# Patient Record
Sex: FEMALE | Race: WHITE | Employment: OTHER | URBAN - METROPOLITAN AREA
[De-identification: names, ages, dates, MRNs, and addresses within clinical notes are randomized per-mention and may not be internally consistent; named-entity substitution may affect disease eponyms.]

---

## 2018-08-06 ENCOUNTER — OFFICE VISIT (OUTPATIENT)
Dept: CARDIOLOGY CLINIC | Age: 83
End: 2018-08-06
Payer: MEDICARE

## 2018-08-06 VITALS
DIASTOLIC BLOOD PRESSURE: 80 MMHG | BODY MASS INDEX: 24.75 KG/M2 | WEIGHT: 145 LBS | HEIGHT: 64 IN | RESPIRATION RATE: 14 BRPM | HEART RATE: 75 BPM | SYSTOLIC BLOOD PRESSURE: 144 MMHG

## 2018-08-06 DIAGNOSIS — I21.4 NSTEMI (NON-ST ELEVATED MYOCARDIAL INFARCTION) (HCC): Primary | ICD-10-CM

## 2018-08-06 DIAGNOSIS — I34.0 NON-RHEUMATIC MITRAL REGURGITATION: ICD-10-CM

## 2018-08-06 PROCEDURE — 99214 OFFICE O/P EST MOD 30 MIN: CPT | Performed by: INTERNAL MEDICINE

## 2018-08-06 PROCEDURE — 93000 ELECTROCARDIOGRAM COMPLETE: CPT | Performed by: INTERNAL MEDICINE

## 2018-08-06 RX ORDER — METOPROLOL SUCCINATE 25 MG/1
12.5 TABLET, EXTENDED RELEASE ORAL DAILY
Qty: 90 TABLET | Refills: 3
Start: 2018-08-06 | End: 2018-08-17

## 2018-08-06 RX ORDER — BIOTIN 1 MG
TABLET ORAL
COMMUNITY
End: 2019-05-15

## 2018-08-06 RX ORDER — AMOXICILLIN 500 MG
CAPSULE ORAL
COMMUNITY
End: 2019-05-15

## 2018-08-06 NOTE — PROGRESS NOTES
Estela Malcolm  1934  Date of Service: 8/6/2018    Patient Active Problem List    Diagnosis Date Noted    GI bleed 06/03/2016     Priority: High    NSTEMI (non-ST elevated myocardial infarction) ([facility] 06/03/2016     Priority: High    GIST (gastrointestinal stromal tumor), malignant ([facility] 05/25/2016     Priority: High    Moderate protein-calorie malnutrition ([facility] 06/06/2016     Priority: Medium    EKG abnormalities 06/03/2016     Priority: Medium    Chest pain 06/03/2016     Priority: Medium    Acute blood loss anemia 06/03/2016     Priority: Medium    Acidosis 06/04/2016    Protein calorie malnutrition ([facility] 06/04/2016    Diverticulitis of large intestine 05/24/2016    Hypertension     PUD (peptic ulcer disease)        Social History     Social History    Marital status:      Spouse name: N/A    Number of children: N/A    Years of education: N/A     Social History Main Topics    Smoking status: Never Smoker    Smokeless tobacco: Never Used    Alcohol use Yes      Comment: glass of wine with dinner    Drug use: No    Sexual activity: No     Other Topics Concern    None     Social History Narrative    None       Current Outpatient Prescriptions   Medication Sig Dispense Refill    Calcium Polycarbophil (FIBER-CAPS PO) Take by mouth      Biotin 1000 MCG TABS Take by mouth      Red Yeast Rice Extract (RED YEAST RICE PO) Take by mouth      Probiotic Product (PROBIOTIC DAILY PO) Take by mouth      Omega-3 Fatty Acids (FISH OIL) 1200 MG CAPS Take by mouth      metoprolol succinate (TOPROL XL) 25 MG extended release tablet Take 0.5 tablets by mouth daily 90 tablet 3    aspirin 81 MG EC tablet Take 81 mg by mouth as needed       diazepam (VALIUM) 5 MG tablet Take 5 mg by mouth nightly as needed for Sleep      Turmeric 450 MG CAPS Take by mouth daily Instructed to stop until after surgery       No current facility-administered medications for this visit.          Allergies Allergen Reactions    Carafate [Sucralfate]      Unable to explain    Cetirizine & Related      Unable to explain    Pravachol [Pravastatin Sodium]      Unable to explain    Protonix [Pantoprazole Sodium]      Unable to explain    Statins Other (See Comments)     intolerable       Chief Complaint:  Coreen Franco is here today for follow up and management/recomendations for CAD     History of Present Illness: Coreen Franco states that She does house work, goes up the stairs, & goes shopping. She denies any chest discomfort, dyspnea on exertionWith any of these activities. She denies orthopnea/PND, or lower extremity edema. She denies any palpitations or presyncopal symptoms. REVIEW OF SYSTEMS:  As above. Patient does not complain of any fever, chills, nausea, vomiting or diarrhea. No focal, motor or neurological deficits. No changes in his/her vision, hearing, bowel or bladder habits. She is not known to have a history of thyroid problems. No recent nose bleeds. PHYSICAL EXAM:  Vitals:    08/06/18 1009 08/06/18 1022   BP: 130/78 (!) 144/80   Pulse: 75    Resp: 14    Weight: 145 lb (65.8 kg)    Height: 5' 4\" (1.626 m)        GENERAL:  She is alert and oriented x 3, communicates well, in no distress. NECK:  No masses, trachea is mid position. Supple, full ROM, no JVD or bruits. No palpable thyromegaly or lymphadenopathy. HEART:  Regular rate and rhythm. Normal S1 and S2. There is an S4 gallop and a I/VI systolic murmur. LUNGS:  Clear to auscultation bilaterally. No use of accessory muscles. symmetrical excursion. ABDOMEN:  Soft, non-tender. Normal bowel sounds. EXTREMITIES:  Full ROM x 4. No bilateral lower extremity edema. Good distal pulses. EYES:  Extraocular muscles intact. PERRL. Normal lids & conjunctiva. ENT:  Nares are clear & not bleeding. Moist mucosa. Normal lips formation. No external masses   NEURO: no tremors, full ROM x 4, EOMI. SKIN:  Warm, dry and intact.

## 2018-08-15 ENCOUNTER — HOSPITAL ENCOUNTER (OUTPATIENT)
Dept: CARDIOLOGY | Age: 83
Discharge: HOME | End: 2018-08-15
Payer: MEDICARE

## 2018-08-15 DIAGNOSIS — I34.0 NON-RHEUMATIC MITRAL REGURGITATION: ICD-10-CM

## 2018-08-15 LAB
LV EF: 65 %
LVEF MODALITY: NORMAL

## 2018-08-15 PROCEDURE — 93306 TTE W/DOPPLER COMPLETE: CPT | Performed by: PSYCHIATRY & NEUROLOGY

## 2018-08-16 ENCOUNTER — TELEPHONE (OUTPATIENT)
Dept: CARDIOLOGY CLINIC | Age: 83
End: 2018-08-16

## 2018-08-16 NOTE — TELEPHONE ENCOUNTER
----- Message from Kaitlynn Chen DO sent at 8/16/2018  3:42 PM EDT -----  Let her know that the heart function is good. The moderate mitral regurgitation is unchanged.   ----- Message -----  From: Chuy Gracia Incoming Cardiology Results From [facility]  Sent: 8/15/2018   5:03 PM  To: Kaitlynn Chen DO

## 2018-08-20 ENCOUNTER — HOSPITAL ENCOUNTER (OUTPATIENT)
Age: 83
Discharge: HOME | End: 2018-08-22

## 2018-08-20 PROCEDURE — 88342 IMHCHEM/IMCYTCHM 1ST ANTB: CPT

## 2018-08-20 PROCEDURE — 88305 TISSUE EXAM BY PATHOLOGIST: CPT

## 2019-03-18 ENCOUNTER — TELEPHONE (OUTPATIENT)
Dept: ADMINISTRATIVE | Age: 84
End: 2019-03-18

## 2019-03-21 ENCOUNTER — OFFICE VISIT (OUTPATIENT)
Dept: CARDIOLOGY CLINIC | Age: 84
End: 2019-03-21
Payer: MEDICARE

## 2019-03-21 VITALS
RESPIRATION RATE: 18 BRPM | BODY MASS INDEX: 22.98 KG/M2 | HEIGHT: 66 IN | WEIGHT: 143 LBS | SYSTOLIC BLOOD PRESSURE: 146 MMHG | DIASTOLIC BLOOD PRESSURE: 86 MMHG | HEART RATE: 60 BPM

## 2019-03-21 DIAGNOSIS — R06.09 DOE (DYSPNEA ON EXERTION): ICD-10-CM

## 2019-03-21 DIAGNOSIS — I10 ESSENTIAL HYPERTENSION: ICD-10-CM

## 2019-03-21 DIAGNOSIS — I25.9 CHEST PAIN DUE TO MYOCARDIAL ISCHEMIA, UNSPECIFIED ISCHEMIC CHEST PAIN TYPE: ICD-10-CM

## 2019-03-21 DIAGNOSIS — I21.4 NSTEMI (NON-ST ELEVATED MYOCARDIAL INFARCTION) (HCC): Primary | ICD-10-CM

## 2019-03-21 PROCEDURE — 93000 ELECTROCARDIOGRAM COMPLETE: CPT | Performed by: INTERNAL MEDICINE

## 2019-03-21 PROCEDURE — 99214 OFFICE O/P EST MOD 30 MIN: CPT | Performed by: INTERNAL MEDICINE

## 2019-03-21 RX ORDER — LISINOPRIL 2.5 MG/1
2.5 TABLET ORAL DAILY
Qty: 90 TABLET | Refills: 3 | Status: SHIPPED | OUTPATIENT
Start: 2019-03-21 | End: 2019-04-01 | Stop reason: SINTOL

## 2019-03-21 RX ORDER — LORATADINE 10 MG/1
10 TABLET ORAL DAILY PRN
COMMUNITY

## 2019-03-22 ENCOUNTER — HOSPITAL ENCOUNTER (OUTPATIENT)
Dept: CARDIOLOGY | Age: 84
Discharge: HOME | End: 2019-03-22
Payer: MEDICARE

## 2019-03-22 VITALS
BODY MASS INDEX: 22.98 KG/M2 | HEIGHT: 66 IN | SYSTOLIC BLOOD PRESSURE: 120 MMHG | WEIGHT: 143 LBS | HEART RATE: 86 BPM | DIASTOLIC BLOOD PRESSURE: 72 MMHG

## 2019-03-22 DIAGNOSIS — R06.09 DOE (DYSPNEA ON EXERTION): ICD-10-CM

## 2019-03-22 DIAGNOSIS — R06.00 DYSPNEA, UNSPECIFIED TYPE: Primary | ICD-10-CM

## 2019-03-22 DIAGNOSIS — I21.4 NSTEMI (NON-ST ELEVATED MYOCARDIAL INFARCTION) (HCC): ICD-10-CM

## 2019-03-22 PROCEDURE — 6360000002 HC RX W HCPCS: Performed by: INTERNAL MEDICINE

## 2019-03-22 PROCEDURE — 78452 HT MUSCLE IMAGE SPECT MULT: CPT

## 2019-03-22 PROCEDURE — 93017 CV STRESS TEST TRACING ONLY: CPT

## 2019-03-22 PROCEDURE — 3430000000 HC RX DIAGNOSTIC RADIOPHARMACEUTICAL: Performed by: INTERNAL MEDICINE

## 2019-03-22 PROCEDURE — 2580000003 HC RX 258: Performed by: INTERNAL MEDICINE

## 2019-03-22 PROCEDURE — A9500 TC99M SESTAMIBI: HCPCS | Performed by: INTERNAL MEDICINE

## 2019-03-22 RX ORDER — SODIUM CHLORIDE 0.9 % (FLUSH) 0.9 %
10 SYRINGE (ML) INJECTION PRN
Status: DISCONTINUED | OUTPATIENT
Start: 2019-03-22 | End: 2019-03-23 | Stop reason: HOSPADM

## 2019-03-22 RX ADMIN — Medication 9.4 MILLICURIE: at 07:04

## 2019-03-22 RX ADMIN — Medication 10 ML: at 08:29

## 2019-03-22 RX ADMIN — Medication 30 MILLICURIE: at 08:28

## 2019-03-22 RX ADMIN — REGADENOSON 0.4 MG: 0.08 INJECTION, SOLUTION INTRAVENOUS at 08:28

## 2019-03-22 RX ADMIN — Medication 10 ML: at 07:04

## 2019-03-22 RX ADMIN — Medication 10 ML: at 08:28

## 2019-03-25 ENCOUNTER — TELEPHONE (OUTPATIENT)
Dept: CARDIOLOGY CLINIC | Age: 84
End: 2019-03-25

## 2019-03-27 ENCOUNTER — TELEPHONE (OUTPATIENT)
Dept: CARDIOLOGY CLINIC | Age: 84
End: 2019-03-27

## 2019-03-27 NOTE — TELEPHONE ENCOUNTER
I do not think lisinopril causes those symptoms. I would recommend following with primary care provider for dysphagia.   If she has any swelling of the tongue then we should stop lisinopril

## 2019-03-27 NOTE — TELEPHONE ENCOUNTER
Patient notified of Dr. Guardado's recommendations. She states she developed the symptoms after only (1) dose of Lisinopril and she refuses to take it again. Advised patient that I would forward information to Dr. Shireen Calderon for him to address upon his return.

## 2019-04-01 RX ORDER — AMLODIPINE BESYLATE 2.5 MG/1
2.5 TABLET ORAL DAILY
Qty: 30 TABLET | Refills: 11 | Status: SHIPPED | OUTPATIENT
Start: 2019-04-01 | End: 2019-05-23 | Stop reason: ALTCHOICE

## 2019-04-01 NOTE — TELEPHONE ENCOUNTER
Patient notified of Dr. Shanti Pedersen recommendation. [facility] e-scribed to pharmacy.

## 2019-05-08 ENCOUNTER — OFFICE VISIT (OUTPATIENT)
Dept: FAMILY MEDICINE CLINIC | Age: 84
End: 2019-05-08
Payer: MEDICARE

## 2019-05-08 VITALS
WEIGHT: 142 LBS | SYSTOLIC BLOOD PRESSURE: 138 MMHG | HEIGHT: 66 IN | OXYGEN SATURATION: 100 % | TEMPERATURE: 97.7 F | RESPIRATION RATE: 16 BRPM | DIASTOLIC BLOOD PRESSURE: 72 MMHG | HEART RATE: 70 BPM | BODY MASS INDEX: 22.82 KG/M2

## 2019-05-08 DIAGNOSIS — I10 ESSENTIAL HYPERTENSION: Primary | ICD-10-CM

## 2019-05-08 DIAGNOSIS — R42 DIZZINESS: ICD-10-CM

## 2019-05-08 PROCEDURE — 93000 ELECTROCARDIOGRAM COMPLETE: CPT | Performed by: FAMILY MEDICINE

## 2019-05-08 PROCEDURE — 99213 OFFICE O/P EST LOW 20 MIN: CPT | Performed by: FAMILY MEDICINE

## 2019-05-08 RX ORDER — CLOPIDOGREL BISULFATE 75 MG/1
75 TABLET ORAL DAILY
COMMUNITY
End: 2019-05-16 | Stop reason: ALTCHOICE

## 2019-05-08 ASSESSMENT — ENCOUNTER SYMPTOMS
NAUSEA: 0
SHORTNESS OF BREATH: 1
SWOLLEN GLANDS: 0
COUGH: 0
VISUAL CHANGE: 0
SORE THROAT: 0

## 2019-05-08 NOTE — PROGRESS NOTES
5/8/19    Name: Ledy Ferraro  QGJ:[id number]   Sex:female   Age:85 y.o. Subjective:  Chief Complaint   Patient presents with    Fatigue      says she feels this suddenly and then has to sit down for the past week    Shortness of Breath     feels this when the sudden weakness occurs     Patient for acute visit  Having some dizziness and weakness  But usually only occurs after taking the plavix  We stopped the hydrocodone, she is eating well, drinking more fluids and getting plenty of rest  Vital signs reviewed today and they are very good    Fatigue   This is a new problem. The current episode started 1 to 4 weeks ago. The problem occurs intermittently. The problem has been waxing and waning. Associated symptoms include fatigue and vertigo. Pertinent negatives include no anorexia, arthralgias, chills, congestion, coughing, headaches, nausea, neck pain, sore throat, swollen glands or visual change. Exacerbated by: medication. Treatments tried: we tried changing meds and increasing fluids with no help. The treatment provided mild relief. Shortness of Breath   Pertinent negatives include no headaches, neck pain, sore throat or swollen glands.      ROS:    As above  Other pertinenet systems reviewed and negative   with her today an dhe really feels like she is doing better and these s/s are intermitent but occur after the plavix      Current Outpatient Medications:     clopidogrel (PLAVIX) 75 MG tablet, Take 75 mg by mouth daily, Disp: , Rfl:     amLODIPine (NORVASC) 2.5 MG tablet, Take 1 tablet by mouth daily, Disp: 30 tablet, Rfl: 11    loratadine (CLARITIN) 10 MG tablet, Take 10 mg by mouth as needed, Disp: , Rfl:     aspirin 81 MG tablet, Take 81 mg by mouth daily, Disp: , Rfl:     Calcium Polycarbophil (FIBER-CAPS PO), Take by mouth, Disp: , Rfl:     Biotin 1000 MCG TABS, Take by mouth, Disp: , Rfl:     Red Yeast Rice Extract (RED YEAST RICE PO), Take by mouth, Disp: , Rfl:     Probiotic Product (PROBIOTIC DAILY PO), Take by mouth, Disp: , Rfl:     Omega-3 Fatty Acids (FISH OIL) 1200 MG CAPS, Take by mouth, Disp: , Rfl:   Allergies   Allergen Reactions    Carafate [Sucralfate]      Unable to explain    Cefdinir     Cetirizine & Related      Unable to explain    Lisinopril Other (See Comments)     Difficulty swallowing, reflux, headache    Pravachol [Pravastatin Sodium]      Unable to explain    Protonix [Pantoprazole Sodium]      Unable to explain    Statins Other (See Comments)     intolerable        Vitals:    05/08/19 1110   BP: 138/72   Pulse: 70   Resp: 16   Temp: 97.7 °F (36.5 °C)   SpO2: 100%       EXAM:   Physical Exam   Constitutional: She is oriented to person, place, and time. She appears well-developed and well-nourished. No distress. HENT:   Head: Normocephalic and atraumatic. Mouth/Throat: Oropharynx is clear and moist.   Eyes: Pupils are equal, round, and reactive to light. Conjunctivae and EOM are normal.   Neck: Normal range of motion. Neck supple. No JVD present. No thyromegaly present. Cardiovascular: Normal rate, regular rhythm and normal heart sounds. Pulmonary/Chest: Effort normal and breath sounds normal. She has no wheezes. She has no rales. Abdominal: Soft. Bowel sounds are normal. She exhibits no distension and no mass. There is no rebound and no guarding. Musculoskeletal: Normal range of motion. She exhibits no edema or tenderness. Neurological: She is alert and oriented to person, place, and time. She displays normal reflexes. No cranial nerve deficit. Skin: Skin is warm and dry. No rash noted. No erythema. Psychiatric: She has a normal mood and affect. Judgment and thought content normal.   Nursing note and vitals reviewed. Colt Rader was seen today for fatigue and shortness of breath.     Diagnoses and all orders for this visit:    Essential hypertension  -     EKG 12 Lead    Dizziness  -     EKG 12 Lead      [facility] about maybe switching the plavix, did asvise her not to take any of it till I call her regarding this or Radha calls her      Seen by:   Radha Sheriff, DO

## 2019-05-14 ENCOUNTER — TELEPHONE (OUTPATIENT)
Dept: PHARMACY | Facility: CLINIC | Age: 84
End: 2019-05-14

## 2019-05-14 DIAGNOSIS — Z98.890 HISTORY OF RIGHT-SIDED CAROTID ENDARTERECTOMY: Primary | ICD-10-CM

## 2019-05-14 PROCEDURE — 1111F DSCHRG MED/CURRENT MED MERGE: CPT

## 2019-05-14 NOTE — TELEPHONE ENCOUNTER
CLINICAL PHARMACY NOTE  Post-Discharge Transitions of Care (ALEJANDRINA)    Patient appears to have been discharged from [facility] on 04/21/2019. Please follow-up with patient to review medications.       30 days since discharge = 04/21/2019     Quinn   [phone] or [phone] (Option 7)

## 2019-05-15 ENCOUNTER — HOSPITAL ENCOUNTER (OUTPATIENT)
Age: 84
Discharge: HOME | End: 2019-05-17
Payer: MEDICARE

## 2019-05-15 DIAGNOSIS — I10 ESSENTIAL HYPERTENSION: ICD-10-CM

## 2019-05-15 LAB
ANION GAP SERPL CALCULATED.3IONS-SCNC: 13 MMOL/L (ref 7–16)
BUN BLDV-MCNC: 13 MG/DL (ref 8–23)
CALCIUM SERPL-MCNC: 9.9 MG/DL (ref 8.6–10.2)
CHLORIDE BLD-SCNC: 104 MMOL/L (ref 98–107)
CO2: 24 MMOL/L (ref 22–29)
CREAT SERPL-MCNC: 0.9 MG/DL (ref 0.5–1)
GFR AFRICAN AMERICAN: >60
GFR NON-AFRICAN AMERICAN: 59 ML/MIN/1.73
GLUCOSE BLD-MCNC: 114 MG/DL (ref 74–99)
POTASSIUM SERPL-SCNC: 4.6 MMOL/L (ref 3.5–5)
SODIUM BLD-SCNC: 141 MMOL/L (ref 132–146)

## 2019-05-15 PROCEDURE — 80048 BASIC METABOLIC PNL TOTAL CA: CPT

## 2019-05-15 PROCEDURE — 36415 COLL VENOUS BLD VENIPUNCTURE: CPT

## 2019-05-15 NOTE — TELEPHONE ENCOUNTER
CLINICAL PHARMACY NOTE  Post-Discharge Transitions of Care (ALEJANDRINA)    Dr. Janelle Cox, patient's next appointment is scheduled with you 6/17/19. Many changes made to medication list during our phone conversation. Patients states she is not taking amlodipine that was started by Dr. Klever Collins for hypertension (was to replace lisinopril she thought she had side effects from). Per pharmacy, patient never picked up the amlodipine. Patient states she would just wait and talk with her doctor about this medication. Have kept it on the medication list for now, but marked it as not taking. Thank you,  Andre Hughes, PharmD, [place], [address] [place] [address],[address] [facility] Clinical Pharmacist  C: [phone]  O: [phone]  Department, toll free: [phone], option 7   =================================================  Non-face-to-face services provided:  Assessment and support for treatment adherence and medication management-Medication reconciliation completed 5/15/19    Subjective/Objective:  Heath Belcher is a 80 y.o. female. Patient was discharged from [facility] on 4/21/19. Patient outreach to review discharge medications and provide medication review and management. Spoke with patient    Allergies   Allergen Reactions    Carafate [Sucralfate]      Unable to explain    Cefdinir     Cetirizine & Related      Unable to explain    Lisinopril Other (See Comments)     Difficulty swallowing, reflux, headache    Pravachol [Pravastatin Sodium]      Unable to explain    Protonix [Pantoprazole Sodium]      Unable to explain    Statins Other (See Comments)     intolerable     Medication Sig Notes    clopidogrel (PLAVIX) 75 MG tablet      [phone] Dr. Yennifer Saravia office number    Take 75 mg by mouth daily Patient states she took 1 dose and did not feel well. Asked what symptoms she had- she states she can't remember but will not take again.   \"Left message with Radha at DR Phillips office about maybe switching the plavix, did asvise her not to take any of it till I call her regarding this or Radha calls her. \" This is from Dr. Stefani Kaur last office visit note. Per Roberta Schilder at Dr. Mateo Pandey office, patient was just there 5/13/19 and Dr. Carli Amato notes that patient can stop clopidogrel due to side effects and continue aspirin 81 mg daily.  amLODIPine (NORVASC) 2.5 MG tablet Take 1 tablet by mouth daily Patient states not taking. Did not recognize name. Octaviano GRAY 7. who states never picked up. Patient states she has 's appointments coming up soon and can talk about it then.  loratadine (CLARITIN) 10 MG tablet Take 10 mg by mouth as needed allergies    aspirin 81 MG tablet Take 81 mg by mouth daily     Calcium Polycarbophil (FIBER-CAPS PO) Take by mouth States does not take, can remove from med list    Biotin 1000 MCG TABS Take by mouth States does not take, can remove from med list    Red Yeast Rice Extract (RED YEAST RICE PO) Take by mouth States does not take, can remove from med list    Probiotic Product (PROBIOTIC DAILY PO) Take by mouth     Omega-3 Fatty Acids (FISH OIL) 1200 MG CAPS Take by mouth States does not take, can remove from med list     Additional Medications:  None reported    CrCl cannot be calculated (Patient's most recent lab result is older than the maximum 120 days allowed. ). Assessment/Plan:  - Medication reconciliation completed. Number of medications reviewed: 9    Number of discrepancies: 1. Instructions per discharge list provided except per below documentation. Identified medication discrepancies/issues:   · Category 3 (1):  · Patient had recent surgery with Dr. Lacho Pineda and states she stopped the clopidogrel he started. No documentation in chart if this was okay with Dr. Carli Amato as outside provider. Called his office and spoke with Roberta Schilder who states Dr. Cristopher Bes it, and patient to keep taking aspirin 81 mg daily. · Category 4 (1):  1.  Medication list updated    - [facility] active medication list updated:  · Medications Added (0):    · Medications Removed (5):  Red yeast rice, Fish oil, Fiber, biotin, clopidogrel  · Medications Changed (1):  loratadine    - Identified Potential Medication Interactions: No clinically significant interactions identified via [employer] Interaction Analysis as category D or higher.    - Renal Dosing: No renal adjustments necessary.     Future Appointments   Date Time Provider Angelo Jordan   6/14/2019  8:15 AM [facility]   6/17/2019  8:45 AM DO MOON [facility]     Thank you,    Lydia Rubi, PharmD  [address]  Phone: C: [phone]  O: [phone]  Toll free: [phone], option 7    CLINICAL PHARMACY NOTE   POST-DISCHARGE TELEPHONE FOLLOW-UP ADDENDUM  For Pharmacy Admin Tracking Only  TCM Call Made?: No  [facility] Select Patient?: Yes  Total # of Interventions Recommended: 2 -   - Updated Order #: 2  Total # Interventions Accepted: 2  Intervention Severity:   - Level 1 Intervention Present?: No   - Level 2 #: 0   - Level 3 #: 1  Outreach Status: Review Complete  Care Coordinator Outreach to Patient?: No  Provider Contacted?: Yes - Office Called  Waiting on response from: n/a  Time Spent (min): 35

## 2019-05-20 ENCOUNTER — OFFICE VISIT (OUTPATIENT)
Dept: FAMILY MEDICINE CLINIC | Age: 84
End: 2019-05-20
Payer: MEDICARE

## 2019-05-20 VITALS
HEART RATE: 68 BPM | SYSTOLIC BLOOD PRESSURE: 140 MMHG | TEMPERATURE: 97.7 F | DIASTOLIC BLOOD PRESSURE: 92 MMHG | OXYGEN SATURATION: 99 %

## 2019-05-20 DIAGNOSIS — R42 VERTIGO: Primary | ICD-10-CM

## 2019-05-20 PROCEDURE — 99213 OFFICE O/P EST LOW 20 MIN: CPT | Performed by: FAMILY MEDICINE

## 2019-05-20 RX ORDER — MECLIZINE HYDROCHLORIDE 25 MG/1
25 TABLET ORAL 3 TIMES DAILY PRN
Qty: 30 TABLET | Refills: 1 | Status: SHIPPED | OUTPATIENT
Start: 2019-05-20 | End: 2019-05-30

## 2019-05-20 NOTE — PROGRESS NOTES
normal.   Neck: Normal range of motion. Cardiovascular: Normal rate and regular rhythm. Pulmonary/Chest: Effort normal and breath sounds normal.   Neurological: She is alert and oriented to person, place, and time. Skin: Skin is warm and dry. Nursing note and vitals reviewed. Delfin Goetz was seen today for dizziness. Diagnoses and all orders for this visit:    Vertigo  -     External Referral To Audiology  -     meclizine (ANTIVERT) 25 MG tablet; Take 1 tablet by mouth 3 times daily as needed for Dizziness        Seen by:   Lakesha Meraz DO

## 2019-05-22 ENCOUNTER — TELEPHONE (OUTPATIENT)
Dept: CARDIOLOGY CLINIC | Age: 84
End: 2019-05-22

## 2019-05-23 ENCOUNTER — TELEPHONE (OUTPATIENT)
Dept: ADMINISTRATIVE | Age: 84
End: 2019-05-23

## 2019-05-23 NOTE — TELEPHONE ENCOUNTER
Patient calling stating since  feeling weak and having a hard time focusing and  Some dizziness. Started new medication Norvasc  on Monday. Please advise.

## 2019-05-28 ENCOUNTER — OFFICE VISIT (OUTPATIENT)
Dept: CARDIOLOGY CLINIC | Age: 84
End: 2019-05-28
Payer: MEDICARE

## 2019-05-28 VITALS
DIASTOLIC BLOOD PRESSURE: 80 MMHG | BODY MASS INDEX: 22.98 KG/M2 | RESPIRATION RATE: 16 BRPM | WEIGHT: 143 LBS | HEART RATE: 71 BPM | HEIGHT: 66 IN | SYSTOLIC BLOOD PRESSURE: 128 MMHG

## 2019-05-28 DIAGNOSIS — R06.00 DYSPNEA, UNSPECIFIED TYPE: Primary | ICD-10-CM

## 2019-05-28 PROCEDURE — 93000 ELECTROCARDIOGRAM COMPLETE: CPT | Performed by: INTERNAL MEDICINE

## 2019-05-28 PROCEDURE — 99214 OFFICE O/P EST MOD 30 MIN: CPT | Performed by: INTERNAL MEDICINE

## 2019-05-28 NOTE — PROGRESS NOTES
Sheri Saeed  1934  Date of Service: 5/28/2019    Patient Active Problem List    Diagnosis Date Noted    GI bleed 06/03/2016     Priority: High    NSTEMI (non-ST elevated myocardial infarction) ([facility] 06/03/2016     Priority: High    GIST (gastrointestinal stromal tumor), malignant ([facility] 05/25/2016     Priority: High    Moderate protein-calorie malnutrition ([facility] 06/06/2016     Priority: Medium    EKG abnormalities 06/03/2016     Priority: Medium    Chest pain 06/03/2016     Priority: Medium    Acute blood loss anemia 06/03/2016     Priority: Medium    Acidosis 06/04/2016    Protein calorie malnutrition ([facility] 06/04/2016    Diverticulitis of large intestine 05/24/2016    Hypertension     PUD (peptic ulcer disease)        Social History     Socioeconomic History    Marital status:      Spouse name: None    Number of children: None    Years of education: None    Highest education level: None   Occupational History    None   Social Needs    Financial resource strain: None    Food insecurity:     Worry: None     Inability: None    Transportation needs:     Medical: None     Non-medical: None   Tobacco Use    Smoking status: Never Smoker    Smokeless tobacco: Never Used   Substance and Sexual Activity    Alcohol use: Yes     Comment: glass of wine with dinner    Drug use: No    Sexual activity: Never   Lifestyle    Physical activity:     Days per week: None     Minutes per session: None    Stress: None   Relationships    Social connections:     Talks on phone: None     Gets together: None     Attends Confucianist service: None     Active member of club or organization: None     Attends meetings of clubs or organizations: None     Relationship status: None    Intimate partner violence:     Fear of current or ex partner: None     Emotionally abused: None     Physically abused: None     Forced sexual activity: None   Other Topics Concern    None   Social History Narrative    None       Current Outpatient Medications   Medication Sig Dispense Refill    meclizine (ANTIVERT) 25 MG tablet Take 1 tablet by mouth 3 times daily as needed for Dizziness 30 tablet 1    loratadine (CLARITIN) 10 MG tablet Take 10 mg by mouth daily as needed (allergies)       aspirin 81 MG tablet Take 81 mg by mouth daily      Probiotic Product (PROBIOTIC DAILY PO) Take by mouth       No current facility-administered medications for this visit. Allergies   Allergen Reactions    Carafate [Sucralfate]      Unable to explain    Cefdinir     Cetirizine & Related      Unable to explain    Lisinopril Other (See Comments)     Difficulty swallowing, reflux, headache    Pravachol [Pravastatin Sodium]      Unable to explain    Protonix [Pantoprazole Sodium]      Unable to explain    Statins Other (See Comments)     intolerable       Chief Complaint:  Ledy Ferraro is here today for follow up and management/recomendations for CAD     History of Present Illness: Ledy Ferraro is an extremely difficult historian. She states that she continues to feel the same easily fatigued. She occasionally does go upstairs and does some light housework. She denies any chest discomfort or dyspnea. She is being seen here today urgently due to lightheadedness was initially reported after she started Norvasc. The Norvasc was discontinued. She did see Dr. Errol Portillo and was started on Antivert. She states that the Antivert is helping. She states that she still does have symptoms. Initially she states that happens when she stands up. However, again she is an extremely difficult historian and then states that the symptoms started approximately 2 weeks after she started wearing hearing aids and also occur when she turns her head. She denies orthopnea/PND, or lower extremity edema. She denies any palpitations. REVIEW OF SYSTEMS:  As above. Patient does not complain of any fever, chills, nausea, vomiting or diarrhea.  No focal, symptoms. She defers at this time and wishes to workup and treat her vertigo 1st. I do agree that the majority of her symptoms sound more consistent with vertigo. She has stopped the Norvasc. Her blood pressure is good today. 3. I will defer the vertigo to others. Thank you for allowing me to participate in your patient's care.       [address], [address] [facility]  Interventional Cardiology

## 2019-05-30 ENCOUNTER — TELEPHONE (OUTPATIENT)
Dept: CARDIOLOGY CLINIC | Age: 84
End: 2019-05-30

## 2019-05-30 NOTE — TELEPHONE ENCOUNTER
Patient called to inquire whether or not she should continue to take Aspirin 81 mg daily. Please advise.

## 2019-06-14 ENCOUNTER — PROCEDURE VISIT (OUTPATIENT)
Dept: PODIATRY | Age: 84
End: 2019-06-14
Payer: MEDICARE

## 2019-06-14 VITALS
DIASTOLIC BLOOD PRESSURE: 82 MMHG | HEIGHT: 66 IN | SYSTOLIC BLOOD PRESSURE: 142 MMHG | WEIGHT: 145.8 LBS | BODY MASS INDEX: 23.43 KG/M2

## 2019-06-14 DIAGNOSIS — I73.9 PVD (PERIPHERAL VASCULAR DISEASE) (HCC): ICD-10-CM

## 2019-06-14 DIAGNOSIS — M79.675 PAIN OF TOE OF LEFT FOOT: ICD-10-CM

## 2019-06-14 DIAGNOSIS — M79.674 PAIN OF TOE OF RIGHT FOOT: ICD-10-CM

## 2019-06-14 DIAGNOSIS — B35.1 ONYCHOMYCOSIS: Primary | ICD-10-CM

## 2019-06-14 DIAGNOSIS — R26.2 DIFFICULTY WALKING: ICD-10-CM

## 2019-06-14 PROCEDURE — 11721 DEBRIDE NAIL 6 OR MORE: CPT | Performed by: PODIATRIST

## 2019-06-14 NOTE — PROGRESS NOTES
19     Autry Oppenheim    : 1934  Sex: female  Age: 80 y.o. Subjective: The patient is seen today for evaluation regarding foot evaluation and mycotic nail care. No other complaints noted. Chief Complaint   Patient presents with    Nail Problem     nail care       Current Medications:    Current Outpatient Medications:     loratadine (CLARITIN) 10 MG tablet, Take 10 mg by mouth daily as needed (allergies) , Disp: , Rfl:     aspirin 81 MG tablet, Take 81 mg by mouth daily, Disp: , Rfl:     Probiotic Product (PROBIOTIC DAILY PO), Take by mouth, Disp: , Rfl:     Allergies: Allergies   Allergen Reactions    Carafate [Sucralfate]      Unable to explain    Cefdinir     Cetirizine & Related      Unable to explain    Lisinopril Other (See Comments)     Difficulty swallowing, reflux, headache    Pravachol [Pravastatin Sodium]      Unable to explain    Protonix [Pantoprazole Sodium]      Unable to explain    Statins Other (See Comments)     intolerable       Past Surgical History:   Procedure Laterality Date    COLONOSCOPY      DILATATION, ESOPHAGUS      ENDOSCOPY, COLON, DIAGNOSTIC  2016    marking of gastric tumor    HYSTERECTOMY      OTHER SURGICAL HISTORY  2016    Laparoscopic Robotic Assisted Partial Gastrectomy    SHOULDER SURGERY Left     UPPER GASTROINTESTINAL ENDOSCOPY      UPPER GASTROINTESTINAL ENDOSCOPY  2016     Past Medical History:   Diagnosis Date    Diverticulosis     GIST [facility]gastrointestinal stroma tumor), malignant[facility] [address]     Hyperlipidemia     Hypertension     MI (myocardial infarction) ([facility] [address]     PUD (peptic ulcer disease)        Vitals:    19 0824   BP: (!) 142/82   Weight: 145 lb 12.8 oz (66.1 kg)   Height: 5' 6\" (1.676 m)       Exam:  Neurovascular status unchanged.   At this time the nail/s 1, 2, 3, 4, 5 right foot and nail/s 1, 2, 3, 4, 5 left foot are noted to be thickened, dystrophic and discolored with subungual debris present. Tenderness noted to palpation. Minimal hair growth is noted to both lower extremities. Edematous issues noted to both lower extremities with varicosities present bilaterally. Coolness is noted to the digital regions to palpation. Capillary fill time delayed digital areas bilateral foot. No heel fissuring or macerations of the web spaces. No plantar calluses and/or ulcerative areas are noted. Patient is having difficulty with gait/walking. Plan Per Assessment  Keely Valdez was seen today for nail problem. Diagnoses and all orders for this visit:    Onychomycosis    Pain of toe of right foot    Pain of toe of left foot    PVD (peripheral vascular disease) ([facility] [address]    Difficulty walking        1. Evaluation and Management  2. Manual and electrical debridement of the mycotic nails was performed for thickness and length to prevent injection and/or ulceration. 3. I recommended antifungal cream to the nails daily. 4. It was discussed in detail with the patient proper caring for the vascular compromised foot. The fact that they have compromised blood flow put the patient at risk for infection/gangrene/amputation. The patient should not walk barefoot. Shoe gear should fit properly and socks should be worn with shoes. Exercise is very important to prevent worsening of the disease process but before performing an exercise program should check with their family physician first.  If any skin lesions are noted, they are instructed to contact the office immediately. 5. We will see the patient back at a later date for continued podiatric management and care. Patient was advised to call the office with any questions or concerns prior to their next appointment if needed. Seen By:    Pratik Richter DPM    Electronically signed by Pratik Richter DPM on 6/14/2019 at 9:02 AM      This note was created using voice recognition software.   The note was reviewed however may contain

## 2019-06-14 NOTE — PROGRESS NOTES
Patient in today for nail care. Patient does not have any complaints of pain at this time.  Patient's PCP is Radha Sheriff DO date of last ov     5-20-19    Darren Moreno LPN

## 2019-08-20 ENCOUNTER — OFFICE VISIT (OUTPATIENT)
Dept: FAMILY MEDICINE CLINIC | Age: 84
End: 2019-08-20
Payer: MEDICARE

## 2019-08-20 ENCOUNTER — HOSPITAL ENCOUNTER (OUTPATIENT)
Age: 84
Discharge: HOME | End: 2019-08-22
Payer: MEDICARE

## 2019-08-20 VITALS
OXYGEN SATURATION: 99 % | TEMPERATURE: 98.4 F | DIASTOLIC BLOOD PRESSURE: 80 MMHG | BODY MASS INDEX: 24.16 KG/M2 | HEART RATE: 80 BPM | WEIGHT: 145 LBS | HEIGHT: 65 IN | SYSTOLIC BLOOD PRESSURE: 138 MMHG

## 2019-08-20 DIAGNOSIS — I73.9 PVD (PERIPHERAL VASCULAR DISEASE) (HCC): ICD-10-CM

## 2019-08-20 DIAGNOSIS — E55.9 VITAMIN D DEFICIENCY: ICD-10-CM

## 2019-08-20 DIAGNOSIS — E07.9 THYROID DISEASE: ICD-10-CM

## 2019-08-20 DIAGNOSIS — E78.2 MIXED HYPERLIPIDEMIA: Primary | ICD-10-CM

## 2019-08-20 DIAGNOSIS — I10 ESSENTIAL HYPERTENSION: ICD-10-CM

## 2019-08-20 DIAGNOSIS — E78.2 MIXED HYPERLIPIDEMIA: ICD-10-CM

## 2019-08-20 PROBLEM — R26.2 DIFFICULTY WALKING: Status: RESOLVED | Noted: 2019-06-14 | Resolved: 2019-08-20

## 2019-08-20 PROBLEM — B35.1 ONYCHOMYCOSIS: Status: RESOLVED | Noted: 2019-06-14 | Resolved: 2019-08-20

## 2019-08-20 LAB
ALBUMIN SERPL-MCNC: 4.4 G/DL (ref 3.5–5.2)
ALP BLD-CCNC: 106 U/L (ref 35–104)
ALT SERPL-CCNC: 13 U/L (ref 0–32)
ANION GAP SERPL CALCULATED.3IONS-SCNC: 18 MMOL/L (ref 7–16)
AST SERPL-CCNC: 22 U/L (ref 0–31)
BASOPHILS ABSOLUTE: 0.05 E9/L (ref 0–0.2)
BASOPHILS RELATIVE PERCENT: 1 % (ref 0–2)
BILIRUB SERPL-MCNC: 0.3 MG/DL (ref 0–1.2)
BUN BLDV-MCNC: 18 MG/DL (ref 8–23)
CALCIUM SERPL-MCNC: 9.9 MG/DL (ref 8.6–10.2)
CHLORIDE BLD-SCNC: 107 MMOL/L (ref 98–107)
CHOLESTEROL, TOTAL: 369 MG/DL (ref 0–199)
CO2: 18 MMOL/L (ref 22–29)
CREAT SERPL-MCNC: 0.9 MG/DL (ref 0.5–1)
EOSINOPHILS ABSOLUTE: 0.14 E9/L (ref 0.05–0.5)
EOSINOPHILS RELATIVE PERCENT: 2.8 % (ref 0–6)
GFR AFRICAN AMERICAN: >60
GFR NON-AFRICAN AMERICAN: 59 ML/MIN/1.73
GLUCOSE BLD-MCNC: 115 MG/DL (ref 74–99)
HCT VFR BLD CALC: 49.4 % (ref 34–48)
HDLC SERPL-MCNC: 72 MG/DL
HEMOGLOBIN: 15.5 G/DL (ref 11.5–15.5)
IMMATURE GRANULOCYTES #: 0.01 E9/L
IMMATURE GRANULOCYTES %: 0.2 % (ref 0–5)
LDL CHOLESTEROL CALCULATED: 249 MG/DL (ref 0–99)
LYMPHOCYTES ABSOLUTE: 1.43 E9/L (ref 1.5–4)
LYMPHOCYTES RELATIVE PERCENT: 29.1 % (ref 20–42)
MCH RBC QN AUTO: 29.9 PG (ref 26–35)
MCHC RBC AUTO-ENTMCNC: 31.4 % (ref 32–34.5)
MCV RBC AUTO: 95.4 FL (ref 80–99.9)
MONOCYTES ABSOLUTE: 0.48 E9/L (ref 0.1–0.95)
MONOCYTES RELATIVE PERCENT: 9.8 % (ref 2–12)
NEUTROPHILS ABSOLUTE: 2.81 E9/L (ref 1.8–7.3)
NEUTROPHILS RELATIVE PERCENT: 57.1 % (ref 43–80)
PDW BLD-RTO: 13.2 FL (ref 11.5–15)
PLATELET # BLD: 183 E9/L (ref 130–450)
PMV BLD AUTO: 12 FL (ref 7–12)
POTASSIUM SERPL-SCNC: 4.4 MMOL/L (ref 3.5–5)
RBC # BLD: 5.18 E12/L (ref 3.5–5.5)
SODIUM BLD-SCNC: 143 MMOL/L (ref 132–146)
TOTAL PROTEIN: 7.4 G/DL (ref 6.4–8.3)
TRIGL SERPL-MCNC: 240 MG/DL (ref 0–149)
TSH SERPL DL<=0.05 MIU/L-ACNC: 4.7 UIU/ML (ref 0.27–4.2)
VITAMIN D 25-HYDROXY: 22 NG/ML (ref 30–100)
VLDLC SERPL CALC-MCNC: 48 MG/DL
WBC # BLD: 4.9 E9/L (ref 4.5–11.5)

## 2019-08-20 PROCEDURE — 3288F FALL RISK ASSESSMENT DOCD: CPT | Performed by: FAMILY MEDICINE

## 2019-08-20 PROCEDURE — G8510 SCR DEP NEG, NO PLAN REQD: HCPCS | Performed by: FAMILY MEDICINE

## 2019-08-20 PROCEDURE — 80061 LIPID PANEL: CPT

## 2019-08-20 PROCEDURE — 99214 OFFICE O/P EST MOD 30 MIN: CPT | Performed by: FAMILY MEDICINE

## 2019-08-20 PROCEDURE — 85025 COMPLETE CBC W/AUTO DIFF WBC: CPT

## 2019-08-20 PROCEDURE — 80053 COMPREHEN METABOLIC PANEL: CPT

## 2019-08-20 PROCEDURE — 84443 ASSAY THYROID STIM HORMONE: CPT

## 2019-08-20 PROCEDURE — 82306 VITAMIN D 25 HYDROXY: CPT

## 2019-08-20 PROCEDURE — 36415 COLL VENOUS BLD VENIPUNCTURE: CPT

## 2019-08-20 ASSESSMENT — PATIENT HEALTH QUESTIONNAIRE - PHQ9
SUM OF ALL RESPONSES TO PHQ QUESTIONS 1-9: 2
SUM OF ALL RESPONSES TO PHQ QUESTIONS 1-9: 2
2. FEELING DOWN, DEPRESSED OR HOPELESS: 1
SUM OF ALL RESPONSES TO PHQ9 QUESTIONS 1 & 2: 2
1. LITTLE INTEREST OR PLEASURE IN DOING THINGS: 1

## 2019-08-20 ASSESSMENT — ENCOUNTER SYMPTOMS
WHEEZING: 0
SINUS PAIN: 0
DIARRHEA: 0
CONSTIPATION: 0
EYE PAIN: 0
SORE THROAT: 0
VOMITING: 0
TROUBLE SWALLOWING: 0
NAUSEA: 0
ABDOMINAL PAIN: 0
BACK PAIN: 0
CHEST TIGHTNESS: 0
COUGH: 0
SHORTNESS OF BREATH: 0

## 2019-08-20 NOTE — PROGRESS NOTES
8/20/19    Name: Filomena Alonso  MDC:[id number]   Sex:female   Age:85 y.o. Chief Complaint   Patient presents with    Hypertension     Here for recheck of hypertension. Patient still has numbness on right side of neck and feels that her right face is droopy. She says here lately in the past month she has been depressed due to family issues. Blood pressures running very good  She has increased her fluids and having a lot less dizziness  Not taking anything prescription    Numbness right neck getting better, less than right after surgery    Increased stress-sees counselor tomorrow  She never really thought about taking meds for her stress and depression   I will recheck her in 3 months after she has seen the counselor a few times and see how she is doing        Hypertension   Pertinent negatives include no chest pain, headaches, palpitations or shortness of breath. Review of Systems   Constitutional: Negative for appetite change, fatigue and fever. HENT: Negative for congestion, ear pain, sinus pain, sore throat and trouble swallowing. Eyes: Negative for pain. Respiratory: Negative for cough, chest tightness, shortness of breath and wheezing. Cardiovascular: Negative for chest pain, palpitations and leg swelling. Gastrointestinal: Negative for abdominal pain, constipation, diarrhea, nausea and vomiting. Endocrine: Negative for cold intolerance and heat intolerance. Genitourinary: Negative for difficulty urinating, hematuria and pelvic pain. Musculoskeletal: Positive for gait problem. Negative for back pain and joint swelling. Unsteady gait   Skin: Negative for rash and wound. Neurological: Positive for numbness. Negative for dizziness, syncope and headaches. Right face and neck   Hematological: Negative for adenopathy. Psychiatric/Behavioral: Negative for confusion, sleep disturbance and suicidal ideas.            Current Outpatient Medications:     loratadine (CLARITIN)

## 2019-08-21 RX ORDER — LEVOTHYROXINE SODIUM 0.05 MG/1
50 TABLET ORAL DAILY
Qty: 30 TABLET | Refills: 3 | Status: SHIPPED | OUTPATIENT
Start: 2019-08-21 | End: 2020-01-15 | Stop reason: SDUPTHER

## 2019-08-21 RX ORDER — ROSUVASTATIN CALCIUM 5 MG/1
5 TABLET, COATED ORAL DAILY
Qty: 30 TABLET | Refills: 3 | Status: SHIPPED | OUTPATIENT
Start: 2019-08-21 | End: 2019-11-20

## 2019-08-23 ENCOUNTER — TELEPHONE (OUTPATIENT)
Dept: ADMINISTRATIVE | Age: 84
End: 2019-08-23

## 2019-09-09 ENCOUNTER — OFFICE VISIT (OUTPATIENT)
Dept: FAMILY MEDICINE CLINIC | Age: 84
End: 2019-09-09
Payer: MEDICARE

## 2019-09-09 VITALS
HEART RATE: 75 BPM | OXYGEN SATURATION: 92 % | SYSTOLIC BLOOD PRESSURE: 130 MMHG | DIASTOLIC BLOOD PRESSURE: 88 MMHG | WEIGHT: 147 LBS | TEMPERATURE: 97.2 F | HEIGHT: 66 IN | BODY MASS INDEX: 23.63 KG/M2

## 2019-09-09 DIAGNOSIS — Z00.00 ROUTINE GENERAL MEDICAL EXAMINATION AT A HEALTH CARE FACILITY: Primary | ICD-10-CM

## 2019-09-09 PROCEDURE — G0439 PPPS, SUBSEQ VISIT: HCPCS | Performed by: PHYSICIAN ASSISTANT

## 2019-09-09 RX ORDER — CHLORAL HYDRATE 500 MG
3000 CAPSULE ORAL 3 TIMES DAILY
COMMUNITY
End: 2020-02-18

## 2019-09-09 ASSESSMENT — LIFESTYLE VARIABLES
AUDIT-C TOTAL SCORE: 4
HOW OFTEN DO YOU HAVE A DRINK CONTAINING ALCOHOL: 3
HOW OFTEN DO YOU HAVE SIX OR MORE DRINKS ON ONE OCCASION: 0
HOW MANY STANDARD DRINKS CONTAINING ALCOHOL DO YOU HAVE ON A TYPICAL DAY: 1

## 2019-09-09 ASSESSMENT — PATIENT HEALTH QUESTIONNAIRE - PHQ9: SUM OF ALL RESPONSES TO PHQ QUESTIONS 1-9: 3

## 2019-09-09 NOTE — PATIENT INSTRUCTIONS
Personalized Preventive Plan for Erika Da Silva - 9/9/2019  Medicare offers a range of preventive health benefits. Some of the tests and screenings are paid in full while other may be subject to a deductible, co-insurance, and/or copay. Some of these benefits include a comprehensive review of your medical history including lifestyle, illnesses that may run in your family, and various assessments and screenings as appropriate. After reviewing your medical record and screening and assessments performed today your provider may have ordered immunizations, labs, imaging, and/or referrals for you. A list of these orders (if applicable) as well as your Preventive Care list are included within your After Visit Summary for your review. Other Preventive Recommendations:    · A preventive eye exam performed by an eye specialist is recommended every 1-2 years to screen for glaucoma; cataracts, macular degeneration, and other eye disorders. · A preventive dental visit is recommended every 6 months. · Try to get at least 150 minutes of exercise per week or 10,000 steps per day on a pedometer . · Order or download the FREE \"Exercise & Physical Activity: Your Everyday Guide\" from [employer]. Call 7-624.367.3052 or search [employer] online. · You need 5413-7437 mg of calcium and 8008-8162 IU of vitamin D per day. It is possible to meet your calcium requirement with diet alone, but a vitamin D supplement is usually necessary to meet this goal.  · When exposed to the sun, use a sunscreen that protects against both UVA and UVB radiation with an SPF of 30 or greater. Reapply every 2 to 3 hours or after sweating, drying off with a towel, or swimming. · Always wear a seat belt when traveling in a car. Always wear a helmet when riding a bicycle or motorcycle.   · Please bring a copy of your Living Will to your next appointment with Dr. Janelle Valles for our records  · If concerns about

## 2019-09-09 NOTE — PROGRESS NOTES
Medicare Annual Wellness Visit  Name: Ced Cisneros Date: 2019   MRN: [id number] Sex: Female   Age: 80 y.o. 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 She feels that she is somewhat concerned/depressed due to the above mentioned concerns  PHQ2= 3 today  Does not participate in a formal exercise program.  Is active She states she is not as active since her surgery but still does housework daily. She no ,onger does yard work/gardenining  She is a former smoker, quit in the 's  She admits to drinking a glass of wine with her evening meal, never drinks more than 1 at a time  She is concerned about weight gain, eats at least 2 meals a day  Dental and Eye exams are UTD  She has a Living Will in place  She no longer has pelvic exams (YUMIKO/BSO) or mammograms  She declined DEXA in 2017, but after our discussion today she may consider having one  Labs are UTD  She had colonoscopy , no more to be done  She had Prevnar in 2017, is due for FLU, Pneumovax, Shingrix and Tetanus. She declines any immunizations today, wants to check with her pharmacist, states she gets all her shots there. She brings a log of her BPs to the visit today for Dr. Michele Díaz to review    Screenings for behavioral, psychosocial and functional/safety risks, and cognitive dysfunction are all negative except as indicated below. These results, as well as other patient data from the [address] form, are documented in Flowsheets linked to this Encounter.     Allergies   Allergen Reactions    Carafate [Sucralfate]      Unable to explain   

## 2019-10-02 ENCOUNTER — OFFICE VISIT (OUTPATIENT)
Dept: CARDIOLOGY CLINIC | Age: 84
End: 2019-10-02
Payer: MEDICARE

## 2019-10-02 VITALS
DIASTOLIC BLOOD PRESSURE: 80 MMHG | HEIGHT: 64 IN | HEART RATE: 73 BPM | SYSTOLIC BLOOD PRESSURE: 122 MMHG | RESPIRATION RATE: 18 BRPM | WEIGHT: 144 LBS | BODY MASS INDEX: 24.59 KG/M2

## 2019-10-02 DIAGNOSIS — I34.0 NONRHEUMATIC MITRAL VALVE REGURGITATION: ICD-10-CM

## 2019-10-02 DIAGNOSIS — I21.4 NSTEMI (NON-ST ELEVATED MYOCARDIAL INFARCTION) (HCC): Primary | ICD-10-CM

## 2019-10-02 PROCEDURE — 99214 OFFICE O/P EST MOD 30 MIN: CPT | Performed by: INTERNAL MEDICINE

## 2019-10-02 PROCEDURE — 93000 ELECTROCARDIOGRAM COMPLETE: CPT | Performed by: INTERNAL MEDICINE

## 2019-10-24 ENCOUNTER — TELEPHONE (OUTPATIENT)
Dept: CARDIOLOGY | Age: 84
End: 2019-10-24

## 2019-10-28 ENCOUNTER — HOSPITAL ENCOUNTER (OUTPATIENT)
Dept: CARDIOLOGY | Age: 84
Discharge: HOME | End: 2019-10-28
Payer: MEDICARE

## 2019-10-28 DIAGNOSIS — I34.0 NONRHEUMATIC MITRAL VALVE REGURGITATION: ICD-10-CM

## 2019-10-28 LAB
LV EF: 60 %
LVEF MODALITY: NORMAL

## 2019-10-28 PROCEDURE — 93306 TTE W/DOPPLER COMPLETE: CPT | Performed by: PSYCHIATRY & NEUROLOGY

## 2019-10-31 ENCOUNTER — TELEPHONE (OUTPATIENT)
Dept: CARDIOLOGY CLINIC | Age: 84
End: 2019-10-31

## 2019-11-07 ENCOUNTER — OFFICE VISIT (OUTPATIENT)
Dept: FAMILY MEDICINE CLINIC | Age: 84
End: 2019-11-07
Payer: MEDICARE

## 2019-11-07 VITALS
WEIGHT: 147 LBS | TEMPERATURE: 98.1 F | OXYGEN SATURATION: 95 % | BODY MASS INDEX: 24.49 KG/M2 | SYSTOLIC BLOOD PRESSURE: 128 MMHG | HEART RATE: 110 BPM | DIASTOLIC BLOOD PRESSURE: 78 MMHG | HEIGHT: 65 IN

## 2019-11-07 DIAGNOSIS — H66.001 NON-RECURRENT ACUTE SUPPURATIVE OTITIS MEDIA OF RIGHT EAR WITHOUT SPONTANEOUS RUPTURE OF TYMPANIC MEMBRANE: Primary | ICD-10-CM

## 2019-11-07 PROCEDURE — 99213 OFFICE O/P EST LOW 20 MIN: CPT | Performed by: FAMILY MEDICINE

## 2019-11-07 RX ORDER — AMOXICILLIN 875 MG/1
875 TABLET, COATED ORAL 2 TIMES DAILY
Qty: 20 TABLET | Refills: 0 | Status: SHIPPED | OUTPATIENT
Start: 2019-11-07 | End: 2019-11-17

## 2019-11-07 ASSESSMENT — ENCOUNTER SYMPTOMS
WHEEZING: 0
VOMITING: 0
TROUBLE SWALLOWING: 0
SINUS PAIN: 0
SHORTNESS OF BREATH: 0
NAUSEA: 0
BACK PAIN: 0
COUGH: 1
SORE THROAT: 0
DIARRHEA: 0
CONSTIPATION: 0
ABDOMINAL PAIN: 0
CHEST TIGHTNESS: 0
EYE PAIN: 0

## 2019-11-11 ENCOUNTER — OFFICE VISIT (OUTPATIENT)
Dept: FAMILY MEDICINE CLINIC | Age: 84
End: 2019-11-11
Payer: MEDICARE

## 2019-11-11 ENCOUNTER — TELEPHONE (OUTPATIENT)
Dept: PODIATRY | Age: 84
End: 2019-11-11

## 2019-11-11 VITALS
HEART RATE: 70 BPM | TEMPERATURE: 97.7 F | OXYGEN SATURATION: 98 % | DIASTOLIC BLOOD PRESSURE: 82 MMHG | WEIGHT: 144.06 LBS | BODY MASS INDEX: 24 KG/M2 | SYSTOLIC BLOOD PRESSURE: 130 MMHG | HEIGHT: 65 IN

## 2019-11-11 DIAGNOSIS — H66.001 NON-RECURRENT ACUTE SUPPURATIVE OTITIS MEDIA OF RIGHT EAR WITHOUT SPONTANEOUS RUPTURE OF TYMPANIC MEMBRANE: Primary | ICD-10-CM

## 2019-11-11 PROCEDURE — 99213 OFFICE O/P EST LOW 20 MIN: CPT | Performed by: FAMILY MEDICINE

## 2019-11-11 RX ORDER — AZITHROMYCIN 250 MG/1
250 TABLET, FILM COATED ORAL SEE ADMIN INSTRUCTIONS
Qty: 6 TABLET | Refills: 0 | Status: SHIPPED | OUTPATIENT
Start: 2019-11-11 | End: 2019-11-16

## 2019-11-11 ASSESSMENT — ENCOUNTER SYMPTOMS
EYE REDNESS: 0
ALLERGIC/IMMUNOLOGIC NEGATIVE: 1
NAUSEA: 0
ABDOMINAL PAIN: 0
SINUS PAIN: 0
PHOTOPHOBIA: 0
BACK PAIN: 0
TROUBLE SWALLOWING: 0
SORE THROAT: 0
VOMITING: 0
SHORTNESS OF BREATH: 0
DIARRHEA: 0
COUGH: 0
CHEST TIGHTNESS: 0
SINUS PRESSURE: 1
EYE PAIN: 0
BLOOD IN STOOL: 0
EYE DISCHARGE: 0

## 2019-11-15 PROBLEM — I65.21 CAROTID STENOSIS, RIGHT: Status: ACTIVE | Noted: 2019-11-15

## 2019-11-15 PROBLEM — E07.9 DISORDER OF THYROID GLAND: Status: ACTIVE | Noted: 2019-11-15

## 2019-11-15 PROBLEM — E78.2 MIXED HYPERLIPIDEMIA: Status: ACTIVE | Noted: 2019-11-15

## 2019-11-20 ENCOUNTER — OFFICE VISIT (OUTPATIENT)
Dept: FAMILY MEDICINE CLINIC | Age: 84
End: 2019-11-20
Payer: MEDICARE

## 2019-11-20 VITALS
DIASTOLIC BLOOD PRESSURE: 88 MMHG | WEIGHT: 144.5 LBS | SYSTOLIC BLOOD PRESSURE: 132 MMHG | HEART RATE: 60 BPM | OXYGEN SATURATION: 97 % | TEMPERATURE: 98.7 F | HEIGHT: 65 IN | BODY MASS INDEX: 24.07 KG/M2

## 2019-11-20 DIAGNOSIS — R73.01 IMPAIRED FASTING BLOOD SUGAR: ICD-10-CM

## 2019-11-20 DIAGNOSIS — H90.0 CONDUCTIVE HEARING LOSS, BILATERAL: ICD-10-CM

## 2019-11-20 DIAGNOSIS — E03.9 ACQUIRED HYPOTHYROIDISM: Primary | ICD-10-CM

## 2019-11-20 DIAGNOSIS — K27.9 PUD (PEPTIC ULCER DISEASE): ICD-10-CM

## 2019-11-20 DIAGNOSIS — H69.81 EUSTACHIAN TUBE DYSFUNCTION, RIGHT: ICD-10-CM

## 2019-11-20 DIAGNOSIS — E78.2 MIXED HYPERLIPIDEMIA: ICD-10-CM

## 2019-11-20 PROCEDURE — 99214 OFFICE O/P EST MOD 30 MIN: CPT | Performed by: FAMILY MEDICINE

## 2019-11-20 ASSESSMENT — ENCOUNTER SYMPTOMS
CONSTIPATION: 0
COUGH: 0
DIARRHEA: 0
ABDOMINAL PAIN: 0
SHORTNESS OF BREATH: 0
WHEEZING: 0
CHEST TIGHTNESS: 0
VOMITING: 0
EYE PAIN: 0
SINUS PAIN: 0
NAUSEA: 0
BACK PAIN: 0
TROUBLE SWALLOWING: 0
SORE THROAT: 0

## 2019-11-25 ENCOUNTER — PROCEDURE VISIT (OUTPATIENT)
Dept: PODIATRY | Age: 84
End: 2019-11-25
Payer: MEDICARE

## 2019-11-25 VITALS — WEIGHT: 143 LBS | BODY MASS INDEX: 23.82 KG/M2 | HEIGHT: 65 IN

## 2019-11-25 DIAGNOSIS — M79.675 PAIN OF TOE OF LEFT FOOT: ICD-10-CM

## 2019-11-25 DIAGNOSIS — B35.1 ONYCHOMYCOSIS: Primary | ICD-10-CM

## 2019-11-25 DIAGNOSIS — I73.9 PVD (PERIPHERAL VASCULAR DISEASE) (HCC): ICD-10-CM

## 2019-11-25 DIAGNOSIS — M79.674 PAIN OF TOE OF RIGHT FOOT: ICD-10-CM

## 2019-11-25 DIAGNOSIS — R26.2 DIFFICULTY WALKING: ICD-10-CM

## 2019-11-25 PROCEDURE — 11721 DEBRIDE NAIL 6 OR MORE: CPT | Performed by: PODIATRIST

## 2020-01-15 RX ORDER — LEVOTHYROXINE SODIUM 0.05 MG/1
50 TABLET ORAL DAILY
Qty: 30 TABLET | Refills: 0 | Status: SHIPPED
Start: 2020-01-15 | End: 2020-02-18 | Stop reason: SDUPTHER

## 2020-01-31 ENCOUNTER — HOSPITAL ENCOUNTER (OUTPATIENT)
Age: 85
Discharge: HOME | End: 2020-02-02
Payer: MEDICARE

## 2020-01-31 ENCOUNTER — OFFICE VISIT (OUTPATIENT)
Dept: PODIATRY | Age: 85
End: 2020-01-31
Payer: MEDICARE

## 2020-01-31 VITALS — BODY MASS INDEX: 23.63 KG/M2 | WEIGHT: 147 LBS | HEIGHT: 66 IN

## 2020-01-31 LAB
ALBUMIN SERPL-MCNC: 4.4 G/DL (ref 3.5–5.2)
ALP BLD-CCNC: 103 U/L (ref 35–104)
ALT SERPL-CCNC: 10 U/L (ref 0–32)
ANION GAP SERPL CALCULATED.3IONS-SCNC: 19 MMOL/L (ref 7–16)
AST SERPL-CCNC: 18 U/L (ref 0–31)
BASOPHILS ABSOLUTE: 0.05 E9/L (ref 0–0.2)
BASOPHILS RELATIVE PERCENT: 0.9 % (ref 0–2)
BILIRUB SERPL-MCNC: <0.2 MG/DL (ref 0–1.2)
BUN BLDV-MCNC: 20 MG/DL (ref 8–23)
CALCIUM SERPL-MCNC: 9.9 MG/DL (ref 8.6–10.2)
CHLORIDE BLD-SCNC: 102 MMOL/L (ref 98–107)
CHOLESTEROL, TOTAL: 332 MG/DL (ref 0–199)
CO2: 21 MMOL/L (ref 22–29)
CREAT SERPL-MCNC: 0.9 MG/DL (ref 0.5–1)
EOSINOPHILS ABSOLUTE: 0.15 E9/L (ref 0.05–0.5)
EOSINOPHILS RELATIVE PERCENT: 2.8 % (ref 0–6)
GFR AFRICAN AMERICAN: >60
GFR NON-AFRICAN AMERICAN: 59 ML/MIN/1.73
GLUCOSE BLD-MCNC: 97 MG/DL (ref 74–99)
HBA1C MFR BLD: 6.2 % (ref 4–5.6)
HCT VFR BLD CALC: 45.6 % (ref 34–48)
HDLC SERPL-MCNC: 68 MG/DL
HEMOGLOBIN: 13.5 G/DL (ref 11.5–15.5)
IMMATURE GRANULOCYTES #: 0.01 E9/L
IMMATURE GRANULOCYTES %: 0.2 % (ref 0–5)
LDL CHOLESTEROL CALCULATED: 215 MG/DL (ref 0–99)
LYMPHOCYTES ABSOLUTE: 1.8 E9/L (ref 1.5–4)
LYMPHOCYTES RELATIVE PERCENT: 33.2 % (ref 20–42)
MCH RBC QN AUTO: 28.9 PG (ref 26–35)
MCHC RBC AUTO-ENTMCNC: 29.6 % (ref 32–34.5)
MCV RBC AUTO: 97.6 FL (ref 80–99.9)
MONOCYTES ABSOLUTE: 0.49 E9/L (ref 0.1–0.95)
MONOCYTES RELATIVE PERCENT: 9 % (ref 2–12)
NEUTROPHILS ABSOLUTE: 2.92 E9/L (ref 1.8–7.3)
NEUTROPHILS RELATIVE PERCENT: 53.9 % (ref 43–80)
PDW BLD-RTO: 13.5 FL (ref 11.5–15)
PLATELET # BLD: 173 E9/L (ref 130–450)
PMV BLD AUTO: 12.1 FL (ref 7–12)
POTASSIUM SERPL-SCNC: 4.4 MMOL/L (ref 3.5–5)
RBC # BLD: 4.67 E12/L (ref 3.5–5.5)
SODIUM BLD-SCNC: 142 MMOL/L (ref 132–146)
T4 FREE: 1.39 NG/DL (ref 0.93–1.7)
TOTAL PROTEIN: 7 G/DL (ref 6.4–8.3)
TRIGL SERPL-MCNC: 244 MG/DL (ref 0–149)
TSH SERPL DL<=0.05 MIU/L-ACNC: 1.72 UIU/ML (ref 0.27–4.2)
VLDLC SERPL CALC-MCNC: 49 MG/DL
WBC # BLD: 5.4 E9/L (ref 4.5–11.5)

## 2020-01-31 PROCEDURE — 99999 PR OFFICE/OUTPT VISIT,PROCEDURE ONLY: CPT | Performed by: PODIATRIST

## 2020-01-31 PROCEDURE — 84439 ASSAY OF FREE THYROXINE: CPT

## 2020-01-31 PROCEDURE — 85025 COMPLETE CBC W/AUTO DIFF WBC: CPT

## 2020-01-31 PROCEDURE — 80053 COMPREHEN METABOLIC PANEL: CPT

## 2020-01-31 PROCEDURE — 83036 HEMOGLOBIN GLYCOSYLATED A1C: CPT

## 2020-01-31 PROCEDURE — 80061 LIPID PANEL: CPT

## 2020-01-31 PROCEDURE — 84443 ASSAY THYROID STIM HORMONE: CPT

## 2020-01-31 PROCEDURE — 36415 COLL VENOUS BLD VENIPUNCTURE: CPT

## 2020-01-31 PROCEDURE — 11721 DEBRIDE NAIL 6 OR MORE: CPT | Performed by: PODIATRIST

## 2020-01-31 NOTE — PROGRESS NOTES
Patient in today for nail care. Patient does not have any complaints of pain at this time.  Patient's PCP is Chucho Zelaya,  date of last ov    11-20-19     Aristeo Ibanez LPN

## 2020-01-31 NOTE — PROGRESS NOTES
20     Sunny Chavira    : 1934  Sex: female  Age: 80 y.o. Subjective: The patient is seen today for evaluation regarding foot evaluation and mycotic nail care. No other complaints noted. Chief Complaint   Patient presents with    Nail Problem     nail care       Current Medications:    Current Outpatient Medications:     levothyroxine (SYNTHROID) 50 MCG tablet, Take 1 tablet by mouth daily, Disp: 30 tablet, Rfl: 0    Omega-3 Fatty Acids (FISH OIL) 1000 MG CAPS, Take 3,000 mg by mouth 3 times daily, Disp: , Rfl:     loratadine (CLARITIN) 10 MG tablet, Take 10 mg by mouth daily as needed (allergies) , Disp: , Rfl:     aspirin 81 MG tablet, Take 81 mg by mouth daily, Disp: , Rfl:     Allergies:   Allergies   Allergen Reactions    Carafate [Sucralfate]      Unable to explain    Cefdinir     Cetirizine & Related      Unable to explain    Lisinopril Other (See Comments)     Difficulty swallowing, reflux, headache    Pravachol [Pravastatin Sodium]      Unable to explain    Protonix [Pantoprazole Sodium]      Unable to explain    Statins Other (See Comments)     intolerable       Past Surgical History:   Procedure Laterality Date    CAROTID ENDARTERECTOMY Right 2019    Dr Kimberli Dasilva Bilateral 2015    COLONOSCOPY  2014    DILATATION, ESOPHAGUS      ENDOSCOPY, COLON, DIAGNOSTIC  2016    marking of gastric tumor    HYSTERECTOMY      YUMIKO/BSO    OTHER SURGICAL HISTORY  2016    Laparoscopic Robotic Assisted Partial Gastrectomy    SHOULDER SURGERY Left     rotator cuff right    TONSILLECTOMY AND ADENOIDECTOMY      UPPER GASTROINTESTINAL ENDOSCOPY  2014    UPPER GASTROINTESTINAL ENDOSCOPY  2016     Past Medical History:   Diagnosis Date    Dementia ([facility] [address]     Diverticulosis     GIST (gastrointestinal stroma tumor), malignant, colon ([facility] [address]     Hyperlipidemia     Hypertension     MI (myocardial infarction) ([facility] [address] 2015    Mitral regurgitation     per cardiology    PUD (peptic ulcer disease)        Vitals:    01/31/20 0933   Weight: 147 lb (66.7 kg)   Height: 5' 6\" (1.676 m)       Exam:  Neurovascular status unchanged. At this time the nail/s 1, 2, 3, 4, 5 right foot and nail/s 1, 2, 3, 4, 5 left foot are noted to be thickened, dystrophic and discolored with subungual debris present. Tenderness noted to palpation. Minimal hair growth is noted to both lower extremities. Edema noted to both lower extremities with varicosities and stasis skin changes noted. Coolness is noted to the digital regions to palpation. Capillary fill time delayed digital areas bilateral foot. No heel fissuring or macerations of the web spaces. No plantar calluses and/or ulcerative areas are noted. Patient is having difficulty with gait/walking. Plan Per Assessment  [employer] was seen today for nail problem. Diagnoses and all orders for this visit:    Onychomycosis    Pain of toe of right foot    Pain of toe of left foot    PVD (peripheral vascular disease) ([address]    Difficulty walking        1. Evaluation and Management  2. Manual and electrical debridement of the mycotic nails was performed for thickness and length to prevent injection and/or ulceration. 3. I recommended antifungal cream to the nails daily. 4. It was discussed in detail with the patient proper caring for the vascular compromised foot. The fact that they have compromised blood flow put the patient at risk for infection/gangrene/amputation. The patient should not walk barefoot. Shoe gear should fit properly and socks should be worn with shoes. Exercise is very important to prevent worsening of the disease process but before performing an exercise program should check with their family physician first.  If any skin lesions are noted, they are instructed to contact the office immediately.     5. We will see the patient back at a later date for continued podiatric management and

## 2020-02-18 ENCOUNTER — OFFICE VISIT (OUTPATIENT)
Dept: FAMILY MEDICINE CLINIC | Age: 85
End: 2020-02-18
Payer: MEDICARE

## 2020-02-18 VITALS
SYSTOLIC BLOOD PRESSURE: 138 MMHG | HEIGHT: 66 IN | OXYGEN SATURATION: 98 % | TEMPERATURE: 98.3 F | HEART RATE: 90 BPM | DIASTOLIC BLOOD PRESSURE: 88 MMHG | BODY MASS INDEX: 23.56 KG/M2 | WEIGHT: 146.6 LBS

## 2020-02-18 PROBLEM — I73.9 PVD (PERIPHERAL VASCULAR DISEASE) (HCC): Status: RESOLVED | Noted: 2019-06-14 | Resolved: 2020-02-18

## 2020-02-18 PROBLEM — E78.5 HYPERLIPIDEMIA: Status: ACTIVE | Noted: 2019-11-15

## 2020-02-18 PROCEDURE — 99214 OFFICE O/P EST MOD 30 MIN: CPT | Performed by: FAMILY MEDICINE

## 2020-02-18 PROCEDURE — G8510 SCR DEP NEG, NO PLAN REQD: HCPCS | Performed by: FAMILY MEDICINE

## 2020-02-18 RX ORDER — LEVOTHYROXINE SODIUM 0.05 MG/1
50 TABLET ORAL DAILY
Qty: 90 TABLET | Refills: 1 | Status: SHIPPED
Start: 2020-02-18 | End: 2020-07-28 | Stop reason: SDUPTHER

## 2020-02-18 ASSESSMENT — ENCOUNTER SYMPTOMS
SINUS PAIN: 0
WHEEZING: 0
NAUSEA: 0
CONSTIPATION: 0
COUGH: 0
BACK PAIN: 0
EYE PAIN: 0
SORE THROAT: 0
VOMITING: 0
ABDOMINAL PAIN: 0
SHORTNESS OF BREATH: 0
CHEST TIGHTNESS: 0
DIARRHEA: 0
TROUBLE SWALLOWING: 0

## 2020-02-18 ASSESSMENT — PATIENT HEALTH QUESTIONNAIRE - PHQ9
SUM OF ALL RESPONSES TO PHQ QUESTIONS 1-9: 0
2. FEELING DOWN, DEPRESSED OR HOPELESS: 0
SUM OF ALL RESPONSES TO PHQ QUESTIONS 1-9: 0
SUM OF ALL RESPONSES TO PHQ9 QUESTIONS 1 & 2: 0
1. LITTLE INTEREST OR PLEASURE IN DOING THINGS: 0

## 2020-02-18 NOTE — PROGRESS NOTES
2/18/20    Name: Chichi Rollins  JPZ:[id number]/[id number]   Sex:female   Age:85 y.o. Chief Complaint   Patient presents with    Hypertension    Stress     Patient is here for follow up. She says that she feels great. Patient did have labs done. She needs refill on thyroid medication. Patient says she hasn't had too much dizziness, this is improving over time. She did see Richard Turner for her hearing, she has hearing aids in and her hearing is much improved from last visit. Patient says she couldn't get her blood pressure cuff at home to work, so she gave up trying to check. Her main complaint today is an itchy rash on bilateral ankles that comes and goes. Hypothyroid  Labs stable  No changes    Labs reviewed and her sugar is elevated  a1c is up to 6.2%    Blood pressures have been fine  She stopped checking it b/c her monitor at home broke    Rash around ankle  Itchy at times      Review of Systems   Constitutional: Negative for appetite change, fatigue and fever. HENT: Negative for congestion, ear pain, sinus pain, sore throat and trouble swallowing. Eyes: Negative for pain. Respiratory: Negative for cough, chest tightness, shortness of breath and wheezing. Cardiovascular: Negative for chest pain, palpitations and leg swelling. Gastrointestinal: Negative for abdominal pain, constipation, diarrhea, nausea and vomiting. Endocrine: Negative for cold intolerance and heat intolerance. Genitourinary: Negative for difficulty urinating, dysuria, frequency, hematuria, pelvic pain and urgency. Musculoskeletal: Negative for arthralgias, back pain, gait problem, joint swelling and myalgias. Skin: Positive for rash. Negative for wound. Neurological: Negative for dizziness, syncope, light-headedness and headaches. Hematological: Negative for adenopathy. Psychiatric/Behavioral: Negative for confusion, dysphoric mood, self-injury, sleep disturbance and suicidal ideas. The patient is not nervous/anxious. Current Outpatient Medications:     levothyroxine (SYNTHROID) 50 MCG tablet, Take 1 tablet by mouth daily, Disp: 90 tablet, Rfl: 1    loratadine (CLARITIN) 10 MG tablet, Take 10 mg by mouth daily as needed (allergies) , Disp: , Rfl:     aspirin 81 MG tablet, Take 81 mg by mouth daily, Disp: , Rfl:   Allergies   Allergen Reactions    Carafate [Sucralfate]      Unable to explain    Cefdinir     Cetirizine & Related      Unable to explain    Lisinopril Other (See Comments)     Difficulty swallowing, reflux, headache    Pravachol [Pravastatin Sodium]      Unable to explain    Protonix [Pantoprazole Sodium]      Unable to explain    Statins Other (See Comments)     intolerable      Past Medical History:   Diagnosis Date    Dementia ([facility] [address]     Diverticulosis     GIST (gastrointestinal stroma tumor), malignant, colon ([facility] [address]     Hyperlipidemia     Hypertension     MI (myocardial infarction) ([facility] [address] 2015    Mitral regurgitation     per cardiology    PUD (peptic ulcer disease)      Patient Active Problem List    Diagnosis Date Noted    NSTEMI (non-ST elevated myocardial infarction) ([facility] [address] 06/03/2016     Priority: High    GIST (gastrointestinal stromal tumor), malignant ([address] 05/25/2016     Priority: High    Acquired hypothyroidism 11/20/2019    Hyperlipidemia 11/15/2019    Disorder of thyroid gland 11/15/2019    Carotid stenosis, right 11/15/2019    Onychomycosis 06/14/2019    Difficulty walking 06/14/2019    Malignant tumor of fundus of stomach ([facility] [address] 06/14/2016    Malignant neoplasm of abdomen ([facility] [address] 06/14/2016    Hypertension     PUD (peptic ulcer disease)       Past Surgical History:   Procedure Laterality Date    CAROTID ENDARTERECTOMY Right 04/2019    Dr Kevin Claire Bilateral 2015    COLONOSCOPY  12/2014    DILATATION, ESOPHAGUS      ENDOSCOPY, COLON, DIAGNOSTIC  09/02/2016    marking of gastric tumor    HYSTERECTOMY      YUMIKO/BSO    OTHER SURGICAL HISTORY  09/09/2016    Laparoscopic Robotic Assisted Partial Gastrectomy    SHOULDER SURGERY Left     rotator cuff right    TONSILLECTOMY AND ADENOIDECTOMY      UPPER GASTROINTESTINAL ENDOSCOPY  12/2014    UPPER GASTROINTESTINAL ENDOSCOPY  5/25/2016      Social History     Tobacco History     Smoking Status  Former Smoker Quit date  1/1/1969 Smoking Frequency  1 pack/day for 18 years (18 pk yrs)    Smokeless Tobacco Use  Never Used          Alcohol History     Alcohol Use Status  Yes Comment  glass of wine with dinner          Drug Use     Drug Use Status  No          Sexual Activity     Sexually Active  Not Currently Partners  Male Comment              /88   Pulse 90   Temp 98.3 °F (36.8 °C)   Ht 5' 6\" (1.676 m)   Wt 146 lb 9.6 oz (66.5 kg)   SpO2 98%   BMI 23.66 kg/m²     EXAM:   Physical Exam  Vitals signs and nursing note reviewed. Constitutional:       Appearance: Normal appearance. She is well-developed. HENT:      Head: Normocephalic and atraumatic. Right Ear: Tympanic membrane normal.      Left Ear: Tympanic membrane normal.      Nose: Nose normal.      Mouth/Throat:      Mouth: Mucous membranes are moist.   Eyes:      Pupils: Pupils are equal, round, and reactive to light. Neck:      Musculoskeletal: Normal range of motion. Cardiovascular:      Rate and Rhythm: Normal rate and regular rhythm. Pulmonary:      Effort: Pulmonary effort is normal.      Breath sounds: Normal breath sounds. Abdominal:      General: Bowel sounds are normal.      Palpations: Abdomen is soft. Skin:     General: Skin is warm and dry. Comments: Rash around ankles actually dry skin with scratch marks. No s/s of infection   Neurological:      General: No focal deficit present. Mental Status: She is alert and oriented to person, place, and time. Victorino Montes was seen today for hypertension and stress.     Diagnoses and all orders for this visit:    Essential hypertension  Comments:  stable  refuses medications  Orders:  -     CBC Auto Differential; Future  -     Comprehensive Metabolic Panel; Future    Malignant tumor of fundus of stomach ([facility] [address]  Comments:  stable  no reoccurance    PVD (peripheral vascular disease) (HCC)  Comments:  stable    Dermatitis  Comments:  aquaphor daily at night on lower legs    Acquired hypothyroidism  Comments:  stable  recheck labs in 6 months  no changes in dose  Orders:  -     T4, Free; Future  -     TSH without Reflex; Future    Mixed hyperlipidemia  Comments:  refuses statin      Impaired fasting blood sugar  -     Hemoglobin A1C; Future    Vitamin D deficiency  -     Vitamin D 25 Hydroxy; Future    Other orders  -     levothyroxine (SYNTHROID) 50 MCG tablet; Take 1 tablet by mouth daily    appt in 6 months  Labs in 6 months      I independently reviewed and updated the chief complaint, HPI, past medical and surgical history, medications, allergies and ROS as entered by the LPN. Seen by:   Rosa Elena Saez DO

## 2020-07-21 ENCOUNTER — HOSPITAL ENCOUNTER (OUTPATIENT)
Age: 85
Discharge: HOME | End: 2020-07-23
Payer: MEDICARE

## 2020-07-21 LAB
ALBUMIN SERPL-MCNC: 4.1 G/DL (ref 3.5–5.2)
ALP BLD-CCNC: 94 U/L (ref 35–104)
ALT SERPL-CCNC: 10 U/L (ref 0–32)
ANION GAP SERPL CALCULATED.3IONS-SCNC: 16 MMOL/L (ref 7–16)
AST SERPL-CCNC: 18 U/L (ref 0–31)
BASOPHILS ABSOLUTE: 0.06 E9/L (ref 0–0.2)
BASOPHILS RELATIVE PERCENT: 1.3 % (ref 0–2)
BILIRUB SERPL-MCNC: 0.2 MG/DL (ref 0–1.2)
BUN BLDV-MCNC: 19 MG/DL (ref 8–23)
C-REACTIVE PROTEIN: 0.5 MG/DL (ref 0–0.4)
CALCIUM SERPL-MCNC: 9.4 MG/DL (ref 8.6–10.2)
CHLORIDE BLD-SCNC: 107 MMOL/L (ref 98–107)
CO2: 21 MMOL/L (ref 22–29)
CREAT SERPL-MCNC: 1 MG/DL (ref 0.5–1)
EOSINOPHILS ABSOLUTE: 0.12 E9/L (ref 0.05–0.5)
EOSINOPHILS RELATIVE PERCENT: 2.6 % (ref 0–6)
GFR AFRICAN AMERICAN: >60
GFR NON-AFRICAN AMERICAN: 53 ML/MIN/1.73
GLUCOSE BLD-MCNC: 100 MG/DL (ref 74–99)
HBA1C MFR BLD: 6.4 % (ref 4–5.6)
HCT VFR BLD CALC: 44 % (ref 34–48)
HEMOGLOBIN: 13.8 G/DL (ref 11.5–15.5)
IMMATURE GRANULOCYTES #: 0.01 E9/L
IMMATURE GRANULOCYTES %: 0.2 % (ref 0–5)
LYMPHOCYTES ABSOLUTE: 1.72 E9/L (ref 1.5–4)
LYMPHOCYTES RELATIVE PERCENT: 36.9 % (ref 20–42)
MCH RBC QN AUTO: 30.4 PG (ref 26–35)
MCHC RBC AUTO-ENTMCNC: 31.4 % (ref 32–34.5)
MCV RBC AUTO: 96.9 FL (ref 80–99.9)
MONOCYTES ABSOLUTE: 0.49 E9/L (ref 0.1–0.95)
MONOCYTES RELATIVE PERCENT: 10.5 % (ref 2–12)
NEUTROPHILS ABSOLUTE: 2.26 E9/L (ref 1.8–7.3)
NEUTROPHILS RELATIVE PERCENT: 48.5 % (ref 43–80)
PDW BLD-RTO: 13.1 FL (ref 11.5–15)
PLATELET # BLD: 164 E9/L (ref 130–450)
PMV BLD AUTO: 12.6 FL (ref 7–12)
POTASSIUM SERPL-SCNC: 4.2 MMOL/L (ref 3.5–5)
RBC # BLD: 4.54 E12/L (ref 3.5–5.5)
REASON FOR REJECTION: NORMAL
REJECTED TEST: NORMAL
RHEUMATOID FACTOR: 12 IU/ML (ref 0–13)
SODIUM BLD-SCNC: 144 MMOL/L (ref 132–146)
T4 FREE: 1.26 NG/DL (ref 0.93–1.7)
TOTAL PROTEIN: 6.3 G/DL (ref 6.4–8.3)
TSH SERPL DL<=0.05 MIU/L-ACNC: 2.78 UIU/ML (ref 0.27–4.2)
URIC ACID, SERUM: 6.9 MG/DL (ref 2.4–5.7)
VITAMIN D 25-HYDROXY: 17 NG/ML (ref 30–100)
WBC # BLD: 4.7 E9/L (ref 4.5–11.5)

## 2020-07-21 PROCEDURE — 80053 COMPREHEN METABOLIC PANEL: CPT

## 2020-07-21 PROCEDURE — 86618 LYME DISEASE ANTIBODY: CPT

## 2020-07-21 PROCEDURE — 82306 VITAMIN D 25 HYDROXY: CPT

## 2020-07-21 PROCEDURE — 84550 ASSAY OF BLOOD/URIC ACID: CPT

## 2020-07-21 PROCEDURE — 85025 COMPLETE CBC W/AUTO DIFF WBC: CPT

## 2020-07-21 PROCEDURE — 86038 ANTINUCLEAR ANTIBODIES: CPT

## 2020-07-21 PROCEDURE — 86140 C-REACTIVE PROTEIN: CPT

## 2020-07-21 PROCEDURE — 86431 RHEUMATOID FACTOR QUANT: CPT

## 2020-07-21 PROCEDURE — 84439 ASSAY OF FREE THYROXINE: CPT

## 2020-07-21 PROCEDURE — 84443 ASSAY THYROID STIM HORMONE: CPT

## 2020-07-21 PROCEDURE — 36415 COLL VENOUS BLD VENIPUNCTURE: CPT

## 2020-07-21 PROCEDURE — 83036 HEMOGLOBIN GLYCOSYLATED A1C: CPT

## 2020-07-22 LAB — ANTI-NUCLEAR ANTIBODY (ANA): NEGATIVE

## 2020-07-24 LAB — LYME, EIA: 0.2 LIV (ref 0–1.2)

## 2020-07-28 ENCOUNTER — OFFICE VISIT (OUTPATIENT)
Dept: FAMILY MEDICINE CLINIC | Age: 85
End: 2020-07-28
Payer: MEDICARE

## 2020-07-28 VITALS
WEIGHT: 146.8 LBS | HEIGHT: 66 IN | HEART RATE: 70 BPM | DIASTOLIC BLOOD PRESSURE: 86 MMHG | OXYGEN SATURATION: 98 % | BODY MASS INDEX: 23.59 KG/M2 | TEMPERATURE: 97.5 F | SYSTOLIC BLOOD PRESSURE: 130 MMHG

## 2020-07-28 PROCEDURE — 99214 OFFICE O/P EST MOD 30 MIN: CPT | Performed by: FAMILY MEDICINE

## 2020-07-28 RX ORDER — COLCHICINE 0.6 MG/1
0.6 TABLET ORAL DAILY
Qty: 5 TABLET | Refills: 0 | Status: SHIPPED
Start: 2020-07-28 | End: 2020-09-02 | Stop reason: ALTCHOICE

## 2020-07-28 RX ORDER — LEVOTHYROXINE SODIUM 0.05 MG/1
50 TABLET ORAL DAILY
Qty: 90 TABLET | Refills: 1 | Status: SHIPPED
Start: 2020-07-28 | End: 2021-01-20 | Stop reason: SDUPTHER

## 2020-07-28 RX ORDER — ALLOPURINOL 100 MG/1
100 TABLET ORAL DAILY
Qty: 90 TABLET | Refills: 1 | Status: SHIPPED
Start: 2020-07-28 | End: 2020-09-02 | Stop reason: SINTOL

## 2020-07-28 ASSESSMENT — ENCOUNTER SYMPTOMS
ABDOMINAL PAIN: 0
CHEST TIGHTNESS: 0
NAUSEA: 0
VOMITING: 0
EYE PAIN: 0
COUGH: 0
CONSTIPATION: 0
TROUBLE SWALLOWING: 0
BACK PAIN: 0
DIARRHEA: 0
SHORTNESS OF BREATH: 0
SINUS PAIN: 0
SORE THROAT: 0
WHEEZING: 0

## 2020-07-28 NOTE — PROGRESS NOTES
7/28/20    Name: Dexter Donnelly  MOB:[id number]   Sex:female   Age:86 y.o. Chief Complaint   Patient presents with    Hypothyroidism     Patient presents to office for visit. She did have labs done. Patient did send a letter to doctor at the end of June, she has been having numbness in her toes about 3-4 times a week. She has been having really bad leg cramps in her calves, mostly in the morning. When she goes up steps her legs hurt. Patient says that she has been having some urinary leakage, she has increased her fluid intake. Patient complaints of multiple issues today    Cramping in lower extremities and some arthitic complaints joint pain. Just not moving around like she use to be able to  Does not feel steady on her feet  But she does not exercise \"wants someone to exrecise for her\"  But she feels like she could fall    Also has complained of some bowel incontinence lynn when she tries to exercise  But her diet is very low in fiber and not well rounded  Will have her try metamucil daily and see how she does with that    Labs reviwed  Her uric acid level is high  She has been haivng joint pain and cramping in lower extremities  Likely gout  Will start allopurinol        Review of Systems   Constitutional: Negative for appetite change, fatigue and fever. HENT: Negative for congestion, ear pain, sinus pain, sore throat and trouble swallowing. Eyes: Negative for pain. Respiratory: Negative for cough, chest tightness, shortness of breath and wheezing. Cardiovascular: Negative for chest pain, palpitations and leg swelling. Gastrointestinal: Negative for abdominal pain, constipation, diarrhea, nausea and vomiting. Endocrine: Negative for cold intolerance and heat intolerance. Genitourinary: Positive for enuresis. Negative for difficulty urinating, dysuria, frequency, hematuria, pelvic pain and urgency. Musculoskeletal: Positive for myalgias.  Negative for arthralgias, back pain, gait problem and joint swelling. Skin: Negative for rash and wound. Neurological: Positive for numbness. Negative for dizziness, syncope, light-headedness and headaches. Hematological: Negative for adenopathy. Psychiatric/Behavioral: Negative for confusion, dysphoric mood, self-injury, sleep disturbance and suicidal ideas. The patient is not nervous/anxious.             Current Outpatient Medications:     levothyroxine (SYNTHROID) 50 MCG tablet, Take 1 tablet by mouth daily, Disp: 90 tablet, Rfl: 1    colchicine (COLCRYS) 0.6 MG tablet, Take 1 tablet by mouth daily Then start allopurinol, Disp: 5 tablet, Rfl: 0    allopurinol (ZYLOPRIM) 100 MG tablet, Take 1 tablet by mouth daily, Disp: 90 tablet, Rfl: 1    loratadine (CLARITIN) 10 MG tablet, Take 10 mg by mouth daily as needed (allergies) , Disp: , Rfl:     aspirin 81 MG tablet, Take 81 mg by mouth daily, Disp: , Rfl:   Allergies   Allergen Reactions    Carafate [Sucralfate]      Unable to explain    Cefdinir     Cetirizine & Related      Unable to explain    Lisinopril Other (See Comments)     Difficulty swallowing, reflux, headache    Pravachol [Pravastatin Sodium]      Unable to explain    Protonix [Pantoprazole Sodium]      Unable to explain    Statins Other (See Comments)     intolerable      Past Medical History:   Diagnosis Date    Dementia ([address]     Diverticulosis     GIST (gastrointestinal stroma tumor), malignant, colon ([address]     Hyperlipidemia     Hypertension     MI (myocardial infarction) ([address] 2015    Mitral regurgitation     per cardiology    PUD (peptic ulcer disease)      Patient Active Problem List    Diagnosis Date Noted    NSTEMI (non-ST elevated myocardial infarction) ([address] 06/03/2016     Priority: High    GIST (gastrointestinal stromal tumor), malignant ([address] 05/25/2016     Priority: High    Dementia ([address]     Acquired hypothyroidism 11/20/2019    Hyperlipidemia 11/15/2019    Disorder of thyroid gland 11/15/2019    Carotid

## 2020-08-28 ENCOUNTER — OFFICE VISIT (OUTPATIENT)
Dept: PRIMARY CARE CLINIC | Age: 85
End: 2020-08-28
Payer: MEDICARE

## 2020-08-28 VITALS
OXYGEN SATURATION: 94 % | HEIGHT: 66 IN | WEIGHT: 142 LBS | BODY MASS INDEX: 22.82 KG/M2 | DIASTOLIC BLOOD PRESSURE: 78 MMHG | SYSTOLIC BLOOD PRESSURE: 120 MMHG | TEMPERATURE: 97.9 F | HEART RATE: 84 BPM

## 2020-08-28 LAB — S PYO AG THROAT QL: NORMAL

## 2020-08-28 PROCEDURE — 87880 STREP A ASSAY W/OPTIC: CPT | Performed by: CLINICAL NURSE SPECIALIST

## 2020-08-28 PROCEDURE — 99201 PR OFFICE OUTPATIENT NEW 10 MINUTES: CPT | Performed by: CLINICAL NURSE SPECIALIST

## 2020-08-28 RX ORDER — DOXYCYCLINE HYCLATE 100 MG
100 TABLET ORAL 2 TIMES DAILY
Qty: 20 TABLET | Refills: 0 | Status: SHIPPED | OUTPATIENT
Start: 2020-08-28 | End: 2020-09-07

## 2020-08-28 ASSESSMENT — ENCOUNTER SYMPTOMS
SORE THROAT: 1
DIARRHEA: 1
ALLERGIC/IMMUNOLOGIC NEGATIVE: 1
EYES NEGATIVE: 1
RESPIRATORY NEGATIVE: 1

## 2020-08-28 NOTE — PROGRESS NOTES
Subjective:      Patient ID: Amadou Quezada is a 80 y.o. female. Patient presented to the [facility] complaining of a sore throat for the past 2 weeks. Patient denied cough, fever,  Headache or  ear pain. Patient stated that she has a history of chronic diarrhea. Review of Systems   Constitutional: Negative. HENT: Positive for sore throat. Eyes: Negative. Respiratory: Negative. Cardiovascular: Negative. Gastrointestinal: Positive for diarrhea. Endocrine: Negative. Genitourinary: Negative. Musculoskeletal: Negative. Skin: Negative. Allergic/Immunologic: Negative. Neurological: Negative. Hematological: Negative. Psychiatric/Behavioral: Negative. Objective:   Physical Exam  Vitals signs and nursing note reviewed. Constitutional:       General: She is not in acute distress. Appearance: Normal appearance. She is normal weight. She is not ill-appearing, toxic-appearing or diaphoretic. HENT:      Head: Normocephalic. Right Ear: Tympanic membrane, ear canal and external ear normal. There is no impacted cerumen. Left Ear: Tympanic membrane, ear canal and external ear normal. There is no impacted cerumen. Nose: Nose normal. No congestion or rhinorrhea. Mouth/Throat:      Mouth: Mucous membranes are moist.      Pharynx: Oropharynx is clear. Posterior oropharyngeal erythema present. No oropharyngeal exudate. Eyes:      General: No scleral icterus. Right eye: No discharge. Left eye: No discharge. Extraocular Movements: Extraocular movements intact. Conjunctiva/sclera: Conjunctivae normal.      Pupils: Pupils are equal, round, and reactive to light. Neck:      Musculoskeletal: Normal range of motion and neck supple. No neck rigidity or muscular tenderness. Vascular: No carotid bruit. Cardiovascular:      Rate and Rhythm: Normal rate and regular rhythm. Pulses: Normal pulses.       Heart sounds: Normal heart sounds. No murmur. No friction rub. No gallop. Pulmonary:      Effort: Pulmonary effort is normal. No respiratory distress. Breath sounds: Normal breath sounds. No stridor. No wheezing, rhonchi or rales. Chest:      Chest wall: No tenderness. Abdominal:      General: Abdomen is flat. Bowel sounds are normal. There is no distension. Palpations: Abdomen is soft. There is no mass. Tenderness: There is no abdominal tenderness. There is no right CVA tenderness, left CVA tenderness, guarding or rebound. Hernia: No hernia is present. Musculoskeletal: Normal range of motion. General: No swelling, tenderness, deformity or signs of injury. Right lower leg: No edema. Left lower leg: No edema. Lymphadenopathy:      Cervical: Cervical adenopathy present. Skin:     General: Skin is warm and dry. Capillary Refill: Capillary refill takes less than 2 seconds. Coloration: Skin is not jaundiced or pale. Findings: No bruising, erythema, lesion or rash. Neurological:      General: No focal deficit present. Mental Status: She is alert and oriented to person, place, and time. Cranial Nerves: No cranial nerve deficit. Sensory: No sensory deficit. Motor: No weakness. Coordination: Coordination normal.      Gait: Gait normal.      Deep Tendon Reflexes: Reflexes normal.   Psychiatric:         Mood and Affect: Mood normal.         Behavior: Behavior normal.         Thought Content: Thought content normal.         Judgment: Judgment normal.       /78 (Site: Left Upper Arm, Position: Sitting, Cuff Size: Medium Adult)   Pulse 84   Temp 97.9 °F (36.6 °C) (Oral)   Ht 5' 6\" (1.676 m)   Wt 142 lb (64.4 kg)   SpO2 94%   BMI 22.92 kg/m²     Assessment:       Diagnosis Orders   1. Sore throat  POCT rapid strep A    doxycycline hyclate (VIBRA-TABS) 100 MG tablet   2.  Pharyngitis, unspecified etiology  doxycycline hyclate (VIBRA-TABS) 100 MG tablet           Plan:      Reviewed medication and allergies. Rapid strep screen was negative. Doxycycline escribed to pharmacy. Advised patient to follow up with PCP or flu clinic if any concerns. Go to the ER with any worsening of her condition.         NOHELIA Hyatt - CNP

## 2020-09-02 ENCOUNTER — OFFICE VISIT (OUTPATIENT)
Dept: FAMILY MEDICINE CLINIC | Age: 85
End: 2020-09-02
Payer: MEDICARE

## 2020-09-02 VITALS
BODY MASS INDEX: 23.53 KG/M2 | SYSTOLIC BLOOD PRESSURE: 140 MMHG | DIASTOLIC BLOOD PRESSURE: 100 MMHG | HEART RATE: 72 BPM | TEMPERATURE: 97.9 F | OXYGEN SATURATION: 98 % | HEIGHT: 66 IN | WEIGHT: 146.4 LBS

## 2020-09-02 PROCEDURE — 99213 OFFICE O/P EST LOW 20 MIN: CPT | Performed by: FAMILY MEDICINE

## 2020-09-02 ASSESSMENT — ENCOUNTER SYMPTOMS
NAUSEA: 0
BACK PAIN: 0
SORE THROAT: 1
DIARRHEA: 0
COUGH: 0
CONSTIPATION: 0
ABDOMINAL PAIN: 0
WHEEZING: 0
EYE PAIN: 0
VOMITING: 0
SHORTNESS OF BREATH: 0
SINUS PAIN: 0
CHEST TIGHTNESS: 0
TROUBLE SWALLOWING: 0

## 2020-09-02 NOTE — PROGRESS NOTES
9/2/20    Name: Miranda Fox  GTD:[id number]   Sex:female   Age:86 y.o. Chief Complaint   Patient presents with    Aphasia    Mood Swings     Patient presents to office for visit. Patient was recently seen in flu clinic for sore throat and cough, she has a function coming up and wanted to make sure she wasn't contagious. Patient thinks she may have had a reaction to the doxycycline. She stopped the doxy two days ago. Patient says she can't say the words that she is thinking in her head, she has been snippy and meaner which is not like her, she has itchiness all over, and she says she has basically no night vision. Patient says her short term memory is gone and she feels tired and is sleeping a lot. Patient isn't sure if her urinary incontinence has worsened or if it is the same. She appears frustrated not being able to say the words she is thinking. Patient says she still has sore throat, which she has had for a very long time. Very pleasant femalle   presnets today for multiple side effects possible  We discussed her meds and when they were started inrelation to her s/s and it really seems related to the allopurinol  She had this all nicely documented for med  She really thinks that's when the mood swings and forgetting started  Reviewed labs and she just had labs in July and they were all very good    We will stop the allopurinol and see how she does in 1 month        Review of Systems   Constitutional: Positive for fatigue. Negative for appetite change and fever. HENT: Positive for sore throat. Negative for congestion, ear pain, sinus pain and trouble swallowing. Eyes: Negative for pain. Respiratory: Negative for cough, chest tightness, shortness of breath and wheezing. Cardiovascular: Negative for chest pain, palpitations and leg swelling. Gastrointestinal: Negative for abdominal pain, constipation, diarrhea, nausea and vomiting.    Endocrine: Negative for cold intolerance and heat intolerance. Genitourinary: Positive for enuresis and frequency. Negative for difficulty urinating, dysuria, hematuria, pelvic pain and urgency. Musculoskeletal: Negative for arthralgias, back pain, gait problem, joint swelling and myalgias. Skin: Negative for rash and wound. Neurological: Positive for speech difficulty. Negative for dizziness, syncope, light-headedness, numbness and headaches. Hematological: Negative for adenopathy. Psychiatric/Behavioral: Positive for confusion. Negative for dysphoric mood, self-injury, sleep disturbance and suicidal ideas. The patient is nervous/anxious.             Current Outpatient Medications:     doxycycline hyclate (VIBRA-TABS) 100 MG tablet, Take 1 tablet by mouth 2 times daily for 10 days, Disp: 20 tablet, Rfl: 0    levothyroxine (SYNTHROID) 50 MCG tablet, Take 1 tablet by mouth daily, Disp: 90 tablet, Rfl: 1    loratadine (CLARITIN) 10 MG tablet, Take 10 mg by mouth daily as needed (allergies) , Disp: , Rfl:     aspirin 81 MG tablet, Take 81 mg by mouth daily, Disp: , Rfl:   Allergies   Allergen Reactions    Carafate [Sucralfate]      Unable to explain    Cefdinir     Cetirizine & Related      Unable to explain    Lisinopril Other (See Comments)     Difficulty swallowing, reflux, headache    Pravachol [Pravastatin Sodium]      Unable to explain    Protonix [Pantoprazole Sodium]      Unable to explain    Statins Other (See Comments)     intolerable      Past Medical History:   Diagnosis Date    [facility]     Diverticulosis     GIST (gastrointestinal stroma tumor), malignant, colon ([facility]     Hyperlipidemia     Hypertension     MI (myocardial infarction) ([facility] 2015    Mitral regurgitation     per cardiology    PUD (peptic ulcer disease)      Patient Active Problem List    Diagnosis Date Noted    NSTEMI (non-ST elevated myocardial infarction) ([facility] 06/03/2016     Priority: High    GIST (gastrointestinal stromal tumor), malignant ([address] 05/25/2016     Priority: High    [facility]     Acquired hypothyroidism 11/20/2019    Hyperlipidemia 11/15/2019    Disorder of thyroid gland 11/15/2019    Carotid stenosis, right 11/15/2019    Onychomycosis 06/14/2019    Difficulty walking 06/14/2019    Malignant tumor of fundus of stomach ([facility] 06/14/2016    Malignant neoplasm of abdomen ([facility] 06/14/2016    Hypertension     PUD (peptic ulcer disease)       Past Surgical History:   Procedure Laterality Date    CAROTID ENDARTERECTOMY Right 04/2019    Dr White President Bilateral 2015    COLONOSCOPY  12/2014    DILATATION, ESOPHAGUS      ENDOSCOPY, COLON, DIAGNOSTIC  09/02/2016    marking of gastric tumor    HYSTERECTOMY      YUMIKO/BSO    OTHER SURGICAL HISTORY  09/09/2016    Laparoscopic Robotic Assisted Partial Gastrectomy    SHOULDER SURGERY Left     rotator cuff right    TONSILLECTOMY AND ADENOIDECTOMY      UPPER GASTROINTESTINAL ENDOSCOPY  12/2014    UPPER GASTROINTESTINAL ENDOSCOPY  5/25/2016      Social History     Tobacco History     Smoking Status  Former Smoker Quit date  1/1/1969 Smoking Frequency  1 pack/day for 18 years (18 pk yrs)    Smokeless Tobacco Use  Never Used          Alcohol History     Alcohol Use Status  Yes Comment  glass of wine with dinner          Drug Use     Drug Use Status  No          Sexual Activity     Sexually Active  Not Currently Partners  Male Comment              BP (!) 140/100   Pulse 72   Temp 97.9 °F (36.6 °C)   Ht 5' 6\" (1.676 m)   Wt 146 lb 6.4 oz (66.4 kg)   SpO2 98%   BMI 23.63 kg/m²     EXAM:   Physical Exam  Vitals signs and nursing note reviewed. Constitutional:       Appearance: Normal appearance. She is well-developed. HENT:      Head: Normocephalic and atraumatic.       Right Ear: Tympanic membrane normal.      Left Ear: Tympanic membrane normal.      Nose: Nose normal.      Mouth/Throat:      Mouth: Mucous membranes are moist.   Eyes:      Pupils: Pupils are equal, round, and reactive to light. Neck:      Musculoskeletal: Normal range of motion. Cardiovascular:      Rate and Rhythm: Normal rate and regular rhythm. Pulmonary:      Effort: Pulmonary effort is normal.      Breath sounds: Normal breath sounds. Musculoskeletal:      Comments: Gait normal in the office today with no joint pain   Skin:     General: Skin is warm and dry. Neurological:      General: No focal deficit present. Mental Status: She is alert and oriented to person, place, and time. Psychiatric:         Mood and Affect: Mood normal.          Parminder Santos was seen today for aphasia and mood swings. Diagnoses and all orders for this visit:    Mood swings  Comments:  stop allopurinol and seehow she does  recheck in 1 month    Medication side effect    if she has any pain or gout flare she is to call immediately    I independently reviewed and updated the chief complaint, HPI, past medical and surgical history, medications, allergies and ROS as entered by the LPN. Seen by:   Dee Aguilar DO

## 2020-09-25 ENCOUNTER — TELEPHONE (OUTPATIENT)
Dept: ADMINISTRATIVE | Age: 85
End: 2020-09-25

## 2020-10-12 ENCOUNTER — HOSPITAL ENCOUNTER (OUTPATIENT)
Age: 85
Discharge: HOME | End: 2020-10-14
Payer: MEDICARE

## 2020-10-12 ENCOUNTER — OFFICE VISIT (OUTPATIENT)
Dept: PRIMARY CARE CLINIC | Age: 85
End: 2020-10-12
Payer: MEDICARE

## 2020-10-12 VITALS
HEIGHT: 66 IN | WEIGHT: 146 LBS | OXYGEN SATURATION: 97 % | TEMPERATURE: 98 F | SYSTOLIC BLOOD PRESSURE: 122 MMHG | DIASTOLIC BLOOD PRESSURE: 70 MMHG | BODY MASS INDEX: 23.46 KG/M2 | HEART RATE: 74 BPM | RESPIRATION RATE: 20 BRPM

## 2020-10-12 LAB — S PYO AG THROAT QL: NORMAL

## 2020-10-12 PROCEDURE — 99202 OFFICE O/P NEW SF 15 MIN: CPT | Performed by: CLINICAL NURSE SPECIALIST

## 2020-10-12 PROCEDURE — U0003 INFECTIOUS AGENT DETECTION BY NUCLEIC ACID (DNA OR RNA); SEVERE ACUTE RESPIRATORY SYNDROME CORONAVIRUS 2 (SARS-COV-2) (CORONAVIRUS DISEASE [COVID-19]), AMPLIFIED PROBE TECHNIQUE, MAKING USE OF HIGH THROUGHPUT TECHNOLOGIES AS DESCRIBED BY CMS-2020-01-R: HCPCS

## 2020-10-12 PROCEDURE — 87880 STREP A ASSAY W/OPTIC: CPT | Performed by: CLINICAL NURSE SPECIALIST

## 2020-10-12 RX ORDER — DOXYCYCLINE HYCLATE 100 MG
100 TABLET ORAL 2 TIMES DAILY
Qty: 20 TABLET | Refills: 0 | Status: SHIPPED
Start: 2020-10-12 | End: 2020-10-20

## 2020-10-12 RX ORDER — SACCHAROMYCES BOULARDII 250 MG
250 CAPSULE ORAL 2 TIMES DAILY
Qty: 14 CAPSULE | Refills: 0 | Status: SHIPPED | OUTPATIENT
Start: 2020-10-12 | End: 2020-10-19

## 2020-10-12 RX ORDER — PREDNISONE 20 MG/1
20 TABLET ORAL 2 TIMES DAILY
Qty: 10 TABLET | Refills: 0 | Status: SHIPPED | OUTPATIENT
Start: 2020-10-12 | End: 2020-10-17

## 2020-10-12 ASSESSMENT — ENCOUNTER SYMPTOMS
COUGH: 1
SORE THROAT: 1
EYES NEGATIVE: 1
DIARRHEA: 1
ALLERGIC/IMMUNOLOGIC NEGATIVE: 1
SHORTNESS OF BREATH: 1

## 2020-10-12 NOTE — PROGRESS NOTES
Subjective:      Patient ID: Robert Amos is a 80 y.o. female. Patient presented to the [facility] with a chief complaint of a dry cough since Saturday. Patient stated that she's been experiencing coughing spells and did have a bout of diarrhea on Saturday. Patient stated that she has a history of chronic diarrhea. Patient complains of occasional shortness of breath and a sore throat. Review of Systems   Constitutional: Negative. HENT: Positive for sore throat. Eyes: Negative. Respiratory: Positive for cough and shortness of breath. Cardiovascular: Negative. Gastrointestinal: Positive for diarrhea. Endocrine: Negative. Genitourinary: Negative. Musculoskeletal: Negative. Skin: Negative. Allergic/Immunologic: Negative. Neurological: Negative. Hematological: Negative. Psychiatric/Behavioral: Negative. Objective:   Physical Exam  Vitals signs and nursing note reviewed. Constitutional:       General: She is not in acute distress. Appearance: Normal appearance. She is normal weight. She is not ill-appearing, toxic-appearing or diaphoretic. HENT:      Head: Normocephalic. Right Ear: Tympanic membrane and external ear normal. There is no impacted cerumen. Left Ear: Tympanic membrane and external ear normal. There is no impacted cerumen. Ears:      Comments: Erythema to bilateral ear canals. Nose: Nose normal. No congestion or rhinorrhea. Mouth/Throat:      Mouth: Mucous membranes are moist.      Pharynx: Oropharynx is clear. Posterior oropharyngeal erythema present. No oropharyngeal exudate. Eyes:      General: No scleral icterus. Right eye: No discharge. Left eye: No discharge. Extraocular Movements: Extraocular movements intact. Conjunctiva/sclera: Conjunctivae normal.      Pupils: Pupils are equal, round, and reactive to light.    Neck:      Musculoskeletal: Normal range of motion and neck supple. No neck rigidity or muscular tenderness. Vascular: No carotid bruit. Cardiovascular:      Rate and Rhythm: Normal rate and regular rhythm. Pulses: Normal pulses. Heart sounds: Normal heart sounds. No murmur. No friction rub. No gallop. Pulmonary:      Effort: Pulmonary effort is normal. No respiratory distress. Breath sounds: No stridor. No wheezing, rhonchi or rales. Comments: Coarse lung sounds to anterior bronchial area of chest.  Chest:      Chest wall: No tenderness. Abdominal:      General: Abdomen is flat. Bowel sounds are normal. There is no distension. Palpations: Abdomen is soft. There is no mass. Tenderness: There is no abdominal tenderness. There is no right CVA tenderness, left CVA tenderness, guarding or rebound. Hernia: No hernia is present. Musculoskeletal: Normal range of motion. General: No swelling, tenderness, deformity or signs of injury. Right lower leg: No edema. Left lower leg: No edema. Lymphadenopathy:      Cervical: Cervical adenopathy present. Skin:     General: Skin is warm and dry. Capillary Refill: Capillary refill takes less than 2 seconds. Coloration: Skin is not jaundiced or pale. Findings: No bruising, erythema, lesion or rash. Neurological:      General: No focal deficit present. Mental Status: She is alert and oriented to person, place, and time. Cranial Nerves: No cranial nerve deficit. Sensory: No sensory deficit. Motor: No weakness. Coordination: Coordination normal.      Gait: Gait normal.      Deep Tendon Reflexes: Reflexes normal.   Psychiatric:         Mood and Affect: Mood normal.         Behavior: Behavior normal.         Thought Content:  Thought content normal.         Judgment: Judgment normal.     /70   Pulse 74   Temp 98 °F (36.7 °C) (Oral)   Resp 20   Ht 5' 6\" (1.676 m)   Wt 146 lb (66.2 kg)   SpO2 97%   BMI 23.57 kg/m² Assessment:       Diagnosis Orders   1. Bronchitis  doxycycline hyclate (VIBRA-TABS) 100 MG tablet    predniSONE (DELTASONE) 20 MG tablet    saccharomyces boulardii (FLORASTOR) 250 MG capsule   2. Sore throat  POCT rapid strep A   3. Encounter for laboratory testing for COVID-19 virus  COVID-19 Ambulatory           Plan:      Reviewed medication, allergies and past medical history. Rapid strep screen was negative. Patient was nasally swabbed for covid. Patient advised to self isolate until covid results are available. Doxycycline, prednisone and florastor escribed to the pharmacy. Patient advised to follow up with the flu clinic with any concerns. Go to the ER with any worsening of her symptoms.         Pat Brothers, APRN - CNP

## 2020-10-14 LAB
SARS-COV-2: NOT DETECTED
SOURCE: NORMAL

## 2020-10-20 ENCOUNTER — OFFICE VISIT (OUTPATIENT)
Dept: FAMILY MEDICINE CLINIC | Age: 85
End: 2020-10-20
Payer: MEDICARE

## 2020-10-20 VITALS
TEMPERATURE: 98 F | HEIGHT: 66 IN | HEART RATE: 76 BPM | SYSTOLIC BLOOD PRESSURE: 130 MMHG | BODY MASS INDEX: 23.11 KG/M2 | OXYGEN SATURATION: 98 % | DIASTOLIC BLOOD PRESSURE: 78 MMHG | WEIGHT: 143.8 LBS

## 2020-10-20 VITALS
SYSTOLIC BLOOD PRESSURE: 130 MMHG | HEART RATE: 76 BPM | DIASTOLIC BLOOD PRESSURE: 78 MMHG | WEIGHT: 143.74 LBS | BODY MASS INDEX: 23.1 KG/M2 | TEMPERATURE: 98 F | OXYGEN SATURATION: 98 % | HEIGHT: 66 IN

## 2020-10-20 PROCEDURE — 3288F FALL RISK ASSESSMENT DOCD: CPT | Performed by: FAMILY MEDICINE

## 2020-10-20 PROCEDURE — G0439 PPPS, SUBSEQ VISIT: HCPCS | Performed by: FAMILY MEDICINE

## 2020-10-20 PROCEDURE — 99214 OFFICE O/P EST MOD 30 MIN: CPT | Performed by: FAMILY MEDICINE

## 2020-10-20 ASSESSMENT — PATIENT HEALTH QUESTIONNAIRE - PHQ9
2. FEELING DOWN, DEPRESSED OR HOPELESS: 0
SUM OF ALL RESPONSES TO PHQ QUESTIONS 1-9: 0
SUM OF ALL RESPONSES TO PHQ QUESTIONS 1-9: 0
SUM OF ALL RESPONSES TO PHQ9 QUESTIONS 1 & 2: 0
1. LITTLE INTEREST OR PLEASURE IN DOING THINGS: 0
SUM OF ALL RESPONSES TO PHQ QUESTIONS 1-9: 0

## 2020-10-20 ASSESSMENT — LIFESTYLE VARIABLES
HOW OFTEN DO YOU HAVE A DRINK CONTAINING ALCOHOL: 0
AUDIT TOTAL SCORE: INCOMPLETE
HOW OFTEN DO YOU HAVE A DRINK CONTAINING ALCOHOL: NEVER
AUDIT-C TOTAL SCORE: INCOMPLETE

## 2020-10-20 ASSESSMENT — ENCOUNTER SYMPTOMS
SINUS PAIN: 0
WHEEZING: 0
CONSTIPATION: 0
EYE PAIN: 0
BACK PAIN: 0
SORE THROAT: 0
COUGH: 0
ABDOMINAL PAIN: 0
CHEST TIGHTNESS: 0
NAUSEA: 0
TROUBLE SWALLOWING: 0
DIARRHEA: 0
VOMITING: 0
SHORTNESS OF BREATH: 0

## 2020-10-20 NOTE — PROGRESS NOTES
10/20/20    Name: Doc Joe  SVY:[id number]/75/[id number]   Sex:female   Age:86 y.o. Chief Complaint   Patient presents with    Mood Swings     Patient presents to office for visit. She says that Dr. Pako Hill has contacted her and wants her to follow up, patient is asking if PCP recommends anyone else. Patient says she doesn't care for Dr. Pako Hill. Patient was advised to stop allopurinol at last appointment, when asked if she stopped the medicine she says whatever is in the bag is what I'm taking. She is completing an antibiotic she was started on through [facility]. MOCA test completed today in the office. Patient has a letter for PCP. Patient here for a check up    She felt great while attending physical therapy  butnow that she is done and expected to do these exercises on her own she feels she cannot  She is not doing them and not walking on her own  Feels like she is getting weak again  She denies being depressed  Just does not feel like she wants to do the exercises on her own    Also would like to know if there is another surgeon she could see, she does not like Dr Phillips personality  unfortunatley she may not see eye to eye with him but he is a great surgeon and no other vasular surgeon goes to [place] [id number]  If she wants to go else where she will let us know but as of now she refuses to go to [facility]  She sees him for her carotid artery disease    MOCA done and she passed  Mild memory issues  Would not treat at this point  Recommend she continue to eercise, keep stress to a minimum  Eat good and stay hydrated        Review of Systems   Constitutional: Negative for appetite change, fatigue and fever. HENT: Negative for congestion, ear pain, sinus pain, sore throat and trouble swallowing. Eyes: Negative for pain. Respiratory: Negative for cough, chest tightness, shortness of breath and wheezing. Cardiovascular: Negative for chest pain, palpitations and leg swelling.    Gastrointestinal: Negative for abdominal pain, constipation, diarrhea, nausea and vomiting. Endocrine: Negative for cold intolerance and heat intolerance. Genitourinary: Negative for difficulty urinating, dysuria, frequency, hematuria, pelvic pain and urgency. Musculoskeletal: Positive for gait problem. Negative for arthralgias, back pain, joint swelling and myalgias. Skin: Negative for rash and wound. Neurological: Negative for dizziness, syncope, light-headedness and headaches. Hematological: Negative for adenopathy. Psychiatric/Behavioral: Positive for confusion. Negative for dysphoric mood, self-injury, sleep disturbance and suicidal ideas. The patient is not nervous/anxious.             Current Outpatient Medications:     levothyroxine (SYNTHROID) 50 MCG tablet, Take 1 tablet by mouth daily, Disp: 90 tablet, Rfl: 1    loratadine (CLARITIN) 10 MG tablet, Take 10 mg by mouth daily as needed (allergies) , Disp: , Rfl:     aspirin 81 MG tablet, Take 81 mg by mouth daily, Disp: , Rfl:   Allergies   Allergen Reactions    Carafate [Sucralfate]      Unable to explain    Cefdinir     Cetirizine & Related      Unable to explain    Lisinopril Other (See Comments)     Difficulty swallowing, reflux, headache    Pravachol [Pravastatin Sodium]      Unable to explain    Protonix [Pantoprazole Sodium]      Unable to explain    Statins Other (See Comments)     intolerable      Past Medical History:   Diagnosis Date    D[facility]     Diverticulosis     GIST (gastrointestinal stroma tumor), malignant, colon ([address]     Hyperlipidemia     Hypertension     MI (myocardial infarction) ([facility] 2015    Mitral regurgitation     per cardiology    PUD (peptic ulcer disease)      Patient Active Problem List    Diagnosis Date Noted    NSTEMI (non-ST elevated myocardial infarction) ([address] 06/03/2016     Priority: High    GIST (gastrointestinal stromal tumor), malignant ([address] 05/25/2016     Priority: High    Dementia ([address]     Acquired hypothyroidism 11/20/2019    Hyperlipidemia 11/15/2019    Disorder of thyroid gland 11/15/2019    Carotid stenosis, right 11/15/2019    Onychomycosis 06/14/2019    Difficulty walking 06/14/2019    Malignant tumor of fundus of stomach ([facility] 06/14/2016    Malignant neoplasm of abdomen ([facility] 06/14/2016    Hypertension     PUD (peptic ulcer disease)       Past Surgical History:   Procedure Laterality Date    CAROTID ENDARTERECTOMY Right 04/2019    Dr Janet Gerber Bilateral 2015    COLONOSCOPY  12/2014    DILATATION, ESOPHAGUS      ENDOSCOPY, COLON, DIAGNOSTIC  09/02/2016    marking of gastric tumor    HYSTERECTOMY      YUMIKO/BSO    OTHER SURGICAL HISTORY  09/09/2016    Laparoscopic Robotic Assisted Partial Gastrectomy    SHOULDER SURGERY Left     rotator cuff right    TONSILLECTOMY AND ADENOIDECTOMY      UPPER GASTROINTESTINAL ENDOSCOPY  12/2014    UPPER GASTROINTESTINAL ENDOSCOPY  5/25/2016      Social History     Tobacco History     Smoking Status  Former Smoker Quit date  1/1/1969 Smoking Frequency  1 pack/day for 18 years (18 pk yrs)    Smokeless Tobacco Use  Never Used          Alcohol History     Alcohol Use Status  Yes Comment  glass of wine with dinner          Drug Use     Drug Use Status  No          Sexual Activity     Sexually Active  Not Currently Partners  Male Comment              /78   Pulse 76   Temp 98 °F (36.7 °C)   Ht 5' 6\" (1.676 m)   Wt 143 lb 12.8 oz (65.2 kg)   SpO2 98%   BMI 23.21 kg/m²     EXAM:   Physical Exam  Vitals signs and nursing note reviewed. Constitutional:       Appearance: Normal appearance. She is well-developed. HENT:      Head: Normocephalic and atraumatic. Right Ear: Tympanic membrane normal.      Left Ear: Tympanic membrane normal.      Nose: Nose normal.      Mouth/Throat:      Mouth: Mucous membranes are moist.   Eyes:      Pupils: Pupils are equal, round, and reactive to light.    Neck:

## 2020-10-21 NOTE — PROGRESS NOTES
Medicare Annual Wellness Visit  Name: Chelsi Cook Date: 10/21/2020   MRN: [id number] Sex: Female   Age: 80 y.o. Ethnicity: Non-/Non    : 1934 Race: Lenore Garcia is here for Medicare AWV    Screenings for behavioral, psychosocial and functional/safety risks, and cognitive dysfunction are all negative except as indicated below. These results, as well as other patient data from the [address] form, are documented in Flowsheets linked to this Encounter. Allergies   Allergen Reactions    Carafate [Sucralfate]      Unable to explain    Cefdinir     Cetirizine & Related      Unable to explain    Lisinopril Other (See Comments)     Difficulty swallowing, reflux, headache    Pravachol [Pravastatin Sodium]      Unable to explain    Protonix [Pantoprazole Sodium]      Unable to explain    Statins Other (See Comments)     intolerable         Prior to Visit Medications    Medication Sig Taking?  Authorizing Provider   levothyroxine (SYNTHROID) 50 MCG tablet Take 1 tablet by mouth daily  Charoletkatie [place], DO   loratadine (CLARITIN) 10 MG tablet Take 10 mg by mouth daily as needed (allergies)   Historical Provider, MD   aspirin 81 MG tablet Take 81 mg by mouth daily  Historical Provider, MD         Past Medical History:   Diagnosis Date    D[facility]     Diverticulosis     GIST (gastrointestinal stroma tumor), malignant, colon ([facility]     Hyperlipidemia     Hypertension     MI (myocardial infarction) ([facility]     Mitral regurgitation     per cardiology    PUD (peptic ulcer disease)        Past Surgical History:   Procedure Laterality Date    CAROTID ENDARTERECTOMY Right 2019    Dr Aida Sher Bilateral 2015    COLONOSCOPY  2014    DILATATION, ESOPHAGUS      ENDOSCOPY, COLON, DIAGNOSTIC  2016    marking of gastric tumor    HYSTERECTOMY      YUMIKO/BSO    OTHER SURGICAL HISTORY  2016    Laparoscopic rales or rhonchi, normal air movement, no respiratory distress  Cardiovascular: normal rate, regular rhythm, normal S1 and S2, no murmurs, rubs, clicks, or gallops, distal pulses intact, no carotid bruits  Abdomen: soft, non-tender, non-distended, normal bowel sounds, no masses or organomegaly  Extremities: no cyanosis, clubbing or edema  Musculoskeletal: normal range of motion, no joint swelling, deformity or tenderness  Neurologic: reflexes normal and symmetric, no cranial nerve deficit, gait, coordination and speech normal    Patient's complete Health Risk Assessment and screening values have been reviewed and are found in Flowsheets. The following problems were reviewed today and where indicated follow up appointments were made and/or referrals ordered.     Positive Risk Factor Screenings with Interventions:     Health Habits/Nutrition:  Health Habits/Nutrition  Do you exercise for at least 20 minutes 2-3 times per week?: Yes  Have you lost any weight without trying in the past 3 months?: No  Do you eat fewer than 2 meals per day?: No  Have you seen a dentist within the past year?: (!) No  Body mass index: 23.2  Health Habits/Nutrition Interventions:  · Dental exam overdue:  patient encouraged to make appointment with his/her dentist    Hearing/Vision:  No exam data present  Hearing/Vision  Do you or your family notice any trouble with your hearing?: (!) Yes  Do you have difficulty driving, watching TV, or doing any of your daily activities because of your eyesight?: No  Have you had an eye exam within the past year?: Yes  Hearing/Vision Interventions:  · Hearing concerns:  patient declines any further evaluation/treatment for hearing issues    Personalized Preventive Plan   Current Health Maintenance Status  Immunization History   Administered Date(s) Administered    Influenza Virus Vaccine 11/01/2017    Influenza, High Dose (Fluzone 65 yrs and older) 09/25/2018, 10/23/2019, 09/26/2020    Pneumococcal Conjugate 13-valent ([id number]) 12/26/2017    Pneumococcal Polysaccharide ([id number]) 09/09/2019    Tdap (Boostrix, Adacel) 09/09/2019    Zoster Live (Zostavax) 09/12/2013    Zoster Recombinant (Shingrix) 10/23/2019, 01/06/2020        Health Maintenance   Topic Date Due    Annual Wellness Visit (AWV)  05/29/2019    TSH testing  07/21/2021    DTaP/Tdap/Td vaccine (2 - Td) 09/09/2029    Flu vaccine  Completed    Shingles Vaccine  Completed    Pneumococcal 65+ years Vaccine  Completed    Hepatitis A vaccine  Aged Out    Hib vaccine  Aged Out    Meningococcal (ACWY) vaccine  Aged Out     Recommendations for [facility] Due: see orders and patient instructions/AVS.  . Recommended screening schedule for the next 5-10 years is provided to the patient in written form: see Patient Usha Pruett was seen today for medicare awv.     Diagnoses and all orders for this visit:    Routine general medical examination at a health care facility    Acquired hypothyroidism  Comments:  stable    Essential hypertension  Comments:  stable

## 2020-10-21 NOTE — PATIENT INSTRUCTIONS
Personalized Preventive Plan for Radha Nuñez - 10/20/2020  Medicare offers a range of preventive health benefits. Some of the tests and screenings are paid in full while other may be subject to a deductible, co-insurance, and/or copay. Some of these benefits include a comprehensive review of your medical history including lifestyle, illnesses that may run in your family, and various assessments and screenings as appropriate. After reviewing your medical record and screening and assessments performed today your provider may have ordered immunizations, labs, imaging, and/or referrals for you. A list of these orders (if applicable) as well as your Preventive Care list are included within your After Visit Summary for your review. Other Preventive Recommendations:    · A preventive eye exam performed by an eye specialist is recommended every 1-2 years to screen for glaucoma; cataracts, macular degeneration, and other eye disorders. · A preventive dental visit is recommended every 6 months. · Try to get at least 150 minutes of exercise per week or 10,000 steps per day on a pedometer . · Order or download the FREE \"Exercise & Physical Activity: Your Everyday Guide\" from [employer]. Call 1-500.465.9559 or search [employer] online. · You need 6675-5388 mg of calcium and 0952-3666 IU of vitamin D per day. It is possible to meet your calcium requirement with diet alone, but a vitamin D supplement is usually necessary to meet this goal.  · When exposed to the sun, use a sunscreen that protects against both UVA and UVB radiation with an SPF of 30 or greater. Reapply every 2 to 3 hours or after sweating, drying off with a towel, or swimming. · Always wear a seat belt when traveling in a car. Always wear a helmet when riding a bicycle or motorcycle.

## 2021-01-06 DIAGNOSIS — E53.8 VITAMIN B 12 DEFICIENCY: Primary | ICD-10-CM

## 2021-01-06 DIAGNOSIS — L84 FOOT CALLUS: ICD-10-CM

## 2021-01-06 DIAGNOSIS — E11.9 TYPE 2 DIABETES MELLITUS WITHOUT COMPLICATION, WITHOUT LONG-TERM CURRENT USE OF INSULIN (HCC): ICD-10-CM

## 2021-01-13 DIAGNOSIS — E78.2 MIXED HYPERLIPIDEMIA: ICD-10-CM

## 2021-01-13 DIAGNOSIS — I10 ESSENTIAL HYPERTENSION: ICD-10-CM

## 2021-01-13 DIAGNOSIS — E11.9 TYPE 2 DIABETES MELLITUS WITHOUT COMPLICATION, WITHOUT LONG-TERM CURRENT USE OF INSULIN (HCC): ICD-10-CM

## 2021-01-13 DIAGNOSIS — E03.9 ACQUIRED HYPOTHYROIDISM: ICD-10-CM

## 2021-01-13 DIAGNOSIS — E53.8 VITAMIN B 12 DEFICIENCY: ICD-10-CM

## 2021-01-13 LAB
ALBUMIN SERPL-MCNC: 4.3 G/DL (ref 3.5–5.2)
ALP BLD-CCNC: 103 U/L (ref 35–104)
ALT SERPL-CCNC: 11 U/L (ref 0–32)
ANION GAP SERPL CALCULATED.3IONS-SCNC: 13 MMOL/L (ref 7–16)
AST SERPL-CCNC: 19 U/L (ref 0–31)
BASOPHILS ABSOLUTE: 0.05 E9/L (ref 0–0.2)
BASOPHILS RELATIVE PERCENT: 1.1 % (ref 0–2)
BILIRUB SERPL-MCNC: 0.2 MG/DL (ref 0–1.2)
BUN BLDV-MCNC: 19 MG/DL (ref 8–23)
CALCIUM SERPL-MCNC: 9.8 MG/DL (ref 8.6–10.2)
CHLORIDE BLD-SCNC: 106 MMOL/L (ref 98–107)
CHOLESTEROL, TOTAL: 308 MG/DL (ref 0–199)
CO2: 21 MMOL/L (ref 22–29)
CREAT SERPL-MCNC: 0.8 MG/DL (ref 0.5–1)
EOSINOPHILS ABSOLUTE: 0.22 E9/L (ref 0.05–0.5)
EOSINOPHILS RELATIVE PERCENT: 4.8 % (ref 0–6)
FOLATE: 10.7 NG/ML (ref 4.8–24.2)
GFR AFRICAN AMERICAN: >60
GFR NON-AFRICAN AMERICAN: >60 ML/MIN/1.73
GLUCOSE BLD-MCNC: 144 MG/DL (ref 74–99)
HBA1C MFR BLD: 6.4 % (ref 4–5.6)
HCT VFR BLD CALC: 46.1 % (ref 34–48)
HDLC SERPL-MCNC: 63 MG/DL
HEMOGLOBIN: 14.4 G/DL (ref 11.5–15.5)
IMMATURE GRANULOCYTES #: 0.01 E9/L
IMMATURE GRANULOCYTES %: 0.2 % (ref 0–5)
LDL CHOLESTEROL CALCULATED: 188 MG/DL (ref 0–99)
LYMPHOCYTES ABSOLUTE: 1.32 E9/L (ref 1.5–4)
LYMPHOCYTES RELATIVE PERCENT: 28.6 % (ref 20–42)
MCH RBC QN AUTO: 30 PG (ref 26–35)
MCHC RBC AUTO-ENTMCNC: 31.2 % (ref 32–34.5)
MCV RBC AUTO: 96 FL (ref 80–99.9)
MONOCYTES ABSOLUTE: 0.44 E9/L (ref 0.1–0.95)
MONOCYTES RELATIVE PERCENT: 9.5 % (ref 2–12)
NEUTROPHILS ABSOLUTE: 2.57 E9/L (ref 1.8–7.3)
NEUTROPHILS RELATIVE PERCENT: 55.8 % (ref 43–80)
PDW BLD-RTO: 13.2 FL (ref 11.5–15)
PLATELET # BLD: 191 E9/L (ref 130–450)
PMV BLD AUTO: 12.2 FL (ref 7–12)
POTASSIUM SERPL-SCNC: 4.1 MMOL/L (ref 3.5–5)
RBC # BLD: 4.8 E12/L (ref 3.5–5.5)
SODIUM BLD-SCNC: 140 MMOL/L (ref 132–146)
T4 FREE: 1.33 NG/DL (ref 0.93–1.7)
TOTAL PROTEIN: 6.7 G/DL (ref 6.4–8.3)
TRIGL SERPL-MCNC: 286 MG/DL (ref 0–149)
TSH SERPL DL<=0.05 MIU/L-ACNC: 3.09 UIU/ML (ref 0.27–4.2)
VITAMIN B-12: 278 PG/ML (ref 211–946)
VLDLC SERPL CALC-MCNC: 57 MG/DL
WBC # BLD: 4.6 E9/L (ref 4.5–11.5)

## 2021-01-20 ENCOUNTER — OFFICE VISIT (OUTPATIENT)
Dept: FAMILY MEDICINE CLINIC | Age: 86
End: 2021-01-20
Payer: MEDICARE

## 2021-01-20 VITALS
BODY MASS INDEX: 23.46 KG/M2 | OXYGEN SATURATION: 99 % | HEART RATE: 62 BPM | TEMPERATURE: 98 F | WEIGHT: 146 LBS | DIASTOLIC BLOOD PRESSURE: 70 MMHG | SYSTOLIC BLOOD PRESSURE: 122 MMHG | HEIGHT: 66 IN

## 2021-01-20 DIAGNOSIS — E78.2 MIXED HYPERLIPIDEMIA: ICD-10-CM

## 2021-01-20 DIAGNOSIS — E11.9 TYPE 2 DIABETES MELLITUS WITHOUT COMPLICATION, WITHOUT LONG-TERM CURRENT USE OF INSULIN (HCC): Primary | ICD-10-CM

## 2021-01-20 DIAGNOSIS — C16.1: ICD-10-CM

## 2021-01-20 DIAGNOSIS — E03.9 ACQUIRED HYPOTHYROIDISM: ICD-10-CM

## 2021-01-20 DIAGNOSIS — M54.31 RIGHT SIDED SCIATICA: ICD-10-CM

## 2021-01-20 DIAGNOSIS — I73.9 PVD (PERIPHERAL VASCULAR DISEASE) (HCC): ICD-10-CM

## 2021-01-20 DIAGNOSIS — M25.551 RIGHT HIP PAIN: ICD-10-CM

## 2021-01-20 DIAGNOSIS — F01.50 VASCULAR DEMENTIA WITHOUT BEHAVIORAL DISTURBANCE (HCC): ICD-10-CM

## 2021-01-20 PROCEDURE — G8510 SCR DEP NEG, NO PLAN REQD: HCPCS | Performed by: FAMILY MEDICINE

## 2021-01-20 PROCEDURE — 99214 OFFICE O/P EST MOD 30 MIN: CPT | Performed by: FAMILY MEDICINE

## 2021-01-20 RX ORDER — LEVOTHYROXINE SODIUM 0.05 MG/1
50 TABLET ORAL DAILY
Qty: 90 TABLET | Refills: 1 | Status: SHIPPED
Start: 2021-01-20 | End: 2021-04-23 | Stop reason: SDUPTHER

## 2021-01-20 ASSESSMENT — PATIENT HEALTH QUESTIONNAIRE - PHQ9
SUM OF ALL RESPONSES TO PHQ9 QUESTIONS 1 & 2: 0
SUM OF ALL RESPONSES TO PHQ QUESTIONS 1-9: 0
1. LITTLE INTEREST OR PLEASURE IN DOING THINGS: 0

## 2021-01-20 ASSESSMENT — ENCOUNTER SYMPTOMS
VOMITING: 0
EYE PAIN: 0
SORE THROAT: 0
COUGH: 0
SINUS PAIN: 0
CONSTIPATION: 0
SHORTNESS OF BREATH: 0
DIARRHEA: 0
BACK PAIN: 1
CHEST TIGHTNESS: 0
TROUBLE SWALLOWING: 0
WHEEZING: 0
ABDOMINAL PAIN: 0
NAUSEA: 0

## 2021-01-20 NOTE — PROGRESS NOTES
1/20/21    Name: Khoa Richard  OAE:[birth year]   Sex:female   Age:86 y.o. Chief Complaint   Patient presents with    Hypothyroidism    Peripheral Neuropathy    Back Pain     Patient presents to office this morning for visit. Patient's hair is unkempt and her one shoe lace is not tied. Patient says today she feels like her memory isn't as sharp. She says this happens on and off. Patient has pain in her right buttocks that goes down her leg. She has had this pain for a while and says it is not getting any better. Patient has numbness in her feet off and on, she establishes next week with podiatry. Patient did have labs done. Patient here for check up    She has had labs done and they were reviewed  Vitamin b 12 level low normal  Will have her add vitamin b 12 and vitamin c to her viatmin d that she takes    Hypothyroidism stable  No dosing changes    Dementia stable no progression at this time    Low back pain and right hip pain  Has been going on for a few weeks, she is taking tylenol and that helps  She agreed to physical therapy    Diabetes stable  She really does not watch diet  We will just keep track of her a1c and treat if necessary        Review of Systems   Constitutional: Negative for appetite change, fatigue and fever. HENT: Negative for congestion, ear pain, sinus pain, sore throat and trouble swallowing. Eyes: Negative for pain. Respiratory: Negative for cough, chest tightness, shortness of breath and wheezing. Cardiovascular: Negative for chest pain, palpitations and leg swelling. Gastrointestinal: Negative for abdominal pain, constipation, diarrhea, nausea and vomiting. Endocrine: Negative for cold intolerance and heat intolerance. Genitourinary: Negative for difficulty urinating, dysuria, frequency, hematuria, pelvic pain and urgency. Musculoskeletal: Positive for back pain and myalgias. Negative for arthralgias, gait problem and joint swelling.    Skin: Negative for rash and 06/14/2016    Malignant neoplasm of abdomen ([facility] 06/14/2016    Hypertension     PUD (peptic ulcer disease)       Past Surgical History:   Procedure Laterality Date    CAROTID ENDARTERECTOMY Right 04/2019    Dr Twan Ken Bilateral 2015    COLONOSCOPY  12/2014    DILATATION, ESOPHAGUS      ENDOSCOPY, COLON, DIAGNOSTIC  09/02/2016    marking of gastric tumor    HYSTERECTOMY      YUMIKO/BSO    OTHER SURGICAL HISTORY  09/09/2016    Laparoscopic Robotic Assisted Partial Gastrectomy    SHOULDER SURGERY Left     rotator cuff right    TONSILLECTOMY AND ADENOIDECTOMY      UPPER GASTROINTESTINAL ENDOSCOPY  12/2014    UPPER GASTROINTESTINAL ENDOSCOPY  5/25/2016      Social History     Tobacco History     Smoking Status  Former Smoker Quit date  1/1/1969 Smoking Frequency  1 pack/day for 18 years (18 pk yrs)    Smokeless Tobacco Use  Never Used          Alcohol History     Alcohol Use Status  Yes Comment  glass of wine with dinner          Drug Use     Drug Use Status  No          Sexual Activity     Sexually Active  Not Currently Partners  Male Comment              /70   Pulse 62   Temp 98 °F (36.7 °C)   Ht 5' 6\" (1.676 m)   Wt 146 lb (66.2 kg)   SpO2 99%   BMI 23.57 kg/m²     EXAM:   Physical Exam  Vitals signs and nursing note reviewed. Constitutional:       General: She is not in acute distress. Appearance: Normal appearance. She is well-developed. She is not ill-appearing. Comments: Somewhat unkempt and had to have things repeated multiple times but pleasant   HENT:      Head: Normocephalic and atraumatic. Right Ear: Tympanic membrane normal.      Left Ear: Tympanic membrane normal.      Nose: Nose normal.      Mouth/Throat:      Mouth: Mucous membranes are moist.   Eyes:      Pupils: Pupils are equal, round, and reactive to light. Neck:      Musculoskeletal: Normal range of motion.    Cardiovascular:      Rate and Rhythm: Normal rate and regular rhythm. Pulmonary:      Effort: Pulmonary effort is normal.      Breath sounds: Normal breath sounds. Abdominal:      General: Bowel sounds are normal.      Palpations: Abdomen is soft. Musculoskeletal:      Comments: Gait slow today, slightly unsteady, right hip pain and slight decrease in ROM, likely OA, mild buttock tenderness on exam   Skin:     General: Skin is warm and dry. Neurological:      Mental Status: She is alert and oriented to person, place, and time. Mental status is at baseline. Psychiatric:         Mood and Affect: Mood normal.         Thought Content: Thought content normal.          Kaylee Maxwell was seen today for hypothyroidism, peripheral neuropathy and back pain. Diagnoses and all orders for this visit:    Type 2 diabetes mellitus without complication, without long-term current use of insulin ([address]    Right sided sciatica  Comments:  refer to physicla therapy  Orders:  -     [facility] [place]    Acquired hypothyroidism  Comments:  stable  labs reviewed and levels good  repeat labs in 6 months    Right hip pain  Comments:  likely OA, will try PT first and if not helping get xrays  Orders:  -     [facility] [place]    Malignant tumor of fundus of stomach ([address]  Comments:  no reoccurances  has been stable    Vascular dementia without behavioral disturbance ([address]  Comments:  stable  unchanges  add vit b 12 and vit c      PVD (peripheral vascular disease) ([address]  Comments:  stable  no changes  no progression    Mixed hyperlipidemia  Comments:  refuses statin, allergies to all  just watch diet    Other orders  -     levothyroxine (SYNTHROID) 50 MCG tablet; Take 1 tablet by mouth daily    appt in 3 month recheck  Physical therapy referral  She has appt with DR Christopher      I independently reviewed and updated the chief complaint, HPI, past medical and surgical history, medications, allergies and ROS as entered by the LPN. Seen by:   Lopez Gonzalez Renaldo, DO

## 2021-01-26 ENCOUNTER — OFFICE VISIT (OUTPATIENT)
Dept: PODIATRY | Age: 86
End: 2021-01-26
Payer: MEDICARE

## 2021-01-26 DIAGNOSIS — L84 CORNS AND CALLOSITIES: ICD-10-CM

## 2021-01-26 DIAGNOSIS — G60.8 HEREDITARY SENSORY NEUROPATHY: ICD-10-CM

## 2021-01-26 DIAGNOSIS — I73.9 PVD (PERIPHERAL VASCULAR DISEASE) (HCC): ICD-10-CM

## 2021-01-26 DIAGNOSIS — E11.9 TYPE 2 DIABETES MELLITUS WITHOUT COMPLICATION, UNSPECIFIED WHETHER LONG TERM INSULIN USE (HCC): ICD-10-CM

## 2021-01-26 DIAGNOSIS — B35.1 TINEA UNGUIUM: Primary | ICD-10-CM

## 2021-01-26 PROCEDURE — 11721 DEBRIDE NAIL 6 OR MORE: CPT | Performed by: PODIATRIST

## 2021-01-26 PROCEDURE — 11056 PARNG/CUTG B9 HYPRKR LES 2-4: CPT | Performed by: PODIATRIST

## 2021-01-26 NOTE — PROGRESS NOTES
Cesar Thomas is here today for nail care. her PCP is Calin Castillo DO last OV was 2021. Cesar Thomas : 1934 Sex: female  Age: 80 y.o. Patient was referred by Calin Castillo DO    CC:   Painful elongated toenails 1-5 right and left    HPI  Kristin Hill is referred today for painful elongated toenails and callus tissue both feet. Previously did see Dr. Fariba Naranjo in the past for both feet. Here today for painful elongated toenails 1-5 right and left. Denies any open wounds or skin lesions. No recent injuries foot or ankle related. No additional pedal complaints at this time. ROS:  Const: Denies constitutional symptoms  Musculo: Denies symptoms other than stated above  Skin: Denies symptoms other than stated above      Current Outpatient Medications:     levothyroxine (SYNTHROID) 50 MCG tablet, Take 1 tablet by mouth daily, Disp: 90 tablet, Rfl: 1    loratadine (CLARITIN) 10 MG tablet, Take 10 mg by mouth daily as needed (allergies) , Disp: , Rfl:     aspirin 81 MG tablet, Take 81 mg by mouth daily, Disp: , Rfl:   Allergies   Allergen Reactions    Carafate [Sucralfate]      Unable to explain    Cefdinir     Cetirizine & Related      Unable to explain    Lisinopril Other (See Comments)     Difficulty swallowing, reflux, headache    Pravachol [Pravastatin Sodium]      Unable to explain    Protonix [Pantoprazole Sodium]      Unable to explain    Statins Other (See Comments)     intolerable       Past Medical History:   Diagnosis Date    [facility]     Diverticulosis     GIST (gastrointestinal stroma tumor), malignant, colon ([facility]     Hyperlipidemia     Hypertension     MI (myocardial infarction) ([facility]     Mitral regurgitation     per cardiology    PUD (peptic ulcer disease)      There were no vitals filed for this visit.      Work History/Social History:     Focused Lower Extremity Physical Exam:    Neurovascular examination:    Dorsalis Pedis palpable bilateral. Posterior tibialis non-palpable bilateral.    Capillary Refill Time:  Immediate return  Hair growth:  Symmetrical and bilateral   Skin:  Not atrophic  Edema: Mild edema bilateral feet. Mild edema bilateral ankles. Neurologic:  Light touch diminished bilateral.  Warm to coolness bilateral distal toes  Decreased epicritic sensation    Musculoskeletal/ Orthopedic examination:    Equinis: present bilateral  Dorsiflexion, plantarflexion, inversion, eversion bilateral 5 out of 5 muscle strength  Wiggling toes  Negative Homans    Dermatology examination:    Toenails 1 through 5 bilateral thickened, elongated, dystrophic, mycotic with subungual debris. Web spaces 1 through 4 bilateral clean dry and intact. Hyperkeratotic tissue noted plantar fifth metatarsal head bilateral.        Assessment and Plan:  Keeley Bain was seen today for callouses, nail problem and numbness. Diagnoses and all orders for this visit:    Tinea unguium    Corns and callosities    Type 2 diabetes mellitus without complication, unspecified whether long term insulin use (HCC)    Hereditary sensory neuropathy    PVD [facility]peripheral vascular disease) [facility]        Keeley Bain seen new referral.  Had seen Dr. Leeanne Felix in the past but has been almost a year. Formal debridement toenails 1 through 5 right and left with manual debridement for thickness and overall length. Paring hyperkeratotic tissue with a #15 blade plantar fifth metatarsal head bilateral.  Avoid barefoot. Follow-up 2 months. Return in about 2 months (around 3/26/2021). Seen By:  Mike Martinez DPM      Document was created using voice recognition software. Note was reviewed however may contain grammatical errors.

## 2021-01-27 ENCOUNTER — EVALUATION (OUTPATIENT)
Dept: PHYSICAL THERAPY | Age: 86
End: 2021-01-27
Payer: MEDICARE

## 2021-01-27 DIAGNOSIS — M25.551 RIGHT HIP PAIN: ICD-10-CM

## 2021-01-27 DIAGNOSIS — M54.31 RIGHT SIDED SCIATICA: Primary | ICD-10-CM

## 2021-01-27 PROCEDURE — 97110 THERAPEUTIC EXERCISES: CPT | Performed by: PHYSICAL THERAPIST

## 2021-01-27 PROCEDURE — 97161 PT EVAL LOW COMPLEX 20 MIN: CPT | Performed by: PHYSICAL THERAPIST

## 2021-01-27 NOTE — PROGRESS NOTES
[address] [facility]   Phone: [phone]   Fax: [phone]           Date:  2021   Patient: Edith Soliman  : 1934  MRN: [id number]  Referring Provider: Carine Romero DO  [address] [facility],  [address] [place] [address] Diagnosis:   M54.31 (ICD-10-CM) - Right sided sciatica   M25.551 (ICD-10-CM) - Right hip pain           SUBJECTIVE:     Onset date: about 1 month     Onset: Unknown    Mechanism of Injury: Pt reports about 1 month ago woke up with R sided hip pain into groin and buttock. Pt reports initially pain was consistent but now it comes and goes. Pt reports some trouble going up/down steps but reports that has been a struggle for awhile. Pt also reports difficulty with bending to pick something up. Previous PT: about 2 months ago for balance/ strengthening    Medical Management for Current Problem: OTC meds     Chief complaint: pain    Behavior: condition is getting better    Pain: intermittent  Current: 0/10     Best: 0/10     Worst:5/10    Symptom Type/Quality: sharp  Location[de-identified] Back: R lateral hip area into buttock and groin        Provoking Activities/Positions: unsure                 Relieving Activitie/Positions: medication    Disturbed Sleep: no  Bladder Dysfunction: no  Bowel Dysfunction: no     Imaging results: No results found.     Past Medical History:  Past Medical History:   Diagnosis Date    Dementia ([facility]     Diverticulosis     GIST (gastrointestinal stroma tumor), malignant, colon ([facility]     Hyperlipidemia     Hypertension     MI (myocardial infarction) ([facility]     Mitral regurgitation     per cardiology    PUD (peptic ulcer disease)      Past Surgical History:   Procedure Laterality Date    CAROTID ENDARTERECTOMY Right 2019    Dr Breanna Balderas Bilateral 2015    COLONOSCOPY  2014    DILATATION, ESOPHAGUS      ENDOSCOPY, COLON, DIAGNOSTIC  2016    marking of gastric tumor  HYSTERECTOMY      YUMIKO/BSO    OTHER SURGICAL HISTORY  09/09/2016    Laparoscopic Robotic Assisted Partial Gastrectomy    SHOULDER SURGERY Left     rotator cuff right    TONSILLECTOMY AND ADENOIDECTOMY      UPPER GASTROINTESTINAL ENDOSCOPY  12/2014    UPPER GASTROINTESTINAL ENDOSCOPY  5/25/2016       Medications:   Current Outpatient Medications   Medication Sig Dispense Refill    levothyroxine (SYNTHROID) 50 MCG tablet Take 1 tablet by mouth daily 90 tablet 1    loratadine (CLARITIN) 10 MG tablet Take 10 mg by mouth daily as needed (allergies)       aspirin 81 MG tablet Take 81 mg by mouth daily       No current facility-administered medications for this visit. Occupation: retired. Exercise regimen: none    Hobbies: cleaning house     Patient Goals: pain relief    Contraindications/Precautions:  occasional dizziness per pt. OBJECTIVE:     Observations: well nourished female      Gait: altered with short step length, width, height    Functional Strength:   Able to toe walk, heel walk, and squat. Range of Motion:    Trunk:    Flexion:  [] Normal   [x] Limited 20%   Extension:  [] Normal   [x] Limited 30%    Right Rotation: [] Normal   [x] Limited 20%   Left Rotation:  [] Normal   [x] Limited 20%   Right Side Bending: [] Normal   [x] Limited 20%   Left Side Bending: [] Normal   [x] Limited painful in R hip area 20%    Lower Extremity:   Right:   [x] Normal   [] Limited    Left:   [x] Normal   [] Limited       Strength:     Trunk: mildly decreased   R LE: 4/5   L LE: 4/5    Palpation: Tender to palpation over R greater trochanter and approximately 1/2 way down IT band area also over R SI joint and pirifomis, Non-tender to palpation with sacral compression, with central lumbar PA mobs, to L piriformis or L SI joint. Sensation: pt reports numbness/tingling in b toes.      Special Tests:   [] Nerve Root Compression           Right []+ / [] -    Left []+ / [] -  [] Slump           Right []+ / [x] -    Left []+ / [x] -  [] FADIR          Right []+ / [] -    Left []+ / [] -  [] S-I Distraction          Right []+ / [] -    Left []+ / [] -     [] SLR           Right []+ / [x] -    Left []+ / [x] -     [] MIQUEL          Right []+ / [] -    Left []+ / [] -  [] S-I Compression          Right []+ / [] -    Left []+ / [] -   [] Other pelvic alignment : []+ / [x] -       Special Test Comments: Pt demonstrates signs and symptoms of R SI pain with possible gluteal tendinopathy     ASSESSMENT     Outcome Measure:   Modified Oswestry 14% disability, LEFS 39/80     Problems:    Pain reported 0-5/10   ROM decreased    Strength decreased   Decreased functional ability with walking, stairs, bending, lifting    [x] There are no barriers affecting plan of care or recovery    [] Barriers to this patient's plan of care or recovery include. Domestic Concerns:  [x] No  [] Yes:    Short Term goals (2-3 weeks)   Decrease reported pain to 0-3/10   Increase ROM by 10%   Increase Strength to 4+/5    Able to perform/complete the following functions/tasks: pt able to go up/down flight of steps with 2 rails with minor pain/limitation,  Pt able to bend and lift 4 pounds from floor safely with minor pain/limitation. Pt able to perform light household chores for 10 minutes with minor pain/limitation.  Oswestry Low Back Disability Questionnaire 10% disability, LEFS 50/80     Long Term goals (4-6 weeks)   Decrease reported pain to 0-1/10   Increase ROM to [facility]   Increase Strength to 5/5    Able to perform/complete the following functions/tasks: pt able to go up/down flight of steps with 2 rails with no pain/limitation, pt able to bend and  lift 8 pounds from floor safely with no pain/limitation. Pt able to perform light household chores for 20 minutes with no pain/limitation.           Oswestry Low Back Disability Questionnaire 8% disability , LEFS 55/80   Independent with Home Exercise Programs    Rehab Potential: [x] Good  [] Fair  [] Poor    PLAN       Treatment Plan:   [x] Therapeutic Exercise  [x] Therapeutic Activity  [x] Neuromuscular Re-education   [x] Gait Training  [x] Balance Training  [x] Aerobic conditioning  [x] Manual Therapy  [x] Massage/Fascial release   [] Work/Sport specific activities    [] Pain Neuroscience [x] Cold/hotpack  [] Vasocompression  [x] Electrical Stimulation  [] Lumbar/Cervical Traction  [x] Ultrasound   [] Iontophoresis: 4 mg/mL Dexamethasone Sodium Phosphate 40-80 mAmin        [x] Instruction in HEP      []  Medication allergies reviewed for use of Dexamethasone Sodium Phosphate 4mg/ml  with iontophoresis treatments. Patient is not allergic. The following CPT codes are likely to be used in the care of this patient: 32301 PT Evaluation: Low Complexity , 95746 PT Re-Evaluation , 50678 Therapeutic Exercise , 91634 Neuromuscular Re-Education , 98280 Therapeutic Activities , 97686 Manual Therapy , 30277 Gait Training ,  Electrical Stimulation, [address]      Suggested Professional Referral: [x] No  [] Yes:     Patient Education:  [x] Plans/Goals, Risks/Benefits discussed  [x] Home exercise program  Method of Education: [x] Verbal  [x] Demo  [x] Written  Comprehension of Education:  [x] Verbalizes understanding. [x] Demonstrates understanding. [] Needs Review. [] Demonstrates/verbalizes understanding of HEP/Ed previously given. Frequency:  2 days per week for 4-6 weeks    Patient understands diagnosis/prognosis and consents to treatment, plan and goals: [x] Yes    [] No     Thank you for the opportunity to work with your patient. If you have questions or comments, please contact me at numbers listed above. Electronically signed by: Elsa Diaz, PT DPT [id number]         Please sign Physician's Certification and return to:   [id number] [facility]  [address] [place] [address]  Dept: [phone]  Dept Fax: [phone] PT DPT [id number]    [facility] Certification / Comments     Frequency/Duration 2 days per week for 4-6 weeks. Certification period from 1/27/2021  to 3/12/2021. I have reviewed the Plan of Care established for skilled therapy services and certify that the services are required and that they will be provided while the patient is under my care.     Physician's Comments/Revisions:               Physician's Printed Name:                                           [de-identified] Signature:                                                               Date:

## 2021-01-27 NOTE — PROGRESS NOTES
[address] [facility]   Phone: [phone]   Fax: [phone]      Physical Therapy Treatment Note    Date: 2021  Patient Name: Sidney Dumont  : 1934   MRN: [id number]  [facility] 1 month  DOSx: NA  Referring Provider: Hilaria Osborne DO  [address] [facility],  [address] [place] [address] Diagnosis:   M54.31 (ICD-10-CM) - Right sided sciatica   M25.551 (ICD-10-CM) - Right hip pain       Outcome Measure:  LEFS 39/80 , Oswestry 14%    S: see eval  O:  Time 1016-  1056     Visit 1 Repeat outcome measure at mid point and end. Pain 0/10     ROM      Modalities      MH + ES            Exercise      ALL EXERCISE DONE WITH DRAW-IN TECHNIQUE                            Functional activities To aid in reaching , pushing, pulling tasks at home     ROWS: H  \"    ROWS: M  \"    ROWS: L  \"    Obliques - high  \"    Obliques - low  \"     THEREX     Bike      Punches      Lat pulldowns      Triceps ext standing      Marching            Lower trunk rotations 10 x 10s B HEP TE   SKTC 10 x 10s B HEP TE   PPT 20 x 3s holds HEP TE   Trunk ext TB      Trunk flex TB      Hip abd      Hip EXT      TG Squats                  A:  Tolerated well. Above added to written HEP.   P: Continue with rehab plan  Tasha Schwarz, PT  PT DPT [id number]    Treatment Charges: Mins Units   Initial Evaluation 25 1   Re-Evaluation     Ther Exercise         TE 15 1   Manual Therapy     MT     Ther Activities        TA     Gait Training          GT     Neuro Re-education NR     Modalities     Non-Billable Service Time     Other     Total Time/Units 40 2

## 2021-02-02 ENCOUNTER — TREATMENT (OUTPATIENT)
Dept: PHYSICAL THERAPY | Age: 86
End: 2021-02-02
Payer: MEDICARE

## 2021-02-02 DIAGNOSIS — M25.551 RIGHT HIP PAIN: ICD-10-CM

## 2021-02-02 DIAGNOSIS — M54.31 RIGHT SIDED SCIATICA: Primary | ICD-10-CM

## 2021-02-02 PROCEDURE — 97530 THERAPEUTIC ACTIVITIES: CPT | Performed by: PHYSICAL THERAPIST

## 2021-02-02 PROCEDURE — 97110 THERAPEUTIC EXERCISES: CPT | Performed by: PHYSICAL THERAPIST

## 2021-02-02 NOTE — PROGRESS NOTES
[address] [facility]   Phone: [phone]   Fax: [phone]      Physical Therapy Treatment Note    Date: 2021  Patient Name: Heriberto Da Silva  : 1934   MRN: [id number]  [facility] [place]: 1 month  DOSx: NA  Referring Provider: No referring provider defined for this encounter. Medical Diagnosis:   M54.31 (ICD-10-CM) - Right sided sciatica   M25.551 (ICD-10-CM) - Right hip pain       Outcome Measure:  LEFS 39/80 , Oswestry 14%    S: Pt reports no pain this date. Does report soreness the day after evaluation, resolving after approx. 24 hours. O:  Time [id number]     Visit 2 Repeat outcome measure at mid point and end. Pain 0/10     ROM      Modalities      MH + ES            Exercise      ALL EXERCISE DONE WITH DRAW-IN TECHNIQUE                            Functional activities To aid in reaching , pushing, pulling tasks at home     ROWS: H  \"    ROWS: M  \"    ROWS: L  \"    Obliques - high  \"    Obliques - low  \"     THEREX     Bike 5 min     Punches      Lat pulldowns      Triceps ext standing      Marching            Sit to stand X 10  No UE TA         IT band stretch 5 on R, discontinued secondary to pain. Piriformis stretch (Modified) 10 x 10s B HEP    Quad stretch with strap 10 x 10s B     Lower trunk rotations 10 x 10s B HEP TE   SKTC 10 x 10s B HEP TE   PPT  HEP TE   Bridging 2 x 5   TA   SLR 2 x 5 (slight assist to initiate) B Flex  TE   Hip ADD 2 x 10  ball TE   Hip ABD 2 x 10 OTB TE   Trunk ext TB      Trunk flex TB                        Hip abd      Hip EXT      TG Squats                  A:  No c/o pain following treatment. Modified piriformis stretch added to HEP. Pt c/o some pain in R hip with IT band stretch therefore discontinued after 5 reps.      P: Continue with rehab plan  Ricky Healy, PT  PT DPT [id number]    Treatment Charges: Mins Units   Initial Evaluation     Re-Evaluation     Ther Exercise         TE 34 2   Manual Therapy     MT     Ther Activities TA 8 1   Gait Training          GT     Neuro Re-education NR     Modalities     Non-Billable Service Time     Other     Total Time/Units 42 3

## 2021-02-03 ENCOUNTER — TREATMENT (OUTPATIENT)
Dept: PHYSICAL THERAPY | Age: 86
End: 2021-02-03
Payer: MEDICARE

## 2021-02-03 DIAGNOSIS — M54.31 RIGHT SIDED SCIATICA: Primary | ICD-10-CM

## 2021-02-03 DIAGNOSIS — M25.551 RIGHT HIP PAIN: ICD-10-CM

## 2021-02-03 PROCEDURE — 97530 THERAPEUTIC ACTIVITIES: CPT | Performed by: PHYSICAL THERAPIST

## 2021-02-03 PROCEDURE — 97110 THERAPEUTIC EXERCISES: CPT | Performed by: PHYSICAL THERAPIST

## 2021-02-03 NOTE — PROGRESS NOTES
[address] [facility]   Phone: [phone]   Fax: [phone]      Physical Therapy Treatment Note    Date: 2/3/2021  Patient Name: Lopez Rios  : 1934   MRN: [id number]  [facility]  DOSx: NA  Referring Provider: Josefa Smith DO  [address] Diagnosis:   M54.31 (ICD-10-CM) - Right sided sciatica   M25.551 (ICD-10-CM) - Right hip pain       Outcome Measure:  LEFS 39/80 , Oswestry 14%    S: Pt reports no pain this date. No pain or discomfort following previous session. Time 3318-3074     Visit 3 Repeat outcome measure at mid point and end. Pain 0/10     ROM      Modalities      MH + ES            Exercise      ALL EXERCISE DONE WITH DRAW-IN TECHNIQUE                            Functional activities To aid in reaching , pushing, pulling tasks at home     ROWS: H  \"    ROWS: M  \"    ROWS: L  \"    Obliques - high  \"    Obliques - low  \"     THEREX     Bike 5 min     Punches      Lat pulldowns      Triceps ext standing      Marching            Sit to stand X 10  No UE TA   Standing IT band stretch X 10     IT band stretch     Piriformis stretch (Modified) 10 x 10s B HEP    Quad stretch with strap 10 x 10s B     Lower trunk rotations 10 x 10s B HEP TE   SKTC 10 x 10s B HEP TE   PPT  HEP TE   Bridging 2 x 5   TA   SLR Flex  TE   Hip ADD 2 x 10 ball TE         Hip ABD OTB TE   Trunk ext TB      Trunk flex TB                        Hip abd      Hip EXT      TG Squats                  A:  No c/o pain following treatment. Pt tolerated standing IT band stretch well. Reports prefers standing therex to supine. Pt given handout for standing SLR 4ways for HEP to do while standing at kitchen counter to hold onto.       P: Continue with rehab plan  Bhakti Mata, PT  PT DPT [id number]    Treatment Charges: Mins Units   Initial Evaluation     Re-Evaluation     Ther Exercise         TE 35 2   Manual Therapy     MT     Ther Activities        TA 10 1   Gait Training          GT     Neuro Re-education NR     Modalities     Non-Billable Service Time     Other     Total Time/Units 45 3

## 2021-02-05 ENCOUNTER — OFFICE VISIT (OUTPATIENT)
Dept: FAMILY MEDICINE CLINIC | Age: 86
End: 2021-02-05
Payer: MEDICARE

## 2021-02-05 ENCOUNTER — TELEPHONE (OUTPATIENT)
Dept: FAMILY MEDICINE CLINIC | Age: 86
End: 2021-02-05

## 2021-02-05 VITALS
DIASTOLIC BLOOD PRESSURE: 92 MMHG | OXYGEN SATURATION: 94 % | WEIGHT: 146 LBS | HEART RATE: 83 BPM | HEIGHT: 66 IN | TEMPERATURE: 97 F | BODY MASS INDEX: 23.46 KG/M2 | SYSTOLIC BLOOD PRESSURE: 174 MMHG

## 2021-02-05 DIAGNOSIS — S30.811A ABRASION OF ABDOMINAL WALL, INITIAL ENCOUNTER: Primary | ICD-10-CM

## 2021-02-05 PROCEDURE — 99212 OFFICE O/P EST SF 10 MIN: CPT | Performed by: INTERNAL MEDICINE

## 2021-02-05 ASSESSMENT — ENCOUNTER SYMPTOMS: ROS SKIN COMMENTS: SEE ABOVE

## 2021-02-05 NOTE — TELEPHONE ENCOUNTER
Please let patient know that I neglected to see her blood pressure when she came in. It is high. Have her check it over the next several days and make sure it comes down. Follow with  PCP.

## 2021-02-08 ENCOUNTER — TREATMENT (OUTPATIENT)
Dept: PHYSICAL THERAPY | Age: 86
End: 2021-02-08
Payer: MEDICARE

## 2021-02-08 DIAGNOSIS — M54.31 RIGHT SIDED SCIATICA: Primary | ICD-10-CM

## 2021-02-08 PROCEDURE — 97110 THERAPEUTIC EXERCISES: CPT

## 2021-02-08 PROCEDURE — 97530 THERAPEUTIC ACTIVITIES: CPT

## 2021-02-08 NOTE — PROGRESS NOTES
[address] [facility]   Phone: [phone]   Fax: [phone]      Physical Therapy Treatment Note    Date: 2021  Patient Name: Angelina Odom  : 1934   MRN: [id number]  [facility]: 1 month  DOSx: NA  Referring Provider: Brigitte Levine DO  [address] [facility],  [address] [place] [address] Diagnosis:   M54.31 (ICD-10-CM) - Right sided sciatica   M25.551 (ICD-10-CM) - Right hip pain       Outcome Measure:  LEFS 39/80 , Oswestry 14%    S: Pt reports no pain this date. Some pain reported yesterday, however nothing in particular initiated pain. Noticed that pt was wearing her pants inside out this date. Time 7220-1799     Visit 4 Repeat outcome measure at mid point and end. Pain 0/10     ROM      Modalities      MH + ES            Exercise      ALL EXERCISE DONE WITH DRAW-IN TECHNIQUE                            Functional activities To aid in reaching , pushing, pulling tasks at home     ROWS: H  \"    ROWS: M  \"    ROWS: L  \"    Obliques - high  \"    Obliques - low  \"     THEREX     Bike 10 min     Punches      Lat pulldowns      Triceps ext standing      Marching            Sit to stand X 10  No UE TA   Standing IT band stretch X 10     IT band stretch     Piriformis stretch (Modified) 10 x 10s B HEP    Standing SLR  2 x 10  3 ways. Quad stretch with strap 10 x 10s B     Lower trunk rotations 2 x 10s B HEP TE   SKTC 10 x 10s B HEP TE   PPT  HEP TE   Bridging 2 x 5   TA   SLR Flex  TE   Hip ADD 2 x 10 ball TE         Hip ABD OTB TE   Trunk ext TB      Trunk flex TB                        Hip abd      Hip EXT      TG Squats                  A:  No c/o pain following treatment. Pt reporting performing SLR at kitchen sink without difficulty at home. Pt requires frequent verbal cues for exercise as she reports being \"forgetful\".     P: Continue with rehab plan  Loulou Coffey, PTA  [id number]    Treatment Charges: Mins Units   Initial Evaluation Re-Evaluation     Ther Exercise         TE 34 2   Manual Therapy     MT     Ther Activities        TA 10 1   Gait Training          GT     Neuro Re-education NR     Modalities     Non-Billable Service Time     Other     Total Time/Units 44 3

## 2021-02-10 ENCOUNTER — TREATMENT (OUTPATIENT)
Dept: PHYSICAL THERAPY | Age: 86
End: 2021-02-10
Payer: MEDICARE

## 2021-02-10 DIAGNOSIS — M54.31 RIGHT SIDED SCIATICA: Primary | ICD-10-CM

## 2021-02-10 PROCEDURE — 97110 THERAPEUTIC EXERCISES: CPT

## 2021-02-10 PROCEDURE — 97530 THERAPEUTIC ACTIVITIES: CPT

## 2021-02-10 NOTE — PROGRESS NOTES
[address] [facility]   Phone: [phone]   Fax: [phone]      Physical Therapy Treatment Note    Date: 2/10/2021  Patient Name: Lopez Rios  : 1934   MRN: [id number]  [facility]: 1 month  DOSx: NA  Referring Provider: Josefa Smith DO  [address] Diagnosis:   M54.31 (ICD-10-CM) - Right sided sciatica   M25.551 (ICD-10-CM) - Right hip pain       Outcome Measure:  LEFS 39/80 , Oswestry 14%    S: Pt reports no pain this date. Time 2984-0601     Visit 4 Repeat outcome measure at mid point and end. Pain 0/10     ROM      Modalities      MH + ES            Exercise      ALL EXERCISE DONE WITH DRAW-IN TECHNIQUE                            Functional activities To aid in reaching , pushing, pulling tasks at home     ROWS: H  \"    ROWS: M  \"    ROWS: L  \"    Obliques - high  \"    Obliques - low  \"     THEREX     Bike 10 min     Punches      Lat pulldowns      Triceps ext standing      Marching            Sit to stand X 10  No UE TA   Standing IT band stretch X 10     IT band stretch     Piriformis stretch (Modified) 10 x 10s B HEP    Standing resistive SLR  2 x 10  3 ways with OTB                Quad stretch with strap      Lower trunk rotations 2 x 10s B HEP TE   SKTC 10 x 10s B HEP TE   PPT  HEP TE   Bridging 2 x 5   TA   SLR Flex  TE   Hip ADD 2 x 10 ball TE   Marches with abdominal bracing  2 x 10 OTB    Hip ABD  TE   Trunk ext TB      Trunk flex TB                        Hip abd      Hip EXT      TG Squats                  A:  No c/o pain following treatment. Pt states she has been feeling much stronger since beginning therapy.      P: Continue with rehab plan  Nick Monterroso, PTA  [id number]    Treatment Charges: Mins Units   Initial Evaluation     Re-Evaluation     Ther Exercise         TE 34 2   Manual Therapy     MT     Ther Activities        TA 10 1   Gait Training          GT     Neuro Re-education NR     Modalities

## 2021-02-15 ENCOUNTER — TREATMENT (OUTPATIENT)
Dept: PHYSICAL THERAPY | Age: 86
End: 2021-02-15
Payer: MEDICARE

## 2021-02-15 DIAGNOSIS — M54.31 RIGHT SIDED SCIATICA: Primary | ICD-10-CM

## 2021-02-15 PROCEDURE — 97530 THERAPEUTIC ACTIVITIES: CPT

## 2021-02-15 PROCEDURE — 97110 THERAPEUTIC EXERCISES: CPT

## 2021-02-15 NOTE — PROGRESS NOTES
[facility] [facility]   Phone: [phone]   Fax: [phone]      Physical Therapy Treatment Note    Date: 2/15/2021  Patient Name: Kimmie Moseley  : 1934   MRN: [id number]  [facility]  DOSx: NA  Referring Provider: Amanda Da Silva DO  [address] [facility],  [address] [place] [address] Diagnosis:   M54.31 (ICD-10-CM) - Right sided sciatica   M25.551 (ICD-10-CM) - Right hip pain       Outcome Measure:  LEFS 39/80 , Oswestry 14%    S: Pt reports no pain this date. Time 1145- 1229     Visit 5 Repeat outcome measure at mid point and end. Pain 0/10     ROM      Modalities      MH + ES            Exercise      ALL EXERCISE DONE WITH DRAW-IN TECHNIQUE                            Functional activities To aid in reaching , pushing, pulling tasks at home     ROWS: H  \"    ROWS: M  \"    ROWS: L  \"    Obliques - high  \"    Obliques - low  \"     THEREX     Bike 10 min     Punches      Lat pulldowns      Triceps ext standing      Marching            Sit to stand X 10  No UE TA   Standing IT band stretch      IT band stretch     Piriformis stretch (Modified) HEP    Standing resistive SLR  2 x 10  3 ways with OTB                Quad stretch with strap      Lower trunk rotations 2 x 10s B HEP TE   SKTC 10 x 10s B HEP TE   PPT  HEP TE   Bridging 3 x 5   TA   SLR Flex  TE   Hip ADD 2 x 10 ball TE   Marches with abdominal bracing  2 x 10 OTB    Hip ABD  TE   Trunk ext TB      Trunk flex TB                        Hip abd      Hip EXT      TG Squats                  A:  No c/o pain following treatment.      P: Continue with rehab plan  Batsheva Anderson, CAROLYN  [id number]    Treatment Charges: Mins Units   Initial Evaluation     Re-Evaluation     Ther Exercise         TE 34 2   Manual Therapy     MT     Ther Activities        TA 10 1   Gait Training          GT     Neuro Re-education NR     Modalities     Non-Billable Service Time     Other     Total Time/Units 44 3

## 2021-02-17 ENCOUNTER — TREATMENT (OUTPATIENT)
Dept: PHYSICAL THERAPY | Age: 86
End: 2021-02-17
Payer: MEDICARE

## 2021-02-17 DIAGNOSIS — M54.31 RIGHT SIDED SCIATICA: Primary | ICD-10-CM

## 2021-02-17 DIAGNOSIS — M25.551 RIGHT HIP PAIN: ICD-10-CM

## 2021-02-17 PROCEDURE — 97530 THERAPEUTIC ACTIVITIES: CPT | Performed by: PHYSICAL THERAPIST

## 2021-02-17 PROCEDURE — 97110 THERAPEUTIC EXERCISES: CPT | Performed by: PHYSICAL THERAPIST

## 2021-02-17 NOTE — PROGRESS NOTES
[facility] [facility]   Phone: [phone]   Fax: [phone]      Physical Therapy Treatment Note    Date: 2021  Patient Name: Heriberto Da Silva  : 1934   MRN: [id number]  [facility]  DOSx: NA  Referring Provider: Jeanette Kirk DO  [address] Diagnosis:   M54.31 (ICD-10-CM) - Right sided sciatica   M25.551 (ICD-10-CM) - Right hip pain       Outcome Measure:  LEFS 39/80 , Oswestry 14%    S: Pt reports no pain this date. Pt reports was sore yesterday due to did a lot of stooping but feels good today. Time 7217- 2729     Visit 6  Repeat outcome measure at mid point and end. Pain 0/10     ROM      Modalities      MH + ES            Exercise      ALL EXERCISE DONE WITH DRAW-IN TECHNIQUE                            Functional activities To aid in reaching , pushing, pulling tasks at home     ROWS: H  \"    ROWS: M  \"    ROWS: L  \"    Obliques - high  \"    Obliques - low  \"     THEREX     Bike 10 min     Punches      Lat pulldowns      Triceps ext standing      Marching            Sit to stand X 10  No UE TA   Standing IT band stretch      IT band stretch     Piriformis stretch (Modified) HEP    Standing resistive SLR    3 ways with OTB                Quad stretch with strap      Lower trunk rotations 2 x 10s B HEP TE   SKTC 10 x 10s B HEP TE   PPT  HEP TE   Bridging 4 x 5   TA   SLR Flex  TE   Hip ADD 2 x 10 ball TE   Marches with abdominal bracing  2 x 10 OTB    Hip ABD  TE   Trunk ext TB      Trunk flex TB                        Hip abd      Hip EXT      TG Squats                  A:  No c/o pain following treatment. Pt does report some fatigue end of session. Pt reports feels she is making good progress with therapy.       P: Continue with rehab plan  Ricky Healy, [address] [facility]  [id number]    Treatment Charges: Mins Units   Initial Evaluation     Re-Evaluation     Ther Exercise         TE 29  2   Manual Therapy     MT Ther Activities        TA 10 1   Gait Training          GT     Neuro Re-education NR     Modalities     Non-Billable Service Time     Other     Total Time/Units 39  3

## 2021-02-22 ENCOUNTER — TREATMENT (OUTPATIENT)
Dept: PHYSICAL THERAPY | Age: 86
End: 2021-02-22
Payer: MEDICARE

## 2021-02-22 DIAGNOSIS — M54.31 RIGHT SIDED SCIATICA: Primary | ICD-10-CM

## 2021-02-22 DIAGNOSIS — M25.551 RIGHT HIP PAIN: ICD-10-CM

## 2021-02-22 PROCEDURE — 97530 THERAPEUTIC ACTIVITIES: CPT | Performed by: PHYSICAL THERAPIST

## 2021-02-22 PROCEDURE — 97110 THERAPEUTIC EXERCISES: CPT | Performed by: PHYSICAL THERAPIST

## 2021-02-22 NOTE — PROGRESS NOTES
[facility] [facility]   Phone: [phone]   Fax: [phone]      Physical Therapy Treatment Note    Date: 2021  Patient Name: Heidi Kaur  : 1934   MRN: [id number]  [employer]: 1 month  DOSx: NA  Referring Provider: Oz Weathers DO  [address] [facility],  [address] [place] [address] Diagnosis:   M54.31 (ICD-10-CM) - Right sided sciatica   M25.551 (ICD-10-CM) - Right hip pain       Outcome Measure:  LEFS 39/80 , Oswestry 14%    S: Pt reports no pain this date. Pt reports was sore last night while and after running the sweeper but better today. Time 1016- 1055     Visit 8 Repeat outcome measure at mid point and end. Pain 0/10     ROM      Modalities      MH + ES            Exercise      ALL EXERCISE DONE WITH DRAW-IN TECHNIQUE                            Functional activities To aid in reaching , pushing, pulling tasks at home     ROWS: H  \"    ROWS: M  \"    ROWS: L  \"    Obliques - high  \"    Obliques - low  \"     THEREX     Bike 10 min     Punches      Lat pulldowns      Triceps ext standing      Marching            Sit to stand X 10  No UE TA   Standing IT band stretch      IT band stretch     Piriformis stretch (Modified) HEP    Standing resistive SLR    3 ways with OTB                Quad stretch with strap      Lower trunk rotations 2 x 10s B HEP TE   SKTC 10 x 10s B HEP TE   PPT  HEP TE   Bridging 2 x 10   TA   SLR Flex  TE   Hip ADD 2 x 10 ball TE   Marches with abdominal bracing  2 x 10 GTB    Hip ABD  TE   Trunk ext TB      Trunk flex TB      Step ups  1 x 10 B 6 inch with 2 UE support  TA               Hip abd      Hip EXT      TG Squats                  A:  Pt reports some SOB after bike activity. O2 and HR checked; O2 98% and HR 84.  C/o SOB decreased with short break. No further complaints during session. No complaints end of session.        P: Continue with rehab plan  Jane Santiago  [id number]    Treatment Charges: Mins Units Initial Evaluation     Re-Evaluation     Ther Exercise         TE 27 2   Manual Therapy     MT     Ther Activities        TA 12 1   Gait Training          GT     Neuro Re-education NR     Modalities     Non-Billable Service Time     Other     Total Time/Units 39  3

## 2021-02-22 NOTE — PROGRESS NOTES
[place] [facility] [facility]   Phone: [phone]   Fax: [phone]        Referring Provider: Cristy Maldonado DO  [address] [facility],  [address] [facility] Diagnosis:   M54.31 (ICD-10-CM) - Right sided sciatica   M25.551 (ICD-10-CM) - Right hip pain        CERTIFICATION PERIOD:  1/27/2021  to 3/12/2021. ATTENDANCE:  Patient has attended *** of *** scheduled treatments from ***  to ***. TREATMENTS RECEIVED:  ***    INITIAL STATUS:  · ***  · ***    CURRENT STATUS:  Observations: well nourished female                            Gait: altered with short step length, width, height     Functional Strength:   Able to toe walk, heel walk, and squat.     Range of Motion:     Trunk:               Flexion:                       []? Normal   [x]? Limited 20%              Extension:                   []? Normal   [x]? Limited 30%               Right Rotation:            []? Normal   [x]? Limited 20%              Left Rotation:               []? Normal   [x]? Limited 20%              Right Side Bending:    []? Normal   [x]? Limited 20%              Left Side Bending:      []? Normal   [x]? Limited painful in R hip area 20%     Lower Extremity:              Right:                           [x]? Normal   []? Limited               Left:                             [x]? Normal   []? Limited         Strength:                 Trunk:  mildly decreased              R LE:   4/5              L LE:    4/5     Palpation: Tender to palpation over R greater trochanter and approximately 1/2 way down IT band area also over R SI joint and pirifomis, Non-tender to palpation with sacral compression, with central lumbar PA mobs, to L piriformis or L SI joint.      Sensation: pt reports numbness/tingling in b toes.      Special Tests:   []? Nerve Root Compression           Right []?+ / []? -    Left []?+ / []? -  []? Slump           Right []?+ / [x]? -    Left []?+ / [x]? -  []?  TASHA Right []?+ / []? -    Left []?+ / []? -  []? S-I Distraction          Right []?+ / []? -    Left []?+ / []? -      []? SLR           Right []?+ / [x]? -    Left []?+ / [x]? -     []? MIQUEL          Right []?+ / []? -    Left []?+ / []? -  []? S-I Compression          Right []?+ / []? -    Left []?+ / []? -   []? Other pelvic alignment : []?+ / [x]? -                Special Test Comments: Pt demonstrates signs and symptoms of R SI pain with possible gluteal tendinopathy       OUTCOME MEASURE:    Modified Oswestry 14% disability, LEFS 39/80     COMMENTS AND RECOMMENDATIONS:   ***    Thank you for the opportunity to work with your patient. Caryle , PT    I CERTIFY THAT THE ABOVE REASSESSMENT AND PLAN OF CARE FOR PHYSICAL THERAPY SERVICES ARE APPROPRIATE AND MEDICALLY NECESSARY.     Duration: From 2/22/2021 thru ***    ________________________                _______________  Physician     Date

## 2021-03-03 ENCOUNTER — TREATMENT (OUTPATIENT)
Dept: PHYSICAL THERAPY | Age: 86
End: 2021-03-03
Payer: MEDICARE

## 2021-03-03 DIAGNOSIS — M54.31 RIGHT SIDED SCIATICA: Primary | ICD-10-CM

## 2021-03-03 PROCEDURE — 97110 THERAPEUTIC EXERCISES: CPT

## 2021-03-03 PROCEDURE — 97530 THERAPEUTIC ACTIVITIES: CPT

## 2021-03-03 NOTE — PROGRESS NOTES
[address] [facility]   Phone: [phone]   Fax: [phone]      Physical Therapy Treatment Note    Date: 3/3/2021  Patient Name: Sidney Dumont  : 1934   MRN: [id number]  [place]: 1 month  DOSx: NA  Referring Provider: Hilaria Osborne DO  [address] [facility],  [address] [place] [address] Diagnosis:   M54.31 (ICD-10-CM) - Right sided sciatica   M25.551 (ICD-10-CM) - Right hip pain       Outcome Measure:  LEFS 39/80 , Oswestry 14%    S: Pt reports no pain this date. Time 1145- 1230     Visit 9 Repeat outcome measure at mid point and end. Pain 0/10     ROM      Modalities      MH + ES            Exercise      ALL EXERCISE DONE WITH DRAW-IN TECHNIQUE                            Functional activities To aid in reaching , pushing, pulling tasks at home     ROWS: H  \"    ROWS: M  \"    ROWS: L  \"    Obliques - high  \"    Obliques - low  \"     THEREX     Bike 10 min     Punches      Lat pulldowns      Triceps ext standing      Marching      squats      Sit to stand X 10  No UE TA   Standing IT band stretch      IT band stretch     Piriformis stretch (Modified) HEP    Standing resistive SLR  2 x 10  3 ways with OTB                Quad stretch with strap      Lower trunk rotations 2 x 10s B HEP TE   SKTC 3 x 30 sec hold HEP TE   PPT  HEP TE   Bridging 2 x 10  With abd. BTB TA   SLR Flex  TE   Hip ADD 2 x 10 ball TE   Marches with abdominal bracing  2 x 10 GTB    Hip ABD  TE   Trunk ext TB      Trunk flex TB      Step ups  1 x 10 B 6 inch with 2 UE support  TA               Hip abd      Hip EXT      TG Squats                  A:  Pt reports no pain as of late and reports she feels \"stronger\".      P: Continue with rehab plan  Mellie Sever, PTA  [id number]    Treatment Charges: Mins Units   Initial Evaluation     Re-Evaluation     Ther Exercise         TE 30 2   Manual Therapy     MT     Ther Activities        TA 15 1   Gait Training          GT     Neuro Re-education NR Modalities     Non-Billable Service Time     Other     Total Time/Units 45  3

## 2021-03-08 ENCOUNTER — TREATMENT (OUTPATIENT)
Dept: PHYSICAL THERAPY | Age: 86
End: 2021-03-08
Payer: MEDICARE

## 2021-03-08 DIAGNOSIS — M54.31 RIGHT SIDED SCIATICA: Primary | ICD-10-CM

## 2021-03-08 PROCEDURE — 97110 THERAPEUTIC EXERCISES: CPT

## 2021-03-08 PROCEDURE — 97530 THERAPEUTIC ACTIVITIES: CPT

## 2021-03-08 NOTE — PROGRESS NOTES
[address] [facility]   Phone: [phone]   Fax: [phone]      Physical Therapy Treatment Note    Date: 3/8/2021  Patient Name: Rajat Joshi  : 1934   MRN: [id number]  [facility] [facility]: 1 month  DOSx: NA  Referring Provider: Doug Ball DO  [address] [facility],  [address] [place] [address] Diagnosis:   M54.31 (ICD-10-CM) - Right sided sciatica   M25.551 (ICD-10-CM) - Right hip pain       Outcome Measure:  LEFS 39/80 , Oswestry 14%    S: Pt report no pain this date and has not had back or hip pain in \"quite some time\"     Time 1420- 1500     Visit 10 Repeat outcome measure at mid point and end. Pain 0/10     ROM      Modalities      MH + ES            Exercise      ALL EXERCISE DONE WITH DRAW-IN TECHNIQUE                            Functional activities To aid in reaching , pushing, pulling tasks at home     ROWS: H  \"    ROWS: M  \"    ROWS: L  \"    Obliques - high  \"    Obliques - low  \"     THEREX     Bike 10 min     Punches      Lat pulldowns 2 x 10 YTB    Triceps ext standing      Marching      squats      Sit to stand X 10  No UE TA   Standing IT band stretch      IT band stretch     Piriformis stretch (Modified) HEP    Standing resistive SLR  2 x 10  3 ways with YTB          Oblique walk out with OTB 2 x 10     Quad stretch with strap      Lower trunk rotations 2 x 10s B HEP TE   SKTC 3 x 30 sec hold HEP TE   PPT  HEP TE   Bridging 2 x 10   TA   SLR Flex  TE   Hip ADD 2 x 10 ball TE   Marches with abdominal bracing  2 x 10 YTB    Hip ABD  TE   Trunk ext TB      Trunk flex TB      Step ups  6 inch with 2 UE support  TA               Hip abd      Hip EXT      TG Squats                  A:  Pt reports no pain as of late and reports she feels \"stronger\".      P: Continue with rehab plan  Zackery Smith PTA  [id number]    Treatment Charges: Mins Units   Initial Evaluation     Re-Evaluation     Ther Exercise         TE 30 2   Manual Therapy     MT     Ther Activities TA 10 1   Gait Training          GT     Neuro Re-education NR     Modalities     Non-Billable Service Time     Other     Total Time/Units 40 3

## 2021-03-10 ENCOUNTER — TREATMENT (OUTPATIENT)
Dept: PHYSICAL THERAPY | Age: 86
End: 2021-03-10
Payer: MEDICARE

## 2021-03-10 DIAGNOSIS — M54.31 RIGHT SIDED SCIATICA: Primary | ICD-10-CM

## 2021-03-10 PROCEDURE — 97110 THERAPEUTIC EXERCISES: CPT

## 2021-03-10 PROCEDURE — 97530 THERAPEUTIC ACTIVITIES: CPT

## 2021-03-10 NOTE — PROGRESS NOTES
[address] [facility]   Phone: [phone]   Fax: [phone]      Physical Therapy Treatment Note    Date: 3/10/2021  Patient Name: Ti Gonzalez  : 1934   MRN: [id number]  [facility]: 1 month  DOSx: NA  Referring Provider: Ruba Melgar DO  [address] [facility],  [facility] Diagnosis:   M54.31 (ICD-10-CM) - Right sided sciatica   M25.551 (ICD-10-CM) - Right hip pain       Outcome Measure:  LEFS 39/80 , Oswestry 14%    S: Pt continues to report no pain    Time 1015- 1100     Visit 11 Repeat outcome measure at mid point and end. Pain 0/10     ROM      Modalities      MH + ES            Exercise      ALL EXERCISE DONE WITH DRAW-IN TECHNIQUE                            Functional activities To aid in reaching , pushing, pulling tasks at home     ROWS: H  \"    ROWS: M  \"    ROWS: L  \"    Obliques - high  \"    Obliques - low  \"     THEREX     Bike 10 min     Punches      Lat pulldowns  YTB    Triceps ext standing      Marching      squats     Sit to stand X 10  No UE TA   Standing IT band stretch      IT band stretch     Piriformis stretch (Modified) HEP    Standing resistive SLR  2 x 10  3 ways with YTB          Oblique walk out with OTB 2 x 10     Quad stretch with strap      Lower trunk rotations 2 x 10s B HEP TE   SKTC 3 x 30 sec hold HEP TE   PPT  HEP TE   Bridging 2 x 10   TA   SLR Flex  TE   Hip ADD TE   Marches with abdominal bracing     Hip ABD  TE   Trunk ext TB      Trunk flex TB      Step ups  TA               Hip abd      Hip EXT      TG Squats                  A:  Pt reports has not had any pain as of late. Fatigued after treatment with no pain.      P: Continue with rehab plan  Elizabeth Joyner, PTA  [id number]    Treatment Charges: Mins Units   Initial Evaluation     Re-Evaluation     Ther Exercise         TE 35 2   Manual Therapy     MT     Ther Activities        TA 10 1   Gait Training          GT     Neuro Re-education NR     Modalities Non-Billable Service Time     Other     Total Time/Units 45 3

## 2021-03-15 ENCOUNTER — OFFICE VISIT (OUTPATIENT)
Dept: CARDIOLOGY CLINIC | Age: 86
End: 2021-03-15
Payer: MEDICARE

## 2021-03-15 VITALS
SYSTOLIC BLOOD PRESSURE: 146 MMHG | RESPIRATION RATE: 14 BRPM | BODY MASS INDEX: 26.87 KG/M2 | WEIGHT: 146 LBS | DIASTOLIC BLOOD PRESSURE: 80 MMHG | HEART RATE: 67 BPM | HEIGHT: 62 IN

## 2021-03-15 DIAGNOSIS — I21.4 NSTEMI (NON-ST ELEVATED MYOCARDIAL INFARCTION) (HCC): Primary | ICD-10-CM

## 2021-03-15 PROCEDURE — 99214 OFFICE O/P EST MOD 30 MIN: CPT | Performed by: INTERNAL MEDICINE

## 2021-03-15 PROCEDURE — 93000 ELECTROCARDIOGRAM COMPLETE: CPT | Performed by: INTERNAL MEDICINE

## 2021-03-15 RX ORDER — AMLODIPINE BESYLATE 2.5 MG/1
2.5 TABLET ORAL DAILY
Qty: 90 TABLET | Refills: 3 | Status: SHIPPED
Start: 2021-03-15 | End: 2022-03-29 | Stop reason: SDUPTHER

## 2021-03-15 NOTE — PROGRESS NOTES
Alan Restrepo  1934  Date of Service: 3/15/2021    Patient Active Problem List    Diagnosis Date Noted    NSTEMI (non-ST elevated myocardial infarction) ([facility] 2016     Priority: High    GIST (gastrointestinal stromal tumor), malignant ([facility] 2016     Priority: High    Type 2 diabetes mellitus without complication, without long-term current use of insulin ([facility] 2021    Dementia ([facility]     Acquired hypothyroidism 2019    Hyperlipidemia 11/15/2019    Disorder of thyroid gland 11/15/2019    Carotid stenosis, right 11/15/2019    Onychomycosis 2019    Difficulty walking 2019    Malignant tumor of fundus of stomach ([facility] 2016    Malignant neoplasm of abdomen ([facility] 2016    Hypertension     PUD (peptic ulcer disease)        Social History     Socioeconomic History    Marital status:      Spouse name: None    Number of children: None    Years of education: None    Highest education level: None   Occupational History    Occupation: retired    Social Needs    Financial resource strain: None    Food insecurity     Worry: None     Inability: None    Transportation needs     Medical: None     Non-medical: None   Tobacco Use    Smoking status: Former Smoker     Packs/day: 1.00     Years: 18.00     Pack years: 18.00     Quit date:      Years since quittin.2    Smokeless tobacco: Never Used   Substance and Sexual Activity    Alcohol use: Yes     Comment: glass of wine with dinner    Drug use: No    Sexual activity: Not Currently     Partners: Male     Comment:    Lifestyle    Physical activity     Days per week: None     Minutes per session: None    Stress: None   Relationships    Social connections     Talks on phone: None     Gets together: None     Attends [employer] service: None     Active member of club or organization: None     Attends meetings of clubs or organizations: None     Relationship status: None    Intimate partner violence     Fear of current or ex partner: None     Emotionally abused: None     Physically abused: None     Forced sexual activity: None   Other Topics Concern    None   Social History Narrative    None       Current Outpatient Medications   Medication Sig Dispense Refill    levothyroxine (SYNTHROID) 50 MCG tablet Take 1 tablet by mouth daily 90 tablet 1    loratadine (CLARITIN) 10 MG tablet Take 10 mg by mouth daily as needed (allergies)       aspirin 81 MG tablet Take 81 mg by mouth daily       No current facility-administered medications for this visit. Allergies   Allergen Reactions    Carafate [Sucralfate]      Unable to explain    Cefdinir     Cetirizine & Related      Unable to explain    Lisinopril Other (See Comments)     Difficulty swallowing, reflux, headache    Pravachol [Pravastatin Sodium]      Unable to explain    Protonix [Pantoprazole Sodium]      Unable to explain    Statins Other (See Comments)     intolerable       Chief Complaint:  Ti Gonzalez is here today for follow up and management/recomendations for CAD     History of Present Illness: Ti Gonzalez is an extremely difficult historian. She does housework, goes upstairs, and goes shopping. She denies any chest discomfort or dyspnea with any of the above activities. She denies orthopnea/PND, or lower extremity edema. She denies any palpitations. She states that on rare occasions when she lays down she feels dizzy. REVIEW OF SYSTEMS:  As above. Patient does not complain of any fever, chills, nausea, vomiting or diarrhea. No focal, motor or neurological deficits. No changes in his/her vision, hearing, bowel or bladder habits. She is not known to have a history of thyroid problems. No recent nose bleeds.     PHYSICAL EXAM:  Vitals:    03/15/21 0936 03/15/21 0954   BP: (!) 148/70 (!) 146/80   Pulse: 67    Resp: 14    Weight: 146 lb (66.2 kg)    Height: 5' 2\" (1.575 m)        GENERAL:  She is alert and oriented x 3, communicates well, in no distress. NECK:  No masses, trachea is mid position. Supple, full ROM, no JVD or bruits. No palpable thyromegaly or lymphadenopathy. HEART:  Regular rate and rhythm. Normal S1 and S2. There is an S4 gallop and a I-II/VI systolic murmur. LUNGS:  Clear to auscultation bilaterally. No use of accessory muscles. symmetrical excursion. ABDOMEN:  Soft, non-tender. Normal bowel sounds. EXTREMITIES:  Full ROM x 4. No lower extremity edema bilateral on exam.  Good distal pulses. EYES:  Extraocular muscles intact. PERRL. Normal lids & conjunctiva. ENT:  Nares are clear & not bleeding. Moist mucosa. Normal lips formation. No external masses   NEURO: no tremors, full ROM x 4, EOMI. SKIN:  Warm, dry and intact. Normal turgor. EKG: Sinus rhythm, 67 bpm, nl axis, nonspecific ST - T wave changes. Assessment:   1. Coronary artery disease as outlined above. Clinically no symptoms of recurring ischemia at this time. 2. Moderate mitral regurgitation  3. Hypertension, not well controlled today  4. Hypercholesterolemia  5. Peptic ulcer disease  6. GI bleed  7. Stomach cancer  8. Dizziness as described above. More consistent with vertigo. Recommendations:  1. She is following the cholesterol with Dr. Kevyn Almendarez. She has been intolerant of statins. 2. Allergic to ACE inhibitors. 3. Norvasc 2.5 mg daily. Thank you for allowing me to participate in your patient's care.       [address], [address] [facility]  Interventional Cardiology

## 2021-03-17 ENCOUNTER — TREATMENT (OUTPATIENT)
Dept: PHYSICAL THERAPY | Age: 86
End: 2021-03-17
Payer: MEDICARE

## 2021-03-17 DIAGNOSIS — M54.31 RIGHT SIDED SCIATICA: Primary | ICD-10-CM

## 2021-03-17 DIAGNOSIS — M25.551 RIGHT HIP PAIN: ICD-10-CM

## 2021-03-17 PROCEDURE — 97164 PT RE-EVAL EST PLAN CARE: CPT | Performed by: PHYSICAL THERAPIST

## 2021-03-17 NOTE — PROGRESS NOTES
[address] [facility]   Phone: [phone]   Fax: [phone]        Referring Provider: Praneeth Donovan DO  [address] [facility],  [address] [facility] Diagnosis:   M54.31 (ICD-10-CM) - Right sided sciatica   M25.551 (ICD-10-CM) - Right hip pain         CERTIFICATION PERIOD:  1/27/2021  to 3/12/2021. ATTENDANCE:  Patient has attended 15 of 12 scheduled treatments from 1/27/2021  to 3/17/2021. TREATMENTS RECEIVED:  Therapeutic exercise, therapeutic activity     INITIAL STATUS:  Observations: well nourished female                            Gait: altered with short step length, width, height     Functional Strength:   Able to toe walk, heel walk, and squat.     Range of Motion:     Trunk:               Flexion:                       []? Normal   [x]? Limited 20%              Extension:                   []? Normal   [x]? Limited 30%               Right Rotation:            []? Normal   [x]? Limited 20%              Left Rotation:               []? Normal   [x]? Limited 20%              Right Side Bending:    []? Normal   [x]? Limited 20%              Left Side Bending:      []? Normal   [x]? Limited painful in R hip area 20%     Lower Extremity:              Right:                           [x]? Normal   []? Limited               Left:                             [x]? Normal   []? Limited         Strength:                 Trunk:  mildly decreased              R LE:   4/5              L LE:    4/5     Palpation: Tender to palpation over R greater trochanter and approximately 1/2 way down IT band area also over R SI joint and pirifomis, Non-tender to palpation with sacral compression, with central lumbar PA mobs, to L piriformis or L SI joint.      Sensation: pt reports numbness/tingling in b toes.      Special Tests:   []? Nerve Root Compression           Right []?+ / []? -    Left []?+ / []? -  []? Slump           Right []?+ / [x]? -    Left []?+ / [x]? -  []? FADIR          Right []?+ / []? -    Left []?+ / []? -  []? S-I Distraction          Right []?+ / []? -    Left []?+ / []? -      []? SLR           Right []?+ / [x]? -    Left []?+ / [x]? -     []? MIQUEL          Right []?+ / []? -    Left []?+ / []? -  []? S-I Compression          Right []?+ / []? -    Left []?+ / []? -   []? Other pelvic alignment : []?+ / [x]? -                Special Test Comments: Pt demonstrates signs and symptoms of R SI pain with possible gluteal tendinopathy          CURRENT STATUS:  Observations: well nourished female                            Gait: altered with short step length, width, height     Functional Strength:   Able to toe walk, heel walk, and squat.     Range of Motion:     Trunk:               Flexion:                       []? Normal   [x]? Limited 20%              Extension:                   []? Normal   [x]? Limited 30%               Right Rotation:            [x]? Normal   []? Limited               Left Rotation:               [x]? Normal   []? Limited               Right Side Bending:    []? Normal   [x]? Limited 10%              Left Side Bending:      []? Normal   [x]? Limited 10%     Lower Extremity:              Right:                           [x]? Normal   []? Limited               Left:                             [x]? Normal   []? Limited         Strength:                 Trunk:  mildly decreased              R LE:   4+/5              L LE:    4+/5     Palpation: slight Tender to palpation over R greater trochanter, not down IT band. slight over R ASIS and R SI and lower lumbar         Sensation:  pt reports numbness/tingling in feet.      Special Tests:   []? Nerve Root Compression           Right []?+ / []? -    Left []?+ / []? -  []? Slump           Right []?+ / [x]? -    Left []?+ / [x]? -  []? FADIR          Right []?+ / []? -    Left []?+ / []? -  []? S-I Distraction          Right []?+ / []? -    Left []?+ / []? -      []? SLR           Right []?+ / [x]? -    Left []?+ / [x]? -     []? MIQUEL          Right []?+ / []? -    Left []?+ / []? -  []? S-I Compression          Right []?+ / []? -    Left []?+ / []? -   []? Other pelvic alignment : []?+ / [x]? -                Special Test Comments: Pt demonstrates signs and symptoms of resolving R SI pain with possible gluteal tendinopathy       Short Term goals (2-3 weeks)  · Decrease reported pain to 0-3/10 (goal met)  · Increase ROM by 10% (partial goal met)  · Increase Strength to 4+/5 (goal met)  · Able to perform/complete the following functions/tasks: pt able to go up/down flight of steps with 2 rails with minor pain/limitation,  Pt able to bend and lift 4 pounds from floor safely with minor pain/limitation. Pt able to perform light household chores for 10 minutes with minor pain/limitation. (goal met)  · Oswestry Low Back Disability Questionnaire 10% disability, LEFS 50/80 (partial goal met)     Long Term goals (4-6 weeks)  · Decrease reported pain to 0-1/10 (goal met)  · Increase ROM to [facility] (not met)  · Increase Strength to 5/5 (not met)  · Able to perform/complete the following functions/tasks: pt able to go up/down flight of steps with 2 rails with no pain/limitation, pt able to bend and  lift 8 pounds from floor safely with no pain/limitation. Pt able to perform light household chores for 20 minutes with no pain/limitation.        (goal met)   · Oswestry Low Back Disability Questionnaire 8% disability , LEFS 55/80 (not met)  · Independent with Home Exercise Programs        OUTCOME MEASURE:   Modified Oswestry 10% disability, LEFS 44/80     COMMENTS AND RECOMMENDATIONS:   Pt met majority of short term goals and made good progress with long term goals. Pt reports gets pain 'every once and a while' but reports feels at least 80% better than initial evaluation. In last few sessions pt had reported no pain as of late and feeling stronger. Reports tries to keep up with HEP but doesn't do every day.   Recommend pt continue with HEP at home. All questions answered. Recommend call with any questions. Will discharge pt at this time. Thank you for the opportunity to work with your patient. Usman Lazo, PT DPT [id number]    I CERTIFY THAT THE ABOVE REASSESSMENT AND PLAN OF CARE FOR PHYSICAL THERAPY SERVICES ARE APPROPRIATE AND MEDICALLY NECESSARY.       ________________________                _______________  Physician     Date

## 2021-04-23 ENCOUNTER — OFFICE VISIT (OUTPATIENT)
Dept: FAMILY MEDICINE CLINIC | Age: 86
End: 2021-04-23
Payer: MEDICARE

## 2021-04-23 VITALS
SYSTOLIC BLOOD PRESSURE: 130 MMHG | HEART RATE: 78 BPM | OXYGEN SATURATION: 98 % | BODY MASS INDEX: 23.3 KG/M2 | HEIGHT: 66 IN | DIASTOLIC BLOOD PRESSURE: 80 MMHG | TEMPERATURE: 98.1 F | WEIGHT: 145 LBS

## 2021-04-23 DIAGNOSIS — I10 ESSENTIAL HYPERTENSION: ICD-10-CM

## 2021-04-23 DIAGNOSIS — E78.2 MIXED HYPERLIPIDEMIA: ICD-10-CM

## 2021-04-23 DIAGNOSIS — E11.9 TYPE 2 DIABETES MELLITUS WITHOUT COMPLICATION, WITHOUT LONG-TERM CURRENT USE OF INSULIN (HCC): Primary | ICD-10-CM

## 2021-04-23 DIAGNOSIS — E03.9 ACQUIRED HYPOTHYROIDISM: ICD-10-CM

## 2021-04-23 DIAGNOSIS — R30.0 DYSURIA: ICD-10-CM

## 2021-04-23 LAB
BILIRUBIN, POC: ABNORMAL
BLOOD URINE, POC: ABNORMAL
CLARITY, POC: ABNORMAL
COLOR, POC: ABNORMAL
CREATININE URINE POCT: 200
GLUCOSE URINE, POC: ABNORMAL
HBA1C MFR BLD: 6.4 %
KETONES, POC: ABNORMAL
LEUKOCYTE EST, POC: ABNORMAL
MICROALBUMIN/CREAT 24H UR: 30 MG/G{CREAT}
MICROALBUMIN/CREAT UR-RTO: <30
NITRITE, POC: ABNORMAL
PH, POC: 5.5
PROTEIN, POC: ABNORMAL
SPECIFIC GRAVITY, POC: 1.02
UROBILINOGEN, POC: 1

## 2021-04-23 PROCEDURE — 81002 URINALYSIS NONAUTO W/O SCOPE: CPT | Performed by: FAMILY MEDICINE

## 2021-04-23 PROCEDURE — 83036 HEMOGLOBIN GLYCOSYLATED A1C: CPT | Performed by: FAMILY MEDICINE

## 2021-04-23 PROCEDURE — 99214 OFFICE O/P EST MOD 30 MIN: CPT | Performed by: FAMILY MEDICINE

## 2021-04-23 PROCEDURE — 82044 UR ALBUMIN SEMIQUANTITATIVE: CPT | Performed by: FAMILY MEDICINE

## 2021-04-23 RX ORDER — LEVOTHYROXINE SODIUM 0.05 MG/1
50 TABLET ORAL DAILY
Qty: 90 TABLET | Refills: 0 | Status: SHIPPED
Start: 2021-04-23 | End: 2021-07-19 | Stop reason: SDUPTHER

## 2021-04-23 ASSESSMENT — ENCOUNTER SYMPTOMS
SHORTNESS OF BREATH: 0
VOMITING: 0
SORE THROAT: 0
COUGH: 0
ABDOMINAL PAIN: 0
BACK PAIN: 0
SINUS PAIN: 0
TROUBLE SWALLOWING: 0
WHEEZING: 0
EYE PAIN: 0
CONSTIPATION: 0
NAUSEA: 0
CHEST TIGHTNESS: 0
DIARRHEA: 0

## 2021-04-23 NOTE — PROGRESS NOTES
21    Name: Eagle Cain  Formerly [facility]:   Sex:female   Age:87 y.o. Chief Complaint   Patient presents with    Hypothyroidism    Dementia     Patient presents to office for visit. She says that her urine has a strong odor and she is worried she may have an infection. Patient has a red mole like spot on her right lower abdomen she would like the doctor to check. Patient says cardiology has put her on a bloodpressure medicine and she is taking it every morning. She denies any dizziness, she says she has leg swelling off and on. Patient here today for check up    Diabetes   she admits she is not watching diet  Will check an a1c today  She has been well otherwise    HTN she continues to see cardiology  He started amlodipine and she has been taking it  No side effects that she can tell    2 small red moles she would like me to check    Also she thinks her urine smells  But when asked about her drinking she drinks coffee in the morning and then a few cups od tea in the afternoon but nothing else  She does not drink any water    Thyroid has been stable  Will continue the same dose  Labs n 3 months        Review of Systems   Constitutional: Negative for appetite change, fatigue and fever. HENT: Negative for congestion, ear pain, sinus pain, sore throat and trouble swallowing. Eyes: Negative for pain. Respiratory: Negative for cough, chest tightness, shortness of breath and wheezing. Cardiovascular: Negative for chest pain, palpitations and leg swelling. Gastrointestinal: Negative for abdominal pain, constipation, diarrhea, nausea and vomiting. Endocrine: Negative for cold intolerance and heat intolerance. Genitourinary: Positive for dysuria. Negative for difficulty urinating, frequency, hematuria and pelvic pain. Musculoskeletal: Negative for arthralgias, back pain, gait problem, joint swelling and myalgias. Skin: Negative for rash and wound.    Neurological: Negative for dizziness, syncope, light-headedness and headaches. Hematological: Negative for adenopathy. Psychiatric/Behavioral: Negative for confusion, dysphoric mood, self-injury, sleep disturbance and suicidal ideas. The patient is not nervous/anxious.             Current Outpatient Medications:     levothyroxine (SYNTHROID) 50 MCG tablet, Take 1 tablet by mouth daily, Disp: 90 tablet, Rfl: 0    amLODIPine (NORVASC) 2.5 MG tablet, Take 1 tablet by mouth daily, Disp: 90 tablet, Rfl: 3    loratadine (CLARITIN) 10 MG tablet, Take 10 mg by mouth daily as needed (allergies) , Disp: , Rfl:     aspirin 81 MG tablet, Take 81 mg by mouth daily, Disp: , Rfl:   Allergies   Allergen Reactions    Carafate [Sucralfate]      Unable to explain    Cefdinir     Cetirizine & Related      Unable to explain    Lisinopril Other (See Comments)     Difficulty swallowing, reflux, headache    Pravachol [Pravastatin Sodium]      Unable to explain    Protonix [Pantoprazole Sodium]      Unable to explain    Statins Other (See Comments)     intolerable      Past Medical History:   Diagnosis Date    Dementia ([address]     Diverticulosis     GIST (gastrointestinal stroma tumor), malignant, colon ([address]     Hyperlipidemia     Hypertension     MI (myocardial infarction) ([address] 2015    Mitral regurgitation     per cardiology    PUD (peptic ulcer disease)      Patient Active Problem List    Diagnosis Date Noted    NSTEMI (non-ST elevated myocardial infarction) ([address] 06/03/2016     Priority: High    GIST (gastrointestinal stromal tumor), malignant ([address] 05/25/2016     Priority: High    Type 2 diabetes mellitus without complication, without long-term current use of insulin ([address] 01/20/2021    Dementia ([address]     Acquired hypothyroidism 11/20/2019    Hyperlipidemia 11/15/2019    Disorder of thyroid gland 11/15/2019    Carotid stenosis, right 11/15/2019    Onychomycosis 06/14/2019    Difficulty walking 06/14/2019    Malignant tumor of fundus of stomach [facility] 06/14/2016    Malignant neoplasm of abdomen ([facility] [facility] 06/14/2016    Hypertension     PUD (peptic ulcer disease)       Past Surgical History:   Procedure Laterality Date    CAROTID ENDARTERECTOMY Right 04/2019    Dr Brumfield Earalexander Bilateral 2015    COLONOSCOPY  12/2014    DILATATION, ESOPHAGUS      ENDOSCOPY, COLON, DIAGNOSTIC  09/02/2016    marking of gastric tumor    HYSTERECTOMY      YUMIKO/BSO    OTHER SURGICAL HISTORY  09/09/2016    Laparoscopic Robotic Assisted Partial Gastrectomy    SHOULDER SURGERY Left     rotator cuff right    TONSILLECTOMY AND ADENOIDECTOMY      UPPER GASTROINTESTINAL ENDOSCOPY  12/2014    UPPER GASTROINTESTINAL ENDOSCOPY  5/25/2016      Social History     Tobacco History     Smoking Status  Former Smoker Quit date  1/1/1969 Smoking Frequency  1 pack/day for 18 years (18 pk yrs)    Smokeless Tobacco Use  Never Used          Alcohol History     Alcohol Use Status  Yes Comment  glass of wine with dinner          Drug Use     Drug Use Status  No          Sexual Activity     Sexually Active  Not Currently Partners  Male Comment              /80   Pulse 78   Temp 98.1 °F (36.7 °C)   Ht 5' 6\" (1.676 m)   Wt 145 lb (65.8 kg)   SpO2 98%   BMI 23.40 kg/m²     EXAM:   Physical Exam  Vitals signs and nursing note reviewed. Constitutional:       Appearance: Normal appearance. She is well-developed. HENT:      Head: Normocephalic and atraumatic. Right Ear: Tympanic membrane normal.      Left Ear: Tympanic membrane normal.      Ears:      Comments: Hearing aide, no cerumen in the canals     Nose: Nose normal.      Mouth/Throat:      Mouth: Mucous membranes are moist.   Eyes:      Pupils: Pupils are equal, round, and reactive to light. Neck:      Musculoskeletal: Normal range of motion. Cardiovascular:      Rate and Rhythm: Normal rate and regular rhythm.    Pulmonary:      Effort: Pulmonary effort is normal.      Breath sounds: Normal breath sounds. Abdominal:      General: Bowel sounds are normal.      Palpations: Abdomen is soft. Skin:     General: Skin is warm and dry. Comments: Right flank there are 2 small hemangioma like moles one is 4-5mm around and the other one is 2mm around, flat and benign appearing, no irritation   Neurological:      General: No focal deficit present. Mental Status: She is alert and oriented to person, place, and time. Psychiatric:         Mood and Affect: Mood normal.         Thought Content: Thought content normal.          Vanetta Sicard was seen today for hypothyroidism and dementia. Diagnoses and all orders for this visit:    Type 2 diabetes mellitus without complication, without long-term current use of insulin ([facility])  Comments:  a1c today, she is not watching her diet  Orders:  -     POCT microalbumin  -     POCT glycosylated hemoglobin (Hb A1C)  -     CBC Auto Differential; Future  -     Comprehensive Metabolic Panel; Future  -     Hemoglobin A1C; Future    Acquired hypothyroidism  Comments:  has been stable  labs in 3 months  continue dose for now, refills sent  Orders:  -     levothyroxine (SYNTHROID) 50 MCG tablet; Take 1 tablet by mouth daily  -     T4, Free; Future  -     TSH without Reflex; Future    Dysuria  Comments:  not drinking enough water, she likes her coffee  will culture urine and treat if indicated  Orders:  -     POCT Urinalysis no Micro  -     Culture, Urine    Essential hypertension  Comments:  continues to see dr Maury Diallo  he started her on amlodipine, she has stopped previous htn meds in the past but so far she is taking this one  BP good in the offic    Mixed hyperlipidemia  Comments:  refuses statin      appt in 3 months  Labs prior      I independently reviewed and updated the chief complaint, HPI, past medical and surgical history, medications, allergies and ROS as entered by the LPN. Seen by:   Jorge Roberson DO

## 2021-04-26 LAB — URINE CULTURE, ROUTINE: NORMAL

## 2021-05-05 ENCOUNTER — OFFICE VISIT (OUTPATIENT)
Dept: FAMILY MEDICINE CLINIC | Age: 86
End: 2021-05-05
Payer: MEDICARE

## 2021-05-05 VITALS
TEMPERATURE: 97.9 F | BODY MASS INDEX: 23.21 KG/M2 | SYSTOLIC BLOOD PRESSURE: 120 MMHG | HEIGHT: 66 IN | OXYGEN SATURATION: 97 % | HEART RATE: 78 BPM | WEIGHT: 144.4 LBS | DIASTOLIC BLOOD PRESSURE: 80 MMHG

## 2021-05-05 DIAGNOSIS — H69.83 EUSTACHIAN TUBE DYSFUNCTION, BILATERAL: ICD-10-CM

## 2021-05-05 DIAGNOSIS — J30.1 SEASONAL ALLERGIC RHINITIS DUE TO POLLEN: ICD-10-CM

## 2021-05-05 DIAGNOSIS — R07.0 PAIN IN THROAT: Primary | ICD-10-CM

## 2021-05-05 LAB — S PYO AG THROAT QL: NORMAL

## 2021-05-05 PROCEDURE — 87880 STREP A ASSAY W/OPTIC: CPT | Performed by: FAMILY MEDICINE

## 2021-05-05 PROCEDURE — 99213 OFFICE O/P EST LOW 20 MIN: CPT | Performed by: FAMILY MEDICINE

## 2021-05-05 ASSESSMENT — ENCOUNTER SYMPTOMS
SINUS PAIN: 0
COUGH: 1
CONSTIPATION: 0
CHEST TIGHTNESS: 0
EYE PAIN: 0
BACK PAIN: 0
SHORTNESS OF BREATH: 0
SORE THROAT: 1
ABDOMINAL PAIN: 0
VOMITING: 0
WHEEZING: 0
DIARRHEA: 0
TROUBLE SWALLOWING: 0
NAUSEA: 0

## 2021-05-05 NOTE — PROGRESS NOTES
5/5/21    Name: Quinn Menjivar  JWX:[id number]   Sex:female   Age:87 y.o. Chief Complaint   Patient presents with    Pharyngitis    Headache     Patient says she has had a sore throat and headache ever since her appointment on 4/23/21. She says she will have body aches off and on, cough, and headache. She says she was taking some over the counter medications for symptom relief and her symptoms improved. Patient can not remember the names of the medications that she took. She says she feels like all of her symptoms are returning. Patient here for random upper resp s/s  strted after she was here with runny nose and drainage, then cough started,  She was taking over the counter meds and that got rid of the cough but now drainage is back and causing  Her to have a sore throat  She does have itchy eye occasionally  She has been taking claritin daily  Of  Note she does have a cat and the cat is very loving    No fevers  No shortness of breath  The cough she had was not productive  Still has taste and smell  Has had her covid vaccine        Review of Systems   Constitutional: Negative for appetite change, fatigue and fever. HENT: Positive for sore throat. Negative for congestion, ear pain, sinus pain and trouble swallowing. Eyes: Negative for pain. Respiratory: Positive for cough. Negative for chest tightness, shortness of breath and wheezing. Cardiovascular: Negative for chest pain, palpitations and leg swelling. Gastrointestinal: Negative for abdominal pain, constipation, diarrhea, nausea and vomiting. Endocrine: Negative for cold intolerance and heat intolerance. Genitourinary: Negative for difficulty urinating, dysuria, frequency, hematuria and pelvic pain. Musculoskeletal: Positive for myalgias. Negative for arthralgias, back pain, gait problem and joint swelling. Skin: Negative for rash and wound. Neurological: Positive for headaches. Negative for dizziness, syncope and light-headedness. neoplasm of abdomen ([facility] 06/14/2016    Hypertension     PUD (peptic ulcer disease)       Past Surgical History:   Procedure Laterality Date    CAROTID ENDARTERECTOMY Right 04/2019    Dr Daigle Cool Bilateral 2015    COLONOSCOPY  12/2014    DILATATION, ESOPHAGUS      ENDOSCOPY, COLON, DIAGNOSTIC  09/02/2016    marking of gastric tumor    HYSTERECTOMY      YUMIKO/BSO    OTHER SURGICAL HISTORY  09/09/2016    Laparoscopic Robotic Assisted Partial Gastrectomy    SHOULDER SURGERY Left     rotator cuff right    TONSILLECTOMY AND ADENOIDECTOMY      UPPER GASTROINTESTINAL ENDOSCOPY  12/2014    UPPER GASTROINTESTINAL ENDOSCOPY  5/25/2016      Social History     Tobacco History     Smoking Status  Former Smoker Quit date  1/1/1969 Smoking Frequency  1 pack/day for 18 years (18 pk yrs)    Smokeless Tobacco Use  Never Used          Alcohol History     Alcohol Use Status  Yes Comment  glass of wine with dinner          Drug Use     Drug Use Status  No          Sexual Activity     Sexually Active  Not Currently Partners  Male Comment              /80   Pulse 78   Temp 97.9 °F (36.6 °C)   Ht 5' 6\" (1.676 m)   Wt 144 lb 6.4 oz (65.5 kg)   SpO2 97%   BMI 23.31 kg/m²     EXAM:   Physical Exam  Vitals signs and nursing note reviewed. Constitutional:       General: She is not in acute distress. Appearance: Normal appearance. She is well-developed. She is not ill-appearing. HENT:      Head: Normocephalic and atraumatic. Ears:      Comments: TM's slightly bulging and pink     Nose: Congestion present. Mouth/Throat:      Mouth: Mucous membranes are moist.      Pharynx: No oropharyngeal exudate or posterior oropharyngeal erythema. Eyes:      Pupils: Pupils are equal, round, and reactive to light. Neck:      Musculoskeletal: Normal range of motion. Cardiovascular:      Rate and Rhythm: Normal rate and regular rhythm.    Pulmonary:      Effort: Pulmonary effort is normal.      Breath sounds: Normal breath sounds. Abdominal:      General: Bowel sounds are normal.      Palpations: Abdomen is soft. Musculoskeletal:      Comments: Gait steady in the office today   Skin:     General: Skin is warm and dry. Neurological:      Mental Status: She is alert and oriented to person, place, and time. Mental status is at baseline. Psychiatric:         Mood and Affect: Mood normal.         Thought Content: Thought content normal.          Lisa Fernandez was seen today for pharyngitis and headache. Diagnoses and all orders for this visit:    Pain in throat  Comments:  strep negative  Orders:  -     POCT rapid strep A    Seasonal allergic rhinitis due to pollen  Comments:  stop claritin  try cetirizine 10mg daily  nasacort nasal spray one spray ea nostril bid  f/u as needed,     Eustachian tube dysfunction, bilateral  Comments:  nasacort nasal spray    f/u as needed  Did advise that it is possible she has a cat allergy      I independently reviewed and updated the chief complaint, HPI, past medical and surgical history, medications, allergies and ROS as entered by the LPN. Seen by:   Baljeet Tapia DO

## 2021-07-14 DIAGNOSIS — E03.9 ACQUIRED HYPOTHYROIDISM: ICD-10-CM

## 2021-07-14 DIAGNOSIS — E11.9 TYPE 2 DIABETES MELLITUS WITHOUT COMPLICATION, WITHOUT LONG-TERM CURRENT USE OF INSULIN (HCC): ICD-10-CM

## 2021-07-14 LAB
ALBUMIN SERPL-MCNC: 4.3 G/DL (ref 3.5–5.2)
ALP BLD-CCNC: 94 U/L (ref 35–104)
ALT SERPL-CCNC: 9 U/L (ref 0–32)
ANION GAP SERPL CALCULATED.3IONS-SCNC: 17 MMOL/L (ref 7–16)
AST SERPL-CCNC: 22 U/L (ref 0–31)
BASOPHILS ABSOLUTE: 0.04 E9/L (ref 0–0.2)
BASOPHILS RELATIVE PERCENT: 0.7 % (ref 0–2)
BILIRUB SERPL-MCNC: 0.4 MG/DL (ref 0–1.2)
BUN BLDV-MCNC: 21 MG/DL (ref 6–23)
CALCIUM SERPL-MCNC: 9.4 MG/DL (ref 8.6–10.2)
CHLORIDE BLD-SCNC: 107 MMOL/L (ref 98–107)
CO2: 16 MMOL/L (ref 22–29)
CREAT SERPL-MCNC: 1 MG/DL (ref 0.5–1)
EOSINOPHILS ABSOLUTE: 0.09 E9/L (ref 0.05–0.5)
EOSINOPHILS RELATIVE PERCENT: 1.6 % (ref 0–6)
GFR AFRICAN AMERICAN: >60
GFR NON-AFRICAN AMERICAN: 52 ML/MIN/1.73
GLUCOSE BLD-MCNC: 156 MG/DL (ref 74–99)
HBA1C MFR BLD: 6.1 % (ref 4–5.6)
HCT VFR BLD CALC: 45.3 % (ref 34–48)
HEMOGLOBIN: 14.1 G/DL (ref 11.5–15.5)
IMMATURE GRANULOCYTES #: 0.01 E9/L
IMMATURE GRANULOCYTES %: 0.2 % (ref 0–5)
LYMPHOCYTES ABSOLUTE: 1.74 E9/L (ref 1.5–4)
LYMPHOCYTES RELATIVE PERCENT: 31.2 % (ref 20–42)
MCH RBC QN AUTO: 29.6 PG (ref 26–35)
MCHC RBC AUTO-ENTMCNC: 31.1 % (ref 32–34.5)
MCV RBC AUTO: 95.2 FL (ref 80–99.9)
MONOCYTES ABSOLUTE: 0.53 E9/L (ref 0.1–0.95)
MONOCYTES RELATIVE PERCENT: 9.5 % (ref 2–12)
NEUTROPHILS ABSOLUTE: 3.17 E9/L (ref 1.8–7.3)
NEUTROPHILS RELATIVE PERCENT: 56.8 % (ref 43–80)
PDW BLD-RTO: 13.3 FL (ref 11.5–15)
PLATELET # BLD: 155 E9/L (ref 130–450)
PMV BLD AUTO: 12.8 FL (ref 7–12)
POTASSIUM SERPL-SCNC: 4.8 MMOL/L (ref 3.5–5)
RBC # BLD: 4.76 E12/L (ref 3.5–5.5)
SODIUM BLD-SCNC: 140 MMOL/L (ref 132–146)
T4 FREE: 1.29 NG/DL (ref 0.93–1.7)
TOTAL PROTEIN: 6.8 G/DL (ref 6.4–8.3)
TSH SERPL DL<=0.05 MIU/L-ACNC: 2.82 UIU/ML (ref 0.27–4.2)
WBC # BLD: 5.6 E9/L (ref 4.5–11.5)

## 2021-07-19 ENCOUNTER — OFFICE VISIT (OUTPATIENT)
Dept: FAMILY MEDICINE CLINIC | Age: 86
End: 2021-07-19
Payer: MEDICARE

## 2021-07-19 VITALS
TEMPERATURE: 97.7 F | OXYGEN SATURATION: 97 % | HEIGHT: 66 IN | SYSTOLIC BLOOD PRESSURE: 110 MMHG | BODY MASS INDEX: 23.4 KG/M2 | HEART RATE: 78 BPM | DIASTOLIC BLOOD PRESSURE: 70 MMHG | WEIGHT: 145.6 LBS

## 2021-07-19 DIAGNOSIS — J30.1 SEASONAL ALLERGIC RHINITIS DUE TO POLLEN: ICD-10-CM

## 2021-07-19 DIAGNOSIS — F01.50 VASCULAR DEMENTIA WITHOUT BEHAVIORAL DISTURBANCE (HCC): ICD-10-CM

## 2021-07-19 DIAGNOSIS — E03.9 ACQUIRED HYPOTHYROIDISM: Primary | ICD-10-CM

## 2021-07-19 DIAGNOSIS — I10 ESSENTIAL HYPERTENSION: ICD-10-CM

## 2021-07-19 DIAGNOSIS — E11.9 TYPE 2 DIABETES MELLITUS WITHOUT COMPLICATION, WITHOUT LONG-TERM CURRENT USE OF INSULIN (HCC): ICD-10-CM

## 2021-07-19 PROCEDURE — 99214 OFFICE O/P EST MOD 30 MIN: CPT | Performed by: FAMILY MEDICINE

## 2021-07-19 RX ORDER — LEVOTHYROXINE SODIUM 0.05 MG/1
50 TABLET ORAL DAILY
Qty: 90 TABLET | Refills: 0 | Status: SHIPPED
Start: 2021-07-19 | End: 2021-10-04 | Stop reason: SDUPTHER

## 2021-07-19 ASSESSMENT — ENCOUNTER SYMPTOMS
TROUBLE SWALLOWING: 0
DIARRHEA: 0
BACK PAIN: 0
VOMITING: 0
SINUS PAIN: 0
COUGH: 0
CHEST TIGHTNESS: 0
EYE PAIN: 0
NAUSEA: 0
SHORTNESS OF BREATH: 0
SORE THROAT: 0
WHEEZING: 0
CONSTIPATION: 0
ABDOMINAL PAIN: 0

## 2021-07-19 NOTE — PROGRESS NOTES
7/19/21    Name: Katharina Sheikh  OQP:[id number]   Sex:female   Age:87 y.o. Chief Complaint   Patient presents with    Diabetes    Hypothyroidism    Hypertension     Patient presents to office for visit. She did have labs done. No medication changes since her last appointment. She has been staying in doors mostly on the very hot days. Patient says she has been waking up in the morning completely soaked with sweat. She says she has to change all of the sheets on her bed. Patient here for checkup    Doing well  She admits she has been a lot less active in the last year  She noticed her balance isnt as good as it use to be  We discussed activity   Recommended chair yoga  She can do this at homein her living room  She has a smart TV  She will give it a try    Labs reviewed  Sugar better  Diabetes is diet controlled at this time  She is doing well    HTN better  She is taking the amlodipine from Dr Ursula So much better with the claritin  She cannot believe the difference  She is feeling better        Review of Systems   Constitutional: Positive for diaphoresis. Negative for appetite change, fatigue and fever. HENT: Negative for congestion, ear pain, sinus pain, sore throat and trouble swallowing. Eyes: Negative for pain. Respiratory: Negative for cough, chest tightness, shortness of breath and wheezing. Cardiovascular: Negative for chest pain, palpitations and leg swelling. Gastrointestinal: Negative for abdominal pain, constipation, diarrhea, nausea and vomiting. Endocrine: Negative for cold intolerance and heat intolerance. Genitourinary: Negative for difficulty urinating, dysuria, frequency, hematuria, pelvic pain and urgency. Musculoskeletal: Negative for arthralgias, back pain, gait problem, joint swelling and myalgias. Skin: Negative for rash and wound. Neurological: Negative for dizziness, syncope, light-headedness and headaches. Hematological: Negative for adenopathy. Past Surgical History:   Procedure Laterality Date    CAROTID ENDARTERECTOMY Right 04/2019    Dr Gisela Pond Bilateral 2015    COLONOSCOPY  12/2014    DILATATION, ESOPHAGUS      ENDOSCOPY, COLON, DIAGNOSTIC  09/02/2016    marking of gastric tumor    HYSTERECTOMY      YUMIKO/BSO    OTHER SURGICAL HISTORY  09/09/2016    Laparoscopic Robotic Assisted Partial Gastrectomy    SHOULDER SURGERY Left     rotator cuff right    TONSILLECTOMY AND ADENOIDECTOMY      UPPER GASTROINTESTINAL ENDOSCOPY  12/2014    UPPER GASTROINTESTINAL ENDOSCOPY  5/25/2016      Social History     Tobacco History     Smoking Status  Former Smoker Quit date  1/1/1969 Smoking Frequency  1 pack/day for 18 years (18 pk yrs)    Smokeless Tobacco Use  Never Used          Alcohol History     Alcohol Use Status  Yes Comment  glass of wine with dinner          Drug Use     Drug Use Status  No          Sexual Activity     Sexually Active  Not Currently Partners  Male Comment              /70   Pulse 78   Temp 97.7 °F (36.5 °C)   Ht 5' 6\" (1.676 m)   Wt 145 lb 9.6 oz (66 kg)   SpO2 97%   BMI 23.50 kg/m²     EXAM:   Physical Exam  Vitals and nursing note reviewed. Constitutional:       General: She is not in acute distress. Appearance: Normal appearance. She is well-developed. She is not ill-appearing. HENT:      Head: Normocephalic and atraumatic. Right Ear: Tympanic membrane normal.      Left Ear: Tympanic membrane normal.      Ears:      Comments: Hearing aids bilaterally     Nose: Nose normal.      Mouth/Throat:      Mouth: Mucous membranes are moist.   Eyes:      Pupils: Pupils are equal, round, and reactive to light. Cardiovascular:      Rate and Rhythm: Normal rate and regular rhythm. Pulmonary:      Effort: Pulmonary effort is normal.      Breath sounds: Normal breath sounds. Abdominal:      General: Bowel sounds are normal.      Palpations: Abdomen is soft. Musculoskeletal:      Cervical back: Normal range of motion. Comments: Gait slow but steady   Skin:     General: Skin is warm and dry. Neurological:      Mental Status: She is alert and oriented to person, place, and time. Mental status is at baseline. Psychiatric:         Mood and Affect: Mood normal.         Thought Content: Thought content normal.          Derek Mujica was seen today for diabetes, hypothyroidism and hypertension. Diagnoses and all orders for this visit:    Acquired hypothyroidism  Comments:  has been stable  labs in 3 months  continue dose for now, refills sent  Orders:  -     levothyroxine (SYNTHROID) 50 MCG tablet; Take 1 tablet by mouth daily    Seasonal allergic rhinitis due to pollen  Comments:  much better with claritin  she cannot believe how much that helps    Essential hypertension  Comments:  much improved, seeing DR Tiffanie Cole   she is taking her amlodipine in the evening    Type 2 diabetes mellitus without complication, without long-term current use of insulin ([address]  Comments:  diet controlled  much better  she is drinking more water than tea  but stopped her tea completely  needs to drink more water    Vascular dementia without behavioral disturbance ([address]  Comments:  poor memory at times  mostly good days  she has not complaints about more forgetfulness    some renal impairment but likely b/c she is not drinking enough fluids  Increase fluids before  Next labs  Will likely recheck in 3 mths at her appt that way she is not fasting    I independently reviewed and updated the chief complaint, HPI, past medical and surgical history, medications, allergies and ROS as entered by the LPN. Seen by:   Christen Wong,

## 2021-07-29 ENCOUNTER — OFFICE VISIT (OUTPATIENT)
Dept: FAMILY MEDICINE CLINIC | Age: 86
End: 2021-07-29
Payer: MEDICARE

## 2021-07-29 VITALS
OXYGEN SATURATION: 97 % | HEIGHT: 64 IN | HEART RATE: 79 BPM | TEMPERATURE: 97.5 F | SYSTOLIC BLOOD PRESSURE: 136 MMHG | WEIGHT: 142 LBS | DIASTOLIC BLOOD PRESSURE: 80 MMHG | BODY MASS INDEX: 24.24 KG/M2

## 2021-07-29 DIAGNOSIS — R30.0 DYSURIA: ICD-10-CM

## 2021-07-29 DIAGNOSIS — R10.2 PELVIC PRESSURE IN FEMALE: ICD-10-CM

## 2021-07-29 DIAGNOSIS — R82.90 MALODOROUS URINE: ICD-10-CM

## 2021-07-29 PROCEDURE — 81002 URINALYSIS NONAUTO W/O SCOPE: CPT | Performed by: INTERNAL MEDICINE

## 2021-07-29 PROCEDURE — 99213 OFFICE O/P EST LOW 20 MIN: CPT | Performed by: INTERNAL MEDICINE

## 2021-07-29 RX ORDER — NITROFURANTOIN 25; 75 MG/1; MG/1
100 CAPSULE ORAL 2 TIMES DAILY
Qty: 14 CAPSULE | Refills: 0 | Status: SHIPPED | OUTPATIENT
Start: 2021-07-29 | End: 2021-08-05

## 2021-07-29 ASSESSMENT — ENCOUNTER SYMPTOMS
DIARRHEA: 0
NAUSEA: 0
VOMITING: 0
ABDOMINAL PAIN: 0

## 2021-07-29 NOTE — PROGRESS NOTES
[address] [place]     21  Shira Law : 1934 Sex: female  Age: 80 y.o. Chief Complaint   Patient presents with    Urinary Tract Infection     onset more than a week; she starts out fine when she is going to the bathroom then after awhile she gets some pressure; like someone is squeezing her bladder       HPI    Patient presents to express care today complaining of strong smelling urine over the past month. She states she does get pressure over the bladder when she has to go and feels like someone is squeezing it. Denies frequency. Denies fever back pain or hematuria. Review of Systems   Constitutional: Negative for chills and fever. Gastrointestinal: Negative for abdominal pain, diarrhea, nausea and vomiting. Genitourinary: Positive for dysuria. Negative for flank pain, frequency, hematuria and vaginal bleeding. Musculoskeletal: Negative for myalgias. Neurological: Negative for light-headedness. REST OF PERTINENT ROS GONE OVER AND WAS NEGATIVE.                Current Outpatient Medications:     nitrofurantoin, macrocrystal-monohydrate, (MACROBID) 100 MG capsule, Take 1 capsule by mouth 2 times daily for 7 days, Disp: 14 capsule, Rfl: 0    levothyroxine (SYNTHROID) 50 MCG tablet, Take 1 tablet by mouth daily, Disp: 90 tablet, Rfl: 0    amLODIPine (NORVASC) 2.5 MG tablet, Take 1 tablet by mouth daily, Disp: 90 tablet, Rfl: 3    loratadine (CLARITIN) 10 MG tablet, Take 10 mg by mouth daily as needed (allergies) , Disp: , Rfl:     aspirin 81 MG tablet, Take 81 mg by mouth daily, Disp: , Rfl:   Allergies   Allergen Reactions    Carafate [Sucralfate]      Unable to explain    Cefdinir     Cetirizine & Related      Unable to explain    Lisinopril Other (See Comments)     Difficulty swallowing, reflux, headache    Pravachol [Pravastatin Sodium]      Unable to explain    Protonix [Pantoprazole Sodium]      Unable to explain    Statins Other (See Comments) intolerable       Past Medical History:   Diagnosis Date    [facility] [facility]     Diverticulosis     GIST (gastrointestinal stroma tumor), malignant, colon ([facility] [address] [facility]     Hyperlipidemia     Hypertension     MI (myocardial infarction) ([facility] [address] 2015    Mitral regurgitation     per cardiology    PUD (peptic ulcer disease)      Past Surgical History:   Procedure Laterality Date    CAROTID ENDARTERECTOMY Right 2019    Dr Janet Gerber Bilateral 2015    COLONOSCOPY  2014    DILATATION, ESOPHAGUS      ENDOSCOPY, COLON, DIAGNOSTIC  2016    marking of gastric tumor    HYSTERECTOMY      YUMIKO/BSO    OTHER SURGICAL HISTORY  2016    Laparoscopic Robotic Assisted Partial Gastrectomy    SHOULDER SURGERY Left     rotator cuff right    TONSILLECTOMY AND ADENOIDECTOMY      UPPER GASTROINTESTINAL ENDOSCOPY  2014    UPPER GASTROINTESTINAL ENDOSCOPY  2016     Family History   Problem Relation Age of Onset    Colon Cancer Father     COPD Mother         tobacco    Cancer Sister         breast     Social History     Socioeconomic History    Marital status:      Spouse name: Not on file    Number of children: Not on file    Years of education: Not on file    Highest education level: Not on file   Occupational History    Occupation: retired    Tobacco Use    Smoking status: Former Smoker     Packs/day: 1.00     Years: 18.00     Pack years: 18.00     Quit date:      Years since quittin.6    Smokeless tobacco: Never Used   Vaping Use    Vaping Use: Never used   Substance and Sexual Activity    Alcohol use: Yes     Comment: glass of wine with dinner    Drug use: No    Sexual activity: Not Currently     Partners: Male     Comment:    Other Topics Concern    Not on file   Social History Narrative    Not on file     Social Determinants of Health     Financial Resource Strain:     Difficulty of Paying Living Expenses:    Food Insecurity:  Worried About [facility] in the Last Year:    [address] in the Last Year:    Transportation Needs:     Lack of Transportation (Medical):  Lack of Transportation (Non-Medical):    Physical Activity:     Days of Exercise per Week:     Minutes of Exercise per Session:    Stress:     Feeling of Stress :    Social Connections:     Frequency of Communication with Friends and Family:     Frequency of Social Gatherings with Friends and Family:     Attends [employer] Services:     Active Member of Clubs or Organizations:     Attends Club or Organization Meetings:     Marital Status:    Intimate Partner Violence:     Fear of Current or Ex-Partner:     Emotionally Abused:     Physically Abused:     Sexually Abused:        Vitals:    07/29/21 1550   BP: 136/80   Pulse: 79   Temp: 97.5 °F (36.4 °C)   TempSrc: Temporal   SpO2: 97%   Weight: 142 lb (64.4 kg)   Height: 5' 4\" (1.626 m)       Physical Exam  Vitals and nursing note reviewed. Constitutional:       General: She is not in acute distress. Abdominal:      General: Bowel sounds are normal.      Palpations: Abdomen is soft. Tenderness: There is no abdominal tenderness. There is no right CVA tenderness or left CVA tenderness. Comments: There was no suprapubic tenderness to palpation nor flank tenderness to percussion noted. Neurological:      Mental Status: She is alert and oriented to person, place, and time. Psychiatric:         Mood and Affect: Mood normal.         Behavior: Behavior normal.               Assessment and Plan:  Derek Mujica was seen today for urinary tract infection. Diagnoses and all orders for this visit:    Dysuria  -     POCT Urinalysis no Micro    Pelvic pressure in female    Malodorous urine    Other orders  -     nitrofurantoin, macrocrystal-monohydrate, (MACROBID) 100 MG capsule; Take 1 capsule by mouth 2 times daily for 7 days    Plan: Unfortunately she was not able to give us a specimen.   She did urinate but missed the container. Given her symptom complex I elected to treat her empirically with nitrofurantoin. Warned of potential side effects. Probiotic. Push fluids. Follow-up with PCP. Notify us if not improving. Return for fu pcp. Seen By:  Diane Henderson MD      *Document was created using voice recognition software. Note was reviewed however may contain grammatical errors.

## 2021-09-07 ENCOUNTER — APPOINTMENT (OUTPATIENT)
Dept: CT IMAGING | Age: 86
End: 2021-09-07
Payer: MEDICARE

## 2021-09-07 ENCOUNTER — HOSPITAL ENCOUNTER (EMERGENCY)
Age: 86
Discharge: HOME | End: 2021-09-07
Attending: EMERGENCY MEDICINE
Payer: MEDICARE

## 2021-09-07 VITALS
OXYGEN SATURATION: 96 % | HEIGHT: 64 IN | RESPIRATION RATE: 16 BRPM | TEMPERATURE: 97.2 F | HEART RATE: 72 BPM | WEIGHT: 142 LBS | DIASTOLIC BLOOD PRESSURE: 101 MMHG | BODY MASS INDEX: 24.24 KG/M2 | SYSTOLIC BLOOD PRESSURE: 203 MMHG

## 2021-09-07 DIAGNOSIS — S09.90XA CLOSED HEAD INJURY, INITIAL ENCOUNTER: Primary | ICD-10-CM

## 2021-09-07 PROCEDURE — 6370000000 HC RX 637 (ALT 250 FOR IP): Performed by: EMERGENCY MEDICINE

## 2021-09-07 PROCEDURE — 99284 EMERGENCY DEPT VISIT MOD MDM: CPT

## 2021-09-07 PROCEDURE — 72125 CT NECK SPINE W/O DYE: CPT

## 2021-09-07 PROCEDURE — 70480 CT ORBIT/EAR/FOSSA W/O DYE: CPT

## 2021-09-07 PROCEDURE — 70450 CT HEAD/BRAIN W/O DYE: CPT

## 2021-09-07 RX ORDER — ACETAMINOPHEN 500 MG
1000 TABLET ORAL ONCE
Status: COMPLETED | OUTPATIENT
Start: 2021-09-07 | End: 2021-09-07

## 2021-09-07 RX ORDER — ONDANSETRON 4 MG/1
4 TABLET, ORALLY DISINTEGRATING ORAL ONCE
Status: COMPLETED | OUTPATIENT
Start: 2021-09-07 | End: 2021-09-07

## 2021-09-07 RX ADMIN — ONDANSETRON 4 MG: 4 TABLET, ORALLY DISINTEGRATING ORAL at 15:12

## 2021-09-07 RX ADMIN — ACETAMINOPHEN 1000 MG: 500 TABLET ORAL at 13:29

## 2021-09-07 ASSESSMENT — PAIN SCALES - GENERAL
PAINLEVEL_OUTOF10: 5
PAINLEVEL_OUTOF10: 5

## 2021-09-07 ASSESSMENT — PAIN DESCRIPTION - PAIN TYPE: TYPE: ACUTE PAIN

## 2021-09-07 ASSESSMENT — PAIN DESCRIPTION - DESCRIPTORS: DESCRIPTORS: DISCOMFORT;ACHING

## 2021-09-07 ASSESSMENT — PAIN DESCRIPTION - ORIENTATION: ORIENTATION: POSTERIOR

## 2021-09-07 ASSESSMENT — PAIN DESCRIPTION - FREQUENCY: FREQUENCY: CONTINUOUS

## 2021-09-07 ASSESSMENT — PAIN DESCRIPTION - ONSET: ONSET: ON-GOING

## 2021-09-07 ASSESSMENT — PAIN DESCRIPTION - LOCATION: LOCATION: HEAD

## 2021-09-07 NOTE — ED PROVIDER NOTES
Department of Emergency Medicine   ED  Provider Note  Admit Date/RoomTime: 9/7/2021 12:14 PM  ED Room: 10/10                HPI:  9/7/21, Time: 1:07 PM EDT  . Blanka Obregon is a 80 y.o. female presenting to the ED after a fall, beginning prior to arrival.  The complaint has been constant, moderate in severity, and worsened by nothing. Non syncopal fall. Was adjusting the drapes and fell backwards. Hit head. +LOC. She c/o headache. She denies neck pain or back. She denies any other injuries or any other complaints. No chest pain or sob. No fever. No recent illness. Her tetanus is UTD      Glascow Coma Scale at time of initial examination  Best Eye Response 4 - Opens eyes on own   Best Verbal Response 5 - Alert and oriented   Best Motor Response 6 - Follows simple motor commands   Total 15       Review of Systems:   A complete review of systems was performed and pertinent positives and negatives are stated within HPI, all other systems reviewed and are negative.            --------------------------------------------- PAST HISTORY ---------------------------------------------  Past Medical History:  has a past medical history of Dementia ([facility] [address] Diverticulosis, GIST (gastrointestinal stroma tumor), malignant, colon ([facility] [address] Hyperlipidemia, Hypertension, MI (myocardial infarction) ([facility] [address] Mitral regurgitation, and PUD (peptic ulcer disease). Past Surgical History:  has a past surgical history that includes Hysterectomy; Upper gastrointestinal endoscopy (12/2014); Colonoscopy (12/2014); Upper gastrointestinal endoscopy (5/25/2016); shoulder surgery (Left); Endoscopy, colon, diagnostic (09/02/2016); Dilatation, esophagus; other surgical history (09/09/2016); Carotid endarterectomy (Right, 04/2019); Tonsillectomy and adenoidectomy; and Cataract removal with implant (Bilateral, 2015). Social History:  reports that she quit smoking about 52 years ago. She has a 18.00 pack-year smoking history.  She has never used smokeless tobacco. She reports current alcohol use. She reports that she does not use drugs. Family History: family history includes COPD in her mother; Cancer in her sister; Colon Cancer in her father. The patients home medications have been reviewed. Allergies: Carafate [sucralfate], Cefdinir, Cetirizine & related, Lisinopril, Pravachol [pravastatin sodium], Protonix [pantoprazole sodium], and Statins            ------------------------- NURSING NOTES AND VITALS REVIEWED ---------------------------   The nursing notes within the ED encounter and vital signs as below have been reviewed. BP (!) 203/101   Pulse 72   Temp 97.2 °F (36.2 °C)   Resp 16   Ht 5' 4\" (1.626 m)   Wt 142 lb (64.4 kg)   SpO2 96%   BMI 24.37 kg/m²   Oxygen Saturation Interpretation: Normal    The patients available past medical records and past encounters were reviewed. -------------------------------------------------- RESULTS -------------------------------------------------    LABS:  No results found for this visit on 09/07/21. RADIOLOGY:  Interpreted by Radiologist.  CT IAC POSTERIOR FOSSA WO CONTRAST   Final Result   1. No temporal bone fracture. 2. Fluid attenuation fills right mastoid air cells related to mastoiditis. 3. Middle ear and inner ear structures are unremarkable. CT HEAD WO CONTRAST   Final Result   Head CT:      No acute CVA, intracranial bleed, or brain mass. White matter hypodensities favor chronic small vessel ischemic gliosis. Complete opacification of the right mastoid air cells may be posttraumatic   blood products and or effusion. Differential includes right eustachian tube   dysfunction and or mastoiditis      Occipital scalp swelling with occipital hematoma, greater to the left of   midline. C-spine CT:      No acute skeletal pathology in the cervical spine.       Multilevel degenerative disc and joint disease         CT CERVICAL SPINE WO CONTRAST Final Result   Head CT:      No acute CVA, intracranial bleed, or brain mass. White matter hypodensities favor chronic small vessel ischemic gliosis. Complete opacification of the right mastoid air cells may be posttraumatic   blood products and or effusion. Differential includes right eustachian tube   dysfunction and or mastoiditis      Occipital scalp swelling with occipital hematoma, greater to the left of   midline. C-spine CT:      No acute skeletal pathology in the cervical spine. Multilevel degenerative disc and joint disease                 ---------------------------------------------------PHYSICAL EXAM--------------------------------------          Constitutional/General: Alert and oriented x3, well appearing, non toxic in NAD  Head: Normocephalic and superficial abrasion to occipital scalp. No mastoid tenderness. No redness or overlying mastoid tenderness. Eyes: PERRL, EOMI  Ears: right TM with dark fluid behind the Ear. TM intact without signs of perforation. No pain with movement of the ear. Mouth: Oropharynx clear, handling secretions, no trismus. No dental trauma, no oral trauma  Neck: Immobilized in cervical collar. Paraspinal neck tenderness. No crepitus, no lacerations, abrasions, deformities, or stepoffs. Back: No midline cervical, thoracic, lumbar spine tenderness. No Stepoffs, abrasions, lacerations, or deformities. Pulmonary: Lungs clear to auscultation bilaterally, no wheezes, rales, or rhonchi. Not in respiratory distress  Chest: no chest wall tenderness, no crepitus  Cardiovascular:  Regular rate and rhythm, no murmurs, gallops, or rubs. 2+ distal pulses  Abdomen: Soft, non tender, non distended, +BS, no rebound, guarding, or rigidity. No pulsatile masses appreciated  Extremities: Moves all extremities x 4. Warm and well perfused, no clubbing, cyanosis, or edema. Capillary refill <3 seconds. Normal tatianna grasps bilaterally.    Skin: warm and dry without rash  Neurologic: GCS 15, CN 2-12 grossly intact, no focal deficits, symmetric strength 5/5 in the upper and lower extremities bilaterally  Psych: Normal Affect        ------------------------------ ED COURSE/MEDICAL DECISION MAKING----------------------  Medications   acetaminophen (TYLENOL) tablet 1,000 mg (1,000 mg Oral Given 9/7/21 1329)   ondansetron (ZOFRAN-ODT) disintegrating tablet 4 mg (4 mg Oral Given 9/7/21 1512)         Medical Decision Making:    Fall with head injury. CT scan with ?blood in the mastoid. She has no infectious sx, no headache or anything prior to her fall today to suggest mastoiditis. She has fluid behind the right TM that is dark but not bright red to say for sure this is blood. No redness or tenderness over the mastoid. I spoke with Dr. Luba Mo from neurosurgery, discussed case, he recommended CT IAC/temporal bones to rule basilar skull fracture or temporal bone fracture. If normal, supportive care    Re-Evaluations:             Re-evaluation. Patients symptoms are improving      Consultations:         Dr. Luba Mo          This patient's ED course included: a personal history and physicial examination, re-evaluation prior to disposition, complex medical decision making and emergency management and a personal history and physicial eaxmination    This patient has remained hemodynamically stable during their ED course. Counseling: The emergency provider has spoken with the patient and family and discussed todays results, in addition to providing specific details for the plan of care and counseling regarding the diagnosis and prognosis. Questions are answered at this time and they are agreeable with the plan.       --------------------------------- IMPRESSION AND DISPOSITION ---------------------------------    IMPRESSION  1.  Closed head injury, initial encounter        DISPOSITION  Disposition: Discharge to home if CT without acute pathology  Patient condition is good Lonnie Nelson MD  09/07/21 9016

## 2021-09-07 NOTE — PROGRESS NOTES
Patient's hearing aid removed and placed in an envelope. Envelope given to patient. Patient left CT department with hearing aid.  2 hearing aids

## 2021-09-07 NOTE — ED NOTES
Patient ambulated a short distance in ED halls with  at side.  Patient stated that she isnt any more dizzy or lightheaded than usual.     Elizabeth Valenzuela RN  09/07/21 6621

## 2021-09-07 NOTE — ED NOTES
Bed: 10  Expected date:   Expected time:   Means of arrival:   Comments:  EMS     Jessi Green RN  09/07/21 1217

## 2021-09-13 ENCOUNTER — OFFICE VISIT (OUTPATIENT)
Dept: FAMILY MEDICINE CLINIC | Age: 86
End: 2021-09-13
Payer: MEDICARE

## 2021-09-13 VITALS
WEIGHT: 144.6 LBS | TEMPERATURE: 98.2 F | HEART RATE: 74 BPM | SYSTOLIC BLOOD PRESSURE: 138 MMHG | OXYGEN SATURATION: 98 % | HEIGHT: 66 IN | BODY MASS INDEX: 23.24 KG/M2 | DIASTOLIC BLOOD PRESSURE: 84 MMHG

## 2021-09-13 DIAGNOSIS — G44.319 ACUTE POST-TRAUMATIC HEADACHE, NOT INTRACTABLE: ICD-10-CM

## 2021-09-13 DIAGNOSIS — M79.10 MYALGIA: ICD-10-CM

## 2021-09-13 DIAGNOSIS — S06.0X9D CONCUSSION WITH LOSS OF CONSCIOUSNESS, SUBSEQUENT ENCOUNTER: Primary | ICD-10-CM

## 2021-09-13 DIAGNOSIS — J30.89 SEASONAL ALLERGIC RHINITIS DUE TO OTHER ALLERGIC TRIGGER: ICD-10-CM

## 2021-09-13 PROCEDURE — 99214 OFFICE O/P EST MOD 30 MIN: CPT | Performed by: FAMILY MEDICINE

## 2021-09-13 RX ORDER — FLUTICASONE PROPIONATE 50 MCG
2 SPRAY, SUSPENSION (ML) NASAL DAILY
Qty: 16 G | Refills: 5 | Status: SHIPPED
Start: 2021-09-13 | End: 2021-09-17 | Stop reason: ALTCHOICE

## 2021-09-13 ASSESSMENT — ENCOUNTER SYMPTOMS
WHEEZING: 0
COUGH: 0
EYE PAIN: 0
VOMITING: 0
SINUS PAIN: 0
CHEST TIGHTNESS: 0
NAUSEA: 0
BACK PAIN: 0
CONSTIPATION: 0
TROUBLE SWALLOWING: 0
SHORTNESS OF BREATH: 0
ABDOMINAL PAIN: 0
SORE THROAT: 0
DIARRHEA: 0

## 2021-09-13 NOTE — PROGRESS NOTES
9/13/21    Name: Sal Bowen  SJ:[id number]   Sex:female   Age:87 y.o. Chief Complaint   Patient presents with    Follow-Up from Hospital     Patient presents to office for visit. She went to ER on 9/7/21 after falling off a ladder in her home. Patient says she was trying to get an object off the shelf and she misjudged how heavy the object was and patient fell backwards off of the ladder. She says since that day she has had worsening dizziness and is more unsteady on her feet. She denies double vision. She has not really been nauseous. Patient here for followu p after a fall off a ladder  She was grabbing something off a shelf and her ladder tipped backwards  She was at least 3 feet off the ground  She hit her back and the back of her head on the floor  She lost consciousness but she is unsure for how long    Ct done in ED reviwed  She has fluid in left mastoid and fluid behind right TM but uncertain etiology  Whether from fall  No acute s/s of sinus infections or mastoiditis but she has allergies and has not been using her nasal spray  She will get back to using her flonase daily    S/s reviewed that would warrant her to go back to the ED  She seemed to understand  She is a little sore but she says she feels okay  Just notieced typing she is missspelling words more than normal but that should get better over the next few weeks        Review of Systems   Constitutional: Negative for appetite change, fatigue and fever. HENT: Negative for congestion, ear pain, sinus pain, sore throat and trouble swallowing. Eyes: Negative for pain. Respiratory: Negative for cough, chest tightness, shortness of breath and wheezing. Cardiovascular: Negative for chest pain, palpitations and leg swelling. Gastrointestinal: Negative for abdominal pain, constipation, diarrhea, nausea and vomiting. Endocrine: Negative for cold intolerance and heat intolerance.    Genitourinary: Negative for difficulty urinating, dysuria, frequency, hematuria and pelvic pain. Musculoskeletal: Positive for gait problem. Negative for arthralgias, back pain, joint swelling and myalgias. Skin: Negative for rash and wound. Neurological: Positive for dizziness. Negative for syncope, light-headedness and headaches. Hematological: Negative for adenopathy. Psychiatric/Behavioral: Negative for confusion, dysphoric mood, self-injury, sleep disturbance and suicidal ideas. The patient is not nervous/anxious.             Current Outpatient Medications:     fluticasone (FLONASE) 50 MCG/ACT nasal spray, 2 sprays by Each Nostril route daily, Disp: 16 g, Rfl: 5    levothyroxine (SYNTHROID) 50 MCG tablet, Take 1 tablet by mouth daily, Disp: 90 tablet, Rfl: 0    amLODIPine (NORVASC) 2.5 MG tablet, Take 1 tablet by mouth daily, Disp: 90 tablet, Rfl: 3    loratadine (CLARITIN) 10 MG tablet, Take 10 mg by mouth daily as needed (allergies) , Disp: , Rfl:     aspirin 81 MG tablet, Take 81 mg by mouth daily, Disp: , Rfl:   Allergies   Allergen Reactions    Carafate [Sucralfate]      Unable to explain    Cefdinir     Cetirizine & Related      Unable to explain    Lisinopril Other (See Comments)     Difficulty swallowing, reflux, headache    Pravachol [Pravastatin Sodium]      Unable to explain    Protonix [Pantoprazole Sodium]      Unable to explain    Statins Other (See Comments)     intolerable      Past Medical History:   Diagnosis Date    Dementia ([facility] [address]     Diverticulosis     GIST (gastrointestinal stroma tumor), malignant, colon ([facility] [address]     Hyperlipidemia     Hypertension     MI (myocardial infarction) ([facility] [address] 2015    Mitral regurgitation     per cardiology    PUD (peptic ulcer disease)      Patient Active Problem List    Diagnosis Date Noted    NSTEMI (non-ST elevated myocardial infarction) ([address] 06/03/2016    GIST (gastrointestinal stromal tumor), malignant ([address] 05/25/2016    Type 2 diabetes mellitus without complication, without long-term current use of insulin ([facility] [address] 01/20/2021    Dementia ([facility] [address]     Acquired hypothyroidism 11/20/2019    Hyperlipidemia 11/15/2019    Disorder of thyroid gland 11/15/2019    Carotid stenosis, right 11/15/2019    Onychomycosis 06/14/2019    Difficulty walking 06/14/2019    Malignant tumor of fundus of stomach ([facility] [address] 06/14/2016    Malignant neoplasm of abdomen ([facility] [address] 06/14/2016    Hypertension     PUD (peptic ulcer disease)       Past Surgical History:   Procedure Laterality Date    CAROTID ENDARTERECTOMY Right 04/2019    Dr Geronimo Walters Bilateral 2015    COLONOSCOPY  12/2014    DILATATION, ESOPHAGUS      ENDOSCOPY, COLON, DIAGNOSTIC  09/02/2016    marking of gastric tumor    HYSTERECTOMY      YUMIKO/BSO    OTHER SURGICAL HISTORY  09/09/2016    Laparoscopic Robotic Assisted Partial Gastrectomy    SHOULDER SURGERY Left     rotator cuff right    TONSILLECTOMY AND ADENOIDECTOMY      UPPER GASTROINTESTINAL ENDOSCOPY  12/2014    UPPER GASTROINTESTINAL ENDOSCOPY  5/25/2016      Social History     Tobacco History     Smoking Status  Former Smoker Quit date  1/1/1969 Smoking Frequency  1 pack/day for 18 years (18 pk yrs)    Smokeless Tobacco Use  Never Used          Alcohol History     Alcohol Use Status  Yes Comment  glass of wine with dinner          Drug Use     Drug Use Status  No          Sexual Activity     Sexually Active  Not Currently Partners  Male Comment              /84   Pulse 74   Temp 98.2 °F (36.8 °C)   Ht 5' 6\" (1.676 m)   Wt 144 lb 9.6 oz (65.6 kg)   SpO2 98%   BMI 23.34 kg/m²     EXAM:   Physical Exam  Vitals and nursing note reviewed. Constitutional:       General: She is not in acute distress. Appearance: Normal appearance. She is well-developed. She is not ill-appearing. HENT:      Head: Normocephalic and atraumatic.       Ears:      Comments: Fluid behind both TM, no tenderness over mastoids, no maxillary or other sinus tenderness     Nose: Rhinorrhea present. Comments: Clear runny nose     Mouth/Throat:      Mouth: Mucous membranes are moist.   Eyes:      Pupils: Pupils are equal, round, and reactive to light. Cardiovascular:      Rate and Rhythm: Normal rate and regular rhythm. Pulmonary:      Effort: Pulmonary effort is normal.      Breath sounds: Normal breath sounds. Musculoskeletal:      Cervical back: Normal range of motion. Comments: Gait steady, muscle soreness in back and left buttock, no bruising or ecchymosis   Skin:     General: Skin is warm and dry. Neurological:      Mental Status: She is alert and oriented to person, place, and time. Mental status is at baseline. Psychiatric:         Mood and Affect: Mood normal.         Thought Content: Thought content normal.      Comments: She says for the last few days since being home when typing or writing she is getting words wrong and misspelling things more than usual          Xenia Jack was seen today for follow-up from hospital.    Diagnoses and all orders for this visit:    Concussion with loss of consciousness, subsequent encounter  Comments:  ED records reviewed  fluid behind right TM and in left mastoid but no acute s/s  has allergies and not treating them    Seasonal allergic rhinitis due to other allergic trigger  Comments:  restart claritin and flonase daily    Acute post-traumatic headache, not intractable  Comments:  due to fall but no nausea or vomitin, vision normal  tylenol helps    Myalgia  Comments:  since fall she feels achy but tyelnol helps   also nticed this am it is already getting a little better    Other orders  -     fluticasone (FLONASE) 50 MCG/ACT nasal spray; 2 sprays by Each Nostril route daily    has an appt on 10-4 will keep this  Flu shot then too    I independently reviewed and updated the chief complaint, HPI, past medical and surgical history, medications, allergies and ROS as entered by the LPN. Seen by:   Taiwo Hwang Renaldo, DO

## 2021-09-16 ENCOUNTER — HOSPITAL ENCOUNTER (EMERGENCY)
Age: 86
Discharge: ANOTHER HOSPITAL | End: 2021-09-18
Attending: STUDENT IN AN ORGANIZED HEALTH CARE EDUCATION/TRAINING PROGRAM
Payer: MEDICARE

## 2021-09-16 ENCOUNTER — APPOINTMENT (OUTPATIENT)
Dept: GENERAL RADIOLOGY | Age: 86
End: 2021-09-16
Payer: MEDICARE

## 2021-09-16 ENCOUNTER — APPOINTMENT (OUTPATIENT)
Dept: CT IMAGING | Age: 86
End: 2021-09-16
Payer: MEDICARE

## 2021-09-16 DIAGNOSIS — I67.1 SUPRACLINOID CAROTID ARTERY ANEURYSM, SMALL: ICD-10-CM

## 2021-09-16 DIAGNOSIS — R29.90 STROKE-LIKE SYMPTOMS: Primary | ICD-10-CM

## 2021-09-16 DIAGNOSIS — R91.8 LUNG MASS: ICD-10-CM

## 2021-09-16 DIAGNOSIS — R51.9 NONINTRACTABLE HEADACHE, UNSPECIFIED CHRONICITY PATTERN, UNSPECIFIED HEADACHE TYPE: ICD-10-CM

## 2021-09-16 PROBLEM — I63.9 ACUTE CEREBROVASCULAR ACCIDENT (CVA) (HCC): Status: ACTIVE | Noted: 2021-09-16

## 2021-09-16 LAB
ALBUMIN SERPL-MCNC: 4.2 G/DL (ref 3.5–5.2)
ALP BLD-CCNC: 117 U/L (ref 35–104)
ALT SERPL-CCNC: 13 U/L (ref 0–32)
ANION GAP SERPL CALCULATED.3IONS-SCNC: 13 MMOL/L (ref 7–16)
APTT: 29.4 SEC (ref 24.5–35.1)
AST SERPL-CCNC: 16 U/L (ref 0–31)
BASOPHILS ABSOLUTE: 0.04 E9/L (ref 0–0.2)
BASOPHILS RELATIVE PERCENT: 0.7 % (ref 0–2)
BILIRUB SERPL-MCNC: 0.3 MG/DL (ref 0–1.2)
BILIRUBIN URINE: NEGATIVE
BLOOD, URINE: NEGATIVE
BUN BLDV-MCNC: 15 MG/DL (ref 6–23)
CALCIUM SERPL-MCNC: 9.6 MG/DL (ref 8.6–10.2)
CHLORIDE BLD-SCNC: 106 MMOL/L (ref 98–107)
CLARITY: CLEAR
CO2: 20 MMOL/L (ref 22–29)
COLOR: YELLOW
CREAT SERPL-MCNC: 0.8 MG/DL (ref 0.5–1)
EKG ATRIAL RATE: 68 BPM
EKG P AXIS: 14 DEGREES
EKG P-R INTERVAL: 136 MS
EKG Q-T INTERVAL: 396 MS
EKG QRS DURATION: 88 MS
EKG QTC CALCULATION (BAZETT): 421 MS
EKG R AXIS: -39 DEGREES
EKG T AXIS: 96 DEGREES
EKG VENTRICULAR RATE: 68 BPM
EOSINOPHILS ABSOLUTE: 0.08 E9/L (ref 0.05–0.5)
EOSINOPHILS RELATIVE PERCENT: 1.3 % (ref 0–6)
GFR AFRICAN AMERICAN: >60
GFR NON-AFRICAN AMERICAN: >60 ML/MIN/1.73
GLUCOSE BLD-MCNC: 164 MG/DL (ref 74–99)
GLUCOSE URINE: NEGATIVE MG/DL
HCT VFR BLD CALC: 43.9 % (ref 34–48)
HEMOGLOBIN: 13.9 G/DL (ref 11.5–15.5)
IMMATURE GRANULOCYTES #: 0.01 E9/L
IMMATURE GRANULOCYTES %: 0.2 % (ref 0–5)
INR BLD: 1
KETONES, URINE: NEGATIVE MG/DL
LEUKOCYTE ESTERASE, URINE: NEGATIVE
LYMPHOCYTES ABSOLUTE: 1.69 E9/L (ref 1.5–4)
LYMPHOCYTES RELATIVE PERCENT: 27.9 % (ref 20–42)
MCH RBC QN AUTO: 29.3 PG (ref 26–35)
MCHC RBC AUTO-ENTMCNC: 31.7 % (ref 32–34.5)
MCV RBC AUTO: 92.4 FL (ref 80–99.9)
MONOCYTES ABSOLUTE: 0.35 E9/L (ref 0.1–0.95)
MONOCYTES RELATIVE PERCENT: 5.8 % (ref 2–12)
NEUTROPHILS ABSOLUTE: 3.89 E9/L (ref 1.8–7.3)
NEUTROPHILS RELATIVE PERCENT: 64.1 % (ref 43–80)
NITRITE, URINE: NEGATIVE
PDW BLD-RTO: 12.9 FL (ref 11.5–15)
PH UA: 6 (ref 5–9)
PLATELET # BLD: 221 E9/L (ref 130–450)
PMV BLD AUTO: 11.2 FL (ref 7–12)
POTASSIUM REFLEX MAGNESIUM: 4.4 MMOL/L (ref 3.5–5)
PROTEIN UA: NEGATIVE MG/DL
PROTHROMBIN TIME: 11.1 SEC (ref 9.3–12.4)
RBC # BLD: 4.75 E12/L (ref 3.5–5.5)
REASON FOR REJECTION: NORMAL
REJECTED TEST: NORMAL
SARS-COV-2, NAAT: NOT DETECTED
SODIUM BLD-SCNC: 139 MMOL/L (ref 132–146)
SPECIFIC GRAVITY UA: 1.01 (ref 1–1.03)
TOTAL PROTEIN: 6.9 G/DL (ref 6.4–8.3)
TROPONIN, HIGH SENSITIVITY: 15 NG/L (ref 0–9)
TROPONIN, HIGH SENSITIVITY: 16 NG/L (ref 0–9)
UROBILINOGEN, URINE: 0.2 E.U./DL
WBC # BLD: 6.1 E9/L (ref 4.5–11.5)

## 2021-09-16 PROCEDURE — 70450 CT HEAD/BRAIN W/O DYE: CPT

## 2021-09-16 PROCEDURE — 71046 X-RAY EXAM CHEST 2 VIEWS: CPT

## 2021-09-16 PROCEDURE — 93005 ELECTROCARDIOGRAM TRACING: CPT | Performed by: NURSE PRACTITIONER

## 2021-09-16 PROCEDURE — 93010 ELECTROCARDIOGRAM REPORT: CPT | Performed by: INTERNAL MEDICINE

## 2021-09-16 PROCEDURE — 70496 CT ANGIOGRAPHY HEAD: CPT

## 2021-09-16 PROCEDURE — 6360000004 HC RX CONTRAST MEDICATION: Performed by: RADIOLOGY

## 2021-09-16 PROCEDURE — 87088 URINE BACTERIA CULTURE: CPT

## 2021-09-16 PROCEDURE — 80053 COMPREHEN METABOLIC PANEL: CPT

## 2021-09-16 PROCEDURE — 99285 EMERGENCY DEPT VISIT HI MDM: CPT

## 2021-09-16 PROCEDURE — 2580000003 HC RX 258: Performed by: RADIOLOGY

## 2021-09-16 PROCEDURE — 85730 THROMBOPLASTIN TIME PARTIAL: CPT

## 2021-09-16 PROCEDURE — 70498 CT ANGIOGRAPHY NECK: CPT

## 2021-09-16 PROCEDURE — 81003 URINALYSIS AUTO W/O SCOPE: CPT

## 2021-09-16 PROCEDURE — 0042T CT BRAIN PERFUSION: CPT

## 2021-09-16 PROCEDURE — 85025 COMPLETE CBC W/AUTO DIFF WBC: CPT

## 2021-09-16 PROCEDURE — 6370000000 HC RX 637 (ALT 250 FOR IP): Performed by: STUDENT IN AN ORGANIZED HEALTH CARE EDUCATION/TRAINING PROGRAM

## 2021-09-16 PROCEDURE — 85610 PROTHROMBIN TIME: CPT

## 2021-09-16 PROCEDURE — 84484 ASSAY OF TROPONIN QUANT: CPT

## 2021-09-16 PROCEDURE — 87635 SARS-COV-2 COVID-19 AMP PRB: CPT

## 2021-09-16 RX ORDER — SODIUM CHLORIDE 9 MG/ML
INJECTION, SOLUTION INTRAVENOUS CONTINUOUS
Status: CANCELLED | OUTPATIENT
Start: 2021-09-16

## 2021-09-16 RX ORDER — POTASSIUM CHLORIDE 7.45 MG/ML
10 INJECTION INTRAVENOUS PRN
Status: CANCELLED | OUTPATIENT
Start: 2021-09-16

## 2021-09-16 RX ORDER — LEVOTHYROXINE SODIUM 0.05 MG/1
50 TABLET ORAL DAILY
Status: CANCELLED | OUTPATIENT
Start: 2021-09-17

## 2021-09-16 RX ORDER — SODIUM CHLORIDE 0.9 % (FLUSH) 0.9 %
10 SYRINGE (ML) INJECTION PRN
Status: DISCONTINUED | OUTPATIENT
Start: 2021-09-16 | End: 2021-09-18 | Stop reason: HOSPADM

## 2021-09-16 RX ORDER — AMLODIPINE BESYLATE 5 MG/1
2.5 TABLET ORAL DAILY
Status: CANCELLED | OUTPATIENT
Start: 2021-09-17

## 2021-09-16 RX ORDER — FLUTICASONE PROPIONATE 50 MCG
2 SPRAY, SUSPENSION (ML) NASAL DAILY
Status: CANCELLED | OUTPATIENT
Start: 2021-09-17

## 2021-09-16 RX ORDER — SODIUM CHLORIDE 0.9 % (FLUSH) 0.9 %
5-40 SYRINGE (ML) INJECTION EVERY 12 HOURS SCHEDULED
Status: CANCELLED | OUTPATIENT
Start: 2021-09-16

## 2021-09-16 RX ORDER — ONDANSETRON 4 MG/1
4 TABLET, ORALLY DISINTEGRATING ORAL EVERY 8 HOURS PRN
Status: CANCELLED | OUTPATIENT
Start: 2021-09-16

## 2021-09-16 RX ORDER — SODIUM CHLORIDE 9 MG/ML
25 INJECTION, SOLUTION INTRAVENOUS PRN
Status: CANCELLED | OUTPATIENT
Start: 2021-09-16

## 2021-09-16 RX ORDER — EZETIMIBE 10 MG/1
10 TABLET ORAL NIGHTLY
Status: CANCELLED | OUTPATIENT
Start: 2021-09-16

## 2021-09-16 RX ORDER — ASPIRIN 81 MG/1
324 TABLET, CHEWABLE ORAL ONCE
Status: COMPLETED | OUTPATIENT
Start: 2021-09-16 | End: 2021-09-16

## 2021-09-16 RX ORDER — ONDANSETRON 2 MG/ML
4 INJECTION INTRAMUSCULAR; INTRAVENOUS EVERY 6 HOURS PRN
Status: CANCELLED | OUTPATIENT
Start: 2021-09-16

## 2021-09-16 RX ORDER — POTASSIUM CHLORIDE 20 MEQ/1
40 TABLET, EXTENDED RELEASE ORAL PRN
Status: CANCELLED | OUTPATIENT
Start: 2021-09-16

## 2021-09-16 RX ORDER — SODIUM CHLORIDE 0.9 % (FLUSH) 0.9 %
5-40 SYRINGE (ML) INJECTION PRN
Status: CANCELLED | OUTPATIENT
Start: 2021-09-16

## 2021-09-16 RX ADMIN — ASPIRIN 81 MG CHEWABLE TABLET 324 MG: 81 TABLET CHEWABLE at 20:28

## 2021-09-16 RX ADMIN — Medication 10 ML: at 19:00

## 2021-09-16 RX ADMIN — IOPAMIDOL 100 ML: 755 INJECTION, SOLUTION INTRAVENOUS at 19:00

## 2021-09-16 ASSESSMENT — ENCOUNTER SYMPTOMS
ABDOMINAL DISTENTION: 0
SINUS PRESSURE: 0
EYE PAIN: 0
EYE REDNESS: 0
SORE THROAT: 0
BACK PAIN: 0
DIARRHEA: 0
SHORTNESS OF BREATH: 0
VOMITING: 0
COUGH: 0
WHEEZING: 0
NAUSEA: 0
EYE DISCHARGE: 0

## 2021-09-16 ASSESSMENT — PAIN DESCRIPTION - LOCATION: LOCATION: HEAD

## 2021-09-16 ASSESSMENT — PAIN DESCRIPTION - PAIN TYPE: TYPE: ACUTE PAIN

## 2021-09-16 ASSESSMENT — PAIN DESCRIPTION - DESCRIPTORS: DESCRIPTORS: ACHING

## 2021-09-16 ASSESSMENT — PAIN DESCRIPTION - FREQUENCY: FREQUENCY: CONTINUOUS

## 2021-09-16 ASSESSMENT — PAIN SCALES - GENERAL: PAINLEVEL_OUTOF10: 6

## 2021-09-16 NOTE — ED NOTES
Bed: 27  Expected date:   Expected time:   Means of arrival:   Comments:  [address], RN  09/16/21 3552

## 2021-09-16 NOTE — ED PROVIDER NOTES
Patient presents with headaches, weakness, and difficulty with speech. Patient states that she did have a fall last week that included head injury. Patient was worked up and was not found to have any bleeds at that time. Patient states she was doing well however this morning she had episode of lightheadedness. She was able to make it to the bathroom where she continued to have a bowel movement however afterwards she was unable to ambulate or speak. She states that her speech has returned however she continues to have difficulty ambulating is unable to make it further than 3-4 steps. Patient has not done anything to make it better nor worse. Her last known well was at 730 this morning. The history is provided by the patient. No  was used. Review of Systems   Constitutional: Negative for chills and fever. HENT: Negative for ear pain, sinus pressure and sore throat. Eyes: Negative for pain, discharge and redness. Respiratory: Negative for cough, shortness of breath and wheezing. Cardiovascular: Negative for chest pain. Gastrointestinal: Negative for abdominal distention, diarrhea, nausea and vomiting. Genitourinary: Negative for dysuria and frequency. Musculoskeletal: Positive for gait problem. Negative for arthralgias, back pain, neck pain and neck stiffness. Skin: Negative for rash and wound. Neurological: Positive for speech difficulty, weakness, light-headedness and headaches. Negative for numbness. Hematological: Negative for adenopathy. All other systems reviewed and are negative. Physical Exam  Vitals and nursing note reviewed. Constitutional:       General: She is not in acute distress. Appearance: She is well-developed. HENT:      Head: Normocephalic and atraumatic. Eyes:      Extraocular Movements: Extraocular movements intact. Conjunctiva/sclera: Conjunctivae normal.      Pupils: Pupils are equal, round, and reactive to light. Cardiovascular:      Rate and Rhythm: Normal rate and regular rhythm. Heart sounds: Normal heart sounds. No murmur heard. Pulmonary:      Effort: Pulmonary effort is normal. No respiratory distress. Breath sounds: Normal breath sounds. No wheezing or rales. Abdominal:      General: Bowel sounds are normal.      Palpations: Abdomen is soft. Tenderness: There is no abdominal tenderness. There is no guarding or rebound. Musculoskeletal:      Cervical back: Normal range of motion and neck supple. No rigidity or tenderness. Skin:     General: Skin is warm and dry. Neurological:      General: No focal deficit present. Mental Status: She is alert and oriented to person, place, and time. Cranial Nerves: No cranial nerve deficit. Sensory: No sensory deficit. Motor: No weakness. Coordination: Coordination normal.          Procedures     MDM  Number of Diagnoses or Management Options  Lung mass  Nonintractable headache, unspecified chronicity pattern, unspecified headache type  Stroke-like symptoms  Supraclinoid carotid artery aneurysm, small  Diagnosis management comments: Patient presents with headache dysarthria and weakness. She did have a fall with a head injury last week. Last known well was 730 this morning. Speech has resolved. Patient however is unable to ambulate. Chest x-ray showed small bilateral effusions. CBC was unremarkable. CMP did have a mildly elevated alk phos. Urinalysis not show any signs of infection. Initial troponin was 15. CT of the head did not show any acute intracranial abnormalities however there is concern that patient may have a TIA. Brain perfusion studies were ordered and showed concern for a posterior stroke. Covid was negative. Patient will be transferred to [facility] for evaluation by neurology and neurosurgery. Patient was informed of results and plan and was agreeable. Patient transferred.        Amount and/or Complexity of Data Reviewed  Clinical lab tests: reviewed  Tests in the radiology section of CPT®: reviewed  Tests in the medicine section of CPT®: reviewed                  --------------------------------------------- PAST HISTORY ---------------------------------------------  Past Medical History:  has a past medical history of Dementia ([facility] [address] Diverticulosis, GIST (gastrointestinal stroma tumor), malignant, colon ([facility] [address] Hyperlipidemia, Hypertension, [facility] Mitral regurgitation, and PUD (peptic ulcer disease). Past Surgical History:  has a past surgical history that includes Hysterectomy; Upper gastrointestinal endoscopy (12/2014); Colonoscopy (12/2014); Upper gastrointestinal endoscopy (5/25/2016); shoulder surgery (Left); Endoscopy, colon, diagnostic (09/02/2016); Dilatation, esophagus; other surgical history (09/09/2016); Carotid endarterectomy (Right, 04/2019); Tonsillectomy and adenoidectomy; and Cataract removal with implant (Bilateral, 2015). Social History:  reports that she quit smoking about 52 years ago. She has a 18.00 pack-year smoking history. She has never used smokeless tobacco. She reports current alcohol use. She reports that she does not use drugs. Family History: family history includes COPD in her mother; Cancer in her sister; Colon Cancer in her father. The patients home medications have been reviewed.     Allergies: Carafate [sucralfate], Cefdinir, Cetirizine & related, Lisinopril, Pravachol [pravastatin sodium], Protonix [pantoprazole sodium], and Statins    -------------------------------------------------- RESULTS -------------------------------------------------    Lab  Results for orders placed or performed during the hospital encounter of 09/16/21   Culture, Urine    Specimen: Urine, clean catch   Result Value Ref Range    Urine Culture, Routine Culture in progress    COVID-19, Rapid    Specimen: Nasopharyngeal Swab   Result Value Ref Range SARS-CoV-2, NAAT Not Detected Not Detected   CBC Auto Differential   Result Value Ref Range    WBC 6.1 4.5 - 11.5 E9/L    RBC 4.75 3.50 - 5.50 E12/L    Hemoglobin 13.9 11.5 - 15.5 g/dL    Hematocrit 43.9 34.0 - 48.0 %    MCV 92.4 80.0 - 99.9 fL    MCH 29.3 26.0 - 35.0 pg    MCHC 31.7 (L) 32.0 - 34.5 %    RDW 12.9 11.5 - 15.0 fL    Platelets 187 801 - 868 E9/L    MPV 11.2 7.0 - 12.0 fL    Neutrophils % 64.1 43.0 - 80.0 %    Immature Granulocytes % 0.2 0.0 - 5.0 %    Lymphocytes % 27.9 20.0 - 42.0 %    Monocytes % 5.8 2.0 - 12.0 %    Eosinophils % 1.3 0.0 - 6.0 %    Basophils % 0.7 0.0 - 2.0 %    Neutrophils Absolute 3.89 1.80 - 7.30 E9/L    Immature Granulocytes # 0.01 E9/L    Lymphocytes Absolute 1.69 1.50 - 4.00 E9/L    Monocytes Absolute 0.35 0.10 - 0.95 E9/L    Eosinophils Absolute 0.08 0.05 - 0.50 E9/L    Basophils Absolute 0.04 0.00 - 0.20 E9/L   Comprehensive Metabolic Panel w/ Reflex to MG   Result Value Ref Range    Sodium 139 132 - 146 mmol/L    Potassium reflex Magnesium 4.4 3.5 - 5.0 mmol/L    Chloride 106 98 - 107 mmol/L    CO2 20 (L) 22 - 29 mmol/L    Anion Gap 13 7 - 16 mmol/L    Glucose 164 (H) 74 - 99 mg/dL    BUN 15 6 - 23 mg/dL    CREATININE 0.8 0.5 - 1.0 mg/dL    GFR Non-African American >60 >=60 mL/min/1.73    GFR African American >60     Calcium 9.6 8.6 - 10.2 mg/dL    Total Protein 6.9 6.4 - 8.3 g/dL    Albumin 4.2 3.5 - 5.2 g/dL    Total Bilirubin 0.3 0.0 - 1.2 mg/dL    Alkaline Phosphatase 117 (H) 35 - 104 U/L    ALT 13 0 - 32 U/L    AST 16 0 - 31 U/L   Troponin   Result Value Ref Range    Troponin, High Sensitivity 15 (H) 0 - 9 ng/L   URINALYSIS   Result Value Ref Range    Color, UA Yellow Straw/Yellow    Clarity, UA Clear Clear    Glucose, Ur Negative Negative mg/dL    Bilirubin Urine Negative Negative    Ketones, Urine Negative Negative mg/dL    Specific Gravity, UA 1.015 1.005 - 1.030    Blood, Urine Negative Negative    pH, UA 6.0 5.0 - 9.0    Protein, UA Negative Negative mg/dL Urobilinogen, Urine 0.2 <2.0 E.U./dL    Nitrite, Urine Negative Negative    Leukocyte Esterase, Urine Negative Negative   SPECIMEN REJECTION   Result Value Ref Range    Rejected Test TRP5     Reason for Rejection see below    Troponin   Result Value Ref Range    Troponin, High Sensitivity 16 (H) 0 - 9 ng/L   SPECIMEN REJECTION   Result Value Ref Range    Rejected Test PT, PTT     Reason for Rejection see below    APTT   Result Value Ref Range    aPTT 29.4 24.5 - 35.1 sec   Protime-INR   Result Value Ref Range    Protime 11.1 9.3 - 12.4 sec    INR 1.0    SPECIMEN REJECTION   Result Value Ref Range    Rejected Test pt ptt     Reason for Rejection see below    EKG 12 Lead   Result Value Ref Range    Ventricular Rate 68 BPM    Atrial Rate 68 BPM    P-R Interval 136 ms    QRS Duration 88 ms    Q-T Interval 396 ms    QTc Calculation (Bazett) 421 ms    P Axis 14 degrees    R Axis -39 degrees    T Axis 96 degrees       Radiology  CTA HEAD W CONTRAST   Final Result   CTA neck:      Mild stenosis at the origin of right common carotid artery. Moderate stenosis at the origin right subclavian artery. Mild stenosis at the origin of bilateral cervical ICA. 18 mm ground-glass area within the right upper lobe may be of   infectious/inflammatory or neoplastic in etiology. For further evaluation   dedicated chest CT examination can be obtained. CTA HEAD:      2 mm inferiorly projecting outpouching arising from the left supraclinoid   carotid artery mostly consistent with an aneurysm. Right vertebral artery is hypoplastic and terminates in right PICA,   anatomical variation. 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 CT PERFUSION:      No infarct core. No perfusion mismatch. CTA NECK W CONTRAST   Final Result   CTA neck:      Mild stenosis at the origin of right common carotid artery. Moderate stenosis at the origin right subclavian artery.       Mild stenosis at the origin of bilateral cervical ICA. 18 mm ground-glass area within the right upper lobe may be of   infectious/inflammatory or neoplastic in etiology. For further evaluation   dedicated chest CT examination can be obtained. CTA HEAD:      2 mm inferiorly projecting outpouching arising from the left supraclinoid   carotid artery mostly consistent with an aneurysm. Right vertebral artery is hypoplastic and terminates in right PICA,   anatomical variation. 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 CT PERFUSION:      No infarct core. No perfusion mismatch. CT BRAIN PERFUSION   Final Result   CTA neck:      Mild stenosis at the origin of right common carotid artery. Moderate stenosis at the origin right subclavian artery. Mild stenosis at the origin of bilateral cervical ICA. 18 mm ground-glass area within the right upper lobe may be of   infectious/inflammatory or neoplastic in etiology. For further evaluation   dedicated chest CT examination can be obtained. CTA HEAD:      2 mm inferiorly projecting outpouching arising from the left supraclinoid   carotid artery mostly consistent with an aneurysm. Right vertebral artery is hypoplastic and terminates in right PICA,   anatomical variation. 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 CT PERFUSION:      No infarct core. No perfusion mismatch. XR CHEST (2 VW)   Final Result   Small bilateral pleural effusions. CT Head WO Contrast   Final Result   1. No acute intracranial abnormality. 2. Chronic small vessel ischemic disease. 3. Persistent large right mastoid effusion. 4. Decreasing posterior scalp hematoma.                ------------------------- NURSING NOTES AND VITALS REVIEWED ---------------------------  Date / Time Roomed:  9/16/2021  4:19 PM  ED Bed Assignment:  17/17    The nursing notes within the ED encounter and vital signs as below have been reviewed. Patient Vitals for the past 24 hrs:   BP Pulse Resp SpO2   09/17/21 1902 (!) 148/73 70 16 98 %   09/17/21 1452 (!) 156/82 -- -- --   09/17/21 1340 127/64 67 16 96 %   09/17/21 0752 (!) 145/88 62 16 96 %   09/17/21 0522 (!) 180/96 69 16 97 %   09/17/21 0403 -- 68 16 98 %   09/17/21 0112 (!) 176/84 65 16 (!) 6 %   09/16/21 2149 (!) 141/71 65 16 96 %       Oxygen Saturation Interpretation: Normal      ------------------------------------------ PROGRESS NOTES ------------------------------------------  Re-evaluation(s):  Time: 1742. Patients symptoms show no change  Repeat physical examination is not changed    I have spoken with the patient and discussed todays results, in addition to providing specific details for the plan of care and counseling regarding the diagnosis and prognosis. Their questions are answered at this time and they are agreeable with the plan. I have discussed the risks and benefits of transfer and they wish to proceed with the transfer. --------------------------------- ADDITIONAL PROVIDER NOTES ---------------------------------  Reason for transfer: Neurology Consult. This patient's ED course included: a personal history and physicial examination, multiple bedside re-evaluations and cardiac monitoring    This patient has remained hemodynamically stable during their ED course. Please note that the withdrawal or failure to initiate urgent interventions for this patient would likely result in a life threatening deterioration or permanent disability. Accordingly this patient received 0 minutes of critical care time, excluding separately billable procedures. Clinical Impression  1. Stroke-like symptoms    2. Nonintractable headache, unspecified chronicity pattern, unspecified headache type    3. Supraclinoid carotid artery aneurysm, small    4. Lung mass          Disposition  Patient's disposition: Transfer to [facility].   Transferred by: ALS.  Patient's condition is stable.     Patient was seen and evaluated by both myself and Rajat Presley MD.           Allie Mina DO  Resident  09/17/21 2004

## 2021-09-16 NOTE — ED NOTES
Pt. Remains in [facility]. No acute distress.      Shawna Gale RN  09/16/21 0314

## 2021-09-16 NOTE — VIRTUAL HEALTH
Consults  Patient Location:  [address] [facility] Emergency Department    Provider Location (City/State): [address] [place] [address] Stroke and Vascular Neurology Consult for  [address] Stroke Alert through [address] @   9/16/2021 5:54 PM  Pt Name: Cintia Lieberman  MRN: [id number]  [place]: 1934  Date of evaluation: 9/16/2021  Primary Care Physician: Tony Merritt DO  Reason for Evaluation: Stroke evaluation with Phone Consult, Discussion and Review of imaging    Cintia Lieberman is a 80 y.o. female who presents with acute onset b/l LE weakness and transient episode of confusion and word finding difficulty. Patient speech difficulty resolved. However patient continued to have bilateral lower extremity weakness. Therefore stroke alert was called. LKW: 07:30 am  NIH:  4 (ER physician)    Allergies  is allergic to carafate [sucralfate], cefdinir, cetirizine & related, lisinopril, pravachol [pravastatin sodium], protonix [pantoprazole sodium], and statins. Medications  Prior to Admission medications    Medication Sig Start Date End Date Taking?  Authorizing Provider   fluticasone (FLONASE) 50 MCG/ACT nasal spray 2 sprays by Each Nostril route daily 9/13/21   Tony Merritt DO   levothyroxine (SYNTHROID) 50 MCG tablet Take 1 tablet by mouth daily 7/19/21   Tony Merritt,    amLODIPine (NORVASC) 2.5 MG tablet Take 1 tablet by mouth daily 3/15/21   Libby Gamble, DO   loratadine (CLARITIN) 10 MG tablet Take 10 mg by mouth daily as needed (allergies)     Historical Provider, MD   aspirin 81 MG tablet Take 81 mg by mouth daily    Historical Provider, MD    Scheduled Meds:  Continuous Infusions:  PRN Meds:.  Past Medical History   has a past medical history of Dementia ([facility] [address] Diverticulosis, GIST (gastrointestinal stroma tumor), malignant, colon ([facility] [address] Hyperlipidemia, Hypertension, MI (myocardial infarction) ([facility] [address] Mitral regurgitation, and PUD (peptic ulcer disease). Social History  Social History     Socioeconomic History    Marital status:      Spouse name: Not on file    Number of children: Not on file    Years of education: Not on file    Highest education level: Not on file   Occupational History    Occupation: retired    Tobacco Use    Smoking status: Former Smoker     Packs/day: 1.00     Years: 18.00     Pack years: 18.00     Quit date:      Years since quittin.7    Smokeless tobacco: Never Used   Vaping Use    Vaping Use: Never used   Substance and Sexual Activity    Alcohol use: Yes     Comment: glass of wine with dinner    Drug use: No    Sexual activity: Not Currently     Partners: Male     Comment:    Other Topics Concern    Not on file   Social History Narrative    Not on file     Social Determinants of Health     Financial Resource Strain:     Difficulty of Paying Living Expenses:    Food Insecurity:     Worried About Running Out of Food in the Last Year:     [address] in the Last Year:    Transportation Needs:     Lack of Transportation (Medical):      Lack of Transportation (Non-Medical):    Physical Activity:     Days of Exercise per Week:     Minutes of Exercise per Session:    Stress:     Feeling of Stress :    Social Connections:     Frequency of Communication with Friends and Family:     Frequency of Social Gatherings with Friends and Family:     Attends Mosque Services:     Active Member of Clubs or Organizations:     Attends Club or Organization Meetings:     Marital Status:    Intimate Partner Violence:     Fear of Current or Ex-Partner:     Emotionally Abused:     Physically Abused:     Sexually Abused:      Family History      Problem Relation Age of Onset    Colon Cancer Father     COPD Mother         tobacco    Cancer Sister         breast       OBJECTIVE  BP (!) 184/80   Pulse 75   Temp 98 °F (36.7 °C) (Oral)   Resp 16 Ht 5' 1\" (1.549 m)   Wt 145 lb (65.8 kg)   SpO2 98%   BMI 27.40 kg/m²        Pre-Morbid mRS: 03    Imaging:  Images were personally reviewed with {Carley single:93884::\"VIZ. AI used to review images\",\"PACS used to review images\",\"VIZ. AI and PACS used to review images\",\"***\"} including:  CT brain without contrast: ***  CTA imaging: ***    Assessment    ***  Differential DDx:  1. ***      Recommendations:  1. [facility] {NUMBERS [id number]}  2. Recommend {Blank single:39157::\"Inpatient\",\"Outpatient\"} Neurology Consult for further assessment and evaluation   3. {Blank single:28617::\"consider . bsmhivtpa\",\" consider . BSMHNOIVTPA\",\"***\"}  4. ***        Discussed with {Blank single:53581::\"ED Physician\",\"Attending Physician\",\"Stroke team\",\"ICU team\",\"***\"}    {Blank single:92750::\"At least *** min of critical care time spent through telemedicine robot at patient bedside (via interactive/real-time software) as patient is in imminent and life threatening deterioration with further treatment and evaluation. This Virtual Visit was conducted with patient's (and/or legal guardian's) consent, to provide telestroke consultation and necessary medical care. Time spent examining patient, reviewing the images personally, reviewing the chart, perform high complexity decision making and speaking with the nursing staff regarding recommendations\",\"At least *** min of Telemedicine and time in conversation directly with ED staff and physician for the patient who is in imminent and life threatening deterioration without further treatment and evaluation. This Virtual Visit was conducted with patient's (and/or legal guardian's) consent, to provide telestroke consultation and necessary medical care.   Time spent examining patient, reviewing the images personally, reviewing the chart, perform high complexity decision making and speaking with the nursing staff regarding recommendations\",\"***\"}    This is a Phone Consult, I have not seen the patient face to face, {Blank single:64774::\"there is no telemedicine service available at the consulting hospital\",\"the telemedicine device was not utilized. \"}    Matthew Flores MD, MD   Stroke, Neurocritical Care And/or [address] [place] [address] Stroke [address] [place]  Electronically signed 9/16/2021 at 5:54 PM    This virtual visit was conducted via interactive/real-time audio/video.

## 2021-09-17 RX ORDER — ACETAMINOPHEN 500 MG
500 TABLET ORAL EVERY 6 HOURS PRN
COMMUNITY
End: 2021-11-24

## 2021-09-17 RX ORDER — DIPHENOXYLATE HYDROCHLORIDE AND ATROPINE SULFATE 2.5; .025 MG/1; MG/1
1 TABLET ORAL 4 TIMES DAILY PRN
COMMUNITY
End: 2021-11-24

## 2021-09-17 NOTE — ED NOTES
Updated Chuyita Farley (spouse), patient on phone speaking to him now.       Linda Rowland RN  09/17/21 6877

## 2021-09-17 NOTE — ED PROVIDER NOTES
ATTENDING PROVIDER ATTESTATION:     Mary Eaton presented to the emergency department for evaluation of Headache (intermittent headache since fall last week. Intermittent confusion. Neg ct head last week. Denies thinners.) and Extremity Weakness (progressively worsening weakness throughout the past week.)   and was initially evaluated by the Medical Resident. See Original ED Note for H&P and ED course above. I have reviewed and discussed the case, including pertinent history (medical, surgical, family and social) and exam findings with the Medical Resident assigned to Mary Eaton. I have personally performed and/or participated in the history, exam, medical decision making, and procedures and agree with all pertinent clinical information and any additional changes or corrections are noted below in my assessment and plan. I have discussed this patient in detail with the resident, and provided the instruction and education,     I have reviewed my findings and recommendations with the assigned Medical Resident, Mary Eaton and members of family present at the time of disposition. I have performed a history and physical examination of this patient and directly supervised the resident caring for this patient    HPI  This is an 72-year-old female who presents to the emergency department today with a chief complaint of headache and weakness. The patient states that she fell 9 days ago. Intermittently since she has been having a headache. When she fell she did hit her head. She was evaluated initially in the emergency department for this. Her headache is posterior. It is a sore sensation. Seems to be intermittent. Nothing seems to worsen or improve it. Is not radiating. She does not currently have a headache. No neck pain. No nausea or vomiting. Today however, this morning at 7:30 AM she was having difficulty speaking.   Her  felt her speech was abnormal.  Subsequently she was having trouble walking. She feels her lower extremities are weak. She denies any recurrent trauma. No back pain. No chest pain or shortness of breath. No abdominal pain, fevers, cough. No vision change. She denies any numbness or tingling. ROS  A complete review of systems was performed and pertinent positives and negatives are stated within HPI, all other systems reviewed and are negative.    --------------------------------------------- PAST HISTORY ---------------------------------------------  Past Medical History:  has a past medical history of Dementia ([facility] [address] Diverticulosis, GIST (gastrointestinal stroma tumor), malignant, colon ([facility] [address] Hyperlipidemia, Hypertension, [facility] infarction) [facility] Mitral regurgitation, and PUD (peptic ulcer disease). Past Surgical History:  has a past surgical history that includes Hysterectomy; Upper gastrointestinal endoscopy (12/2014); Colonoscopy (12/2014); Upper gastrointestinal endoscopy (5/25/2016); shoulder surgery (Left); Endoscopy, colon, diagnostic (09/02/2016); Dilatation, esophagus; other surgical history (09/09/2016); Carotid endarterectomy (Right, 04/2019); Tonsillectomy and adenoidectomy; and Cataract removal with implant (Bilateral, 2015). Social History:  reports that she quit smoking about 52 years ago. She has a 18.00 pack-year smoking history. She has never used smokeless tobacco. She reports current alcohol use. She reports that she does not use drugs. Family History: family history includes COPD in her mother; Cancer in her sister; Colon Cancer in her father. Unless otherwise noted, family history is non contributory    The patients home medications have been reviewed. Allergies: Carafate [sucralfate], Cefdinir, Cetirizine & related, Lisinopril, Pravachol [pravastatin sodium], Protonix [pantoprazole sodium], and Statins    Physical Exam  General: The patient is conversational and lying comfortably in bed.   Head: Normocephalic and atraumatic. Eyes: Sclera non-icteric and no conjunctival injection  ENT: Nasal and oral membranes moist  Neck: Trachea midline. No JVD  Respiratory: Lungs clear to auscultation bilaterally. Patient speaking in full sentences. Cardiovascular: Regular rate. No pedal edema. Gastrointestinal:  Abdomen is soft and nondistended. Bowel sounds present. There is no tenderness. There is no guarding or rebound. Musculoskeletal: No deformity. No midline tenderness. No step-offs or deformities. Skin: Skin warm and dry. No rashes. Neurologic: No gross motor deficits. No aphasia. Pupils are equal and reactive to light. Extraocular eye movements intact. Sensation intact bilaterally. Symmetric facies. Tongue protrudes midline. 5 out of 5 symmetric strength of the upper. Lower extremities 4+ out of 5 with minimal drift. The patient is unable to ambulate with an unsteady gait. Ataxia of the lower extremities. Negative finger-to-nose testing. Alert and oriented. No meningismus. NIH stroke scale for  Psychiatric: Normal affect. MDM  This is a 80 y.o. female presenting to the ED for weakness and headache. On initial presentation, the patient's vital signs are interpreted as hypertensive, afebrile and saturating well on room air. Based on history and physical exam, we have a broad differential.  Initially the patient was seen in triage and work-up was initiated for ACS. After my evaluation of the patient, she did abolish the speech and inability to ambulate. As the patient has last known well at 7:30 AM which is less than 24 hours we did activate Zulma alert. We are concerned for intracranial process including CVA, TIA, migraine, close head injury or possible intracranial hemorrhage. We did consider ACS, arrhythmia or infectious process. Originally laboratory studies, chest x-ray and CT of the head were performed. We will add CTA of the head and neck as well as CT perfusion.   Neurology will be consulted. A 12-lead EKG was performed and interpreted by myself. The rate is 68 with normal sinus rhythm and left axis deviation. There is no ectopy. The DE interval is 136, QRS interval is 88, and QTC interval is 421. No acute ST elevation or depression. There is notching of the QRS segment in V6. There is RR prime in lead II. This is normal sinus rhythm with left axis deviation and nonspecific abnormality. CT of the head shows no acute cranial abnormality. Chronic small vessel ischemic disease. Persistent large right mastoid effusion. Decreased posterior scalp hematoma. Chest x-ray shows small bilateral pleural effusion. This is interpreted by radiology. Laboratory studies show decreased bicarb of 20. The patient has no anion gap. Troponin 15. Alkaline phosphatase minimally elevated. No leukocytosis or anemia. Urinalysis shows no evidence of infection. CT perfusion shows no infarct core. No perfusion mismatch. CTA of the head shows a 2 mm inferior projecting outpouching arising from the left supraclinoid carotid artery mostly consistent with aneurysm. Right vertebral artery is hypoplastic and terminates in the right PICA. 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 CT of the neck shows mild stenosis at the origin of the right common carotid artery. Moderate stenosis of the original right subclavian. Mild stenosis of the original bilateral ICA. 18 mm groundglass opacity in the right upper lobe which may be infectious or inflammatory versus neoplastic pathology. This is interpreted by radiology. The patient is not in the TPA window. Neurology feels she requires MRI. As we not have neurology here the patient will require [facility] [place]. She will be given aspirin. She denies any cough, fevers and does not of leukocytosis we will not start her on broad-spectrum antibiotics for pneumonia at this time.     Repeat troponin is 16 and Covid is negative. The patient is resting comfortably. We have discussed the findings. She is agreeable to transfer as is her . The patient's primary care physician is Dr. Vinnie Montano I spoke with the medicine team and they are agreeable to the admission. She will be admitted to a telemetry bed. I directly supervised any procedures performed by the resident and was present for the procedure including all critical portions of the procedure    The cardiac monitor revealed sinus rhythm with a heart rate in the 60s as interpreted by me. The cardiac monitor was ordered secondary to the patient's weakness and to monitor the patient for dysrhythmia. CPT [id number]    I, Dr. Zenia Segura, am the primary provider of record    Impression  1. Stroke-like symptoms    2. Nonintractable headache, unspecified chronicity pattern, unspecified headache type    3. Supraclinoid carotid artery aneurysm, small    4.  Lung mass              Zenia Segura MD  09/17/21 9582

## 2021-09-18 ENCOUNTER — HOSPITAL ENCOUNTER (INPATIENT)
Age: 86
LOS: 5 days | Discharge: HOME | DRG: 065 | End: 2021-09-23
Attending: INTERNAL MEDICINE
Payer: MEDICARE

## 2021-09-18 VITALS
BODY MASS INDEX: 27.38 KG/M2 | RESPIRATION RATE: 16 BRPM | HEIGHT: 61 IN | SYSTOLIC BLOOD PRESSURE: 155 MMHG | OXYGEN SATURATION: 95 % | DIASTOLIC BLOOD PRESSURE: 69 MMHG | TEMPERATURE: 98.2 F | WEIGHT: 145 LBS | HEART RATE: 70 BPM

## 2021-09-18 DIAGNOSIS — I63.9 ACUTE CEREBROVASCULAR ACCIDENT (CVA) (HCC): Primary | ICD-10-CM

## 2021-09-18 LAB — URINE CULTURE, ROUTINE: NORMAL

## 2021-09-18 PROCEDURE — 96374 THER/PROPH/DIAG INJ IV PUSH: CPT

## 2021-09-18 PROCEDURE — 6370000000 HC RX 637 (ALT 250 FOR IP): Performed by: EMERGENCY MEDICINE

## 2021-09-18 PROCEDURE — 6370000000 HC RX 637 (ALT 250 FOR IP): Performed by: STUDENT IN AN ORGANIZED HEALTH CARE EDUCATION/TRAINING PROGRAM

## 2021-09-18 PROCEDURE — 2500000003 HC RX 250 WO HCPCS: Performed by: STUDENT IN AN ORGANIZED HEALTH CARE EDUCATION/TRAINING PROGRAM

## 2021-09-18 PROCEDURE — 2060000000 HC ICU INTERMEDIATE R&B

## 2021-09-18 RX ORDER — AMLODIPINE BESYLATE 5 MG/1
2.5 TABLET ORAL DAILY
Status: DISCONTINUED | OUTPATIENT
Start: 2021-09-18 | End: 2021-09-18 | Stop reason: HOSPADM

## 2021-09-18 RX ORDER — ASPIRIN 81 MG/1
81 TABLET, CHEWABLE ORAL DAILY
Status: DISCONTINUED | OUTPATIENT
Start: 2021-09-18 | End: 2021-09-18 | Stop reason: HOSPADM

## 2021-09-18 RX ORDER — LEVOTHYROXINE SODIUM 0.05 MG/1
50 TABLET ORAL DAILY
Status: DISCONTINUED | OUTPATIENT
Start: 2021-09-18 | End: 2021-09-18 | Stop reason: HOSPADM

## 2021-09-18 RX ORDER — LABETALOL HYDROCHLORIDE 5 MG/ML
10 INJECTION, SOLUTION INTRAVENOUS ONCE
Status: COMPLETED | OUTPATIENT
Start: 2021-09-18 | End: 2021-09-18

## 2021-09-18 RX ORDER — DIPHENOXYLATE HYDROCHLORIDE AND ATROPINE SULFATE 2.5; .025 MG/1; MG/1
1 TABLET ORAL ONCE
Status: COMPLETED | OUTPATIENT
Start: 2021-09-18 | End: 2021-09-18

## 2021-09-18 RX ADMIN — AMLODIPINE BESYLATE 2.5 MG: 5 TABLET ORAL at 10:39

## 2021-09-18 RX ADMIN — ASPIRIN 81 MG CHEWABLE TABLET 81 MG: 81 TABLET CHEWABLE at 10:38

## 2021-09-18 RX ADMIN — DIPHENOXYLATE HYDROCHLORIDE AND ATROPINE SULFATE 1 TABLET: 2.5; .025 TABLET ORAL at 10:38

## 2021-09-18 RX ADMIN — LABETALOL HYDROCHLORIDE 10 MG: 5 INJECTION INTRAVENOUS at 02:04

## 2021-09-18 RX ADMIN — LEVOTHYROXINE SODIUM 50 MCG: 0.05 TABLET ORAL at 10:39

## 2021-09-18 ASSESSMENT — PAIN SCALES - GENERAL
PAINLEVEL_OUTOF10: 0
PAINLEVEL_OUTOF10: 0

## 2021-09-18 NOTE — PROGRESS NOTES
[phone] Patient going to room 8508A Call nurse to nurse to [phone], PAS ETA 2 hours MICU [id number]

## 2021-09-18 NOTE — ED NOTES
Pt and family member updated about awaiting for bed at [facility].       Harmony Cabezas RN  09/18/21 4010

## 2021-09-19 ENCOUNTER — APPOINTMENT (OUTPATIENT)
Dept: CT IMAGING | Age: 86
DRG: 065 | End: 2021-09-19
Attending: INTERNAL MEDICINE
Payer: MEDICARE

## 2021-09-19 LAB
ADENOVIRUS BY PCR: NOT DETECTED
ALBUMIN SERPL-MCNC: 3.9 G/DL (ref 3.5–5.2)
ALP BLD-CCNC: 115 U/L (ref 35–104)
ALT SERPL-CCNC: 14 U/L (ref 0–32)
ANION GAP SERPL CALCULATED.3IONS-SCNC: 13 MMOL/L (ref 7–16)
AST SERPL-CCNC: 20 U/L (ref 0–31)
BASOPHILS ABSOLUTE: 0.04 E9/L (ref 0–0.2)
BASOPHILS RELATIVE PERCENT: 0.7 % (ref 0–2)
BILIRUB SERPL-MCNC: 0.4 MG/DL (ref 0–1.2)
BORDETELLA PARAPERTUSSIS BY PCR: NOT DETECTED
BORDETELLA PERTUSSIS BY PCR: NOT DETECTED
BUN BLDV-MCNC: 14 MG/DL (ref 6–23)
CALCIUM SERPL-MCNC: 9.4 MG/DL (ref 8.6–10.2)
CHLAMYDOPHILIA PNEUMONIAE BY PCR: NOT DETECTED
CHLORIDE BLD-SCNC: 102 MMOL/L (ref 98–107)
CHOLESTEROL, TOTAL: 335 MG/DL (ref 0–199)
CO2: 20 MMOL/L (ref 22–29)
CORONAVIRUS 229E BY PCR: NOT DETECTED
CORONAVIRUS HKU1 BY PCR: NOT DETECTED
CORONAVIRUS NL63 BY PCR: NOT DETECTED
CORONAVIRUS OC43 BY PCR: NOT DETECTED
CREAT SERPL-MCNC: 0.8 MG/DL (ref 0.5–1)
EOSINOPHILS ABSOLUTE: 0.14 E9/L (ref 0.05–0.5)
EOSINOPHILS RELATIVE PERCENT: 2.4 % (ref 0–6)
GFR AFRICAN AMERICAN: >60
GFR NON-AFRICAN AMERICAN: >60 ML/MIN/1.73
GLUCOSE BLD-MCNC: 115 MG/DL (ref 74–99)
HBA1C MFR BLD: 6.2 % (ref 4–5.6)
HCT VFR BLD CALC: 44.3 % (ref 34–48)
HDLC SERPL-MCNC: 63 MG/DL
HEMOGLOBIN: 14.1 G/DL (ref 11.5–15.5)
HUMAN METAPNEUMOVIRUS BY PCR: NOT DETECTED
HUMAN RHINOVIRUS/ENTEROVIRUS BY PCR: NOT DETECTED
IMMATURE GRANULOCYTES #: 0.01 E9/L
IMMATURE GRANULOCYTES %: 0.2 % (ref 0–5)
INFLUENZA A BY PCR: NOT DETECTED
INFLUENZA B BY PCR: NOT DETECTED
LDL CHOLESTEROL CALCULATED: 229 MG/DL (ref 0–99)
LYMPHOCYTES ABSOLUTE: 1.76 E9/L (ref 1.5–4)
LYMPHOCYTES RELATIVE PERCENT: 30.7 % (ref 20–42)
MCH RBC QN AUTO: 29.4 PG (ref 26–35)
MCHC RBC AUTO-ENTMCNC: 31.8 % (ref 32–34.5)
MCV RBC AUTO: 92.3 FL (ref 80–99.9)
MONOCYTES ABSOLUTE: 0.49 E9/L (ref 0.1–0.95)
MONOCYTES RELATIVE PERCENT: 8.6 % (ref 2–12)
MYCOPLASMA PNEUMONIAE BY PCR: NOT DETECTED
NEUTROPHILS ABSOLUTE: 3.29 E9/L (ref 1.8–7.3)
NEUTROPHILS RELATIVE PERCENT: 57.4 % (ref 43–80)
PARAINFLUENZA VIRUS 1 BY PCR: NOT DETECTED
PARAINFLUENZA VIRUS 2 BY PCR: NOT DETECTED
PARAINFLUENZA VIRUS 3 BY PCR: NOT DETECTED
PARAINFLUENZA VIRUS 4 BY PCR: NOT DETECTED
PDW BLD-RTO: 13.1 FL (ref 11.5–15)
PLATELET # BLD: 235 E9/L (ref 130–450)
PMV BLD AUTO: 10.7 FL (ref 7–12)
POTASSIUM SERPL-SCNC: 4.3 MMOL/L (ref 3.5–5)
RBC # BLD: 4.8 E12/L (ref 3.5–5.5)
RESPIRATORY SYNCYTIAL VIRUS BY PCR: NOT DETECTED
SARS-COV-2, PCR: NOT DETECTED
SODIUM BLD-SCNC: 135 MMOL/L (ref 132–146)
TOTAL PROTEIN: 6.6 G/DL (ref 6.4–8.3)
TRIGL SERPL-MCNC: 217 MG/DL (ref 0–149)
TSH SERPL DL<=0.05 MIU/L-ACNC: 3.11 UIU/ML (ref 0.27–4.2)
VLDLC SERPL CALC-MCNC: 43 MG/DL
WBC # BLD: 5.7 E9/L (ref 4.5–11.5)

## 2021-09-19 PROCEDURE — 80061 LIPID PANEL: CPT

## 2021-09-19 PROCEDURE — 97535 SELF CARE MNGMENT TRAINING: CPT

## 2021-09-19 PROCEDURE — 83036 HEMOGLOBIN GLYCOSYLATED A1C: CPT

## 2021-09-19 PROCEDURE — 2060000000 HC ICU INTERMEDIATE R&B

## 2021-09-19 PROCEDURE — 6360000004 HC RX CONTRAST MEDICATION: Performed by: RADIOLOGY

## 2021-09-19 PROCEDURE — 2580000003 HC RX 258: Performed by: PHYSICIAN ASSISTANT

## 2021-09-19 PROCEDURE — 6370000000 HC RX 637 (ALT 250 FOR IP): Performed by: PSYCHIATRY & NEUROLOGY

## 2021-09-19 PROCEDURE — 6370000000 HC RX 637 (ALT 250 FOR IP): Performed by: PHYSICIAN ASSISTANT

## 2021-09-19 PROCEDURE — 36415 COLL VENOUS BLD VENIPUNCTURE: CPT

## 2021-09-19 PROCEDURE — 99222 1ST HOSP IP/OBS MODERATE 55: CPT | Performed by: PSYCHIATRY & NEUROLOGY

## 2021-09-19 PROCEDURE — 6370000000 HC RX 637 (ALT 250 FOR IP): Performed by: INTERNAL MEDICINE

## 2021-09-19 PROCEDURE — 80053 COMPREHEN METABOLIC PANEL: CPT

## 2021-09-19 PROCEDURE — 71260 CT THORAX DX C+: CPT

## 2021-09-19 PROCEDURE — 84443 ASSAY THYROID STIM HORMONE: CPT

## 2021-09-19 PROCEDURE — 85025 COMPLETE CBC W/AUTO DIFF WBC: CPT

## 2021-09-19 PROCEDURE — 97165 OT EVAL LOW COMPLEX 30 MIN: CPT

## 2021-09-19 PROCEDURE — 6360000002 HC RX W HCPCS: Performed by: PHYSICIAN ASSISTANT

## 2021-09-19 PROCEDURE — 0202U NFCT DS 22 TRGT SARS-COV-2: CPT

## 2021-09-19 RX ORDER — SODIUM CHLORIDE 0.9 % (FLUSH) 0.9 %
10 SYRINGE (ML) INJECTION PRN
Status: DISCONTINUED | OUTPATIENT
Start: 2021-09-19 | End: 2021-09-23 | Stop reason: HOSPADM

## 2021-09-19 RX ORDER — ACETAMINOPHEN 325 MG/1
650 TABLET ORAL EVERY 6 HOURS PRN
Status: DISCONTINUED | OUTPATIENT
Start: 2021-09-19 | End: 2021-09-23 | Stop reason: HOSPADM

## 2021-09-19 RX ORDER — CLOPIDOGREL BISULFATE 75 MG/1
75 TABLET ORAL DAILY
Status: DISCONTINUED | OUTPATIENT
Start: 2021-09-19 | End: 2021-09-23 | Stop reason: HOSPADM

## 2021-09-19 RX ORDER — POTASSIUM CHLORIDE 20 MEQ/1
40 TABLET, EXTENDED RELEASE ORAL PRN
Status: DISCONTINUED | OUTPATIENT
Start: 2021-09-19 | End: 2021-09-23 | Stop reason: HOSPADM

## 2021-09-19 RX ORDER — ATORVASTATIN CALCIUM 10 MG/1
10 TABLET, FILM COATED ORAL NIGHTLY
Status: DISCONTINUED | OUTPATIENT
Start: 2021-09-19 | End: 2021-09-20

## 2021-09-19 RX ORDER — ACETAMINOPHEN 650 MG/1
650 SUPPOSITORY RECTAL EVERY 6 HOURS PRN
Status: DISCONTINUED | OUTPATIENT
Start: 2021-09-19 | End: 2021-09-23 | Stop reason: HOSPADM

## 2021-09-19 RX ORDER — ONDANSETRON 2 MG/ML
4 INJECTION INTRAMUSCULAR; INTRAVENOUS EVERY 6 HOURS PRN
Status: DISCONTINUED | OUTPATIENT
Start: 2021-09-19 | End: 2021-09-23 | Stop reason: HOSPADM

## 2021-09-19 RX ORDER — ONDANSETRON 4 MG/1
4 TABLET, ORALLY DISINTEGRATING ORAL EVERY 8 HOURS PRN
Status: DISCONTINUED | OUTPATIENT
Start: 2021-09-19 | End: 2021-09-23 | Stop reason: HOSPADM

## 2021-09-19 RX ORDER — LEVOTHYROXINE SODIUM 0.05 MG/1
50 TABLET ORAL DAILY
Status: DISCONTINUED | OUTPATIENT
Start: 2021-09-19 | End: 2021-09-23 | Stop reason: HOSPADM

## 2021-09-19 RX ORDER — LOVASTATIN 20 MG/1
10 TABLET ORAL NIGHTLY
Status: DISCONTINUED | OUTPATIENT
Start: 2021-09-19 | End: 2021-09-19 | Stop reason: ALTCHOICE

## 2021-09-19 RX ORDER — SODIUM CHLORIDE 9 MG/ML
25 INJECTION, SOLUTION INTRAVENOUS PRN
Status: DISCONTINUED | OUTPATIENT
Start: 2021-09-19 | End: 2021-09-23 | Stop reason: HOSPADM

## 2021-09-19 RX ORDER — ASPIRIN 81 MG/1
81 TABLET, CHEWABLE ORAL DAILY
Status: DISCONTINUED | OUTPATIENT
Start: 2021-09-19 | End: 2021-09-23 | Stop reason: HOSPADM

## 2021-09-19 RX ORDER — AMLODIPINE BESYLATE 2.5 MG/1
2.5 TABLET ORAL DAILY
Status: DISCONTINUED | OUTPATIENT
Start: 2021-09-19 | End: 2021-09-23 | Stop reason: HOSPADM

## 2021-09-19 RX ORDER — SODIUM CHLORIDE 0.9 % (FLUSH) 0.9 %
10 SYRINGE (ML) INJECTION EVERY 12 HOURS SCHEDULED
Status: DISCONTINUED | OUTPATIENT
Start: 2021-09-19 | End: 2021-09-23 | Stop reason: HOSPADM

## 2021-09-19 RX ORDER — POTASSIUM CHLORIDE 7.45 MG/ML
10 INJECTION INTRAVENOUS PRN
Status: DISCONTINUED | OUTPATIENT
Start: 2021-09-19 | End: 2021-09-23 | Stop reason: HOSPADM

## 2021-09-19 RX ORDER — SODIUM CHLORIDE 0.9 % (FLUSH) 0.9 %
10 SYRINGE (ML) INJECTION ONCE
Status: DISCONTINUED | OUTPATIENT
Start: 2021-09-19 | End: 2021-09-23 | Stop reason: HOSPADM

## 2021-09-19 RX ADMIN — LEVOTHYROXINE SODIUM 50 MCG: 0.05 TABLET ORAL at 08:54

## 2021-09-19 RX ADMIN — AMLODIPINE BESYLATE 2.5 MG: 2.5 TABLET ORAL at 08:54

## 2021-09-19 RX ADMIN — ASPIRIN 81 MG CHEWABLE TABLET 81 MG: 81 TABLET CHEWABLE at 08:54

## 2021-09-19 RX ADMIN — CLOPIDOGREL 75 MG: 75 TABLET, FILM COATED ORAL at 11:28

## 2021-09-19 RX ADMIN — IOPAMIDOL 90 ML: 755 INJECTION, SOLUTION INTRAVENOUS at 17:14

## 2021-09-19 RX ADMIN — Medication 10 ML: at 20:45

## 2021-09-19 RX ADMIN — ENOXAPARIN SODIUM 40 MG: 100 INJECTION SUBCUTANEOUS at 08:55

## 2021-09-19 RX ADMIN — Medication 10 ML: at 08:53

## 2021-09-19 RX ADMIN — ATORVASTATIN CALCIUM 10 MG: 10 TABLET, FILM COATED ORAL at 20:45

## 2021-09-19 ASSESSMENT — PAIN SCALES - GENERAL
PAINLEVEL_OUTOF10: 0
PAINLEVEL_OUTOF10: 0

## 2021-09-19 NOTE — ED NOTES
ANTHONY called and reports that they will be here in about 47 minutes     Prerna Washington RN  09/18/21 2051

## 2021-09-19 NOTE — PROGRESS NOTES
This RN called patient's  Pelon Silverio to try and complete MRI checklist, there was no answer. Will try again later.      Voicemail left with patient's daughter Raza Casey with callback number to complete MRI checklist.

## 2021-09-19 NOTE — CONSULTS
[facility] [address] [address]  Neurology Consult    Date:  9/19/2021  Patient Name:  Misha Roberson  YOB: 1934  MRN: [id number]     PCP:  Prem Mariee DO   Referring:  Getachew Balbuena MD      Chief Complaint: dizziness    History obtained from: patient, chart    Assessment  Misha Roberson is a 80 y.o. female admitted after an episode concerning for a posterior circulation stroke. She does have some left sided ataxia on exam concerning for left cerebellar infarct. She does have severe stenosis of the basilar artery noted on CTA head. Plan  Continue dual antiplatelet therapy  Patient with history of statin intolerances. Discussed risk/benefit given basilar stenosis. Will trial lovastatin at low dose. If initially tolerated would recommend increasing dose as able. Goal LDL<70  MRI brain pending  Consider ECHO depending on appearance of MRI brain        History of Present Illness:  Misha Roberson is a 80 y.o. right handed female presenting for evaluation of dizziness. The patient began dizziness starting on Wednesday this past week. That morning she also noted an episode of slurred speech and double vision which lasted for several minutes. She was admitted to [facility] and was in the ED for the past two days.          Review of Systems:  No current dysphagia, dysarthria, double vision, weakness, numbness    Medical History:   Past Medical History:   Diagnosis Date    Dementia ([address]     Diverticulosis     GIST (gastrointestinal stroma tumor), malignant, colon ([address]     Hyperlipidemia     Hypertension     MI (myocardial infarction) ([address] 2015    Mitral regurgitation     per cardiology    PUD (peptic ulcer disease)         Surgical History:   Past Surgical History:   Procedure Laterality Date    CAROTID ENDARTERECTOMY Right 04/2019    Dr Johann Pak Bilateral 2015    COLONOSCOPY  12/2014    DILATATION, ESOPHAGUS      ENDOSCOPY, COLON, DIAGNOSTIC  09/02/2016 marking of gastric tumor    HYSTERECTOMY      YUMIKO/BSO    OTHER SURGICAL HISTORY  2016    Laparoscopic Robotic Assisted Partial Gastrectomy    SHOULDER SURGERY Left     rotator cuff right    TONSILLECTOMY AND ADENOIDECTOMY      UPPER GASTROINTESTINAL ENDOSCOPY  2014    UPPER GASTROINTESTINAL ENDOSCOPY  2016        Family History:   Family History   Problem Relation Age of Onset    Colon Cancer Father     COPD Mother         tobacco    Cancer Sister         breast     Social History:  Social History     Tobacco Use    Smoking status: Former Smoker     Packs/day: 1.00     Years: 18.00     Pack years: 18.00     Quit date:      Years since quittin.7    Smokeless tobacco: Never Used   Vaping Use    Vaping Use: Never used   Substance Use Topics    Alcohol use: Yes     Comment: glass of wine with dinner    Drug use: No        Current Medications:      Current Facility-Administered Medications   Medication Dose Route Frequency Provider Last Rate Last Admin    amLODIPine (NORVASC) tablet 2.5 mg  2.5 mg Oral Daily Leatha Rasp, PA   2.5 mg at 21 0854    aspirin chewable tablet 81 mg  81 mg Oral Daily Leatha Rasp, PA   81 mg at 21 0854    levothyroxine (SYNTHROID) tablet 50 mcg  50 mcg Oral Daily Leatha Rasp, PA   50 mcg at 21 0854    sodium chloride flush 0.9 % injection 10 mL  10 mL IntraVENous 2 times per day Leatha Rasp, PA   10 mL at 21 0853    sodium chloride flush 0.9 % injection 10 mL  10 mL IntraVENous PRN Leatha Rasp, PA        0.9 % sodium chloride infusion  25 mL IntraVENous PRN Leatha Rasp, PA        potassium chloride (KLOR-CON M) extended release tablet 40 mEq  40 mEq Oral PRN Leatha Rasp, PA        Or    potassium bicarb-citric acid (EFFER-K) effervescent tablet 40 mEq  40 mEq Oral PRN Leatha Rasp, PA        Or    potassium chloride 10 mEq/100 mL IVPB (Peripheral Line)  10 mEq IntraVENous PRN Leatha Rasp, PA        enoxaparin (LOVENOX) injection 40 mg  40 mg SubCUTAneous Daily XOCHITL Marin   40 mg at 09/19/21 0855    ondansetron (ZOFRAN-ODT) disintegrating tablet 4 mg  4 mg Oral Q8H PRN XOCHITL Marin        Or    ondansetron [facility]) injection 4 mg  4 mg IntraVENous Q6H PRN XOCHITL Marin        magnesium hydroxide (MILK OF MAGNESIA) 400 MG/5ML suspension 30 mL  30 mL Oral Daily PRN XOCHITL Marin        acetaminophen (TYLENOL) tablet 650 mg  650 mg Oral Q6H PRN XOCHITL Marin        Or    acetaminophen (TYLENOL) suppository 650 mg  650 mg Rectal Q6H PRN XOCHITL Marin        clopidogrel (PLAVIX) tablet 75 mg  75 mg Oral Daily Rigoberto Dutton MD   75 mg at 09/19/21 1128        Allergies: Allergies   Allergen Reactions    Carafate [Sucralfate] Other (See Comments)     Unable to explain    Cefdinir Other (See Comments)     Unable to explain    Cetirizine & Related Other (See Comments)     Makes her forgetful and sleepy    Lisinopril Other (See Comments)     Difficulty swallowing, reflux, headache, dizziness, leg cramps    Pravachol [Pravastatin Sodium] Other (See Comments)     Patient states \"it almost killed me. \" Spoke with Patient's Daughter and she stated \"body cramps and extreme weakness. \"    Protonix [Pantoprazole Sodium] Other (See Comments)     Unable to explain    Statins Other (See Comments)     Intolerable. Patient states \"it almost killed me. \" Spoke with Patient's Daughter and she stated \"body cramps and extreme weakness. \"        Physical Examination  Vitals   Vitals:    09/19/21 0530 09/19/21 0635 09/19/21 0845 09/19/21 1315   BP: (!) 144/64  (!) 160/96 (!) 154/64   Pulse: 71  72 64   Resp:   18 16   Temp: 97.8 °F (36.6 °C)  96.9 °F (36.1 °C) 97.1 °F (36.2 °C)   TempSrc:   Temporal Temporal   SpO2: 97%  98% 94%   Weight:  145 lb (65.8 kg)     Height:  5' 1\" (1.549 m)          General: Patient appears in no acute distress  HEENT: Normocephalic, atraumatic  Chest: no respiratory distress noted  Extremities: No edema or cyanosis noted    Neurologic Examination    Mental Status  Alert, and oriented to person, place and time with normal speech and language. No evidence of aphasia during conversation. No evidence of memory impairment. Attention and concentration appeared normal.     Cranial Nerves  II. Visual fields full to confrontation bilaterally. III, IV, VI: Pupils equally round and reactive to light, 4 to 3 mm bilaterally. EOMs: full, no nystagmus. V. Facial sensation intact to light touch bilaterally  VII: Facial movements symmetric and strong  VIII: Hearing intact to voice  IX,X: Palate elevates symmetrically. No dysarthria  XI: Sternocleidomastoid and trapezius 5/5 bilaterally   XII: Tongue is midline    Motor     Right Left   Right Left   Deltoid 5 5  Hip Flexion 5 5   Biceps      5  5  Knee Extension 5 5   Triceps 5 5  Knee Flexion 5 5   Handgrip 5 5  Ankle Dorsiflexion 5 5       Ankle Plantarflexion 5 5       Sensation  Light Touch: Intact distally in all four limbs    Reflexes     Right Left   Biceps 2 2   Brachioradialis 2 2   Triceps 2 2   Patellar 2 2   Achilles 1 1   ankle clonus none none     Toes down going bilaterally. Coordination  Rapid alternating movements normal in bilateral upper extremities  Finger to nose testing ataxic on left  Heel to shin testing ataxic on left    Gait  Deferred     Labs  Results for Mala Archuleta (MRN [id number]) as of 9/19/2021 17:19   Ref.  Range 9/19/2021 08:38   Sodium Latest Ref Range: 132 - 146 mmol/L 135   Potassium Latest Ref Range: 3.5 - 5.0 mmol/L 4.3   Chloride Latest Ref Range: 98 - 107 mmol/L 102   CO2 Latest Ref Range: 22 - 29 mmol/L 20 (L)   BUN Latest Ref Range: 6 - 23 mg/dL 14   Creatinine Latest Ref Range: 0.5 - 1.0 mg/dL 0.8   Anion Gap Latest Ref Range: 7 - 16 mmol/L 13   GFR Non- Latest Ref Range: >=60 mL/min/1.73 >60   GFR African American Unknown >60   Glucose Latest Ref Range: 74 - 99 mg/dL 115 (H)   Calcium Latest Ref Range: 8.6 - 10.2 mg/dL 9.4   Total Protein Latest Ref Range: 6.4 - 8.3 g/dL 6.6   Cholesterol, Total Latest Ref Range: 0 - 199 mg/dL 335 (H)   HDL Cholesterol Latest Ref Range: >40 mg/dL 63   LDL Calculated Latest Ref Range: 0 - 99 mg/dL 229 (H)   Triglycerides Latest Ref Range: 0 - 149 mg/dL 217 (H)   VLDL Cholesterol Calculated Latest Units: mg/dL 43   Albumin Latest Ref Range: 3.5 - 5.2 g/dL 3.9   Alk Phos Latest Ref Range: 35 - 104 U/L 115 (H)   ALT Latest Ref Range: 0 - 32 U/L 14   AST Latest Ref Range: 0 - 31 U/L 20   Bilirubin Latest Ref Range: 0.0 - 1.2 mg/dL 0.4   Hemoglobin A1C Latest Ref Range: 4.0 - 5.6 % 6.2 (H)   TSH Latest Ref Range: 0.270 - 4.200 uIU/mL 3.110   WBC Latest Ref Range: 4.5 - 11.5 E9/L 5.7   RBC Latest Ref Range: 3.50 - 5.50 E12/L 4.80   Hemoglobin Quant Latest Ref Range: 11.5 - 15.5 g/dL 14.1   Hematocrit Latest Ref Range: 34.0 - 48.0 % 44.3   MCV Latest Ref Range: 80.0 - 99.9 fL 92.3   MCH Latest Ref Range: 26.0 - 35.0 pg 29.4   MCHC Latest Ref Range: 32.0 - 34.5 % 31.8 (L)   MPV Latest Ref Range: 7.0 - 12.0 fL 10.7   RDW Latest Ref Range: 11.5 - 15.0 fL 13.1   Platelet Count Latest Ref Range: 130 - 450 E9/L 235   Neutrophils % Latest Ref Range: 43.0 - 80.0 % 57.4   Immature Granulocytes % Latest Ref Range: 0.0 - 5.0 % 0.2   Lymphocyte % Latest Ref Range: 20.0 - 42.0 % 30.7   Monocytes % Latest Ref Range: 2.0 - 12.0 % 8.6   Eosinophils % Latest Ref Range: 0.0 - 6.0 % 2.4   Basophils % Latest Ref Range: 0.0 - 2.0 % 0.7   Neutrophils Absolute Latest Ref Range: 1.80 - 7.30 E9/L 3.29   Immature Granulocytes # Latest Units: E9/L 0.01   Lymphocytes Absolute Latest Ref Range: 1.50 - 4.00 E9/L 1.76   Monocytes Absolute Latest Ref Range: 0.10 - 0.95 E9/L 0.49   Eosinophils Absolute Latest Ref Range: 0.05 - 0.50 E9/L 0.14   Basophils Absolute Latest Ref Range: 0.00 - 0.20 E9/L 0.04       Imaging  CTA head/neck, perfusion  Impression   CTA neck:       Mild stenosis at the origin of right common carotid artery.       Moderate stenosis at the origin right subclavian artery.       Mild stenosis at the origin of bilateral cervical ICA.     18 mm ground-glass area within the right upper lobe may be of   infectious/inflammatory or neoplastic in etiology.  For further evaluation   dedicated chest CT examination can be obtained.       CTA HEAD:       2 mm inferiorly projecting outpouching arising from the left supraclinoid   carotid artery mostly consistent with an aneurysm.       Right vertebral artery is hypoplastic and terminates in right PICA,   anatomical variation.       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       CT PERFUSION:       No infarct core. No perfusion mismatch.              Electronically signed by: Edin Mathew DO, 9/19/2021 1:51 PM

## 2021-09-19 NOTE — H&P
Admission History and Physical                                                                                    Inge Elliott MD, FACP                   Patient Name: Felipe Bailey                   Age:  80 y.o. Gender:   female    CC: Fall, unsteadiness, transient altered speech    HPI: Patient history is obtained at bedside in conjunction with her  as well as chart review. Patient states that she was held at [facility] emergency room for 2 days  Prior to coming to the hospital patient states that she was on a ladder and was working above her head when she fell backwards and struck the back of her head. She came to the emergency room was evaluated and subsequently released  According to the chart this was on 9 /7    She then presented to the emergency room on 9/16. On the morning of 916 she stated she got up to go to the bathroom had a bowel movement and then got up and was walking back to her bed and she became unable to ambulate and she was unable to express herself. Her  was at bedside and stated that this lasted for a couple of seconds and was over. During the interview and examination the patient appeared to be very slow with conversation I did not know if that was her baseline. 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 He states that she has been somewhat unsteady since then. She feels that she is back to baseline now and so does her . In the emergency room it was determined that she had strokelike symptoms.   She was not transferred here until 9/18    During her 2 days at the emergency room in [facility] she had no changes in her neurologic status    Her evaluation in the emergency room at [facility] demonstrated:  · Mild stenosis right common carotid origin  · Moderate stenosis Take 1 tablet by mouth daily 21  Yes Kvng Naik,    amLODIPine (NORVASC) 2.5 MG tablet Take 1 tablet by mouth daily 3/15/21  Yes Feliberto Carmona, DO   loratadine (CLARITIN) 10 MG tablet Take 10 mg by mouth daily as needed (ALLERGIES)    Yes Historical Provider, MD   aspirin 81 MG tablet Take 81 mg by mouth daily   Yes Historical Provider, MD        Social History     Socioeconomic History    Marital status:      Spouse name: Not on file    Number of children: Not on file    Years of education: Not on file    Highest education level: Not on file   Occupational History    Occupation: retired    Tobacco Use    Smoking status: Former Smoker     Packs/day: 1.00     Years: 18.00     Pack years: 18.00     Quit date:      Years since quittin.7    Smokeless tobacco: Never Used   Vaping Use    Vaping Use: Never used   Substance and Sexual Activity    Alcohol use: Yes     Comment: glass of wine with dinner    Drug use: No    Sexual activity: Not Currently     Partners: Male     Comment:    Other Topics Concern    Not on file   Social History Narrative    Not on file     Social Determinants of Health     Financial Resource Strain:     Difficulty of Paying Living Expenses:    Food Insecurity:     Worried About [address] [employer] in the Last Year:     [address]  in the Last Year:    Transportation Needs:     Lack of Transportation (Medical):      Lack of Transportation (Non-Medical):    Physical Activity:     Days of Exercise per Week:     Minutes of Exercise per Session:    Stress:     Feeling of Stress :    Social Connections:     Frequency of Communication with Friends and Family:     Frequency of Social Gatherings with Friends and Family:     Attends [employer] Services:     Active Member of Clubs or Organizations:     Attends Club or Organization Meetings:     Marital Status:    Intimate Partner Violence:     Fear of Current or Ex-Partner:     Emotionally Abused:     Physically Abused:     Sexually Abused: Allergies   Allergen Reactions    Carafate [Sucralfate] Other (See Comments)     Unable to explain    Cefdinir Other (See Comments)     Unable to explain    Cetirizine & Related Other (See Comments)     Makes her forgetful and sleepy    Lisinopril Other (See Comments)     Difficulty swallowing, reflux, headache, dizziness, leg cramps    Pravachol [Pravastatin Sodium] Other (See Comments)     Patient states \"it almost killed me. \" Spoke with Patient's Daughter and she stated \"body cramps and extreme weakness. \"    Protonix [Pantoprazole Sodium] Other (See Comments)     Unable to explain    Statins Other (See Comments)     Intolerable. Patient states \"it almost killed me. \" Spoke with Patient's Daughter and she stated \"body cramps and extreme weakness. \"       The patient's medical records have been reviewed contingent on availability    Review of Systems:   · General: As per HPI denies fever or chills. · Eyes: No visual changes or diplopia. No swelling or pain. · ENT: No Headaches, tinnitus or vertigo. No mouth sores or sore throat. · Cardiovascular: No chest pain, dyspnea on exertion, palpitations, syncope. · Respiratory: No cough or wheezing, hemoptysis, sob, pleuritic pain. · Gastrointestinal: No abdominal pain, anorexia, hematochezia, melena, hematemesis or change in bowels. · Genitourinary: No dysuria, trouble voiding, or hematuria. No change in urination. · Musculoskeletal:  No joint pain or inflammation. No limb weakness. · Integumentary: No rash or pruritis. No abnormal pigmentation,  masses, hair or nail changes  Neurological: As per HPI  · Psychiatric: No anxiety, or depression. Mood and affect reported as normal  · Endocrine: No temperature intolerance. No polydipsia or polyuria. · Hematologic: No abnormal bruising or bleeding, blood clots or swollen lymph nodes. no anemia, no fever,chills, night sweats, swollen glands. · Allergic/Immunologic: No nasal congestion or hives. Physical Examination:      Wt Readings from Last 3 Encounters:   09/19/21 145 lb (65.8 kg)   09/16/21 145 lb (65.8 kg)   09/13/21 144 lb 9.6 oz (65.6 kg)     Temp Readings from Last 3 Encounters:   09/19/21 96.9 °F (36.1 °C) (Temporal)   09/18/21 98.2 °F (36.8 °C) (Oral)   09/13/21 98.2 °F (36.8 °C)     BP Readings from Last 3 Encounters:   09/19/21 (!) 160/96   09/18/21 (!) 155/69   09/13/21 138/84     Pulse Readings from Last 3 Encounters:   09/19/21 72   09/18/21 70   09/13/21 74       General appearance: Normal, awake, alert no distress. Skin: Color, texture, turgor normal. No rashes or lesions. Head: Normocephalic. No masses, lesions, tenderness or abnormalities   Face: Symmetric no visible lesions  Eyes: Conjunctivae/cornea clear. [place]. Sclera non icteric. Ears: External appearance normal.  Hearing grossly normal  Nose/Sinuses: Nares normal. No paranasal sinus tenderness. Mouth: Lips and tongue appear normal. Dentition noted  Neck:  Symmetric. No adenopathy. Thyroid symmetric, normal size, without nodules. Trachea is midline. Chest: Even excursion   Lungs: Clear to auscultation. No rhonchi, crackles or rales. Heart: S1 > S2. Rhythm is regular and rate is normal. No gallop rub or murmur. Abdomen: Soft, mildly protuberant, non-tender. BS normal. No masses, organomegaly. Anatomic contours appear normal.  Extremities: No deformities, edema, or skin discoloration. Peripheral perfusion assessed in all exremities. No cyanosis  Musculoskeletal: No unusual pain or swelling. Muscular strength intact. Neuro:   · Cranial nerves grossly intact. · Motor: Strength slightly weaker on the left with regard to   · Sensory: grossly normal to touch.    · Cerebellar testing was grossly abnormal  · Speech was slow and deliberate  · Somewhat unsteady ataxic gait  · Romberg present  · Failed tandem gait  Mental components found for: HGBA1C   U/A:   Lab Results   Component Value Date    NITRITE neg 04/23/2021    LEUKOCYTESUR Negative 09/16/2021    PHUR 6.0 09/16/2021    WBCUA 0-1 05/24/2016    WBCUA 1-3 04/06/2011    RBCUA 1-3 05/24/2016    RBCUA 0-1 04/06/2011    BACTERIA RARE 05/24/2016    SPECGRAV 1.015 09/16/2021    BLOODU Negative 09/16/2021    GLUCOSEU Negative 09/16/2021    GLUCOSEU NEGATIVE 04/06/2011         ADMISSION SCHEDULED MEDS:   Current Facility-Administered Medications   Medication Dose Route Frequency Provider Last Rate Last Admin    amLODIPine (NORVASC) tablet 2.5 mg  2.5 mg Oral Daily XOCHITL Delaney   2.5 mg at 09/19/21 0854    aspirin chewable tablet 81 mg  81 mg Oral Daily XOCHITL Delaney   81 mg at 09/19/21 0854    levothyroxine (SYNTHROID) tablet 50 mcg  50 mcg Oral Daily XOCHITL Delaney   50 mcg at 09/19/21 0854    sodium chloride flush 0.9 % injection 10 mL  10 mL IntraVENous 2 times per day XOCHITL Delaney   10 mL at 09/19/21 0853    sodium chloride flush 0.9 % injection 10 mL  10 mL IntraVENous PRN XOCHITL Delaney        0.9 % sodium chloride infusion  25 mL IntraVENous PRN XOCHITL Delaney        potassium chloride (KLOR-CON M) extended release tablet 40 mEq  40 mEq Oral PRN XOCHITL Delaney        Or    potassium bicarb-citric acid (EFFER-K) effervescent tablet 40 mEq  40 mEq Oral PRN XOCHITL Delaney        Or    potassium chloride 10 mEq/100 mL IVPB (Peripheral Line)  10 mEq IntraVENous PRN XOCHITL Delaney        enoxaparin (LOVENOX) injection 40 mg  40 mg SubCUTAneous Daily XOCHITL Delaney   40 mg at 09/19/21 0855    ondansetron (ZOFRAN-ODT) disintegrating tablet 4 mg  4 mg Oral Q8H PRN XOCHITL Delaney        Or    ondansetron [facility]) injection 4 mg  4 mg IntraVENous Q6H PRN XOCHITL Delaney        magnesium hydroxide (MILK OF MAGNESIA) 400 MG/5ML suspension 30 mL  30 mL Oral Daily PRN XOCHITL Delaney        acetaminophen (TYLENOL) tablet 650 mg  650 mg Oral Q6H PRN XOCHITL Delaney        Or    acetaminophen (TYLENOL) suppository 650 mg  650 mg Rectal Q6H PRN XOCHITL Delaney           Current  Infusions   sodium chloride         Prn Meds  sodium chloride flush, sodium chloride, potassium chloride **OR** potassium alternative oral replacement **OR** potassium chloride, ondansetron **OR** ondansetron, magnesium hydroxide, acetaminophen **OR** acetaminophen    Radiology Review:  MRI BRAIN WO CONTRAST    (Results Pending)   CT CHEST W CONTRAST    (Results Pending)         ASSESSMENT:  Active diagnoses treated at this admission:  · Most likely posterior CVA/vascular insufficiency  · Consistent with diffuse atherosclerotic vascular disease  · Significant hyperlipidemia  · Past history of myocardial infarction  · Reported cognitive impairment not overtly evident  · History of diabetes with hemoglobin A1c is 6.2    Problem list:  Patient Active Problem List   Diagnosis    Hypertension    PUD (peptic ulcer disease)    GIST (gastrointestinal stromal tumor), malignant ([address]    NSTEMI (non-ST elevated myocardial infarction) ([address]    Onychomycosis    Difficulty walking    Hyperlipidemia    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)    Malignant neoplasm of abdomen ([address]    Disorder of thyroid gland    Carotid stenosis, right    Acquired hypothyroidism    Dementia ([address]    Type 2 diabetes mellitus without complication, without long-term current use of insulin (HCC)    Acute cerebrovascular accident (CVA) ([address]       PLAN:  Dual antiplatelet therapy  Await neurology assessment  Aggressive lipid and blood pressure treatment  Will have to define clearly with the intolerance to statins was because of the critical need for aggressive treatment at this point  Glycemic control  Physical and Occupational Therapy due to gait impairment        See  Orders  Shraddha Jose MD, Harriet Pisano, American Board of Internal Medicine  Diplomate, Geriatric Medicine, [address] [place] [address] Rd., [address] of Internal Medicine  10:41 AM  9/19/2021

## 2021-09-19 NOTE — PLAN OF CARE
Problem: Falls - Risk of:  Goal: Will remain free from falls  Description: Will remain free from falls  9/19/2021 1256 by Ortega Rodriguez RN  Outcome: Met This Shift  9/19/2021 0640 by Tawanna Santiago RN  Outcome: Ongoing  Goal: Absence of physical injury  Description: Absence of physical injury  9/19/2021 1256 by Ortega Rodriguez RN  Outcome: Met This Shift  9/19/2021 0640 by Tawanna Santiago RN  Outcome: Ongoing

## 2021-09-19 NOTE — ED NOTES
Verbal report given to [facility] at [facility]. Report and paperwork given to paramedic for PAS and pt loaded to cot.       Yovany Johnson RN  09/18/21 7140

## 2021-09-19 NOTE — PROGRESS NOTES
Occupational Therapy  OCCUPATIONAL THERAPY INITIAL EVALUATION    [address]  [address] [facility]      Date:2021                                                Patient Name: Lesly Valenzuela  MRN: [id number]  : 1934  Room: [address] [place], [place] [address]    Referring Provider: XOCHITL Stephens  Specific Provider Orders/Date: OT eval and treat 21    Diagnosis: Acute cerebrovascular accident (CVA) [facility]) [I63.9]   Pt admitted to hospital on 21 for intermittent confusion, headache and weakness    Pertinent Medical History:  has a past medical history of Dementia ([facility] Diverticulosis, GIST (gastrointestinal stroma tumor), malignant, colon ([facility] Hyperlipidemia, Hypertension, MI (myocardial infarction) ([facility] Mitral regurgitation, and PUD (peptic ulcer disease).        Precautions:  Fall Risk, bed alarm    Assessment of current deficits    [x] Functional mobility  [x]ADLs  [x] Strength               []Cognition    [x] Functional transfers   [x] IADLs         [x] Safety Awareness   [x]Endurance    [] Fine Coordination              [x] Balance      [] Vision/perception   []Sensation     []Gross Motor Coordination  [] ROM  [] Delirium                   [] Motor Control     OT PLAN OF CARE   OT POC based on physician orders, patient diagnosis and results of clinical assessment    Frequency/Duration 2-3 days/wk for 2 weeks PRN   Specific OT Treatment Interventions to include:   * Instruction/training on adapted ADL techniques and AE recommendations to increase functional independence within precautions       * Training on energy conservation strategies, correct breathing pattern and techniques to improve independence/tolerance for self-care routine  * Functional transfer/mobility training/DME recommendations for increased independence, safety, and fall prevention  * Patient/Family education to increase follow Dressing Moderate Assist   Simulated pants  Improved to min A to don/doff socks  Supervision    Bathing Moderate Assist  Supervision    Toileting Minimal Assist   Including hygiene  Supervision    Bed Mobility  Supine to sit: Minimal Assist   Sit to supine: Minimal Assist   Supine to sit: Modified [place]   Sit to supine: Modified [place]    Functional Transfers Minimal Assist   Supervision    Functional Mobility Minimal><Moderate Assist w/o AD (LOB x2 when turning, increased assist required)    Min A w/ w/w  In room and bathroom  Supervision   Balance Sitting:     Static:  Supervision    Dynamic:SBA  Standing: Min><Mod A w/o AD  Min A w/ w/w     Activity Tolerance Fair  Good   Visual/  Perceptual Glasses: yes  Visual scanning: WFL  Perception: WFL                  Hand Dominance R   AROM (PROM) Strength Additional Info:    RUE  WFL 4/5 good  and wfl FMC/dexterity noted during ADL tasks  Finger opp: wfl  Finger><nose: wfl       LUE WFL 4-/5 good  and wfl FMC/dexterity noted during ADL tasks    Finger opp: fair+ (slower speed)  Finger><nose: wfl     Hearing: WFL  Sensation:  No c/o numbness or tingling  Light touch: diminished R foot, intact facial and B UE's  Tone: WFL   Edema: none noted    Comments: Upon arrival patient lying in bed. Pt agreeable to OT session this date. Therapist educated pt on role of OT. At end of session, patient lying in bed (bed alarm on) with call light and phone within reach, all lines and tubes intact. Overall patient demonstrated decreased independence and safety during completion of ADL/functional transfer/mobility tasks. Pt would benefit from continued skilled OT to increase safety and independence with completion of ADL/IADL tasks for functional independence and quality of life.     Treatment: OT treatment provided this date includes: Facilitation of bed mobility (education/cues for body mechanics), unsupported sitting balance (addressing posture, weight shifting, of data and development of plan of care and goals.         Keena, OTR/L #[id number]

## 2021-09-20 ENCOUNTER — APPOINTMENT (OUTPATIENT)
Dept: MRI IMAGING | Age: 86
DRG: 065 | End: 2021-09-20
Attending: INTERNAL MEDICINE
Payer: MEDICARE

## 2021-09-20 PROBLEM — R91.8 PULMONARY NODULES: Status: ACTIVE | Noted: 2021-09-20

## 2021-09-20 PROBLEM — Z78.9 STATIN INTOLERANCE: Chronic | Status: ACTIVE | Noted: 2021-09-20

## 2021-09-20 PROBLEM — I71.00 AORTIC DISSECTION (HCC): Status: ACTIVE | Noted: 2021-09-20

## 2021-09-20 PROBLEM — R91.8 PULMONARY NODULES: Chronic | Status: ACTIVE | Noted: 2021-09-20

## 2021-09-20 LAB
ANION GAP SERPL CALCULATED.3IONS-SCNC: 8 MMOL/L (ref 7–16)
BASOPHILS ABSOLUTE: 0.07 E9/L (ref 0–0.2)
BASOPHILS RELATIVE PERCENT: 1.4 % (ref 0–2)
BUN BLDV-MCNC: 15 MG/DL (ref 6–23)
CALCIUM SERPL-MCNC: 9.8 MG/DL (ref 8.6–10.2)
CHLORIDE BLD-SCNC: 105 MMOL/L (ref 98–107)
CO2: 27 MMOL/L (ref 22–29)
CREAT SERPL-MCNC: 1 MG/DL (ref 0.5–1)
EOSINOPHILS ABSOLUTE: 0.12 E9/L (ref 0.05–0.5)
EOSINOPHILS RELATIVE PERCENT: 2.5 % (ref 0–6)
GFR AFRICAN AMERICAN: >60
GFR NON-AFRICAN AMERICAN: 52 ML/MIN/1.73
GLUCOSE BLD-MCNC: 123 MG/DL (ref 74–99)
HCT VFR BLD CALC: 43 % (ref 34–48)
HEMOGLOBIN: 14 G/DL (ref 11.5–15.5)
IMMATURE GRANULOCYTES #: 0.01 E9/L
IMMATURE GRANULOCYTES %: 0.2 % (ref 0–5)
LYMPHOCYTES ABSOLUTE: 1.68 E9/L (ref 1.5–4)
LYMPHOCYTES RELATIVE PERCENT: 34.5 % (ref 20–42)
MCH RBC QN AUTO: 29.8 PG (ref 26–35)
MCHC RBC AUTO-ENTMCNC: 32.6 % (ref 32–34.5)
MCV RBC AUTO: 91.5 FL (ref 80–99.9)
MONOCYTES ABSOLUTE: 0.5 E9/L (ref 0.1–0.95)
MONOCYTES RELATIVE PERCENT: 10.3 % (ref 2–12)
NEUTROPHILS ABSOLUTE: 2.49 E9/L (ref 1.8–7.3)
NEUTROPHILS RELATIVE PERCENT: 51.1 % (ref 43–80)
PDW BLD-RTO: 13.1 FL (ref 11.5–15)
PLATELET # BLD: 233 E9/L (ref 130–450)
PMV BLD AUTO: 10.9 FL (ref 7–12)
POTASSIUM REFLEX MAGNESIUM: 4.3 MMOL/L (ref 3.5–5)
RBC # BLD: 4.7 E12/L (ref 3.5–5.5)
SODIUM BLD-SCNC: 140 MMOL/L (ref 132–146)
WBC # BLD: 4.9 E9/L (ref 4.5–11.5)

## 2021-09-20 PROCEDURE — 6370000000 HC RX 637 (ALT 250 FOR IP): Performed by: INTERNAL MEDICINE

## 2021-09-20 PROCEDURE — 2060000000 HC ICU INTERMEDIATE R&B

## 2021-09-20 PROCEDURE — 2580000003 HC RX 258: Performed by: PHYSICIAN ASSISTANT

## 2021-09-20 PROCEDURE — 70551 MRI BRAIN STEM W/O DYE: CPT

## 2021-09-20 PROCEDURE — 6370000000 HC RX 637 (ALT 250 FOR IP): Performed by: PHYSICIAN ASSISTANT

## 2021-09-20 PROCEDURE — 97161 PT EVAL LOW COMPLEX 20 MIN: CPT

## 2021-09-20 PROCEDURE — 97530 THERAPEUTIC ACTIVITIES: CPT

## 2021-09-20 PROCEDURE — 99232 SBSQ HOSP IP/OBS MODERATE 35: CPT | Performed by: PHYSICIAN ASSISTANT

## 2021-09-20 PROCEDURE — 99223 1ST HOSP IP/OBS HIGH 75: CPT | Performed by: SURGERY

## 2021-09-20 PROCEDURE — 80048 BASIC METABOLIC PNL TOTAL CA: CPT

## 2021-09-20 PROCEDURE — 85025 COMPLETE CBC W/AUTO DIFF WBC: CPT

## 2021-09-20 PROCEDURE — 6360000002 HC RX W HCPCS: Performed by: PHYSICIAN ASSISTANT

## 2021-09-20 PROCEDURE — 97535 SELF CARE MNGMENT TRAINING: CPT

## 2021-09-20 PROCEDURE — 36415 COLL VENOUS BLD VENIPUNCTURE: CPT

## 2021-09-20 RX ORDER — ROSUVASTATIN CALCIUM 5 MG/1
5 TABLET, COATED ORAL NIGHTLY
Status: DISCONTINUED | OUTPATIENT
Start: 2021-09-20 | End: 2021-09-23 | Stop reason: HOSPADM

## 2021-09-20 RX ADMIN — ASPIRIN 81 MG CHEWABLE TABLET 81 MG: 81 TABLET CHEWABLE at 09:06

## 2021-09-20 RX ADMIN — ROSUVASTATIN CALCIUM 5 MG: 5 TABLET, FILM COATED ORAL at 20:41

## 2021-09-20 RX ADMIN — Medication 10 ML: at 20:58

## 2021-09-20 RX ADMIN — AMLODIPINE BESYLATE 2.5 MG: 2.5 TABLET ORAL at 09:06

## 2021-09-20 RX ADMIN — CLOPIDOGREL 75 MG: 75 TABLET, FILM COATED ORAL at 09:05

## 2021-09-20 RX ADMIN — LEVOTHYROXINE SODIUM 50 MCG: 0.05 TABLET ORAL at 06:25

## 2021-09-20 RX ADMIN — Medication 10 ML: at 09:06

## 2021-09-20 ASSESSMENT — PAIN SCALES - GENERAL
PAINLEVEL_OUTOF10: 0
PAINLEVEL_OUTOF10: 0

## 2021-09-20 NOTE — PROGRESS NOTES
Occupational Therapy  OT BEDSIDE TREATMENT NOTE   [facility] [place] [address]  [address] [place] [address]       Date:2021  Patient Name: Sari Juarez  MRN: [id number]  : 1934  Room: [address] [place], [place] [id number]     Per OT Eval:    Evaluating [facility], OTR/L #[id number]     Referring Provider: Shelah Barthel, PA  Specific Provider Orders/Date: OT eval and treat 21     Diagnosis: Acute cerebrovascular accident (CVA) ([facility] [I63.9]   Pt admitted to hospital on 21 for intermittent confusion, headache and weakness     Pertinent Medical History:  has a past medical history of Dementia ([facility] Diverticulosis, GIST (gastrointestinal stroma tumor), malignant, colon ([facility] Hyperlipidemia, Hypertension, MI (myocardial infarction) ([facility] Mitral regurgitation, and PUD (peptic ulcer disease).       Precautions:  Fall Risk, bed alarm     Assessment of current deficits    [x]? Functional mobility         [x]? ADLs           [x]? Strength                  []?Cognition    [x]? Functional transfers       [x]? IADLs         [x]? Safety Awareness   [x]? Endurance    []? Fine Coordination                      [x]? Balance      []? Vision/perception   []? Sensation      []? Gross Motor Coordination          []? ROM           []?  Delirium                   []? Motor Control      OT PLAN OF CARE   OT POC based on physician orders, patient diagnosis and results of clinical assessment     Frequency/Duration 2-3 days/wk for 2 weeks PRN   Specific OT Treatment Interventions to include:   * Instruction/training on adapted ADL techniques and AE recommendations to increase functional independence within precautions       * Training on energy conservation strategies, correct breathing pattern and techniques to improve independence/tolerance for self-care routine  * Functional transfer/mobility training/DME recommendations for increased independence, safety, and fall prevention  * Patient/Family education to increase follow through with safety techniques and functional independence  * Recommendation of environmental modifications for increased safety with functional transfers/mobility and ADLs  * Therapeutic exercise to improve motor endurance, ROM, and functional strength for ADLs/functional transfers  * Therapeutic activities to facilitate/challenge dynamic balance, stand tolerance for increased safety and independence with ADLs  * Therapeutic activities to facilitate gross/fine motor skills for increased independence with ADLs        OTMRS  Modified Lares Scale (MRS)  Score     Description  0             No symptoms  1             No significant disability despite symptoms  2             Slight disability; able to look after own affairs  3             Moderate disability; able to ambulate without assist/ requires assist with ADLs  4             Moderate/Severe disability;requires assist to ambulate/assist with ADLs  5             Severe disability;bedridden/incontinent   6               Score:  4     Recommended Adaptive Equipment: TBD      Home Living: Pt lives with spouse in 1 floor home.  4 [address], 2 handrails  Laundry on 1st floor    Bathroom setup: walk in shower with grab bars    Equipment owned: n/a     Prior Level of Function: independent with ADLs , shares with IADLs; ambulated independently w/o AD   Driving: yes   Teaches bridge     Pain Level: Pt c/o no pain this session     Cognition: A&O: 4/4; Follows 1-2 step directions, pleasant & cooperative  Speech clear, [place]           Memory:  good           Sequencing:  fair            Problem solving:  fair            Judgement/safety:  fair              Functional Assessment:  AM-PAC Daily Activity Raw Score:     Initial Eval Status  Date: 21 Treatment Status  Date:  21 STGs = LTGs  Time frame: 10-14 days   Feeding Modified [place]        Grooming Minimal Assist   Standing at sink CGA  Standing at sink to brush teeth Modified [place]    UB Dressing Stand by Assist   Supervision  Seated, instructed on modified tech due to L UE weakness Modified [place]    LB Dressing Moderate Assist   Simulated pants  Improved to min A to don/doff socks  Min A  Doff/radha socks using cross over technique, Min A radha pants, seated then standing over hips, assist for steadiness Supervision    Bathing Moderate Assist  Mod A  simulated Supervision    Toileting Minimal Assist   Including hygiene  Min A  simulated Supervision    Bed Mobility  Supine to sit: Minimal Assist   Sit to supine: Minimal Assist  SBA  Using bed rail & HOB slightly elevated  Supine to sit: Modified [place]   Sit to supine: Modified [place]    Functional Transfers Minimal Assist  Min A  Cues for hand placement & safety Supervision    Functional Mobility Minimal><Moderate Assist w/o AD (LOB x2 when turning, increased assist required)     Min A w/ w/w  In room and bathroom  Min A  With walker, no LOB this date, cues needed for walker management Supervision   Balance Sitting:     Static:  Supervision    Dynamic:SBA  Standing: Min><Mod A w/o AD  Min A w/ w/w Sitting: SBA  Standing: Min A  With walker       Activity Tolerance Fair Fair  Good   Visual/  Perceptual Glasses: yes  Visual scanning: WFL  Perception: WFL                       Education:  Pt was educated through out treatment regarding proper technique & safety with bed mobility, functional transfers & mobility, walker management & ADL compensatory strategies due to L sided weakness to ease tasks, improve safety & prevent falls to return home safely. Instructed & completed B UE therapeutic exercises, focusing on L UE due to weakness. Fair+ understanding    Comments: Upon arrival pt was in bed & agreeable for therapy, daughter present. At end of session pt was seated in chair, sister now present, all lines and tubes intact, call light within reach. · Pt has made fair+ progress towards set goals.    · Continue with current plan of care      Treatment Time In: 11:40          Treatment Time Out: 12:10           Treatment Charges: Mins Units   Ther Ex  [id number]     Manual Therapy [id number]     Thera Activities [id number] 10 1   ADL/Home Mgt [id number] 20 1   Neuro Re-ed [id number]     Group Therapy      Orthotic manage/training  [id number]     Non-Billable Time     Total Timed Treatment 30 2       Missy PRIDE  [address] [place] [address], [address] [place] [address]

## 2021-09-20 NOTE — PROGRESS NOTES
Chief Complaint:  Acute cerebrovascular accident (CVA) ([address]     Subjective:    No new neurologic complaints. No weakness/numbness but severe ataxia / imbalance    Objective:    BP (!) 143/75   Pulse 74   Temp 97.9 °F (36.6 °C) (Temporal)   Resp 18   Ht 5' 1\" (1.549 m)   Wt 145 lb 0.3 oz (65.8 kg)   SpO2 98%   BMI 27.40 kg/m²     Current medications that patient is taking have been reviewed. General appearance: NAD, conversant  HEENT: AT/NC, MMM  Neck: FROM, supple  Lungs: Clear to auscultation, WOB normal  CV: RRR, no MRGs  Abdomen: Soft, non-tender; no masses or HSM, +BS  Extremities: No peripheral edema or digital cyanosis  Skin: no rash, lesions or ulcers  Psych: Calm and cooperative  Neuro: Alert and interactive, CN 2-12 intact, handgrips, hip flexors, and foot plantar/dorsiflexors 5/5 b/l. Gait is very unsteady and wide based. Speech fluent. No dysmetria on FTN.     Labs:  CBC with Differential:    Lab Results   Component Value Date    WBC 4.9 09/20/2021    RBC 4.70 09/20/2021    HGB 14.0 09/20/2021    HCT 43.0 09/20/2021     09/20/2021    MCV 91.5 09/20/2021    MCH 29.8 09/20/2021    MCHC 32.6 09/20/2021    RDW 13.1 09/20/2021    LYMPHOPCT 34.5 09/20/2021    MONOPCT 10.3 09/20/2021    BASOPCT 1.4 09/20/2021    MONOSABS 0.50 09/20/2021    LYMPHSABS 1.68 09/20/2021    EOSABS 0.12 09/20/2021    BASOSABS 0.07 09/20/2021     CMP:    Lab Results   Component Value Date     09/20/2021    K 4.3 09/20/2021     09/20/2021    CO2 27 09/20/2021    BUN 15 09/20/2021    CREATININE 1.0 09/20/2021    GFRAA >60 09/20/2021    LABGLOM 52 09/20/2021    GLUCOSE 123 09/20/2021    GLUCOSE 97 12/27/2011    PROT 6.6 09/19/2021    LABALBU 3.9 09/19/2021    LABALBU 4.3 12/27/2011    CALCIUM 9.8 09/20/2021    BILITOT 0.4 09/19/2021    ALKPHOS 115 09/19/2021    AST 20 09/19/2021    ALT 14 09/19/2021        Imaging:  I've personally reviewed the patient's CT chest     Focal ground-glass opacity in the left upper lobe and right lung base   probably inflammatory.  Developing malignancy is less likely and clinical   assessment and surveillance according to Fleischner society guidelines are   recommended. 2-4 mm nonspecific pulmonary nodules.  Consider surveillance according to   [employer] guidelines. Ectasia and focal dissection of abdominal aorta.  Vascular surgical   assessment is recommended. I've personally reviewed the patient's MRI brain  1. Acute or early subacute foci of infarction in the right paracentral sarai. 2. No mass effect, hemorrhage or midline shift. Telemetry:  I've personally reviewed the patient's telemetry:  NSR no arrhythmias     Assessment/Plan:  Principal Problem:    Acute cerebrovascular accident (CVA) ([facility] [address]  Active Problems:    Hyperlipidemia    Aortic dissection (HCC)    Pulmonary nodules    Statin intolerance  Resolved Problems:    * No resolved hospital problems. *       DAPT    She is quite anxious about the possibility of muscle weakness with lipitor. She's willing to try Crestor which might be better in terms of muscle aches. Will switch.     May need to add Zetia on top, we'll see    PT, OT    Vascular consult for totally incidental aortic dissection finding    Outpatient follow up for pulmonary nodules    Requires continued inpatient level of care     Akira Mendez MD    9:44 AM  9/20/2021

## 2021-09-20 NOTE — PROGRESS NOTES
Pradip Luong is a 80 y.o. right handed female     Neurology is following for stroke. Past medical history significant for hypertension, hyperlipidemia, MI, PUD, dementia, mitral regurgitation    She presented with complaints of dizziness which started on 9/15. She also had noted an episode of slurred speech and diplopia that lasted for several minutes. She was admitted to [facility] and was in the ED for the past 2 days prior to being transferred here. Prior to admission she was taking aspirin 81 mg daily. She has a history of intolerance to statins in the past.  BP on admission to [facility] was 195/80. CTA of the neck revealed mild stenosis at the origin of the bilateral cervical ICAs. 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 A hypoplastic right vertebral artery. CTP showed no perfusion mismatch. MRI of the brain did confirm acute to early subacute infarct in the right paracentral sarai. HA1C 6.2, . She reports feeling tired but otherwise denies any weakness, numbness, double vision, trouble chewing or swallowing or speech difficulties. Family member present at bedside. Patient wants to go home. PT evaluated patient and felt that she would benefit from acute rehab and PMR consult has been placed. Review of systems negative other than stated above    Allergies   Allergen Reactions    Carafate [Sucralfate] Other (See Comments)     Unable to explain    Cefdinir Other (See Comments)     Unable to explain    Cetirizine & Related Other (See Comments)     Makes her forgetful and sleepy    Lisinopril Other (See Comments)     Difficulty swallowing, reflux, headache, dizziness, leg cramps    Pravachol [Pravastatin Sodium] Other (See Comments)     Patient states \"it almost killed me. \" Spoke with Patient's Daughter and she stated \"body cramps and extreme weakness. \"    Protonix [Pantoprazole Sodium] Other (See Comments) Unable to explain    Statins Other (See Comments)     Intolerable. Patient states \"it almost killed me. \" Spoke with Patient's Daughter and she stated \"body cramps and extreme weakness. \"     Objective:       BP (!) 143/75   Pulse 74   Temp 97.9 °F (36.6 °C) (Temporal)   Resp 18   Ht 5' 1\" (1.549 m)   Wt 145 lb 0.3 oz (65.8 kg)   SpO2 98%   BMI 27.40 kg/m²        General appearance: alert, appears stated age and cooperative  Head: Normocephalic, without obvious abnormality, atraumatic  Eyes: conjunctivae/corneas clear.    Neck: no adenopathy, no carotid bruit, no JVD, supple  Lungs: clear to auscultation bilaterally  Heart: regular rate and rhythm, S1, S2 normal, no murmur, click, rub or gallop  Extremities: extremities normal, atraumatic, no cyanosis or edema  Skin: Skin color, texture, turgor normal. No rashes or lesions     Mental Status: Alert, oriented, thought content appropriate     Appropriate attention/concentration  Intact fundus of knowledge  Repetition intact  Intact memories    Speech: No dysarthria  Language: No aphasia    Cranial Nerves:  I: smell    II: visual acuity     II: visual fields Full to confrontation   II: pupils CARI   III,VII: ptosis None   III,IV,VI: extraocular muscles  Full ROM   V: mastication Normal   V: facial light touch sensation  Normal   V,VII: corneal reflex     VII: facial muscle function - upper  Normal   VII: facial muscle function - lower Normal   VIII: hearing Normal   IX: soft palate elevation  Normal   IX,X: gag reflex    XI: trapezius strength  5/5   XI: sternocleidomastoid strength 5/5   XI: neck extension strength  5/5   XII: tongue strength  Normal     Motor:  5/5 throughout  Normal tone and bulk  No abnormal movements    Sensory:  Light touch intact distally in all limbs    Coordination:   FFM slower on L  FNF slightly ataxic on L  HTS symmetrical     Gait:  Deferred for patient safety     DTR:   Right Brachioradialis reflex 2+  Left Brachioradialis reflex 2+  Right Biceps reflex 2+  Left Biceps reflex 2+  Right Quadriceps reflex 2+  Left Quadriceps reflex 2+  Right Achilles reflex 1+  Left Achilles reflex 1+    No Babinskis    No pathological reflexes    Laboratory/Radiology:     CBC with Differential:    Lab Results   Component Value Date    WBC 4.9 09/20/2021    RBC 4.70 09/20/2021    HGB 14.0 09/20/2021    HCT 43.0 09/20/2021     09/20/2021    MCV 91.5 09/20/2021    MCH 29.8 09/20/2021    MCHC 32.6 09/20/2021    RDW 13.1 09/20/2021    LYMPHOPCT 34.5 09/20/2021    MONOPCT 10.3 09/20/2021    BASOPCT 1.4 09/20/2021    MONOSABS 0.50 09/20/2021    LYMPHSABS 1.68 09/20/2021    EOSABS 0.12 09/20/2021    BASOSABS 0.07 09/20/2021     CMP:    Lab Results   Component Value Date     09/20/2021    K 4.3 09/20/2021     09/20/2021    CO2 27 09/20/2021    BUN 15 09/20/2021    CREATININE 1.0 09/20/2021    GFRAA >60 09/20/2021    LABGLOM 52 09/20/2021    GLUCOSE 123 09/20/2021    GLUCOSE 97 12/27/2011    PROT 6.6 09/19/2021    LABALBU 3.9 09/19/2021    LABALBU 4.3 12/27/2011    CALCIUM 9.8 09/20/2021    BILITOT 0.4 09/19/2021    ALKPHOS 115 09/19/2021    AST 20 09/19/2021    ALT 14 09/19/2021     HgBA1c:    Lab Results   Component Value Date    LABA1C 6.2 09/19/2021     FLP:    Lab Results   Component Value Date    TRIG 217 09/19/2021    HDL 63 09/19/2021    LDLCALC 229 09/19/2021    LABVLDL 43 09/19/2021     CTA  Head/neck, CTP  CTA neck:     Mild stenosis at the origin of right common carotid artery.     Moderate stenosis at the origin right subclavian artery.     Mild stenosis at the origin of bilateral cervical ICA.      18 mm ground-glass area within the right upper lobe may be of  infectious/inflammatory or neoplastic in etiology.  For further evaluation  dedicated chest CT examination can be obtained.     CTA HEAD:     2 mm inferiorly projecting outpouching arising from the left supraclinoid  carotid artery mostly consistent with an aneurysm.     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     CT PERFUSION:     No infarct core. No perfusion mismatch. MRI brain   1. Acute or early subacute foci of infarction in the right paracentral sarai. 2. 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 Also with uncontrolled vascular risk factors of hyperlipidemia, hypertension. Was previously on aspirin daily prior to admission. She is now on DAPT and statin therapy for secondary prevention.     Plan:     Continue DAPT and statin therapy    Risk factor control   , goal less than 70   BP goal 130/80   HA1C 6.2    Stroke education    PT OT    SCDs    Stroke clinic follow-up    Okay discharge from neurology point of view, please call with any questions or new issues    Anju Cabral PA-C  11:24 AM  9/20/2021

## 2021-09-20 NOTE — PROGRESS NOTES
Physical Therapy  Physical Therapy Initial Assessment     Name: Shelley Pierce  : 1934  MRN: [id number]      Date of Service: 2021    Evaluating PT:  Lauro Garcia PT, DPT [id number]    Room #:  7811/2326-K  Diagnosis:  Acute cerebrovascular accident (CVA) ([facility] [I63.9]  PMHx/PSHx:  Dementia, Diverticulosis, HLD, HTN, MI  Procedure/Surgery:  None  Precautions:  Falls, L hemiparesis, Crow, bed alarm  Equipment Needs:  TBD    SUBJECTIVE:    Pt lives with  in a 1 story home with 4 stairs to enter and 2 rail. Pt ambulated without device and was independent PTA. OBJECTIVE:   Initial Evaluation  Date: 21 Treatment Short Term/ Long Term   Goals   AM-PAC 6 Clicks 95/41     Was pt agreeable to Eval/treatment? Yes     Does pt have pain? No c/o pain     Bed Mobility  Rolling: NT  Supine to sit: SBA  Sit to supine: NT  Scooting: SBA  Mod Independent   Transfers Sit to stand: Julee  Stand to sit: Julee  Stand pivot: ModA no device; Julee with [employer]  Mod Independent with AAD   Ambulation   60 feet with ModA no device  100 feet with Julee with [employer]  >400 feet with Mod Independent with AAD   Stair negotiation: ascended and descended NT  >4 steps with 1 rail Mod Independent   ROM BUE:  WFL  BLE:  WFL     Strength BUE:  WFL  RLE:  4/5  LLE:  3+ to 4-/5 grossly  Increase by 1/3 MMT grade   Balance Sitting EOB:  Julee  Dynamic Standing:  ModA no device; Julee with [employer]  Sitting EOB:  Independent  Dynamic Standing:   Mod Independent with AAD     Pt is A & O x 4  Sensation:  No reported paresthesias  Edema:  None    Therapeutic Exercises:  NA    Patient education  Pt educated on safety    Patient response to education:   Pt verbalized understanding Pt demonstrated skill Pt requires further education in this area   x x x     ASSESSMENT:    Conditions Requiring Skilled Therapeutic Intervention:    [x]Decreased strength     []Decreased ROM  [x]Decreased functional mobility  [x]Decreased balance   [x]Decreased endurance [x]Decreased posture  []Decreased sensation  []Decreased coordination   []Decreased vision  []Decreased safety awareness   []Increased pain       Comments:  Pt was in bed upon arrival, agreeable to initial evaluation. Pt reported visual deficits earlier this morning but improved at time of evaluation. LLE weakness noted with MMT. Pt ambulated with [employer] initially and exhibited decreased speed and slight unsteadiness. Attempted ambulation without device and pt was mildly unsteady with a few LOBs. Pt was left in chair with all needs met and call light in reach. Pt would benefit from an intensive rehabilitation program at discharge. Treatment:  Patient practiced and was instructed in the following treatment:     Bed mobility training - pt given verbal cues to facilitate proper sequencing and safety during supine>sit.  Sitting EOB for >5 minutes for upright tolerance, postural awareness and BLE ROM   Transfer training - pt was given verbal and tactile cues to facilitate proper hand placement, technique and safety during sit to stand, stand to sit and stand pivot transfers as well as provided with physical assistance.  Gait training- pt was given verbal and tactile cues to facilitate safety, balance and use of AD during ambulation as well as provided with physical assistance. Pt's/ family goals   1. Return home    Prognosis is good for reaching above PT goals. Patient and or family understand(s) diagnosis, prognosis, and plan of care.   Yes    PHYSICAL THERAPY PLAN OF CARE:    PT POC is established based on physician order and patient diagnosis     Referring provider/PT Order:  XOCHITL Gonzalez /09/19/21 0745 PT eval and treat  Diagnosis:  Acute cerebrovascular accident (CVA) ([facility] [I63.9]  Specific instructions for next treatment:  Progress activity    Current Treatment Recommendations:     [x] Strengthening to improve independence with functional mobility   [] ROM to improve independence with functional mobility   [x] Balance Training to improve static/dynamic balance and to reduce fall risk  [x] Endurance Training to improve activity tolerance during functional mobility   [x] Transfer Training to improve safety and independence with all functional transfers   [x] Gait Training to improve gait mechanics, endurance and asses need for appropriate assistive device  [x] Stair Training in preparation for safe discharge home and/or into the community   [] Positioning to prevent skin breakdown and contractures  [x] Safety and Education Training   [x] Patient/Caregiver Education   [] HEP  [] Other     PT long term treatment goals are located in above grid    Frequency of treatments: 2-5x/week x 1-2 weeks. Time in  0734  Time out  0800    Total Treatment Time  11 minutes     Evaluation Time includes thorough review of current medical information, gathering information on past medical history/social history and prior level of function, completion of standardized testing/informal observation of tasks, assessment of data and education on plan of care and goals.     CPT codes:  [x] Low Complexity PT evaluation 32445  [] Moderate Complexity PT evaluation 43386  [] High Complexity PT evaluation 88676  [] PT Re-evaluation 21681  [] Gait training 24095 - minutes  [] Manual therapy 36266 - minutes  [x] Therapeutic activities 10368 11 minutes  [] Therapeutic exercises 41627 - minutes  [] Neuromuscular reeducation 69512 - minutes     Burnis Lois, PT, DPT  [id number]

## 2021-09-20 NOTE — CARE COORDINATION
Met with the patient at the bedside to discuss transition of care plan. Patient lives with her  Kenneth Multani in a one story home with four steps to enter. Patient's PCP is Dr Grupo Ovalle and she uses [facility] in [facility] for her medications. Discussed the therapy evaluations and recommendation for ARU prior to returning home. Patient is agreeable. Discussed options for ARU and patient stated she would have to discuss options with her . Await final choice. Will continue to follow.      Masha Sanchez RN.  [facility]  [phone]

## 2021-09-20 NOTE — CONSULTS
[address]                  [address] [place],  [facility]                                  CONSULTATION    PATIENT NAME: Serena Alaniz                     :        1934  MED REC NO:   [id number]                            ROOM:       7483  ACCOUNT NO:   [de-identified]                           ADMIT DATE: 2021  PROVIDER:     Jyotsna Muñoz MD    CONSULT DATE:  2021    CHIEF COMPLAINT:  CVA. HISTORY OF PRESENT ILLNESS:  The patient seems to be a little bit  confused. She reports that she was up on a step ladder when she had  sudden onset of weakness and dizziness. She did report a fall. She was  brought to [facility], initially to [facility] on 2021. Blood  pressure was 195/80. She was found to have severe stenosis of the  basilar artery, hypoplastic right vertebral artery, and MRI confirmed a  right paracentral stroke. [facility] She has been stabilized and has been evaluated by Therapy. She is at a  minimal-to-moderate assist level with transfers, minimal-to-moderate  with ambulation, also minimal-to-moderate with all of her ADLs. I do  not see a Speech eval yet, but there does appear to be some cognitive  deficits, so I am going to order a speech eval as well. PAST MEDICAL HISTORY:  The patient could not give me any sort of medical  history. She does have a history of hyperlipidemia, had a previous  aortic dissection. She apparently had a prior gastric tumor,  hypertension, a prior MI, and type 2 diabetes mellitus. There is also  some history of dementia noted in the chart. ALLERGIES:  PRAVACHOL, STATINS, CARAFATE, CEFDINIR, CETIRIZINE,  LISINOPRIL, and PROTONIX. CURRENT MEDICATIONS:  Norvasc, aspirin, Lipitor, Plavix, Lovenox, and  Synthroid. SOCIAL HISTORY:  She lives with her .     REVIEW OF SYSTEMS:  The patient cannot give me any other information and  could not give me any information about the history that I did get from  the electronic medical records. PHYSICAL EXAMINATION:  GENERAL:  Well-nourished, well-developed white female in no acute  distress at rest.  HEAD:  Normocephalic and atraumatic. EYES:  Pupils equal, round, and reactive to light. PHARYNX:  Normal.  LUNGS:  Clear to P and A. HEART:  Regular rate and rhythm. S1 and S2 normal.  No murmurs or  gallops. ABDOMEN:  Bowel sounds normal.  Soft and nontender. No masses or  organomegaly. EXTREMITIES:  Without clubbing, cyanosis, or edema. MENTAL STATUS:  The patient is alert. She is oriented to person and  place, but not to time. As mentioned, I think there is some higher  level cognitive deficits. SENSATION:  Grossly intact. NEUROMUSCULAR:  4/5 strength. She has impaired coordination and roughly  4/5 strength on the left side. PROBLEM LIST:  1. 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 2.  Gastric cancer. 3.  Hypertension. 4.  Dementia. RECOMMENDATIONS:  I am going to have Speech see her to evaluate  cognition. At this point, I am seeing enough deficits that she would  probably benefit from a short rehab stay. There was some question from  the notes as to whether or not she is at her baseline. We can get  further information from the family about that, but at this point, I  think there are deficits in the strength, balance, coordination, and  cognition.         Moo Phoenix MD    D: 09/20/2021 14:24:47       T: 09/20/2021 14:29:44     [email or web address]  Job#: [id number]     Doc#: [id number]    CC:

## 2021-09-20 NOTE — CONSULTS
Vascular Surgery Inpatient Consultation Note      Reason for Consultation: Aortic dissection    HISTORY OF PRESENT ILLNESS:                The patient is a 80 y.o. female who is admitted to the hospital for treatment of unsteadiness and slurred speech. The patient has history of fall approximately 2 weeks ago and was evaluated in the emergency department. She read presented following slurred speech and difficulty ambulating. She was admitted for stroke work-up. A CTA neck showed evidence of mild carotid stenosis. A CAT scan of the abdomen and pelvis reported dissection of the abdominal aorta. Vascular surgery is consulted for evaluation and treatment. 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 I reviewed the images and the patient has a focal dissection of the aorta more consistent with atherosclerotic disease. This is not traumatic in origin. I do not feel that she requires any additional vascular testing or intervention. Okay for discharge from vascular surgery standpoint. Given her age and overall condition, I doubt that this will ever extend or dilate to become aneurysmal requiring intervention. Patient Active Problem List   Diagnosis Code    Hypertension I10    PUD (peptic ulcer disease) K27.9    GIST (gastrointestinal stromal tumor), malignant ([address] C49. 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    Malignant neoplasm of abdomen (HCC) C76.2    Disorder of thyroid gland E07.9    Carotid stenosis, right I65.21    Acquired hypothyroidism E03.9    Dementia (formerly [facility]) F03.90    Type 2 diabetes mellitus without complication, without long-term current use of insulin (HCC) E11.9    Acute cerebrovascular accident (CVA) ([address] I63.9    Aortic dissection (formerly [facility]) I71.00    Pulmonary nodules R91.8 retired    Tobacco Use    Smoking status: Former Smoker     Packs/day: 1.00     Years: 18.00     Pack years: 18.00     Quit date:      Years since quittin.7    Smokeless tobacco: Never Used   Vaping Use    Vaping Use: Never used   Substance and Sexual Activity    Alcohol use: Yes     Comment: glass of wine with dinner    Drug use: No    Sexual activity: Not Currently     Partners: Male     Comment:    Other Topics Concern    Not on file   Social History Narrative    Not on file     Social Determinants of Health     Financial Resource Strain:     Difficulty of Paying Living Expenses:    Food Insecurity:     Worried About Running Out of Food in the Last Year:     [address] in the Last Year:    Transportation Needs:     Lack of Transportation (Medical):      Lack of Transportation (Non-Medical):    Physical Activity:     Days of Exercise per Week:     Minutes of Exercise per Session:    Stress:     Feeling of Stress :    Social Connections:     Frequency of Communication with Friends and Family:     Frequency of Social Gatherings with Friends and Family:     Attends Alev[employer] Services:     Active Member of Clubs or Organizations:     Attends Club or Organization Meetings:     Marital Status:    Intimate Partner Violence:     Fear of Current or Ex-Partner:     Emotionally Abused:     Physically Abused:     Sexually Abused:         Family History   Problem Relation Age of Onset    Colon Cancer Father     COPD Mother         tobacco    Cancer Sister         breast       REVIEW OF SYSTEMS:      Eyes:      Blurred vision:  No [x]/Yes []               Diplopia:   No [x]/Yes []               Vision loss:       No [x]/Yes []   Ears, nose, throat:             Hearing loss:    No [x]/Yes []      Vertigo:   No []/Yes [x]                       Swallowing problem:  No [x]/Yes []               Nose bleeds:   No [x]/Yes []      Voice hoarseness:  No [x]/Yes []  Respiratory: Cough:    No [x]/Yes []      Pleuritic chest pain:  No [x]/Yes []                        Dyspnea:   No [x]/Yes []      Wheezing:   No [x]/Yes []  Cardiovascular:             Angina:   No [x]/Yes []      Palpitations:   No [x]/Yes []          Claudication:    No [x]/Yes []      Leg swelling:   No [x]/Yes []  Gastrointestinal:             Nausea or vomiting:  No [x]/Yes []               Abdominal pain:  No [x]/Yes []                     Intestinal bleeding: No [x]/Yes []  Musculoskeletal:             Leg pain:   No [x]/Yes []      Back pain:   No [x]/Yes []                    Weakness:   No [x]/Yes []  Neurologic:             Numbness:   No [x]/Yes []      Paralysis:   No [x]/Yes []                       Headaches:   No [x]/Yes []  Hematologic, lymphatic:   Anemia:   No [x]/Yes []              Bleeding or bruising:  No [x]/Yes []              Fevers or chills: No [x]/Yes []  Endocrine:             Temp intolerance:   No [x]/Yes []                       Polydipsia, polyuria:  No [x]/Yes []  Skin:              Rash:    No [x]/Yes []      Ulcers:   No [x]/Yes []              Abnorm pigment: No [x]/Yes []  :              Frequency/urgency:  No [x]/Yes []      Hematuria:    No [x]/Yes []                      Incontinence:    No [x]/Yes []    PHYSICAL EXAM:  Vitals:    09/20/21 0743   BP: (!) 143/75   Pulse: 74   Resp: 18   Temp: 97.9 °F (36.6 °C)   SpO2: 98%     General Appearance: alert and oriented to person, place and time, in no acute distress, well developed and well- nourished  Neurologic: no cranial nerve deficit, speech normal  Head: normocephalic and atraumatic  Eyes: extraocular eye movements intact, conjunctivae normal  ENT: external ear and ear canal normal bilaterally, nose without deformity  Pulmonary/Chest: normal air movement, no respiratory distress  Cardiovascular: normal rate, regular rhythm  Abdomen: non-distended, no masses  Musculoskeletal: no joint deformity or tenderness  Extremities: no leg edema bilaterally  Skin: warm and dry, no rash or erythema    PULSE EXAM      Right      Left   Brachial     Radial 2 2   Femoral     Popliteal     Dorsalis Pedis 0 0   Posterior Tibial 0 0   (3=normal, 2=diminished, 1=barely palpable, 4=widened)      LABS:    Lab Results   Component Value Date    WBC 4.9 09/20/2021    HGB 14.0 09/20/2021    HCT 43.0 09/20/2021     09/20/2021    PROTIME 11.1 09/16/2021    INR 1.0 09/16/2021    K 4.3 09/20/2021    BUN 15 09/20/2021    CREATININE 1.0 09/20/2021       RADIOLOGY:    CTA abdomen pelvis reviewed.

## 2021-09-21 PROCEDURE — 2060000000 HC ICU INTERMEDIATE R&B

## 2021-09-21 PROCEDURE — 2580000003 HC RX 258: Performed by: PHYSICIAN ASSISTANT

## 2021-09-21 PROCEDURE — 92523 SPEECH SOUND LANG COMPREHEN: CPT

## 2021-09-21 PROCEDURE — 6370000000 HC RX 637 (ALT 250 FOR IP): Performed by: INTERNAL MEDICINE

## 2021-09-21 PROCEDURE — 6370000000 HC RX 637 (ALT 250 FOR IP): Performed by: PHYSICIAN ASSISTANT

## 2021-09-21 RX ADMIN — Medication 10 ML: at 22:00

## 2021-09-21 RX ADMIN — ASPIRIN 81 MG CHEWABLE TABLET 81 MG: 81 TABLET CHEWABLE at 09:45

## 2021-09-21 RX ADMIN — AMLODIPINE BESYLATE 2.5 MG: 2.5 TABLET ORAL at 09:45

## 2021-09-21 RX ADMIN — LEVOTHYROXINE SODIUM 50 MCG: 0.05 TABLET ORAL at 05:38

## 2021-09-21 RX ADMIN — ROSUVASTATIN CALCIUM 5 MG: 5 TABLET, FILM COATED ORAL at 21:34

## 2021-09-21 RX ADMIN — Medication 10 ML: at 09:45

## 2021-09-21 RX ADMIN — CLOPIDOGREL 75 MG: 75 TABLET, FILM COATED ORAL at 09:45

## 2021-09-21 ASSESSMENT — PAIN SCALES - GENERAL
PAINLEVEL_OUTOF10: 0

## 2021-09-21 NOTE — PLAN OF CARE
Problem: Falls - Risk of:  Goal: Will remain free from falls  Outcome: Met This Shift  Goal: Absence of physical injury  Outcome: Met This Shift     Problem: Neurological  Goal: Maximum potential motor/sensory/cognitive function  Outcome: Met This Shift

## 2021-09-21 NOTE — PROGRESS NOTES
SPEECH/LANGUAGE PATHOLOGY  SPEECH/LANGUAGE/COGNITIVE EVALUATION   and PLAN OF CARE      PATIENT NAME:  Javi Meza  (female)     MRN:  [id number]    :  1934  (80 y.o.)  STATUS:  Inpatient: Room 8514/8514-B    TODAY'S DATE:  2021  REFERRING PROVIDER:    Dr. Brigitte Clinton: SLP eval and treat  Date of order:  21  REASON FOR REFERRAL:  cognition  EVALUATING THERAPIST: ADAM Epstein    ADMITTING DIAGNOSIS: Acute cerebrovascular accident (CVA) [facility]) [I63.9]    VISIT DIAGNOSIS:      SPEECH THERAPY  PLAN OF CARE   The speech therapy  POC is established based on physician order, speech pathology diagnosis and results of clinical assessment     SPEECH PATHOLOGY DIAGNOSIS:    Minimal dysarthria, mild-moderate cognitive deficit    Speech Pathology intervention is recommended 3-6 times per week for LOS or when goals are met with emphasis on the following:      Conditions Requiring Skilled Therapeutic Intervention for speech, language and/or cognition    Dysarthria   Decreased short term memory    Specific Speech Therapy Interventions to Include: Therapeutic exercises  Therapeutic tasks for Cognition    Specific instructions for next treatment:      To initiate therapeutic exercises  To initiate memory tasks    SHORT/LONG TERM GOALS  Pt will improve immediate, short term, recent memory during structured and unstructured tasks with 75% accuracy   Pt will improve problem solving/thought organization during structured and unstructured tasks with 75% accuracy   Pt will improve speech intelligibility and increased articulatory precision via compensatory strategies and oral motor exercises     Patient goals: Patient/family involved in developing goals and treatment plan:   Treatment goals discussed with Patient and Family    The Patient and Family understand(s) the diagnosis, prognosis and plan of care   The patient/family Agreed with above,     This plan may be re-evaluated and revised as warranted. Rehabilitation Potential/Prognosis: good                CLINICAL ASSESSMENT:  MOTOR SPEECH       Oral Peripheral Examination   Left labiobuccal weakness    Parameters of Speech Production  Respiration:  Adequate for speech production  Articulation:  Distortion, minimal, inconsistent  Resonance:  Within functional limits  Quality:   Within functional limits  Pitch: Within functional limits  Intensity: Within functional limits  Fluency:  Intact  Prosody Intact    RECEPTIVE LANGUAGE    Comprehension of Yes/No Questions: Within functional limits    Process  Simple Verbal Commands:   Within functional limits  Process Intermediate Verbal Commands:   Within functional limits  Process Complex Verbal Commands:     Incomplete    Comprehension of Conversation:      Within functional limits      EXPRESSIVE LANGUAGE     Serials: Functional    Imitation:  Words   Functional   Sentences Functional    Naming:  (Modality used:  Verbal)  Confrontation Naming  Functional  Functional Description  Functional  Response Naming: Functional    Conversation:      Conversation was within functional limits    COGNITION     Attention/Orientation  Attention: Sustained attention   Orientation:  Oriented to Person, Place, Date, Reason for hospitalization    Memory   Immediate Recall: Repeated 3/3    Delayed Recall:   Recalled 0/3    Long Term Recall:   Recalled Address, Birthdate, Age and Family    Organization/Problem Solving/Reasoning   Verbal Sequencing:   Impaired        Verbal Problem solving:   Impaired          CLINICAL OBSERVATIONS NOTED DURING THE EVALUATION  Latent responses                  EDUCATION:   The Speech Language Pathologist (SLP) completed education regarding results of evaluation and that intervention is warranted at this time.   Learner: Patient and Family  Education: Reviewed results and recommendations of this evaluation  Evaluation of Education:  Verbalizes understanding    Evaluation Time includes thorough review of current medical information, gathering information on past medical history/social history and prior level of function, completion of standardized testing/informal observation of tasks, assessment of data and education on plan of care and goals. CPT code:    99913  eval speech sound lang comprehension      The admitting diagnosis and active problem list, as listed below have been reviewed prior to initiation of this evaluation.         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

## 2021-09-21 NOTE — PROGRESS NOTES
Acute Rehab Pre-Admission Screen      Referral date: 9/20/21  Onset/Hospital Admit Date: 9/18/2021 10:41 PM    Current Location: [address] [place], [place] [id number]-    Name: Javi Meza  YOB: 1934  Age: 80 y.o. Admitting Diagnosis: CVA  Address: [address] [place] [address]. [facility]  Home Phone: [phone] (home)  Hector Hayes #:     Sex: female  Race:   Marital Status:    Ethnic/Cultural/Buddhism Considerations: None known. Advanced Directives: [x] Full Code  [] [facility] [] Medications only       [x] Living Will  [x] DPOA      []Organ donor      [] No mechanical breathing or ventilation     [] no tube feeding, nutrition or hydration      [] Patient does not have advanced directives or living will      COVERAGE INFORMATION   Primary Insurer: Lisa Sweet Medicare  Payor Contact: ***  Phone: ***  FAX:***  Authorization #: [id number]  Secondary Insurer: ***  Medicare #: ***  Medicaid #: ***  Verified coverage: [] Patient  [] Family/caregiver    [] financial department [] insurance carrier    COVID SCREEN DATE:   Results for Mo Craven (MRN [id number]) as of 9/22/2021 11:14   Ref. Range 9/19/2021 08:50   SARS-CoV-2, PCR Latest Ref Range: Not Detected  Not Detected       MEDICAL UPDATE:  History of present admission: Patient had a fall at home on 9/7/21, fell backwards and hit her head, +LOC, reporting headache. All imaging negative. Patient was discharged to home. She returned to the ER on 9/16/21 with reports of intermittent headaches and confusion and difficulty walking since her fall. MRI brain showed acute or early subacute infarction in the right paracentral sarai, negative for hemorrhage.       PHYSICIAN / REFERRAL INFORMATION  Referring Physician: Estelle Zacarias MD  Attending Physician: Estelle Zacarias MD  Primary Care Physician: Bernadette Arevalo DO  Consultants/Opinions (see full consult notes on chart): ***    SOCIAL INFORMATION  Primary  Contact: Pawel Kaur  Relationship: Spouse  Primary Phone: [phone]  Secondary Phone:  Secondary  Contact: Sanaz Campos  Relationship: Daughter  Primary Phone: [phone]  Secondary Phone:    Previous Community Services:  Caregiver available: [x] Yes [] No Hours per day available:   Patient previously employed:  [] Yes [] Part Time [] Full Time [] No [x] Retired  Occupation/Profession: ***  Prior living arrangements: [x] Home  [] Assisted living  [] SNF [] Other  Lived with:  [] Alone  [x] Spouse  [] Family  [] Other  Lived with: Gregoria Bray  Contact phone: [phone]  Home:  [address] home  4 entry steps  Rails: 2   Bedroom: [x] 1st floor  [] 2nd floor    Bathroom:  [x] 1st floor  [] 2nd floor    Prior Functional Level: Independent for: ADLS, IADLS, ambulation, driving  Assistance for:   Dependent for:   Dominant hand: [x] Right  [] Left    Previous Home Equipment:  [] Cane [] Grab bars [] Orthotic / prosthetic   [] Shower chair [] Tub bench  [] 3-in-1 Commode [] Long handle sponge   [] Oxygen [] Sock aide  [] Wheelchair  [] motorized wc/scooter  [] Wheelchair cushion   [] Crutches [] Long handle shoehorn  [] Reachers [] Toilet seat elevator [] Rollator  [] Walker(wheeled)   [] Walker(standard) [] Mechanical lift    [x] None of the above     Has patient had 2 or more falls in the past year or any fall with injury in the past year? [] yes   [] no   []unknown    Has patient had major surgery during past 100 days prior to admission?    [] yes   [x] no Type/ Date:    Surgical History:  Past Surgical History:   Procedure Laterality Date    CAROTID ENDARTERECTOMY Right 04/2019    Dr Ольга Miller Bilateral 2015    COLONOSCOPY  12/2014    DILATATION, ESOPHAGUS      ENDOSCOPY, COLON, DIAGNOSTIC  09/02/2016    marking of gastric tumor    HYSTERECTOMY      YUMIKO/BSO    OTHER SURGICAL HISTORY  09/09/2016    Laparoscopic Robotic Assisted Partial Gastrectomy    SHOULDER SURGERY Left     rotator cuff right    TONSILLECTOMY AND ADENOIDECTOMY      UPPER GASTROINTESTINAL ENDOSCOPY  12/2014    UPPER GASTROINTESTINAL ENDOSCOPY  5/25/2016       Past Medical:  Past Medical History:   Diagnosis Date    D[facility]     Diverticulosis     [facility] stroma [facility]     Hyperlipidemia     Hypertension     MI (myocardial infarction) ([facility] 2015    Mitral regurgitation     per cardiology    PUD (peptic ulcer disease)        Current Co-morbidities:  [] Alzheimer's   [] Dysphasia    [] Parkinsonism  [] Amputation   [] GERD   [] Peripheral artery disease   [] Anemia      [] Encephalopathy  [] Peripheral vascular disease  [] Anxiety   [] Gangrene   [] Pneumonia  [] Aphasia   [] Gout   [] Polyneuropathy  [] Asthma   [] Heart Failure (diastolic) [] Post-polio syndrome  [] Atrial fibrillation  [] Heart Failure (left-sided) [] Pseudomonas enteritis   [] Blind   [] Heart Failure (right-sided) [] Pulmonary embolism  [] Cellulitis     [] Heart Failure (systolic) [] Renal dialysis  [] Clostridium difficile  [x] Hemiparesis (left)  [] Renal failure  [] Congestive heart failure [x] Hypertension  [] Rheumatoid arthritis  [] COPD   [] Hypotension  [] Seizure disorder   [] Coronary Artery Disease [] Hypothyroidism  [] Septicemia   [] Deaf   [x] Hyperlipidemia  [] Sleep apnea  [] Depression   [] Morbid obesity  [] Spinal cord injury  [] Diabetes   [] MRSA   [x] Stroke  [] Diabetic nephropathy [x] Myocardial infarction [] Tracheostomy  [] Diabetic neuropathy [] Osteoarthritis  [] Traumatic brain injury   [] Diabetic retinopathy [] Osteoporosis  [] Urinary tract infection  [] DVT   [] Pancytopenia  [] Vocal cord paralysis  [] Spinal stenosis  [] kidney disease  [] VRE  [] Post op  [x] Dementia   []        Medical/Functional Conditions requiring inpatient rehabilitation: Patient has functional deficits in ADLS, mobility, and cognition, impaired balance, and needs ongoing medical management   Requires multidisciplinary treatment including PM&R physician daily care, 24 hour rehabilitation nursing, physical therapy, occupational therapy, rehabilitation psychology, recreation therapy and rehabilitation social work, nutrition services due to new deficits     Risk for Medical/Clinical Complications: Falls, injury, pain, skin breakdown, abnormal vitals, abnormal labs, DVT, PE, pneumonia, decreased mobility, neuro changes    CLINICAL DATA:     Height : 5'1\"     Weight:  142lbs   BMI: 27       Date: 9/21/21 Date: 9/22/21 Date: ***   temperature 96.8 97.3    pulse 67 71    respirations 16 16    Blood pressure 115/78 124/72    Pulse oximeter 95% on room air        ALLERGIES: Carafate [sucralfate], Cefdinir, Cetirizine & related, Lisinopril, Pravachol [pravastatin sodium], Protonix [pantoprazole sodium], and Statins    DIET : ADULT DIET;  Regular    Current Lab and Diagnostic Tests:   Recent Results (from the past 24 hour(s))   CBC Auto Differential    Collection Time: 09/22/21  5:24 AM   Result Value Ref Range    WBC 5.1 4.5 - 11.5 E9/L    RBC 4.70 3.50 - 5.50 E12/L    Hemoglobin 13.9 11.5 - 15.5 g/dL    Hematocrit 44.0 34.0 - 48.0 %    MCV 93.6 80.0 - 99.9 fL    MCH 29.6 26.0 - 35.0 pg    MCHC 31.6 (L) 32.0 - 34.5 %    RDW 13.1 11.5 - 15.0 fL    Platelets 713 448 - 419 E9/L    MPV 11.7 7.0 - 12.0 fL    Neutrophils % 55.6 43.0 - 80.0 %    Immature Granulocytes % 0.2 0.0 - 5.0 %    Lymphocytes % 31.4 20.0 - 42.0 %    Monocytes % 10.1 2.0 - 12.0 %    Eosinophils % 1.9 0.0 - 6.0 %    Basophils % 0.8 0.0 - 2.0 %    Neutrophils Absolute 2.85 1.80 - 7.30 E9/L    Immature Granulocytes # 0.01 E9/L    Lymphocytes Absolute 1.61 1.50 - 4.00 E9/L    Monocytes Absolute 0.52 0.10 - 0.95 E9/L    Eosinophils Absolute 0.10 0.05 - 0.50 E9/L    Basophils Absolute 0.04 0.00 - 0.20 D6/E   Basic Metabolic Panel    Collection Time: 09/22/21  5:24 AM   Result Value Ref Range    Sodium 140 132 - 146 mmol/L    Potassium 3.9 3.5 - 5.0 mmol/L    Chloride 105 98 - 107 mmol/L    CO2 19 (L) 22 - 29 mmol/L    Anion Gap 16 7 - 16 mmol/L    Glucose 103 (H) 74 - 99 mg/dL    BUN 19 6 - 23 mg/dL    CREATININE 0.8 0.5 - 1.0 mg/dL    GFR Non-African American >60 >=60 mL/min/1.73    GFR African American >60     Calcium 9.4 8.6 - 10.2 mg/dL     CT CHEST W CONTRAST    Result Date: 9/19/2021  Focal ground-glass opacity in the left upper lobe and right lung base probably inflammatory. Developing malignancy is less likely and clinical assessment and surveillance according to Fleischner society guidelines are recommended. 2-4 mm nonspecific pulmonary nodules. Consider surveillance according to Fleischner society guidelines. Ectasia and focal dissection of abdominal aorta. Vascular surgical assessment is recommended. RECOMMENDATIONS: Focal dissection of abdominal aorta. Dedicated CT of the abdominal aorta and vascular surgical assessment is recommended. Fleischner Society guidelines for follow-up and management of incidentally detected pulmonary nodules: Single Solid Nodule: Nodule size less than 6 mm In a low-risk patient, no routine follow-up. In a high-risk patient, optional CT at 12 months. Nodule size equals 6-8 mm In a low-risk patient, CT at 6-12 months, then consider CT at 18-24 months. In a high-risk patient, CT at 6-12 months, then CT at 18-24 months. Nodule size greater than 8 mm In a low-risk patient, consider CT at 3 months, PET/CT, or tissue sampling. In a high-risk patient, consider CT at 3 months, PET/CT, or tissue sampling. Multiple Solid Nodules: Nodule size less than 6 mm In a low-risk patient, no routine follow-up. In a high-risk patient, optional CT at 12 months. Nodule size equals 6-8 mm In a low-risk patient, CT at 3-6 months, then consider CT at 18-24 months. In a high-risk patient, CT at 3-6 months, then CT at 18-24 months. Nodule size greater than 8 mm In a low-risk patient, CT at 3-6 months, then consider CT at 18-24 months.  In a high-risk patient, CT at 3-6 months, then CT at 18-24 months. - Low risk patients include individuals with minimal or absent history of smoking and other known risk factors. - High risk patients include individuals with a history or smoking or known risk factors. Radiology 2017 http://pubs. rsna.org/doi/full/10.1148/radiol. 7638401623       Additional labs or diagnostic studies needed before admission to rehabilitation unit:    Medications:   rosuvastatin  5 mg Oral Nightly    amLODIPine  2.5 mg Oral Daily    aspirin  81 mg Oral Daily    levothyroxine  50 mcg Oral Daily    sodium chloride flush  10 mL IntraVENous 2 times per day    enoxaparin  40 mg SubCUTAneous Daily    clopidogrel  75 mg Oral Daily    sodium chloride flush  10 mL IntraVENous Once      sodium chloride       sodium chloride flush, sodium chloride, potassium chloride **OR** potassium alternative oral replacement **OR** potassium chloride, ondansetron **OR** ondansetron, magnesium hydroxide, acetaminophen **OR** acetaminophen    SPECIAL PRECAUTIONS: [x] No current precautions  [] Cardiac  [] Renal [] Sternal [] Respiratory      [] Neurological           [] Hip  [] Spinal [] Seizure  [] Aspiration  [] Isolation precautions:    [] Contact   [] Respiratory   [] Protective     [] Droplet    [] Weight Bearing precautions:         [] Non Weight Bearing :         [] Toe Touch Weight Bearing :        [] Partial Weight Bearing :         [] Weight Bearing as Tolerated :        [x] Fall Risk:   [x] Recent history of falls [] Falls risk level (Alcantar Scale):      [] Bed Alarm    [x] Do not leave alone in the bathroom    [] Chair Alarm    [x] Cognitive impairment      [] One to One supervision  [] Sitter / Tele sitter   [] Safety enclosure bed  [x] Decreased balance     SPECIAL REHABILITATION NEEDS:   [] IV Therapy: [x] PRN Adapter  [] Midline  [] PICC      [] Central Line    [] TPN       [] Oxygen: [] Trach [] Bi-PAP [] CPAP  [] Nasal cannula  [] Liters:     [] Wound Care:   [] Pressure ulcers(stage and location)   [] Wound vac   [] Wound or incision care    [] Pain Management (level of pain, meds): Pain 0/10, not taking any pain medications. [] Incontinence Bladder [] [place]  Insertion date:  []Hemodialysis and  Frequency:  [] Incontinence Bowel    [x] Last bowel movement : None charted. Substance use history: [x] Yes  [] No   [x] Tobacco (former)  [] Alcohol  [] Other     [] Ethnic  [] Cultural  [] Spiritual  [] Language [] Needs  [] Other than English  [] Hearing Impaired  [] Visually Impaired  [] Speaking Impaired  [] Blind  [] Special equipment:  [] Devices/Splints  [] Type   [] Brace   [] Type  [] Bariatric bed  [] Extra wide commode  [] Extra wide wheelchair [] Extra wide walker  [] Vidal walker  [] Vidal wheelchair  [] Transfer lift    [] Other equipment    FUNCTIONAL STATUS PT / [place] / Nikkie Nailer:  FIM / EVAL Discipline Initial: 9/19/21 Follow Up: 9/20 Current: ***   Eating OT Modified independent Modified independent    Grooming OT min assist Contact guard assist    Bathing OT mod assist mod assist    Dressing Upper Extremity OT SBA supervision    Dressing Lower Extremity OT mod assist min assist    Toileting OT min assist min assist    Toilet Transfers OT min assist     Tub/Shower Transfers OT NT     Homemaking OT      Bed Mobility PT min assist standby assist    Bed/Wheelchair Transfers PT min assist min assist-mod assist    Locomotion Walk / Wheelchair  Device: [employer]  Distance: 100 ft PT mod assist 60 ft no device at [employer] assist    Endurance PT      Expression SP      Social Interaction SP      Problem Solving SP      Memory SP      Comprehension SP  mild -moderate cognitive deficits    Swallowing SP  minimal dysarthria    Bowel Management NSG continent continent    Bladder Management NSG        Comments on Functional Status: Able to tolerate 3 hours of therapy per day split into four 45 minute sessions.  ***    [x] Able to participate a minimum of 3 hours per day of therapy intervention    Required treatments/services: [x] Rehabilitation nursing [] Dietitian / nurtition                 [x] Case management  [] Respiratory Therapy      [x] Social work   [] Other ***    Required Therapy:  Therapy Hours per Day Days per Week Therapeutic Interventions Required   [x] Physical Therapy 1 5-7 Gait, transfers, Safety, strength, education, endurance   [x] Occupational Therapy 1 5-7 ADLs, IADLs, Safety, strength, education, endurance   [x] Speech Pathology 1 5-7 speech therapy for dysarthria, cognition   [] Prosthetics / Orthotics       []         Anticipated Discharge Plan:   Anticipated DME Needs:  [x] Home     [] Commode   [] Alone    [] Wheelchair   [x] Supervised    [] Walker   [x] Assist    [] Oxygen        [] Hospital Bed  [] Assisted Living    [] Ramp        [x] To Be Determined    Anticipated Home Health Services:  Anticipated Outpatient Services:  [] PT       [] PT  [] OT      [] OT  [] Speech     [] Speech  [] Nursing     [] Dialysis  [] Aide      [x] To Be Determined  [x] To Be Determined    Anticipated support group:  [] Amputation  [] Multiple Sclerosis  [x] Stroke  [] Brain Injury  [] Spinal cord injury  [] Other    Barriers to discharge: pre-exisiting dementia    Discharge Support: [] Patient lives alone and does not have a caregiver available     [x] Patient has a caregiver available     [x] Discharge plan has been verified with patient's caregiver      [x] Caregiver is in agreement with the discharge plan     Expected functional status for safe discharge: ***    Patient/support person goals: ***    Expected length of stay: ***    Discussed expected length of stay and agreeable to IRF plan: [x] Yes   [] No    Impairment Group Category: ***    Etiological Diagnosis: ***    Primary Rehabilitation Diagnosis: CVA    Electronically signed by Chu Alcazar RN on 9/22/2021 at 11:17 AM    Prescreen completed __________________________________ (signature of prescreener)    Date:   *** Time: ***    JUSTIFICATION FOR ADMISSION TO ACUTE REHABILITATION:  Patient has suffered decline in functional abilities for gait, transfers, speech, cognition,  ADL's and IADL's as well as endurance. Patient has functional deficits requiring intensive therapy across multiple disciplines in order to return home safely. Patient will need physician oversight for respiratory issues, abnormal vital signs, nutritional and hydration status, safety issues, medications and therapy modalities. PT, OT and speech will work on deficits as noted in evaluations. Case management and social work will provide services for DME and management of a safe discharge home.      RECOMMEND LEVEL OF CARE  Recommend inpatient rehabilitation: [x] Yes   [] No  If no indicate reason:  [] Functional level too high  [] Unmotivated  [] No insurance carrier approval [] Unlikely to return to community  [] No medical necessity  [] Patient or family chose other facility  [] Too medically complex  [] Inadequate discharge plan  [] Rehabilitation bed unavailable [] Functional level too low  [] patient or family refused ARU    If patient not accepted for IRF admission, recommended level of care:  [] [facility]  [] [facility]  [] [place]   [] Home Care  [] Other      [] LTAC       Physician Assigned:  [] Dr. Mims Fairly         [x] Dr. Seb Longo              [] Dr. Evy Mercedes [] Dr. Rossy Hamilton  [] Dr. Mcmillan Duty:    ____________________________________________________________________  ____________________________________________________________________  ____________________________________________________________________  ____________________________________________________________________  ____________________________________________________________________      Physician Signature:_____________________________________    Print

## 2021-09-21 NOTE — CARE COORDINATION
Met with the patient and her  Chuyita Farley at the bedside to discuss transition of care planning. Patient and family are confused about rehab. They believed that the patient's family would have to bring her back and forth to rehab daily. Discussed ARU and PATITO programs in detail with the patient and her  at the bedside again. Explained that either way, the patient would be staying at the rehab facility. Patient and Chuyita Farley expressed understanding and are agreeable to ARU at [facility]. Tabitha Winters, liaison with ARU at [facility] to notify family is agreeable to ARU. Patient is a precert and they will need speech therapy notes to submit for authorization today. Speech therapy notified of need for evaluation. Await authorization and bed availability for transition of care. Carlos Ritchie RN.  P:  [phone]    The Plan for Transition of Care is related to the following treatment goals: Improve functional mobility prior to retuning home. The Patient and/or patient representative, and  Chuyita Farley, was provided with a choice of provider and agrees   with the discharge plan. [x] Yes [] No    Freedom of choice list was provided with basic dialogue that supports the patient's individualized plan of care/goals, treatment preferences and shares the quality data associated with the providers.  [x] Yes [] No

## 2021-09-22 LAB
ANION GAP SERPL CALCULATED.3IONS-SCNC: 16 MMOL/L (ref 7–16)
BASOPHILS ABSOLUTE: 0.04 E9/L (ref 0–0.2)
BASOPHILS RELATIVE PERCENT: 0.8 % (ref 0–2)
BUN BLDV-MCNC: 19 MG/DL (ref 6–23)
CALCIUM SERPL-MCNC: 9.4 MG/DL (ref 8.6–10.2)
CHLORIDE BLD-SCNC: 105 MMOL/L (ref 98–107)
CO2: 19 MMOL/L (ref 22–29)
CREAT SERPL-MCNC: 0.8 MG/DL (ref 0.5–1)
EOSINOPHILS ABSOLUTE: 0.1 E9/L (ref 0.05–0.5)
EOSINOPHILS RELATIVE PERCENT: 1.9 % (ref 0–6)
GFR AFRICAN AMERICAN: >60
GFR NON-AFRICAN AMERICAN: >60 ML/MIN/1.73
GLUCOSE BLD-MCNC: 103 MG/DL (ref 74–99)
HCT VFR BLD CALC: 44 % (ref 34–48)
HEMOGLOBIN: 13.9 G/DL (ref 11.5–15.5)
IMMATURE GRANULOCYTES #: 0.01 E9/L
IMMATURE GRANULOCYTES %: 0.2 % (ref 0–5)
LYMPHOCYTES ABSOLUTE: 1.61 E9/L (ref 1.5–4)
LYMPHOCYTES RELATIVE PERCENT: 31.4 % (ref 20–42)
MCH RBC QN AUTO: 29.6 PG (ref 26–35)
MCHC RBC AUTO-ENTMCNC: 31.6 % (ref 32–34.5)
MCV RBC AUTO: 93.6 FL (ref 80–99.9)
MONOCYTES ABSOLUTE: 0.52 E9/L (ref 0.1–0.95)
MONOCYTES RELATIVE PERCENT: 10.1 % (ref 2–12)
NEUTROPHILS ABSOLUTE: 2.85 E9/L (ref 1.8–7.3)
NEUTROPHILS RELATIVE PERCENT: 55.6 % (ref 43–80)
PDW BLD-RTO: 13.1 FL (ref 11.5–15)
PLATELET # BLD: 201 E9/L (ref 130–450)
PMV BLD AUTO: 11.7 FL (ref 7–12)
POTASSIUM SERPL-SCNC: 3.9 MMOL/L (ref 3.5–5)
RBC # BLD: 4.7 E12/L (ref 3.5–5.5)
SODIUM BLD-SCNC: 140 MMOL/L (ref 132–146)
WBC # BLD: 5.1 E9/L (ref 4.5–11.5)

## 2021-09-22 PROCEDURE — 6370000000 HC RX 637 (ALT 250 FOR IP): Performed by: PHYSICIAN ASSISTANT

## 2021-09-22 PROCEDURE — 2580000003 HC RX 258: Performed by: PHYSICIAN ASSISTANT

## 2021-09-22 PROCEDURE — 97110 THERAPEUTIC EXERCISES: CPT

## 2021-09-22 PROCEDURE — 36415 COLL VENOUS BLD VENIPUNCTURE: CPT

## 2021-09-22 PROCEDURE — 80048 BASIC METABOLIC PNL TOTAL CA: CPT

## 2021-09-22 PROCEDURE — 2060000000 HC ICU INTERMEDIATE R&B

## 2021-09-22 PROCEDURE — 6370000000 HC RX 637 (ALT 250 FOR IP): Performed by: INTERNAL MEDICINE

## 2021-09-22 PROCEDURE — 97129 THER IVNTJ 1ST 15 MIN: CPT

## 2021-09-22 PROCEDURE — 85025 COMPLETE CBC W/AUTO DIFF WBC: CPT

## 2021-09-22 PROCEDURE — 6360000002 HC RX W HCPCS: Performed by: PHYSICIAN ASSISTANT

## 2021-09-22 RX ADMIN — LEVOTHYROXINE SODIUM 50 MCG: 0.05 TABLET ORAL at 06:50

## 2021-09-22 RX ADMIN — ENOXAPARIN SODIUM 40 MG: 100 INJECTION SUBCUTANEOUS at 09:37

## 2021-09-22 RX ADMIN — Medication 10 ML: at 20:28

## 2021-09-22 RX ADMIN — ASPIRIN 81 MG CHEWABLE TABLET 81 MG: 81 TABLET CHEWABLE at 09:36

## 2021-09-22 RX ADMIN — AMLODIPINE BESYLATE 2.5 MG: 2.5 TABLET ORAL at 09:36

## 2021-09-22 RX ADMIN — ROSUVASTATIN CALCIUM 5 MG: 5 TABLET, FILM COATED ORAL at 20:28

## 2021-09-22 RX ADMIN — Medication 10 ML: at 10:47

## 2021-09-22 RX ADMIN — CLOPIDOGREL 75 MG: 75 TABLET, FILM COATED ORAL at 09:37

## 2021-09-22 ASSESSMENT — PAIN SCALES - GENERAL
PAINLEVEL_OUTOF10: 0

## 2021-09-22 NOTE — PROGRESS NOTES
Chief Complaint:  Acute cerebrovascular accident (CVA) ([address]     Subjective:    No other weakness/numbness/speech disturbances/facial droop    Objective:    /89   Pulse 82   Temp 97.8 °F (36.6 °C) (Temporal)   Resp 18   Ht 5' 1\" (1.549 m)   Wt 143 lb 4.8 oz (65 kg)   SpO2 94%   BMI 27.08 kg/m²     Current medications that patient is taking have been reviewed. General appearance: NAD, conversant  HEENT: AT/NC, MMM  Neck: FROM, supple  Lungs: Clear to auscultation, WOB normal  CV: RRR, no MRGs  Abdomen: Soft, non-tender; no masses or HSM, +BS  Extremities: No peripheral edema or digital cyanosis  Skin: no rash, lesions or ulcers  Psych: Calm and cooperative  Neuro: Alert and interactive, face symmetric, moving all extremities, speech fluent.   Handgrips 5/5 b/l, foot plantarflexors 5/5 b/l    Labs:  CBC with Differential:    Lab Results   Component Value Date    WBC 5.1 09/22/2021    RBC 4.70 09/22/2021    HGB 13.9 09/22/2021    HCT 44.0 09/22/2021     09/22/2021    MCV 93.6 09/22/2021    MCH 29.6 09/22/2021    MCHC 31.6 09/22/2021    RDW 13.1 09/22/2021    LYMPHOPCT 31.4 09/22/2021    MONOPCT 10.1 09/22/2021    BASOPCT 0.8 09/22/2021    MONOSABS 0.52 09/22/2021    LYMPHSABS 1.61 09/22/2021    EOSABS 0.10 09/22/2021    BASOSABS 0.04 09/22/2021     CMP:    Lab Results   Component Value Date     09/22/2021    K 3.9 09/22/2021    K 4.3 09/20/2021     09/22/2021    CO2 19 09/22/2021    BUN 19 09/22/2021    CREATININE 0.8 09/22/2021    GFRAA >60 09/22/2021    LABGLOM >60 09/22/2021    GLUCOSE 103 09/22/2021    GLUCOSE 97 12/27/2011    PROT 6.6 09/19/2021    LABALBU 3.9 09/19/2021    LABALBU 4.3 12/27/2011    CALCIUM 9.4 09/22/2021    BILITOT 0.4 09/19/2021    ALKPHOS 115 09/19/2021    AST 20 09/19/2021    ALT 14 09/19/2021        Assessment/Plan:  Principal Problem:    Acute cerebrovascular accident (CVA) [facility])  Active Problems:    Hyperlipidemia    Aortic dissection (HCC)    Pulmonary nodules    Statin intolerance  Resolved Problems:    * No resolved hospital problems. 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 History of statin intolerances. Continue aspirin, plavix, Crestor. Uptitrate or augment statin as outpatient as tolerated.     Vascular consult appreciated, no intervention required for the aorta finding    Medically ready for d/c, awaiting ARU approval    Yaya Krishnamurthy MD    7:47 PM  9/22/2021

## 2021-09-22 NOTE — PLAN OF CARE
Problem: Falls - Risk of:  Goal: Will remain free from falls  Description: Will remain free from falls  Outcome: Met This Shift  Goal: Absence of physical injury  Description: Absence of physical injury  Outcome: Met This Shift     Problem: Neurological  Goal: Maximum potential motor/sensory/cognitive function  Outcome: Met This Shift

## 2021-09-22 NOTE — PROGRESS NOTES
Chief Complaint:  Acute cerebrovascular accident (CVA) ([address]     Subjective:    Continues to be unsteady on her feet but otherwise feeling well. No other weakness/numbness/speech disturbances/facial droop    Objective:    BP (!) 144/72   Pulse 79   Temp 98.3 °F (36.8 °C) (Temporal)   Resp 16   Ht 5' 1\" (1.549 m)   Wt 143 lb 4.8 oz (65 kg)   SpO2 95%   BMI 27.08 kg/m²     Current medications that patient is taking have been reviewed. General appearance: NAD, conversant  HEENT: AT/NC, MMM  Neck: FROM, supple  Lungs: Clear to auscultation, WOB normal  CV: RRR, no MRGs  Abdomen: Soft, non-tender; no masses or HSM, +BS  Extremities: No peripheral edema or digital cyanosis  Skin: no rash, lesions or ulcers  Psych: Calm and cooperative  Neuro: Alert and interactive, face symmetric, moving all extremities, speech fluent. Handgrips 5/5 b/l, foot plantarflexors 5/5 b/l    Labs:  CBC with Differential:    Lab Results   Component Value Date    WBC 4.9 09/20/2021    RBC 4.70 09/20/2021    HGB 14.0 09/20/2021    HCT 43.0 09/20/2021     09/20/2021    MCV 91.5 09/20/2021    MCH 29.8 09/20/2021    MCHC 32.6 09/20/2021    RDW 13.1 09/20/2021    LYMPHOPCT 34.5 09/20/2021    MONOPCT 10.3 09/20/2021    BASOPCT 1.4 09/20/2021    MONOSABS 0.50 09/20/2021    LYMPHSABS 1.68 09/20/2021    EOSABS 0.12 09/20/2021    BASOSABS 0.07 09/20/2021     CMP:    Lab Results   Component Value Date     09/20/2021    K 4.3 09/20/2021     09/20/2021    CO2 27 09/20/2021    BUN 15 09/20/2021    CREATININE 1.0 09/20/2021    GFRAA >60 09/20/2021    LABGLOM 52 09/20/2021    GLUCOSE 123 09/20/2021    GLUCOSE 97 12/27/2011    PROT 6.6 09/19/2021    LABALBU 3.9 09/19/2021    LABALBU 4.3 12/27/2011    CALCIUM 9.8 09/20/2021    BILITOT 0.4 09/19/2021    ALKPHOS 115 09/19/2021    AST 20 09/19/2021    ALT 14 09/19/2021        CT Chest  Focal ground-glass opacity in the left upper lobe and right lung base   probably inflammatory. Problem:    Acute cerebrovascular accident (CVA) ([facility]  Active Problems:    Hyperlipidemia    Aortic dissection (HCC)    Pulmonary nodules    Statin intolerance  Resolved Problems:    * No resolved hospital problems. 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 History of statin intolerances. Continue aspirin, plavix, Crestor. Uptitrate or augment statin as outpatient as tolerated. Vascular consult appreciated, no intervention required for the aorta finding    As a former smoker I think she should have repeat CT as outpatient in 6-12 months to make sure they're not cancerous.   Discussed this with her and forwarding this to her PCP's office    Medically ready for d/c    Estelle Zacarias MD    10:26 PM  9/21/2021

## 2021-09-23 VITALS
RESPIRATION RATE: 18 BRPM | HEIGHT: 61 IN | HEART RATE: 74 BPM | OXYGEN SATURATION: 95 % | DIASTOLIC BLOOD PRESSURE: 72 MMHG | SYSTOLIC BLOOD PRESSURE: 116 MMHG | TEMPERATURE: 97.7 F | BODY MASS INDEX: 27.35 KG/M2 | WEIGHT: 144.84 LBS

## 2021-09-23 PROCEDURE — 2580000003 HC RX 258: Performed by: PHYSICIAN ASSISTANT

## 2021-09-23 PROCEDURE — 97535 SELF CARE MNGMENT TRAINING: CPT

## 2021-09-23 PROCEDURE — 97530 THERAPEUTIC ACTIVITIES: CPT

## 2021-09-23 PROCEDURE — 6370000000 HC RX 637 (ALT 250 FOR IP): Performed by: PHYSICIAN ASSISTANT

## 2021-09-23 PROCEDURE — 6370000000 HC RX 637 (ALT 250 FOR IP): Performed by: INTERNAL MEDICINE

## 2021-09-23 PROCEDURE — 6360000002 HC RX W HCPCS: Performed by: PHYSICIAN ASSISTANT

## 2021-09-23 RX ORDER — ROSUVASTATIN CALCIUM 5 MG/1
5 TABLET, COATED ORAL NIGHTLY
Qty: 30 TABLET | Refills: 3 | Status: SHIPPED
Start: 2021-09-23 | End: 2021-10-04 | Stop reason: SDUPTHER

## 2021-09-23 RX ORDER — CLOPIDOGREL BISULFATE 75 MG/1
75 TABLET ORAL DAILY
Qty: 30 TABLET | Refills: 3 | Status: SHIPPED
Start: 2021-09-24 | End: 2021-10-04 | Stop reason: SDUPTHER

## 2021-09-23 RX ADMIN — ENOXAPARIN SODIUM 40 MG: 100 INJECTION SUBCUTANEOUS at 08:54

## 2021-09-23 RX ADMIN — Medication 10 ML: at 08:55

## 2021-09-23 RX ADMIN — ASPIRIN 81 MG CHEWABLE TABLET 81 MG: 81 TABLET CHEWABLE at 08:54

## 2021-09-23 RX ADMIN — LEVOTHYROXINE SODIUM 50 MCG: 0.05 TABLET ORAL at 05:53

## 2021-09-23 RX ADMIN — CLOPIDOGREL 75 MG: 75 TABLET, FILM COATED ORAL at 08:54

## 2021-09-23 RX ADMIN — AMLODIPINE BESYLATE 2.5 MG: 2.5 TABLET ORAL at 08:54

## 2021-09-23 ASSESSMENT — PAIN SCALES - GENERAL
PAINLEVEL_OUTOF10: 0

## 2021-09-23 NOTE — PLAN OF CARE
Problem: Falls - Risk of:  Goal: Will remain free from falls  9/23/2021 0327 by Julius Bernabe RN  Outcome: Met This Shift  9/22/2021 1405 by Gus Biswas RN  Outcome: Met This Shift  Goal: Absence of physical injury  9/23/2021 0327 by Julius Bernabe RN  Outcome: Met This Shift  9/22/2021 1405 by Gus Biswas RN  Outcome: Met This Shift     Problem: Neurological  Goal: Maximum potential motor/sensory/cognitive function  9/23/2021 0327 by Julius Bernabe RN  Outcome: Met This Shift  9/22/2021 1405 by Gus Biswas RN  Outcome: Met This Shift

## 2021-09-23 NOTE — CARE COORDINATION
Patient denied ARU per her insurance. Therapy worked with the patient and stated that she is appropriate for home with [facility]. Spoke with patient's  Arlene Harrell and reviewed above. He is agreeable to home with [facility]. Reviewed [facility] provider list.  He would like 1.) [facility], 2.) [facility], 3.) [facility]. Call placed to Adriana Hawkins, liaison with [facility] and referral made. They are able to start care this weekend. Home care orders and DME order for a Wheeled walker received. Call placed to geri Shaver with [facility] DME and she will review and bring the walker to the room prior to discharge. AVS updated. Patient to discharge to home this evening. Will continue to follow.      Toney Prajapati RN.  [facility] [place]  [phone]

## 2021-09-23 NOTE — PROGRESS NOTES
Occupational Therapy  OT BEDSIDE TREATMENT NOTE   [facility] [address] [address]       Date:2021  Patient Name: Lia Boles  MRN: [id number]  : 1934  Room: [address] [place] [address]     Per OT Eval:    Evaluating [facility], OTR/L #[id number]     Referring Provider: XOCHITL Penny  Specific Provider Orders/Date: OT eval and treat 21     Diagnosis: Acute cerebrovascular accident (CVA) ([facility] [I63.9]   Pt admitted to hospital on 21 for intermittent confusion, headache and weakness     Pertinent Medical History:  has a past medical history of Dementia ([facility] Diverticulosis, GIST (gastrointestinal stroma tumor), malignant, colon ([facility] Hyperlipidemia, Hypertension, MI (myocardial infarction) ([facility] Mitral regurgitation, and PUD (peptic ulcer disease).       Precautions:  Fall Risk, bed alarm     Assessment of current deficits    [x]? Functional mobility         [x]? ADLs           [x]? Strength                  []?Cognition    [x]? Functional transfers       [x]? IADLs         [x]? Safety Awareness   [x]? Endurance    []? Fine Coordination                      [x]? Balance      []? Vision/perception   []? Sensation      []? Gross Motor Coordination          []? ROM           []?  Delirium                   []? Motor Control      OT PLAN OF CARE   OT POC based on physician orders, patient diagnosis and results of clinical assessment     Frequency/Duration 2-3 days/wk for 2 weeks PRN   Specific OT Treatment Interventions to include:   * Instruction/training on adapted ADL techniques and AE recommendations to increase functional independence within precautions       * Training on energy conservation strategies, correct breathing pattern and techniques to improve independence/tolerance for self-care routine  * Functional transfer/mobility training/DME recommendations for increased independence, safety, and fall prevention  * Patient/Family education to increase follow through with safety techniques and functional independence  * Recommendation of environmental modifications for increased safety with functional transfers/mobility and ADLs  * Therapeutic exercise to improve motor endurance, ROM, and functional strength for ADLs/functional transfers  * Therapeutic activities to facilitate/challenge dynamic balance, stand tolerance for increased safety and independence with ADLs  * Therapeutic activities to facilitate gross/fine motor skills for increased independence with ADLs        OTMRS  Modified [place] Scale (MRS)  Score     Description  0             No symptoms  1             No significant disability despite symptoms  2             Slight disability; able to look after own affairs  3             Moderate disability; able to ambulate without assist/ requires assist with ADLs  4             Moderate/Severe disability;requires assist to ambulate/assist with ADLs  5             Severe disability;bedridden/incontinent   6               Score:  4     Recommended Adaptive Equipment: wheeled walker       Home Living: Pt lives with spouse in 1 floor home.  4 [address], 2 handrails  Laundry on 1st floor    Bathroom setup: walk in shower with grab bars    Equipment owned: n/a     Prior Level of Function: independent with ADLs , shares with IADLs; ambulated independently w/o AD   Driving: yes   Teaches bridge     Pain Level: Pt c/o no pain this session     Cognition: A&O: 4/4; Follows 1-2 step directions, pleasant & cooperative, mild inattention L side noted  Speech clear, [place]           Memory: fair+ 2/3 recall with 3 words           Sequencing:  fair            Problem solving:  fair            Judgement/safety:  fair              Functional Assessment:  AM-PAC Daily Activity Raw Score:     Initial Eval Status  Date: 21 Treatment Status  Date:  21 STGs = LTGs  Time frame: 10-14 days   Feeding Modified [place]   pt reports she has bitten her left cheek & tongue during meals, educated pt to use continued caution & east meals slowly, defer to speech      Grooming Minimal Assist   Standing at sink CGA  Standing at sink for grooming hair  Modified [place]    UB Dressing Stand by Assist   Supervision  Seated, instructed on modified tech due to L UE weakness Modified [place]    LB Dressing Moderate Assist   Simulated pants  Improved to min A to don/doff socks Min/CGA  Doff/radha socks using cross over technique with increased time.       pt reports she did stand to radha pants prior but educated pt on safety    to thread legs seated then stand radha over hips, assist for steadiness but with Good results Supervision    Bathing Moderate Assist Min/CGA  Sponge bathing seated, standing for lior-care  Supervision    Toileting Minimal Assist   Including hygiene SBA  Able to complete lior-care & clothing management with increased time  Supervision    Bed Mobility  Supine to sit: Minimal Assist   Sit to supine: Minimal Assist  SBA  Using bed rail & HOB slightly elevated  Supine to sit: Modified [place]   Sit to supine: Modified [place]    Functional Transfers Minimal Assist  CGA  Cues for hand placement, lynn L hand & safety Supervision    Functional Mobility Minimal><Moderate Assist w/o AD (LOB x2 when turning, increased assist required)     Min A w/ w/w  In room and bathroom SBA  With walker, household distance with Good results  Without walker Min A unsteady, drifting to Left, R LE crossing over Supervision   Balance Sitting:     Static:  Supervision    Dynamic:SBA  Standing: Min><Mod A w/o AD  Min A w/ w/w Sitting: SBA  Standing: SBA   With walker   CGA/Min   With no AD     Activity Tolerance Fair Fair+ Good   Visual/  Perceptual Glasses: yes  Visual scanning: WFL  Perception: WFL     mild inattention                 Education:  Pt was educated through out treatment regarding proper technique & safety with bed mobility, functional transfers & mobility, walker management & ADL compensatory strategies due to L sided weakness, mild inattention to ease tasks, improve safety & prevent falls to return home safely. Instructed & completed B UE therapeutic exercises, focusing on L UE due to weakness. Fair+ understanding. Comments: Upon arrival pt was in bed & agreeable for therapy. At end of session pt was seated in chair all lines and tubes intact, call light within reach. · Pt has made fair+ progress towards set goals. · Continue with current plan of care    Treatment Time In: 9:05          Treatment Time Out: 9:45           Treatment Charges: Mins Units   Ther Ex  [id number]     Manual Therapy [id number]     Thera Activities 47974 10 1   ADL/Home Mgt 84019 30 2   Neuro Re-ed 34433     Group Therapy      Orthotic manage/training  13377     Non-Billable Time     Total Timed Treatment 40 3       Missy PRIDE  [address] [place] [address] Drive, [address] [place] [address]

## 2021-09-23 NOTE — DISCHARGE SUMMARY
Physician Discharge Summary     Patient ID:  Onetha Klinefelter  [id number]  87 y.o.  1934    Admit date: 9/18/2021    Discharge date and time:  9/23/2021     Admission Diagnoses:    Acute cerebrovascular accident (CVA) [facility])     Discharge Diagnoses:   Principal Problem:    Acute cerebrovascular accident (CVA) ([facility]  Active Problems:    Hyperlipidemia    [facility]    Pulmonary nodules    Statin intolerance  Resolved Problems:    * No resolved hospital problems. *       Consults: Neuro    Procedures: None    Hospital Course:   Patient presented with acute ataxia and found to have a pontine stroke. She has been extremely roxanne and does not have any other neurologic sequelae and actually her ataxia has improved over the course of this hospitalization. She has an LDL level of about 230 because she has had statin intolerances historically. She reports muscle aches. After much discussion, she was willing to reattempt Crestor at a low dose. This should be uptitrated as an outpatient, and additional medication such as a fibrate or PSCK9 inhibitor could be added on top. She also was found to have what seems to be a chronic dissection of her aorta related to bulky atherosclerotic plaque and was seen by vascular for this, please see their consultation note. There is no surgical intervention recommended for this. She was also found to have some small pulmonary nodules which I have informed her about, and she may need repeat imaging in 6 to 12 months as outpatient. I have forwarded that information in a separate note to her family doctor.      Discharge Exam:  Vitals:    09/22/21 1700 09/23/21 0009 09/23/21 0555 09/23/21 0800   BP: 138/89 (!) 147/76  138/82   Pulse: 82 73  64   Resp: 18 18 18   Temp: 97.8 °F (36.6 °C) 97.8 °F (36.6 °C)  97.6 °F (36.4 °C)   TempSrc: Temporal Temporal  Temporal   SpO2: 94% 95%  97%   Weight:   144 lb 13.5 oz (65.7 kg)    Height:            General appearance: NAD, conversant  HEENT: AT/NC, MMM  Neck: FROM, supple  Lungs: Clear to auscultation, WOB normal  CV: RRR, no MRGs  Abdomen: Soft, non-tender; no masses or HSM, +BS  Extremities: No peripheral edema or digital cyanosis  Skin: no rash, lesions or ulcers  Psych: Calm and cooperative  Neuro: Alert and interactive, face symmetric, handgrips 5/5 b/l, hip flexors 5/5 b/l, foot plantar/dorsiflexors 5/5 b/l    Condition:  Stable    Disposition: home    Patient Instructions:   Current Discharge Medication List      START taking these medications    Details   rosuvastatin (CRESTOR) 5 MG tablet Take 1 tablet by mouth nightly  Qty: 30 tablet, Refills: 3      clopidogrel (PLAVIX) 75 MG tablet Take 1 tablet by mouth daily  Qty: 30 tablet, Refills: 3         CONTINUE these medications which have NOT CHANGED    Details   diphenoxylate-atropine (LOMOTIL) 2.5-0.025 MG per tablet Take 1 tablet by mouth 4 times daily as needed for Diarrhea.      acetaminophen (TYLENOL) 500 MG tablet Take 500 mg by mouth every 6 hours as needed for Pain      levothyroxine (SYNTHROID) 50 MCG tablet Take 1 tablet by mouth daily  Qty: 90 tablet, Refills: 0    Associated Diagnoses: Acquired hypothyroidism      amLODIPine (NORVASC) 2.5 MG tablet Take 1 tablet by mouth daily  Qty: 90 tablet, Refills: 3      loratadine (CLARITIN) 10 MG tablet Take 10 mg by mouth daily as needed (ALLERGIES)       aspirin 81 MG tablet Take 81 mg by mouth daily           Activity: activity as tolerated  Diet: regular diet    Follow-up with PCP in 1 week.     Note that over 30 minutes was spent in preparing discharge papers, discussing discharge with patient, medication review, etc.    Signed:  Sabino Caldera MD    9/23/2021  2:20 PM

## 2021-09-23 NOTE — PROGRESS NOTES
Physical Therapy  Physical Therapy Rx Note    Name: Robert Amos  : 1934  MRN: [id number]      Date of Service: 2021    Evaluating PT:  Alka Hidalgo, PT, DPT [id number]    Room #:  3735/5611-O  Diagnosis:  [facility] [I63.9]  PMHx/PSHx:  Dementia, Diverticulosis, HLD, HTN, MI  Procedure/Surgery:  None  Precautions:  Falls, L hemiparesis, [place], bed alarm  Equipment Needs:  TBD    SUBJECTIVE:    Pt lives with  in a 1 story home with 4 stairs to enter and 2 rail. Pt ambulated without device and was independent PTA. OBJECTIVE:   Initial Evaluation  Date: 21 Treatment    21 Short Term/ Long Term   Goals   AM-PAC 6 Clicks  18    Was pt agreeable to Eval/treatment? Yes Yes     Does pt have pain? No c/o pain No co pain     Bed Mobility  Rolling: NT  Supine to sit: SBA  Sit to supine: NT  Scooting: SBA Rolling supervision  Supine to sit supervision     Sit to supine supervision    Mod Independent   Transfers Sit to stand: [place]  Stand to sit: [place]ulee  Stand pivot: ModA no device; [place]e with [address] Sit to stand sba/  Stand to sit sba  Stand pivot ww sba  Mod Independent with AAD   Ambulation   60 feet with ModA no device  100 feet with Julee with [address]  400 feet ww sba     >400 feet with Mod Independent with AAD   Stair negotiation: ascended and descended NT  4 steps 1 rail sideways approach sba  L handrail     4 steps 2 rails sba /s   >4 steps with 1 rail Mod Independent   ROM BUE:  WFL  BLE:  WFL B ue  WFL  B LE  WFL    Strength BUE:  WFL  RLE:  4/5  LLE:  3+ to 4-/5 grossly  Increase by 1/3 MMT grade   Balance Sitting EOB:  Julee  Dynamic Standing:  ModA no device; Julee with [address] Sitting eob - independent    Dynamic standing ww  sba  Sitting EOB:  Independent  Dynamic Standing:   Mod Independent with AAD     Pt is A & O x 4  Sensation:  No reported paresthesias  Edema:  None    Therapeutic Exercises:  Function     Patient education  Pt educated on walker safety, verbal car transfer, steps     Patient response to education:   Pt verbalized understanding Pt demonstrated skill Pt requires further education in this area   x x x     ASSESSMENT:    Conditions Requiring Skilled Therapeutic Intervention:    [x]Decreased strength     []Decreased ROM  [x]Decreased functional mobility  [x]Decreased balance   [x]Decreased endurance   [x]Decreased posture  []Decreased sensation  []Decreased coordination   []Decreased vision  []Decreased safety awareness   []Increased pain       Comments:    Pt on eob with spouse present upon arrival.  Pt performed function as per above. Pt with slow gait speed and has tendency to have a downward gaze using ww. Spouse able to cue pt. Pt had no Lob using ww and was cautious. Educated pt and spouse on safety with ww and pt's risk for a fall. Did a trial of sp cane and pt not able to get sequence down and unsteady. At this time would recommend ww and sba which spouse can provide. Pt ambulated short distance with no device and unsteady also. Verbally reviewed walker safety and reviewed car transfer technique with pt and spouse using ww. Treatment:  Patient practiced and was instructed in the following treatment:    Function as per above   Pt education    Pt's/ family goals   1. Return home    Prognosis is good for reaching above PT goals. Patient and or family understand(s) diagnosis, prognosis, and plan of care.   Yes    PHYSICAL THERAPY PLAN OF CARE:    PT POC is established based on physician order and patient diagnosis     Referring provider/PT Order:  XOCHITL Gonzalez /09/19/21 0745 PT eval and treat  Diagnosis:  Acute cerebrovascular accident (CVA) ([facility] [I63.9]  Specific instructions for next treatment:  Progress activity    Current Treatment Recommendations:     [x] Strengthening to improve independence with functional mobility   [] ROM to improve independence with functional mobility   [x] Balance Training to improve static/dynamic balance and to reduce fall risk  [x] Endurance Training to improve activity tolerance during functional mobility   [x] Transfer Training to improve safety and independence with all functional transfers   [x] Gait Training to improve gait mechanics, endurance and asses need for appropriate assistive device  [x] Stair Training in preparation for safe discharge home and/or into the community   [] Positioning to prevent skin breakdown and contractures  [x] Safety and Education Training   [x] Patient/Caregiver Education   [] HEP  [] Other     PT long term treatment goals are located in above grid    Frequency of treatments: 2-5x/week x 1-2 weeks. Time in  235  Time out  314    Total Treatment Time  39 minutes     Evaluation Time includes thorough review of current medical information, gathering information on past medical history/social history and prior level of function, completion of standardized testing/informal observation of tasks, assessment of data and education on plan of care and goals.     CPT codes:  [] Low Complexity PT evaluation 25592  [] Moderate Complexity PT evaluation 30255  [] High Complexity PT evaluation 54932  [] PT Re-evaluation 26226  [] Gait training 56914 - minutes  [] Manual therapy 56069 - minutes  [x] Therapeutic activities 23207 39 minutes  [] Therapeutic exercises 71200 - minutes  [] Neuromuscular reeducation 57829 - minutes     Venkat Carmona, PTA  [id number]

## 2021-09-23 NOTE — PROGRESS NOTES
Dr. Gisela Jalloh was notified that precert was denied for patient to discharge to Acute Rehab. Peer to peer will not be done. Charly Rosa and Shabnam Amin with social work and case management on nursing unit have been updated.

## 2021-09-24 ENCOUNTER — CARE COORDINATION (OUTPATIENT)
Dept: CASE MANAGEMENT | Age: 86
End: 2021-09-24

## 2021-09-24 ENCOUNTER — TELEPHONE (OUTPATIENT)
Dept: FAMILY MEDICINE CLINIC | Age: 86
End: 2021-09-24

## 2021-09-24 DIAGNOSIS — I63.9 ACUTE CEREBROVASCULAR ACCIDENT (CVA) (HCC): Primary | ICD-10-CM

## 2021-09-24 PROCEDURE — 1111F DSCHRG MED/CURRENT MED MERGE: CPT | Performed by: FAMILY MEDICINE

## 2021-09-24 NOTE — TELEPHONE ENCOUNTER
Marcos Orourke from [facility] choice called back. She was informed that Dr. Yajaira Mata would follow Home care.     Electronically signed by Tito Delarosa on 9/24/21 at 4:00 PM EDT

## 2021-09-24 NOTE — CARE COORDINATION
[place] [facility] Transitions Initial Follow Up Call    Call within 2 business days of discharge: Yes    Patient: Shira Law Patient : 1934   MRN: [id number]  Reason for Admission: Acute cerebrovascular accident (CVA)   Discharge Date: 21 RARS: Readmission Risk Score: 15      Last Discharge [facility]       Complaint Diagnosis Description Type Department Provider    21  Acute cerebrovascular accident (CVA) [facility]) Admission (Discharged) Estelle May MD        Transitions of Care Initial Call    Called and spoke to patient. Speech is slow and occasionally slurred. Patient is alert and oriented to self, place, and time. Denies weakness and is able to move all extremities. Patient states she is unsteady and uses walker for ambulation with no issues. Lives with . Denies weakness, pain, numbness, swallowing difficulties, incontinence, memory problems. Reviewed medications with patient. Started Plavix and Crestor. Instructed patient to monitor crestor for possible side effects. Patient expresses understanding. Patient confirms they have all medications and medications are being taken as directed. There are no questions concerning medications at this time. [id number] sent to PCP  Patient has fair appetite and is drinking adequate fluids. Normal bladder and bowel elimination patterns. Had [facility] Wednesday. Will see PCP 10/4/2021. Offered patient assistance making Neurology appointment,  patient  Accepted. Instructed patient that PCP will need to be seen within 7 days of discharge. Expresses understanding. Will Send to Nj Godinez MA to schedule Neurology appointment in 1 month. (Patient states Thursday not good). Explained the Transition to Home program to patient and that they are followed for 30 days after discharge. Patient expresses understanding. Will continue to follow.      Lam Starkey LPN    [phone]  [facility] protocols and quarantine with CDC Guidelines. Patient was given an opportunity to verbalize any questions and concerns and agrees to contact LPN CC or health care provider for questions related to their healthcare. Reviewed and educated patient on any new and changed medications related to discharge diagnosis     Was patient discharged with a pulse oximeter? No     Discussed and confirmed pulse oximeter discharge instructions and when to notify provider or seek emergency care. LPN CC provided contact information. Plan for follow-up call in 3-5 days based on severity of symptoms and risk factors.      Non-face-to-face services provided:  Obtained and reviewed discharge summary and/or continuity of care documents    Care Transitions 24 Hour Call    Schedule Follow Up Appointment with PCP: Completed  Do you have any ongoing symptoms?: Yes  Patient-reported symptoms:  (Comment: Slurred and slow speech)  Do you have a copy of your discharge instructions?: Yes  Do you have all of your prescriptions and are they filled?: Yes  Have you been contacted by a [address] [place] [address] Avenue?: No  Have you scheduled your follow up appointment?: Yes (Comment: PCP 10/4/2021)  How are you going to get to your appointment?: Car - family or friend to transport  Were you discharged with any Home Care or Post Acute Services: No  Do you feel like you have everything you need to keep you well at home?: Yes  Care Transitions Interventions  No Identified Needs         Follow Up  Future Appointments   Date Time Provider Angelo Jordan   10/4/2021  8:45 AM [address] [place] [address], [address]       [place], LPN

## 2021-09-24 NOTE — CARE COORDINATION
Request from [facility], LPN., please schedule patient for hospital f/u in 1 month    10/22 @ 11:20. By Nina Whitmore CNP   at [address] [facility], [address] [place] [address]. Called and spoke with patient, given time, date and address of appt. Patient verbalized understanding.     Zina Denise, [address]  Care Coordination Transition

## 2021-09-29 ENCOUNTER — CARE COORDINATION (OUTPATIENT)
Dept: CASE MANAGEMENT | Age: 86
End: 2021-09-29

## 2021-09-29 NOTE — CARE COORDINATION
[place] [facility] Transitions Follow Up Call    2021    Patient: Cirilo Laguerre  Patient : 1934   MRN: [id number]  Reason for Admission: Acute cerebrovascular accident (CVA)   Discharge Date: 21 RARS: Readmission Risk Score: 15    Attempted to contact patient for Transition Subsequent call. Left HIPAA Compliant message on Voice Mail to call [facility] (number given) with any questions and an update on patient's condition since discharge. Will continue to follow. Jennifer Umanzor LPN    [phone]  [address] Transitions Subsequent and Final Call    Subsequent and Final Calls  Care Transitions Interventions  Other Interventions:            Follow Up  Future Appointments   Date Time Provider Angelo Jordan   10/4/2021  8:45 AM [address] [place] [address]   10/22/2021 11:20 AM NOHELIA Rush CNP [facility]       Jennifer Umanzor LPN

## 2021-10-04 ENCOUNTER — OFFICE VISIT (OUTPATIENT)
Dept: FAMILY MEDICINE CLINIC | Age: 86
End: 2021-10-04
Payer: MEDICARE

## 2021-10-04 VITALS
OXYGEN SATURATION: 99 % | BODY MASS INDEX: 22.88 KG/M2 | DIASTOLIC BLOOD PRESSURE: 74 MMHG | HEART RATE: 70 BPM | HEIGHT: 66 IN | TEMPERATURE: 98.2 F | SYSTOLIC BLOOD PRESSURE: 122 MMHG | WEIGHT: 142.4 LBS

## 2021-10-04 DIAGNOSIS — E03.9 ACQUIRED HYPOTHYROIDISM: ICD-10-CM

## 2021-10-04 DIAGNOSIS — I63.9 ACUTE CEREBROVASCULAR ACCIDENT (CVA) (HCC): Primary | ICD-10-CM

## 2021-10-04 DIAGNOSIS — E78.2 MIXED HYPERLIPIDEMIA: ICD-10-CM

## 2021-10-04 DIAGNOSIS — E11.9 TYPE 2 DIABETES MELLITUS WITHOUT COMPLICATION, WITHOUT LONG-TERM CURRENT USE OF INSULIN (HCC): ICD-10-CM

## 2021-10-04 PROCEDURE — 99214 OFFICE O/P EST MOD 30 MIN: CPT | Performed by: FAMILY MEDICINE

## 2021-10-04 RX ORDER — ROSUVASTATIN CALCIUM 5 MG/1
5 TABLET, COATED ORAL NIGHTLY
Qty: 90 TABLET | Refills: 0 | Status: SHIPPED
Start: 2021-10-04 | End: 2021-11-24

## 2021-10-04 RX ORDER — TRIAMCINOLONE ACETONIDE 55 UG/1
2 SPRAY, METERED NASAL DAILY
COMMUNITY

## 2021-10-04 RX ORDER — CLOPIDOGREL BISULFATE 75 MG/1
75 TABLET ORAL DAILY
Qty: 90 TABLET | Refills: 0 | Status: SHIPPED
Start: 2021-10-04 | End: 2021-12-13 | Stop reason: SDUPTHER

## 2021-10-04 RX ORDER — LEVOTHYROXINE SODIUM 0.05 MG/1
50 TABLET ORAL DAILY
Qty: 90 TABLET | Refills: 1 | Status: SHIPPED
Start: 2021-10-04 | End: 2021-12-13 | Stop reason: SDUPTHER

## 2021-10-04 ASSESSMENT — ENCOUNTER SYMPTOMS
SORE THROAT: 0
EYE PAIN: 0
CONSTIPATION: 0
NAUSEA: 0
DIARRHEA: 0
CHEST TIGHTNESS: 0
COUGH: 0
VOMITING: 0
BACK PAIN: 0
WHEEZING: 0
SINUS PAIN: 0
TROUBLE SWALLOWING: 0
SHORTNESS OF BREATH: 0
ABDOMINAL PAIN: 0

## 2021-10-04 NOTE — PROGRESS NOTES
10/4/21    Name: Henok Payment  PONCHO:[birth year]   Sex:female   Age:87 y.o. Chief Complaint   Patient presents with    Hypothyroidism    Follow-Up from Hospital     Patient presents to office for visit. Her  is with her. Patient was hospitalized from 9/18/21 until 9/23/21 for CVA. Patient was started on Plavix and Rosuvastatin by hospital. She is using a walker to ambulate. Patient says she only uses the walker sometimes. She says she does have dizziness off and on.  She denies any falls since being released from the hospital.     Patient here for hospital follow up  She is still confused about the event from the hospital  She seems to have lost track of time from the 7th when the initial fall happened and then her hospitalization on the 18th  She doesnot remember being here for a follow up on the 13th    A few days later her s/s worsened, she started to slur her words and was more confused so her  took her to the ED  She was found to have had a stroke  She was placed on statin and plavix and has continued these  She is very surprised she is tolerating the medications but glad  Explained again to her why these medications were important  She agreed to continue them    There seemed to be some confusion about her neurology appt  She does not remember seeing them at all in the hospital and wonder why she is seeing them  She did not realize they were neurologist b//c she kept asking me to see someone for her head  So there is stilk some recall issues and short term memory problems since the fall  Her response time is a little slower than usual but appropriate all the same    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 HENT: Negative for congestion, ear pain, sinus pain, sore throat and trouble swallowing. Eyes: Negative for pain. Respiratory: Negative for cough, chest tightness, shortness of breath and wheezing. Cardiovascular: Negative for chest pain, palpitations and leg swelling. Gastrointestinal: Negative for abdominal pain, constipation, diarrhea, nausea and vomiting. Endocrine: Negative for cold intolerance and heat intolerance. Genitourinary: Negative for difficulty urinating, dysuria, frequency, hematuria, pelvic pain and urgency. Musculoskeletal: Positive for gait problem. Negative for arthralgias, back pain, joint swelling and myalgias. Skin: Negative for rash and wound. Neurological: Positive for dizziness. Negative for syncope, light-headedness and headaches. Hematological: Negative for adenopathy. Psychiatric/Behavioral: Negative for confusion, dysphoric mood, self-injury, sleep disturbance and suicidal ideas. The patient is not nervous/anxious.             Current Outpatient Medications:     levothyroxine (SYNTHROID) 50 MCG tablet, Take 1 tablet by mouth daily, Disp: 90 tablet, Rfl: 1    clopidogrel (PLAVIX) 75 MG tablet, Take 1 tablet by mouth daily, Disp: 90 tablet, Rfl: 0    rosuvastatin (CRESTOR) 5 MG tablet, Take 1 tablet by mouth nightly, Disp: 90 tablet, Rfl: 0    triamcinolone (NASACORT) 55 MCG/ACT nasal inhaler, 2 sprays by Each Nostril route daily, Disp: , Rfl:     diphenoxylate-atropine (LOMOTIL) 2.5-0.025 MG per tablet, Take 1 tablet by mouth 4 times daily as needed for Diarrhea., Disp: , Rfl:     acetaminophen (TYLENOL) 500 MG tablet, Take 500 mg by mouth every 6 hours as needed for Pain, Disp: , Rfl:     amLODIPine (NORVASC) 2.5 MG tablet, Take 1 tablet by mouth daily, Disp: 90 tablet, Rfl: 3    loratadine (CLARITIN) 10 MG tablet, Take 10 mg by mouth daily as needed (ALLERGIES) , Disp: , Rfl:     aspirin 81 MG tablet, Take 81 mg by mouth daily, Disp: , Rfl:   Allergies   Allergen Reactions    Carafate [Sucralfate] Other (See Comments)     Unable to explain    Cefdinir Other (See Comments)     Unable to explain    Cetirizine & Related Other (See Comments)     Makes her forgetful and sleepy    Lisinopril Other (See Comments)     Difficulty swallowing, reflux, headache, dizziness, leg cramps    Pravachol [Pravastatin Sodium] Other (See Comments)     Patient states \"it almost killed me. \" Spoke with Patient's Daughter and she stated \"body cramps and extreme weakness. \"    Protonix [Pantoprazole Sodium] Other (See Comments)     Unable to explain    Statins Other (See Comments)     Intolerable. Patient states \"it almost killed me. \" Spoke with Patient's Daughter and she stated \"body cramps and extreme weakness. \"      Past Medical History:   Diagnosis Date    Dementia ([address]     Diverticulosis     GIST (gastrointestinal stroma tumor), malignant, colon ([address]     Hyperlipidemia     Hypertension     MI (myocardial infarction) ([address] 2015    Mitral regurgitation     per cardiology    PUD (peptic ulcer disease)      Patient Active Problem List    Diagnosis Date Noted    NSTEMI (non-ST elevated myocardial infarction) ([address] 06/03/2016    GIST (gastrointestinal stromal tumor), malignant ([address] 05/25/2016    Aortic dissection ([address] 09/20/2021    Pulmonary nodules 09/20/2021    Statin intolerance 09/20/2021    Acute cerebrovascular accident (CVA) ([address] 09/16/2021    Type 2 diabetes mellitus without complication, without long-term current use of insulin ([address] 01/20/2021    Dementia ([address]     Acquired hypothyroidism 11/20/2019    Hyperlipidemia 11/15/2019    Disorder of thyroid gland 11/15/2019    Carotid stenosis, right 11/15/2019    Onychomycosis 06/14/2019    Difficulty walking 06/14/2019    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 ([facility] 06/14/2016    Malignant neoplasm of abdomen ([facility] 06/14/2016    Hypertension     PUD (peptic ulcer disease)       Past Surgical History:   Procedure Laterality Date    CAROTID ENDARTERECTOMY Right 04/2019    Dr Diaz Ee Bilateral 2015    COLONOSCOPY  12/2014    DILATATION, ESOPHAGUS      ENDOSCOPY, COLON, DIAGNOSTIC  09/02/2016    marking of gastric tumor    HYSTERECTOMY      YUMIKO/BSO    OTHER SURGICAL HISTORY  09/09/2016    Laparoscopic Robotic Assisted Partial Gastrectomy    SHOULDER SURGERY Left     rotator cuff right    TONSILLECTOMY AND ADENOIDECTOMY      UPPER GASTROINTESTINAL ENDOSCOPY  12/2014    UPPER GASTROINTESTINAL ENDOSCOPY  5/25/2016      Social History     Tobacco History     Smoking Status  Former Smoker Quit date  1/1/1969 Smoking Frequency  1 pack/day for 18 years (18 pk yrs)    Smokeless Tobacco Use  Never Used          Alcohol History     Alcohol Use Status  Yes Comment  glass of wine with dinner          Drug Use     Drug Use Status  No          Sexual Activity     Sexually Active  Not Currently Partners  Male Comment              /74   Pulse 70   Temp 98.2 °F (36.8 °C)   Ht 5' 6\" (1.676 m)   Wt 142 lb 6.4 oz (64.6 kg)   SpO2 99%   BMI 22.98 kg/m²     EXAM:   Physical Exam  Vitals and nursing note reviewed. Constitutional:       General: She is not in acute distress. Appearance: Normal appearance. She is well-developed. She is not ill-appearing. HENT:      Head: Normocephalic and atraumatic. Right Ear: Tympanic membrane normal.      Left Ear: Tympanic membrane normal.      Nose: Nose normal.      Mouth/Throat:      Mouth: Mucous membranes are moist.   Eyes:      Pupils: Pupils are equal, round, and reactive to light. Cardiovascular:      Rate and Rhythm: Normal rate and regular rhythm.    Pulmonary:      Effort: Pulmonary effort is normal.      Breath sounds: Normal breath sounds. Abdominal:      General: Bowel sounds are normal.      Palpations: Abdomen is soft. Musculoskeletal:      Cervical back: Normal range of motion. Comments: ambulating with a walker     Skin:     General: Skin is warm and dry. Neurological:      Mental Status: She is alert and oriented to person, place, and time. Mental status is at baseline. Psychiatric:         Mood and Affect: Mood normal.         Thought Content: Thought content normal.          Tiffanie Pfeiffer was seen today for hypothyroidism and follow-up from hospital.    Diagnoses and all orders for this visit:    Acute cerebrovascular accident (CVA) ([facility]  Comments:  on plavix and rosubvastatin  doing well  labs in 2 months    Mixed hyperlipidemia  Comments:  tolerating statin  repeat labs in 2 months  Orders:  -     CBC Auto Differential; Future  -     Comprehensive Metabolic Panel; Future  -     Lipid Panel; Future    Acquired hypothyroidism  Comments:  has been stable  labs in 6 months  continue dose for now, refills sent  Orders:  -     levothyroxine (SYNTHROID) 50 MCG tablet; Take 1 tablet by mouth daily    Type 2 diabetes mellitus without complication, without long-term current use of insulin (HCC)  Comments:  remains stable with an a1c 6.2%  Orders:  -     CBC Auto Differential; Future  -     Comprehensive Metabolic Panel; Future    Other orders  -     clopidogrel (PLAVIX) 75 MG tablet; Take 1 tablet by mouth daily  -     rosuvastatin (CRESTOR) 5 MG tablet; Take 1 tablet by mouth nightly        I independently reviewed and updated the chief complaint, HPI, past medical and surgical history, medications, allergies and ROS as entered by the LPN. Seen by:   Melissa Julian DO

## 2021-10-07 ENCOUNTER — CARE COORDINATION (OUTPATIENT)
Dept: CASE MANAGEMENT | Age: 86
End: 2021-10-07

## 2021-10-18 ENCOUNTER — OFFICE VISIT (OUTPATIENT)
Dept: FAMILY MEDICINE CLINIC | Age: 86
End: 2021-10-18
Payer: MEDICARE

## 2021-10-18 VITALS
SYSTOLIC BLOOD PRESSURE: 122 MMHG | DIASTOLIC BLOOD PRESSURE: 72 MMHG | TEMPERATURE: 96.8 F | WEIGHT: 139.6 LBS | HEIGHT: 66 IN | RESPIRATION RATE: 18 BRPM | HEART RATE: 74 BPM | BODY MASS INDEX: 22.43 KG/M2 | OXYGEN SATURATION: 95 %

## 2021-10-18 DIAGNOSIS — R26.9 ABNORMALITY OF GAIT AND MOBILITY: ICD-10-CM

## 2021-10-18 DIAGNOSIS — M79.10 MYALGIA: ICD-10-CM

## 2021-10-18 DIAGNOSIS — I63.9 ACUTE CEREBROVASCULAR ACCIDENT (CVA) (HCC): Primary | ICD-10-CM

## 2021-10-18 PROCEDURE — 99214 OFFICE O/P EST MOD 30 MIN: CPT | Performed by: NURSE PRACTITIONER

## 2021-10-18 ASSESSMENT — ENCOUNTER SYMPTOMS
ABDOMINAL PAIN: 0
WHEEZING: 0
NAUSEA: 0
VOMITING: 0
CHEST TIGHTNESS: 0
COUGH: 0
DIARRHEA: 0
SHORTNESS OF BREATH: 0
CONSTIPATION: 0

## 2021-10-18 NOTE — PROGRESS NOTES
Chief Complaint:   Dizziness (patient stated that she had gotten out of the hospital after being in there for 8.5 days. Kelli Woods 2.5weeks ago. . she was in for a stroke/fall. . hurt back of head. . still having some discomfort. . she felt fine when she first got out of the hospital.. now she feels she isn't getting any better. . Saturday her hands/feet were numb when she woke up in the AM.. now she states that they are better. Kelli Woods Her balance she noticed is off. . she does not feel like doing anything  ) and Fatigue    History of Present Illness   HPI:  Tera Kruse is a 80 y.o. female who presents to [facility] today for multiple complaints. Patient reports she had a stroke on 9/16/2021. Reports her symptoms at that time included ataxia and headache. She reports that she recently has been having a harder time ambulating. She also has been having some numbness in her hands and feet that started 3 days ago when she wakes up and gets better throughout the day. She reports she did stop taking her Crestor as well as her Plavix on Saturday. She has been doing physical therapy however  states that they have mostly been talking or not actually doing physical therapy with her. She reports that she does ambulate well with her walker however has not needed her walker before this and has to hold onto stuff to walk. She does have an appointment with neurology and 4 days. She denies any recent fever, chills, nausea, vomiting, diarrhea, chest pain, shortness of breath, unilateral weakness, word slurring or facial droop. Prior to Visit Medications    Medication Sig Taking?  Authorizing Provider   levothyroxine (SYNTHROID) 50 MCG tablet Take 1 tablet by mouth daily Yes Micki Lawson, DO   clopidogrel (PLAVIX) 75 MG tablet Take 1 tablet by mouth daily Yes Micki Lawson, DO   triamcinolone (NASACORT) 55 MCG/ACT nasal inhaler 2 sprays by Each Nostril route daily Yes Historical Provider, MD   diphenoxylate-atropine (LOMOTIL) 2.5-0.025 MG per tablet Take 1 tablet by mouth 4 times daily as needed for Diarrhea. Yes Historical Provider, MD   acetaminophen (TYLENOL) 500 MG tablet Take 500 mg by mouth every 6 hours as needed for Pain Yes Historical Provider, MD   amLODIPine (NORVASC) 2.5 MG tablet Take 1 tablet by mouth daily Yes Karan Pelayo DO   loratadine (CLARITIN) 10 MG tablet Take 10 mg by mouth daily as needed (ALLERGIES)  Yes Historical Provider, MD   aspirin 81 MG tablet Take 81 mg by mouth daily Yes Historical Provider, MD   rosuvastatin (CRESTOR) 5 MG tablet Take 1 tablet by mouth nightly  Patient not taking: Reported on 10/18/2021  Tarun Levo, DO     Review of Systems   Review of Systems   Constitutional: Negative for activity change, chills and fever. HENT: Negative for congestion. Respiratory: Negative for cough, chest tightness, shortness of breath and wheezing. Cardiovascular: Negative for chest pain, palpitations and leg swelling. Gastrointestinal: Negative for abdominal pain, constipation, diarrhea, nausea and vomiting. Genitourinary: Negative for dysuria and urgency. Musculoskeletal: Positive for gait problem and myalgias. Negative for neck pain. Neurological: Positive for headaches. Negative for dizziness, syncope, facial asymmetry, speech difficulty, weakness, light-headedness and numbness. Psychiatric/Behavioral: The patient is not nervous/anxious. Patient's medical, social, and family history reviewed    Past Medical History:  has a past medical history of Dementia ([facility] Diverticulosis, GIST (gastrointestinal stroma tumor), malignant, colon ([address] Hyperlipidemia, Hypertension, MI (myocardial infarction) ([facility] Mitral regurgitation, and PUD (peptic ulcer disease). Past Surgical History:  has a past surgical history that includes Hysterectomy; Upper gastrointestinal endoscopy (12/2014); Colonoscopy (12/2014);  Upper gastrointestinal endoscopy (5/25/2016); shoulder surgery (Left); Endoscopy, colon, diagnostic (09/02/2016); Dilatation, esophagus; other surgical history (09/09/2016); Carotid endarterectomy (Right, 04/2019); Tonsillectomy and adenoidectomy; and Cataract removal with implant (Bilateral, 2015). Social History:  reports that she quit smoking about 52 years ago. She has a 18.00 pack-year smoking history. She has never used smokeless tobacco. She reports current alcohol use. She reports that she does not use drugs. Family History: family history includes COPD in her mother; Cancer in her sister; Colon Cancer in her father. Allergies: Carafate [sucralfate], Cefdinir, Cetirizine & related, Lisinopril, Pravachol [pravastatin sodium], Protonix [pantoprazole sodium], and Statins    Physical Exam   Vital Signs:  /72   Pulse 74   Temp 96.8 °F (36 °C)   Resp 18   Ht 5' 6\" (1.676 m)   Wt 139 lb 9.6 oz (63.3 kg)   SpO2 95%   BMI 22.53 kg/m²    Oxygen Saturation Interpretation: Normal.    Physical Exam  Vitals reviewed. Constitutional:       Appearance: Normal appearance. She is well-developed. She is not toxic-appearing. HENT:      Head: Normocephalic and atraumatic. Right Ear: Hearing normal.      Left Ear: Hearing normal.      Nose: Nose normal.      Mouth/Throat:      Lips: Pink. Mouth: Mucous membranes are moist.      Pharynx: Oropharynx is clear. Uvula midline. Eyes:      General: Lids are normal.      Conjunctiva/sclera: Conjunctivae normal.      Pupils: Pupils are equal, round, and reactive to light. Neck:      Trachea: Trachea normal.   Cardiovascular:      Rate and Rhythm: Normal rate and regular rhythm. Pulses: Normal pulses. Heart sounds: Normal heart sounds. Pulmonary:      Effort: Pulmonary effort is normal.      Breath sounds: Normal breath sounds. Abdominal:      General: Abdomen is flat. Bowel sounds are normal.      Palpations: Abdomen is soft. Musculoskeletal:      Cervical back: Normal range of motion.       Comments: with patient. Patient verbalized understanding is agreeable plan of care. All questions answered. Assessment / Plan   Impression(s):  Sanjuana Elizondo was seen today for dizziness and fatigue. Diagnoses and all orders for this visit:    Acute cerebrovascular accident (CVA) ([facility] [address]    Myalgia    Abnormality of gait and mobility    Discharged home. Patient condition is good    Return if symptoms worsen or fail to improve. New Medications     New Prescriptions    No medications on file     Electronically signed by NOHELIA Márquez CNP   DD: 10/18/21    **This report was transcribed using voice recognition software. Every effort was made to ensure accuracy; however, inadvertent computerized transcription errors may be present.

## 2021-10-29 ENCOUNTER — OFFICE VISIT (OUTPATIENT)
Dept: NEUROLOGY | Age: 86
End: 2021-10-29
Payer: MEDICARE

## 2021-10-29 VITALS
WEIGHT: 143 LBS | BODY MASS INDEX: 25.34 KG/M2 | SYSTOLIC BLOOD PRESSURE: 128 MMHG | OXYGEN SATURATION: 96 % | HEART RATE: 73 BPM | HEIGHT: 63 IN | RESPIRATION RATE: 18 BRPM | TEMPERATURE: 97.2 F | DIASTOLIC BLOOD PRESSURE: 79 MMHG

## 2021-10-29 DIAGNOSIS — R41.89 COGNITIVE IMPAIRMENT: ICD-10-CM

## 2021-10-29 DIAGNOSIS — I63.9 ISCHEMIC STROKE (HCC): Primary | ICD-10-CM

## 2021-10-29 PROCEDURE — 99215 OFFICE O/P EST HI 40 MIN: CPT | Performed by: NURSE PRACTITIONER

## 2021-10-29 PROCEDURE — 1123F ACP DISCUSS/DSCN MKR DOCD: CPT | Performed by: NURSE PRACTITIONER

## 2021-10-29 NOTE — PROGRESS NOTES
STROKE CLINIC VISIT      Katharina Sheikh is a 80 y.o. right handed female who presents following recent hospitalization for acute ischemic stroke. She presented to the hospital for persistent headache over the prior week starting after a fall. She reports she was on a step ladder attempting to grab something overhead when she fell backwards. She had dizziness, slurred speech and diplopia at that time. Upon arrival to the emergency department, /80. 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 MRI of the brain demonstrated an early subacute infarct in the right paracentral sarai. She was started on dual antiplatelet therapy. She has a previous intolerance to multiple statins. She was discharged home     She is currently receiving home physical and occupational therapy. She feels she is 70% better from initial onset of her deficits. She notes that some activities take her longer to do as she has difficulty processing the information. She gives the example of doing the score sheet for bridge and this takes her an hour to complete as to 15 minutes before her stroke. She denies any recent falls or injuries. She feels her baseline is improving as uses a walker as needed. Her  reports she has \"given up on the walker. \"    Current Functional Status(Modified John Scale):   2=Slight disability; unable to carry out all previous activities, but able to look after own affairs without assistance      Past Medical History:         Diagnosis Date    Dementia ([address]     Diverticulosis     GIST (gastrointestinal stroma tumor), malignant, colon ([address]     Hyperlipidemia     Hypertension     MI (myocardial infarction) ([address] 2015    Mitral regurgitation     per cardiology    PUD (peptic ulcer disease)         Past Surgical History:         Procedure Laterality Date    CAROTID ENDARTERECTOMY Right 04/2019    Dr Josue Diaz WITH IMPLANT Bilateral 2015    COLONOSCOPY  12/2014    DILATATION, ESOPHAGUS      ENDOSCOPY, COLON, DIAGNOSTIC  09/02/2016    marking of gastric tumor    HYSTERECTOMY      YUMIKO/BSO    OTHER SURGICAL HISTORY  09/09/2016    Laparoscopic Robotic Assisted Partial Gastrectomy    SHOULDER SURGERY Left     rotator cuff right    TONSILLECTOMY AND ADENOIDECTOMY      UPPER GASTROINTESTINAL ENDOSCOPY  12/2014    UPPER GASTROINTESTINAL ENDOSCOPY  5/25/2016      Review of Systems:     No chest pain, palpitations or shortness of breath  No vertigo, lightheadedness or loss of consciousness  No falls, tripping or stumbling +dizziness  No incontinence of bowels or bladder  No numbness, tingling + focal arm/leg weakness  No swallowing or speech difficulties  No blurred vision, double vision or vision loss     ROS otherwise negative    Objective:     /79 (Site: Left Upper Arm, Position: Sitting, Cuff Size: Medium Adult)   Pulse 73   Temp 97.2 °F (36.2 °C) (Infrared)   Resp 18   Ht 5' 3\" (1.6 m)   Wt 143 lb (64.9 kg)   SpO2 96%   BMI 25.33 kg/m²     General Appearance: alert, cooperative, no distress, appears stated age    Head: normocephalic, without obvious abnormality, atraumatic    Eyes: conjunctiva/corneas clear    Neck: supple, symmetrical, trachea midline, no carotid bruit    Lungs: clear to auscultation bilaterally, respirations unlabored    Heart: regular rate and rhythm, S1 and S2 normal   Extremities: normal, atraumatic, no cyanosis or edema   Pulses: 2+ and symmetric all extremities   Skin: color, texture and turgor normal, no rashes or lesions     Mental Status: alert and oriented, thought content appropriate, memories questionable  Speech: no dysarthria  Language: no aphasia    Cranial Nerves:  I: smell NA   II: visual acuity  NA   II: visual fields Full    II: pupils Equal and reactive    III,VII: ptosis None   III,IV,VI: extraocular muscles  Full ROM without nystagmus   V: mastication Normal   V: facial light touch sensation  Normal   V,VII: corneal reflex     VII: facial muscle function - upper  Normal   VII: facial muscle function - lower Normal   VIII: hearing Normal   IX: soft palate elevation  Normal   IX,X: gag reflex    XI: trapezius strength  5/5   XI: sternocleidomastoid strength 5/5   XI: neck extension strength  5/5   XII: tongue strength  Normal     Motor:  5/5 throughout  No abnormal movements  No drift   Normal bulk and tone     Sensory:  LT intact in all four extremities    Coordination:   FN, FFM slightly decreased on the left    Gait:  Normal    DTR:   2+ throughout     Laboratory/Radiology:     CBC:   Lab Results   Component Value Date    WBC 5.1 09/22/2021    RBC 4.70 09/22/2021    HGB 13.9 09/22/2021    HCT 44.0 09/22/2021    MCV 93.6 09/22/2021    MCH 29.6 09/22/2021    MCHC 31.6 09/22/2021    RDW 13.1 09/22/2021     09/22/2021    MPV 11.7 09/22/2021     CMP:    Lab Results   Component Value Date     09/22/2021    K 3.9 09/22/2021    K 4.3 09/20/2021     09/22/2021    CO2 19 09/22/2021    BUN 19 09/22/2021    CREATININE 0.8 09/22/2021    GFRAA >60 09/22/2021    LABGLOM >60 09/22/2021    GLUCOSE 103 09/22/2021    GLUCOSE 97 12/27/2011    PROT 6.6 09/19/2021    LABALBU 3.9 09/19/2021    LABALBU 4.3 12/27/2011    CALCIUM 9.4 09/22/2021    BILITOT 0.4 09/19/2021    ALKPHOS 115 09/19/2021    AST 20 09/19/2021    ALT 14 09/19/2021     HgBA1c:    Lab Results   Component Value Date    LABA1C 6.2 09/19/2021     FLP:    Lab Results   Component Value Date    TRIG 217 09/19/2021    HDL 63 09/19/2021    LDLCALC 229 09/19/2021    LABVLDL 43 09/19/2021     CT head without contrast 09/16/2021  1. No acute intracranial abnormality. 2. Chronic small vessel ischemic disease. 3. Persistent large right mastoid effusion.    4. Decreasing posterior scalp hematoma     CTA head/neck with contrast + CT brain perfusion 9/16/2021  CTA neck:       Mild stenosis at the origin of right common carotid artery.       Moderate stenosis at the origin right subclavian artery.       Mild stenosis at the origin of bilateral cervical ICA.     18 mm ground-glass area within the right upper lobe may be of   infectious/inflammatory or neoplastic in etiology.  For further evaluation   dedicated chest CT examination can be obtained.       CTA HEAD:       2 mm inferiorly projecting outpouching arising from the left supraclinoid   carotid artery mostly consistent with an aneurysm.       Right vertebral artery is hypoplastic and terminates in right PICA,   anatomical variation.       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       CT PERFUSION:       No infarct core. No perfusion mismatch. MRI brain without contrast 09/20/2021  1. Acute or early subacute foci of infarction in the right paracentral sarai. 2. No mass effect, hemorrhage or midline shift. * Images and labs personally reviewed at the time of this office visit    Assessment     Acute ischemic stroke involving the right paracentral sarai with left body hemiparesis and imbalance issues. She notes difficulty processing information for which she will be referred for formal cognitive evaluation- possible underlying dementia. 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 She remains on dual antiplatelet therapy for 90 days and to transition to aspirin monotherapy thereafter. Follow up with neurology in 3-4 months   Follow up with PCP as planned  Refer for formal cognitive evaluation    NOHELIA Burns CNP  10:22 AM  10/29/21    I spent 40 minutes with this patient obtaining the HPI and discussing test results and exam with 50% of the visit counseling the patient and/or family on diagnosis, risk factor modification and our plan of action. All questions were answered prior to leaving my office.

## 2021-10-29 NOTE — PATIENT INSTRUCTIONS
Enteric-coated aspirin 81 mg daily + clopidogrel 75 mg daily for 3 months total (completing around [place] on 12/23/2021)  Afterwards, aspirin daily stopping clopidogrel.

## 2021-11-03 ENCOUNTER — TELEPHONE (OUTPATIENT)
Dept: NEUROLOGY | Age: 86
End: 2021-11-03

## 2021-11-03 NOTE — TELEPHONE ENCOUNTER
Patient called in asking if she is able to drive, states she was waiting on a note? She was not clear as to what she was asking.    Please Advise  Electronically signed by Scott Hinson on 11/3/21 at 11:21 AM EDT

## 2021-11-04 ENCOUNTER — TELEPHONE (OUTPATIENT)
Dept: NEUROLOGY | Age: 86
End: 2021-11-04

## 2021-11-04 NOTE — TELEPHONE ENCOUNTER
She was not advised to drive at this time due to processing issues and is to see speech for formal cognitive evaluation.

## 2021-11-10 ENCOUNTER — TELEPHONE (OUTPATIENT)
Dept: NEUROLOGY | Age: 86
End: 2021-11-10

## 2021-11-10 NOTE — TELEPHONE ENCOUNTER
[facility] [facility] called stating that patient does not qualify for Home PT did not meet medicare quidelines. Also patient is driving.   Electronically signed by William Kinsey on 11/10/21 at 11:15 AM EST

## 2021-11-11 DIAGNOSIS — I63.9 ISCHEMIC STROKE (HCC): Primary | ICD-10-CM

## 2021-11-11 DIAGNOSIS — R41.89 COGNITIVE IMPAIRMENT: ICD-10-CM

## 2021-11-17 ENCOUNTER — HOSPITAL ENCOUNTER (OUTPATIENT)
Dept: SPEECH THERAPY | Age: 86
Setting detail: THERAPIES SERIES
Discharge: HOME | End: 2021-11-17
Payer: MEDICARE

## 2021-11-17 PROCEDURE — 96125 COGNITIVE TEST BY HC PRO: CPT

## 2021-11-17 NOTE — PROGRESS NOTES
SPEECH/LANGUAGE PATHOLOGY  ROSS INFORMATION PROCESSING ASSESSMENT-2 (RIPA-2)   EVALUATION RESULTS and PLAN OF CARE    PATIENT NAME:  Lizbet Ross  (female)     MRN:  [id number]    :  1934  (80 y.o.)  STATUS:  Outpatient clinic   TODAY'S DATE:  2021  REFERRING PROVIDER:    NOHELIA Arechiga   SPECIFIC PROVIDER ORDER: SLP cognitive language evaluation  Date of order:  21  REASON FOR REFERRAL: Assess Cognition  EVALUATING THERAPIST: ADAM Martinez  CERTIFICATION/RECERTIFICATION PERIOD: 2021 to 2/15/22  REFERRING DIAGNOSIS: Other symptoms and signs involving cognitive functions and awareness [R41.89]  Cerebral infarction, unspecified [I63.9]    ADMITTING DIAGNOSIS: Other symptoms and signs involving cognitive functions and awareness [R41.89]  Cerebral infarction, unspecified [I63.9]    VISIT DIAGNOSIS:  Cognitive Impairment (R41.89 [ICD-10-CM]); Ischemic stroke (HCC) (I63.9 [IC-10-CM])    SPEECH THERAPY  PLAN OF CARE   The speech therapy  POC is established based on physician order, speech pathology diagnosis and results of clinical assessment     SPEECH PATHOLOGY DIAGNOSIS:   Within Functional Limits    Speech Pathology intervention is not warranted at this time. Conditions Requiring Skilled Therapeutic Intervention for speech, language and/or cognition    Not applicable    Specific Speech Therapy Interventions to Include:   Not applicable    Specific instructions for next treatment:    not applicable    SHORT/LONG TERM GOALS  Not applicable. Therapy is not recommended    Patient goals: Patient/family involved in developing goals and treatment plan:   Treatment goals discussed with Patient and Family    The Patient and Family understand(s) the diagnosis, prognosis and plan of care   The patient/family Agreed with above,     This plan may be re-evaluated and revised as warranted.         Rehabilitation Potential/Prognosis: good                           Patient was pleasant and cooperative. Patient is hard of hearing. It was important for the SLP to face the patient and use loud, clear speech. Patient reports she feels her memory almost back to baseline functioning prior to her stroke. Patient reports it is her goal to drive again; patient denies that she would have any trouble driving. Stimulus questions were obtained from the RIPA-2. There are 30 possible points for each subtest.   Severity ratings for each subtest were determined as follows:     RAW SCORE  SEVERITY    28-30  Within functional limits    25-27  Mild deficit    22-24  Moderate deficit    19-21  Marked deficit    16-18 Severe deficit   Below 16 Profound deficit         Subtest  Raw Score /Severity    Immediate Memory  28/WFL   Recent Memory  29/WFL   Temporal Orientation   (Recent Memory)  30/WFL   Temporal Orientation   (Remote Memory)  30/WFL   Spatial Orientation  30/WFL   Orientation to Environment  30/WFL   Recall of General Information  29/WFL   Problem Solving & Abstract Reasoning  30/WFL   Organization  30/WFL   Auditory Processing   & Retention  30/WFL     VARIANT OBSERVATIONS PRESENT DURING THE EVALATION:   None                               EDUCATION:   The Speech Language Pathologist (SLP) completed education regarding results of evaluation and that intervention is not warranted at this time. Learner: Patient and Significant Other  Education: Reviewed results and recommendations of this evaluation  Evaluation of Education:  Verbalizes understanding    This plan may be re-evaluated and revised as warranted.       Treatment goals discussed with Patient and Family   The Patient and Family understand(s) the diagnosis, prognosis and plan of care     Evaluation Time includes thorough review of current medical information, gathering information on past medical history/social history and prior level of function, completion of standardized testing/informal observation of tasks, assessment of data and education on plan of care and goals. CPT Codes    Evaluation: 89217  standardized cognitive performance testing (per hour)    The admitting diagnosis and active problem list, as listed below have been reviewed prior to initiation of this evaluation. 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 [phone]  11/17/2021      [address]        Phone: [phone]     If you have any questions or concerns, please don't hesitate to call. Thank you for your referral.    Physician/Provider Signature:________________________________Date:__________________  By signing above, the therapists plan is approved by the physician/provider. All patients under [employer] must be signed by physician/provider.

## 2021-11-19 ENCOUNTER — TELEPHONE (OUTPATIENT)
Dept: NEUROLOGY | Age: 86
End: 2021-11-19

## 2021-11-19 NOTE — TELEPHONE ENCOUNTER
Pt  called on behalf of patient , states that she completed her cognitive eval and passed. Now she is requesting to drive. Please advise.

## 2021-11-19 NOTE — TELEPHONE ENCOUNTER
She is not my patient. She was evaluated by Chantelle Giron in the stroke clinic. Please ask her.   Thank you

## 2021-11-24 ENCOUNTER — OFFICE VISIT (OUTPATIENT)
Dept: FAMILY MEDICINE CLINIC | Age: 86
End: 2021-11-24
Payer: MEDICARE

## 2021-11-24 ENCOUNTER — NURSE TRIAGE (OUTPATIENT)
Dept: OTHER | Facility: CLINIC | Age: 86
End: 2021-11-24

## 2021-11-24 VITALS
WEIGHT: 145 LBS | SYSTOLIC BLOOD PRESSURE: 120 MMHG | BODY MASS INDEX: 23.3 KG/M2 | HEART RATE: 70 BPM | TEMPERATURE: 98.4 F | HEIGHT: 66 IN | DIASTOLIC BLOOD PRESSURE: 62 MMHG | OXYGEN SATURATION: 97 %

## 2021-11-24 VITALS
HEIGHT: 66 IN | TEMPERATURE: 98.4 F | DIASTOLIC BLOOD PRESSURE: 62 MMHG | SYSTOLIC BLOOD PRESSURE: 120 MMHG | BODY MASS INDEX: 23.31 KG/M2 | WEIGHT: 145.06 LBS | HEART RATE: 70 BPM | OXYGEN SATURATION: 97 %

## 2021-11-24 DIAGNOSIS — Z00.00 ROUTINE GENERAL MEDICAL EXAMINATION AT A HEALTH CARE FACILITY: Primary | ICD-10-CM

## 2021-11-24 DIAGNOSIS — R53.83 FATIGUE, UNSPECIFIED TYPE: Primary | ICD-10-CM

## 2021-11-24 DIAGNOSIS — N30.90 CYSTITIS: ICD-10-CM

## 2021-11-24 DIAGNOSIS — R30.0 DYSURIA: ICD-10-CM

## 2021-11-24 LAB
BILIRUBIN, POC: ABNORMAL
BLOOD URINE, POC: ABNORMAL
CLARITY, POC: ABNORMAL
COLOR, POC: YELLOW
GLUCOSE URINE, POC: ABNORMAL
KETONES, POC: ABNORMAL
LEUKOCYTE EST, POC: ABNORMAL
NITRITE, POC: ABNORMAL
PH, POC: 5
PROTEIN, POC: ABNORMAL
SPECIFIC GRAVITY, POC: >=1.03
UROBILINOGEN, POC: 1

## 2021-11-24 PROCEDURE — 99214 OFFICE O/P EST MOD 30 MIN: CPT | Performed by: FAMILY MEDICINE

## 2021-11-24 PROCEDURE — G0439 PPPS, SUBSEQ VISIT: HCPCS | Performed by: FAMILY MEDICINE

## 2021-11-24 PROCEDURE — 81002 URINALYSIS NONAUTO W/O SCOPE: CPT | Performed by: FAMILY MEDICINE

## 2021-11-24 RX ORDER — CIPROFLOXACIN 250 MG/1
250 TABLET, FILM COATED ORAL 2 TIMES DAILY
Qty: 6 TABLET | Refills: 0 | Status: SHIPPED | OUTPATIENT
Start: 2021-11-24 | End: 2021-11-27

## 2021-11-24 RX ORDER — CHLORAL HYDRATE 500 MG
1000 CAPSULE ORAL DAILY
COMMUNITY
End: 2022-05-23

## 2021-11-24 ASSESSMENT — LIFESTYLE VARIABLES
AUDIT TOTAL SCORE: INCOMPLETE
AUDIT-C TOTAL SCORE: INCOMPLETE
HOW OFTEN DO YOU HAVE A DRINK CONTAINING ALCOHOL: NEVER
HOW OFTEN DO YOU HAVE A DRINK CONTAINING ALCOHOL: 0

## 2021-11-24 ASSESSMENT — ENCOUNTER SYMPTOMS
NAUSEA: 0
TROUBLE SWALLOWING: 0
SHORTNESS OF BREATH: 0
CONSTIPATION: 0
SINUS PAIN: 0
VOMITING: 0
CHEST TIGHTNESS: 0
EYE PAIN: 0
DIARRHEA: 0
WHEEZING: 0
COUGH: 0
SORE THROAT: 0
BACK PAIN: 0
ABDOMINAL PAIN: 0

## 2021-11-24 ASSESSMENT — PATIENT HEALTH QUESTIONNAIRE - PHQ9
SUM OF ALL RESPONSES TO PHQ9 QUESTIONS 1 & 2: 0
SUM OF ALL RESPONSES TO PHQ QUESTIONS 1-9: 0
1. LITTLE INTEREST OR PLEASURE IN DOING THINGS: 0
SUM OF ALL RESPONSES TO PHQ QUESTIONS 1-9: 0
2. FEELING DOWN, DEPRESSED OR HOPELESS: 0
SUM OF ALL RESPONSES TO PHQ QUESTIONS 1-9: 0

## 2021-11-24 NOTE — PROGRESS NOTES
Medicare Annual Wellness Visit  Name: Nay Samson Date: 2021   MRN: [id number] Sex: Female   Age: 80 y.o. Ethnicity: Non- / Non    : 1934 Race: White (non-)      Wendie Srinivasan is here for Medicare AWV    Screenings for behavioral, psychosocial and functional/safety risks, and cognitive dysfunction are all negative except as indicated below. These results, as well as other patient data from the [address] form, are documented in Flowsheets linked to this Encounter. Allergies   Allergen Reactions    Carafate [Sucralfate] Other (See Comments)     Unable to explain    Cefdinir Other (See Comments)     Unable to explain    Cetirizine & Related Other (See Comments)     Makes her forgetful and sleepy    Lisinopril Other (See Comments)     Difficulty swallowing, reflux, headache, dizziness, leg cramps    Pravachol [Pravastatin Sodium] Other (See Comments)     Patient states \"it almost killed me. \" Spoke with Patient's Daughter and she stated \"body cramps and extreme weakness. \"    Protonix [Pantoprazole Sodium] Other (See Comments)     Unable to explain    Statins Other (See Comments)     Intolerable. Patient states \"it almost killed me. \" Spoke with Patient's Daughter and she stated \"body cramps and extreme weakness. \"         Prior to Visit Medications    Medication Sig Taking?  Authorizing Provider   Omega-3 Fatty Acids (FISH OIL) 1000 MG CAPS Take 1,000 mg by mouth daily  Historical Provider, MD   ciprofloxacin (CIPRO) 250 MG tablet Take 1 tablet by mouth 2 times daily for 3 days  Emmett Carmona,    levothyroxine (SYNTHROID) 50 MCG tablet Take 1 tablet by mouth daily  Emmett Carmona DO   clopidogrel (PLAVIX) 75 MG tablet Take 1 tablet by mouth daily  Emmett Carmona DO   triamcinolone (NASACORT) 55 MCG/ACT nasal inhaler 2 sprays by Each Nostril route daily  Historical Provider, MD   amLODIPine (NORVASC) 2.5 MG tablet Take 1 tablet by mouth daily Prem Chaidez DO   loratadine (CLARITIN) 10 MG tablet Take 10 mg by mouth daily as needed (ALLERGIES)   Historical Provider, MD   aspirin 81 MG tablet Take 81 mg by mouth daily   Historical Provider, MD         Past Medical History:   Diagnosis Date    Dementia ([facility]     Diverticulosis     GIST (gastrointestinal stroma tumor), malignant, colon ([facility]     Hyperlipidemia     Hypertension     MI (myocardial infarction) ([facility] 2015    Mitral regurgitation     per cardiology    PUD (peptic ulcer disease)        Past Surgical History:   Procedure Laterality Date    CAROTID ENDARTERECTOMY Right 04/2019    Dr Gerri Soriano Bilateral 2015    COLONOSCOPY  12/2014    DILATATION, ESOPHAGUS      ENDOSCOPY, COLON, DIAGNOSTIC  09/02/2016    marking of gastric tumor    HYSTERECTOMY      YUMIKO/BSO    OTHER SURGICAL HISTORY  09/09/2016    Laparoscopic Robotic Assisted Partial Gastrectomy    SHOULDER SURGERY Left     rotator cuff right    TONSILLECTOMY AND ADENOIDECTOMY      UPPER GASTROINTESTINAL ENDOSCOPY  12/2014    UPPER GASTROINTESTINAL ENDOSCOPY  5/25/2016         Family History   Problem Relation Age of Onset    Colon Cancer Father     COPD Mother         tobacco    Cancer Sister         breast       CareTeam (Including outside providers/suppliers regularly involved in providing care):   Patient Care Team:  Evangelista Zafar DO as PCP - General (Family Medicine)  Evangelista Zafar DO as PCP - [facility] Empaneled Provider  Prem Chaidez DO as Consulting Physician (Cardiology)  Madhu Silva MD as Surgeon (Cardiothoracic Surgery)    Wt Readings from Last 3 Encounters:   11/24/21 145 lb 1 oz (65.8 kg)   11/24/21 145 lb (65.8 kg)   10/29/21 143 lb (64.9 kg)     Vitals:    11/24/21 1443   BP: 120/62   Pulse: 70   Temp: 98.4 °F (36.9 °C)   SpO2: 97%   Weight: 145 lb 1 oz (65.8 kg)   Height: 5' 6\" (1.676 m)     Body mass index is 23.41 kg/m².     Based upon direct observation of the patient, evaluation of cognition reveals recent and remote memory intact. General Appearance: alert and oriented to person, place and time, well developed and well- nourished, in no acute distress  Skin: warm and dry, no rash or erythema  Head: normocephalic and atraumatic  Eyes: pupils equal, round, and reactive to light, extraocular eye movements intact, conjunctivae normal  ENT: tympanic membrane, external ear and ear canal normal bilaterally, nose without deformity, nasal mucosa and turbinates normal without polyps  Neck: supple and non-tender without mass, no thyromegaly or thyroid nodules, no cervical lymphadenopathy  Pulmonary/Chest: clear to auscultation bilaterally- no wheezes, rales or rhonchi, normal air movement, no respiratory distress  Cardiovascular: normal rate, regular rhythm, normal S1 and S2, no murmurs, rubs, clicks, or gallops, distal pulses intact, no carotid bruits  Abdomen: soft, non-tender, non-distended, normal bowel sounds, no masses or organomegaly  Extremities: no cyanosis, clubbing or edema  Musculoskeletal: normal range of motion, no joint swelling, deformity or tenderness  Neurologic: reflexes normal and symmetric, no cranial nerve deficit,  speech normal but gait unsteady nad coordination off today    Patient's complete Health Risk Assessment and screening values have been reviewed and are found in Flowsheets. The following problems were reviewed today and where indicated follow up appointments were made and/or referrals ordered.     Positive Risk Factor Screenings with Interventions:     Fall Risk:  2 or more falls in past year?: no  Fall with injury in past year?: (!) yes  Fall Risk Interventions:    · Home safety tips provided  · Home exercises provided to promote strength and balance         Health Habits/Nutrition:  Health Habits/Nutrition  Do you exercise for at least 20 minutes 2-3 times per week?: (!) No  Have you lost any weight without trying in the and patient instructions/AVS.  . Recommended screening schedule for the next 5-10 years is provided to the patient in written form: see Patient Stephen Cornea was seen today for medicare aw.     Diagnoses and all orders for this visit:    Routine general medical examination at a health care facility

## 2021-11-24 NOTE — PROGRESS NOTES
21    Name: Jack Wells  W   Sex:female   Age:87 y.o. Chief Complaint   Patient presents with    Extremity Weakness    Fatigue     Patient presents to office for visit. She says for the past three days she has felt progressively more tired and weak each day. Patient says her head feels much better and she isn't having any pain there. She wants to sleep all of the time and she feels like a little old lady when she is walking around. Patient  here for fatigue and just not feeling well the last few days  She feels it has started suddenly  She also has had some back pain and discomfort  But tyelenol helped  Feels a little off balance at times but she does not know why and cannot predict when it is going to happen  She thins she is eating pretty good  Weight is good  But she is not drinking nearly enough fluids  So far today has had 2 small cups of coffee  Yesterday the same with a few sips of wter with her pills  She may need to really strt to drink more water/fluids  Anything really just more fluids'    No headaches, no cough, no fevers  No GI complaints  No nausea  No shortness of breath          Review of Systems   Constitutional: Positive for fatigue. Negative for appetite change and fever. HENT: Negative for congestion, ear pain, sinus pain, sore throat and trouble swallowing. Eyes: Negative for pain. Respiratory: Negative for cough, chest tightness, shortness of breath and wheezing. Cardiovascular: Negative for chest pain, palpitations and leg swelling. Gastrointestinal: Negative for abdominal pain, constipation, diarrhea, nausea and vomiting. Endocrine: Negative for cold intolerance and heat intolerance. Genitourinary: Negative for difficulty urinating, dysuria, frequency, hematuria and pelvic pain. Musculoskeletal: Negative for arthralgias, back pain, gait problem, joint swelling and myalgias. Skin: Negative for rash and wound.    Neurological: Positive for weakness. Negative for dizziness, syncope, light-headedness and headaches. Hematological: Negative for adenopathy. Psychiatric/Behavioral: Negative for confusion, dysphoric mood, self-injury, sleep disturbance and suicidal ideas. The patient is not nervous/anxious. Current Outpatient Medications:     Omega-3 Fatty Acids (FISH OIL) 1000 MG CAPS, Take 1,000 mg by mouth daily, Disp: , Rfl:     ciprofloxacin (CIPRO) 250 MG tablet, Take 1 tablet by mouth 2 times daily for 3 days, Disp: 6 tablet, Rfl: 0    aspirin 81 MG tablet, Take 81 mg by mouth daily , Disp: , Rfl:     levothyroxine (SYNTHROID) 50 MCG tablet, Take 1 tablet by mouth daily, Disp: 90 tablet, Rfl: 1    clopidogrel (PLAVIX) 75 MG tablet, Take 1 tablet by mouth daily, Disp: 90 tablet, Rfl: 0    triamcinolone (NASACORT) 55 MCG/ACT nasal inhaler, 2 sprays by Each Nostril route daily, Disp: , Rfl:     amLODIPine (NORVASC) 2.5 MG tablet, Take 1 tablet by mouth daily, Disp: 90 tablet, Rfl: 3    loratadine (CLARITIN) 10 MG tablet, Take 10 mg by mouth daily as needed (ALLERGIES) , Disp: , Rfl:   Allergies   Allergen Reactions    Carafate [Sucralfate] Other (See Comments)     Unable to explain    Cefdinir Other (See Comments)     Unable to explain    Cetirizine & Related Other (See Comments)     Makes her forgetful and sleepy    Lisinopril Other (See Comments)     Difficulty swallowing, reflux, headache, dizziness, leg cramps    Pravachol [Pravastatin Sodium] Other (See Comments)     Patient states \"it almost killed me. \" Spoke with Patient's Daughter and she stated \"body cramps and extreme weakness. \"    Protonix [Pantoprazole Sodium] Other (See Comments)     Unable to explain    Statins Other (See Comments)     Intolerable. Patient states \"it almost killed me. \" Spoke with Patient's Daughter and she stated \"body cramps and extreme weakness. \"      Past Medical History:   Diagnosis Date    [facility] [address]     Diverticulosis     GIST (gastrointestinal stroma tumor), malignant, colon ([address]     Hyperlipidemia     Hypertension     MI (myocardial infarction) ([address] 2015    Mitral regurgitation     per cardiology    PUD (peptic ulcer disease)      Patient Active Problem List    Diagnosis Date Noted    NSTEMI (non-ST elevated myocardial infarction) ([address] 06/03/2016    GIST (gastrointestinal stromal tumor), malignant ([address] 05/25/2016    Aortic dissection ([address] 09/20/2021    Pulmonary nodules 09/20/2021    Statin intolerance 09/20/2021    Acute cerebrovascular accident (CVA) ([address] 09/16/2021    Type 2 diabetes mellitus without complication, without long-term current use of insulin ([address] 01/20/2021    Dementia ([address]     Acquired hypothyroidism 11/20/2019    Hyperlipidemia 11/15/2019    Disorder of thyroid gland 11/15/2019    Carotid stenosis, right 11/15/2019    Onychomycosis 06/14/2019    Difficulty walking 06/14/2019    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 ([address] 06/14/2016    Malignant neoplasm of abdomen ([address] 06/14/2016    Hypertension     PUD (peptic ulcer disease)       Past Surgical History:   Procedure Laterality Date    CAROTID ENDARTERECTOMY Right 04/2019    Dr Anita Jackson Bilateral 2015    COLONOSCOPY  12/2014    DILATATION, ESOPHAGUS      ENDOSCOPY, COLON, DIAGNOSTIC  09/02/2016    marking of gastric tumor    HYSTERECTOMY      YUMIKO/BSO    OTHER SURGICAL HISTORY  09/09/2016    Laparoscopic Robotic Assisted Partial Gastrectomy    SHOULDER SURGERY Left     rotator cuff right    TONSILLECTOMY AND ADENOIDECTOMY      UPPER GASTROINTESTINAL ENDOSCOPY  12/2014    UPPER GASTROINTESTINAL ENDOSCOPY  5/25/2016      Social History     Tobacco History     Smoking Status  Former Smoker Quit date  1/1/1969 Smoking Frequency  1 pack/day for 18 years (18 pk yrs)    Smokeless Tobacco Use  Never Used          Alcohol History     Alcohol Use Status  Yes Comment  glass of wine with dinner Drug Use     Drug Use Status  No          Sexual Activity     Sexually Active  Not Currently Partners  Male Comment              /62   Pulse 70   Temp 98.4 °F (36.9 °C)   Ht 5' 6\" (1.676 m)   Wt 145 lb (65.8 kg)   SpO2 97%   BMI 23.40 kg/m²     EXAM:   Physical Exam  Vitals and nursing note reviewed. Constitutional:       General: She is not in acute distress. Appearance: She is well-developed. She is not ill-appearing. HENT:      Head: Normocephalic and atraumatic. Right Ear: Tympanic membrane normal.      Left Ear: Tympanic membrane normal.      Nose: Nose normal.      Mouth/Throat:      Mouth: Mucous membranes are moist.   Eyes:      Pupils: Pupils are equal, round, and reactive to light. Cardiovascular:      Rate and Rhythm: Normal rate and regular rhythm. Pulmonary:      Effort: Pulmonary effort is normal.      Breath sounds: Normal breath sounds. Abdominal:      General: Bowel sounds are normal.      Palpations: Abdomen is soft. Tenderness: There is no right CVA tenderness or left CVA tenderness. Musculoskeletal:      Cervical back: Normal range of motion. Comments: Off balance today, walked with her and she was steadier   Skin:     General: Skin is warm and dry. Neurological:      Mental Status: She is alert and oriented to person, place, and time. Mental status is at baseline. Psychiatric:         Mood and Affect: Mood normal.         Thought Content: Thought content normal.          Susy Wright was seen today for extremity weakness and fatigue. Diagnoses and all orders for this visit:    Fatigue, unspecified type  Comments:  needs to stay hydrated  make sure she is eating well        Dysuria  -     POCT Urinalysis no Micro  -     Culture, Urine    Cystitis  Comments:  cipro 250mg bid 3 days    Other orders  -     ciprofloxacin (CIPRO) 250 MG tablet;  Take 1 tablet by mouth 2 times daily for 3 days        I independently reviewed and updated the chief complaint, HPI, past medical and surgical history, medications, allergies and ROS as entered by the LPN. Seen by:   Emmett Carmona DO

## 2021-11-24 NOTE — PATIENT INSTRUCTIONS
Personalized Preventive Plan for Henry Boyd - 11/24/2021  Medicare offers a range of preventive health benefits. Some of the tests and screenings are paid in full while other may be subject to a deductible, co-insurance, and/or copay. Some of these benefits include a comprehensive review of your medical history including lifestyle, illnesses that may run in your family, and various assessments and screenings as appropriate. After reviewing your medical record and screening and assessments performed today your provider may have ordered immunizations, labs, imaging, and/or referrals for you. A list of these orders (if applicable) as well as your Preventive Care list are included within your After Visit Summary for your review. Other Preventive Recommendations:    · A preventive eye exam performed by an eye specialist is recommended every 1-2 years to screen for glaucoma; cataracts, macular degeneration, and other eye disorders. · A preventive dental visit is recommended every 6 months. · Try to get at least 150 minutes of exercise per week or 10,000 steps per day on a pedometer . · Order or download the FREE \"Exercise & Physical Activity: Your Everyday Guide\" from [employer]. Call 8-839.573.1215 or search [employer] online. · You need 8101-2427 mg of calcium and 7663-6979 IU of vitamin D per day. It is possible to meet your calcium requirement with diet alone, but a vitamin D supplement is usually necessary to meet this goal.  · When exposed to the sun, use a sunscreen that protects against both UVA and UVB radiation with an SPF of 30 or greater. Reapply every 2 to 3 hours or after sweating, drying off with a towel, or swimming. · Always wear a seat belt when traveling in a car. Always wear a helmet when riding a bicycle or motorcycle.

## 2021-11-24 NOTE — TELEPHONE ENCOUNTER
Received call from Teresita Wahl at [facility] with [employer]. Brief description of triage: Patient calls with complaints of weakness and fatigue for about 1 month. She states last month she fell and hit her head and spent 8 days in the hospital and just has not felt right since. Triage indicates for patient to go to office now. Advised caller if unable to get an appointment in the suggested time frame to go to an [facility] or walk-in clinic, caller agreeable. Care advice provided, patient verbalizes understanding; denies any other questions or concerns; instructed to call back for any new or worsening symptoms. Writer provided warm transfer to [facility] at [facility] for appointment scheduling. Attention Provider: Thank you for allowing me to participate in the care of your patient. The patient was connected to triage in response to information provided to the ECC/PSC. Please do not respond through this encounter as the response is not directed to a shared pool. Reason for Disposition   MODERATE weakness (i.e., interferes with work, school, normal activities) and cause unknown (Exceptions: weakness with acute minor illness, or weakness from poor fluid intake)    Answer Assessment - Initial Assessment Questions  1. DESCRIPTION: \"Describe how you are feeling. \"      Had a fall last month and spent time in hospital- feels weak/fatigued since     2. SEVERITY: \"How bad is it? \"  \"Can you stand and walk? \"    - MILD - Feels weak or tired, but does not interfere with work, school or normal activities    - [facility] to stand and walk; weakness interferes with work, school, or normal activities    - SEVERE - Unable to stand or walk      Moderate- slow walking     3. ONSET:  \"When did the weakness begin? \"      After hospital stay about 1 month ago     4. CAUSE: \"What do you think is causing the weakness? \"      B12 issues     5.  MEDICINES: [place] you recently started a new medicine or had a change in the amount of a medicine? \"      Denies- advised to take statin, but does not want to b/c of side effects    6. OTHER SYMPTOMS: \"Do you have any other symptoms? \" (e.g., chest pain, fever, cough, SOB, vomiting, diarrhea, bleeding, other areas of pain)      Denies     7. PREGNANCY: \"Is there any chance you are pregnant? \" \"When was your last menstrual period? \"      N/A    Protocols used: WEAKNESS (GENERALIZED) AND FATIGUE-ADULT-[place]

## 2021-11-27 LAB — URINE CULTURE, ROUTINE: NORMAL

## 2021-11-29 ENCOUNTER — TELEPHONE (OUTPATIENT)
Dept: NEUROLOGY | Age: 86
End: 2021-11-29

## 2021-11-29 NOTE — TELEPHONE ENCOUNTER
Patient called in requesting result of her cognitive eval and is requesting to be able to drive. Informed her that this would be forwarded to [facility]. Patient is aware that Niraj Castellanos will be in the office Thursday.

## 2021-12-10 ENCOUNTER — OFFICE VISIT (OUTPATIENT)
Dept: NEUROLOGY | Age: 86
End: 2021-12-10
Payer: MEDICARE

## 2021-12-10 VITALS
HEART RATE: 76 BPM | OXYGEN SATURATION: 97 % | BODY MASS INDEX: 23.32 KG/M2 | DIASTOLIC BLOOD PRESSURE: 81 MMHG | SYSTOLIC BLOOD PRESSURE: 168 MMHG | WEIGHT: 140 LBS | HEIGHT: 65 IN | TEMPERATURE: 98.3 F

## 2021-12-10 DIAGNOSIS — I63.9 ISCHEMIC STROKE (HCC): Primary | ICD-10-CM

## 2021-12-10 PROCEDURE — 99213 OFFICE O/P EST LOW 20 MIN: CPT | Performed by: PHYSICIAN ASSISTANT

## 2021-12-10 NOTE — PROGRESS NOTES
weakness. \"    Protonix [Pantoprazole Sodium] Other (See Comments)     Unable to explain    Statins Other (See Comments)     Intolerable. Patient states \"it almost killed me. \" Spoke with Patient's Daughter and she stated \"body cramps and extreme weakness. \"       Objective:       BP (!) 168/81 (Site: Right Upper Arm)   Pulse 76   Temp 98.3 °F (36.8 °C)   Ht 5' 5\" (1.651 m)   Wt 140 lb (63.5 kg)   SpO2 97%   BMI 23.30 kg/m²        General appearance: alert, appears stated age and cooperative  Head: Normocephalic, without obvious abnormality, atraumatic  Eyes: conjunctivae/corneas clear.    Neck: no adenopathy, no carotid bruit, no JVD, supple, symmetrical, trachea midline and thyroid not enlarged, symmetric, no tenderness/mass/nodules  Lungs: clear to auscultation bilaterally  Heart: regular rate and rhythm, S1, S2 normal, no murmur, click, rub or gallop  Extremities: extremities normal, atraumatic, no cyanosis or edema  Skin: Skin color, texture, turgor normal. No rashes or lesions       Mental Status: Alert, oriented, thought content appropriate    Appropriate attention/concentration  Intact fundus of knowledge  Intact memories  Repetition intact    Speech: Clear    Language: Appropriate      Cranial Nerves:  I: smell    II: visual acuity     II: visual fields Full to confrontation   II: pupils CARI   III,VII: ptosis None   III,IV,VI: extraocular muscles  Full ROM   V: mastication Normal   V: facial light touch sensation  Normal   V,VII: corneal reflex     VII: facial muscle function - upper  Normal   VII: facial muscle function - lower Normal   VIII: hearing Normal   IX: soft palate elevation  Normal   IX,X: gag reflex    XI: trapezius strength  5/5   XI: sternocleidomastoid strength 5/5   XI: neck extension strength  5/5   XII: tongue strength  Normal       Motor:  5/5 throughout  Normal tone and bulk  No abnormal movements    Sensory:  Light touch normal  Vibration decreased bilateral ankles    Coordination:   FN F, FFM, HTS intact    Gait:  Normal    DTR:     No Babinskis'  No Camp's  No pathological reflexes    Laboratory/Radiology:     CBC with Differential:    Lab Results   Component Value Date    WBC 5.1 09/22/2021    RBC 4.70 09/22/2021    HGB 13.9 09/22/2021    HCT 44.0 09/22/2021     09/22/2021    MCV 93.6 09/22/2021    MCH 29.6 09/22/2021    MCHC 31.6 09/22/2021    RDW 13.1 09/22/2021    LYMPHOPCT 31.4 09/22/2021    MONOPCT 10.1 09/22/2021    BASOPCT 0.8 09/22/2021    MONOSABS 0.52 09/22/2021    LYMPHSABS 1.61 09/22/2021    EOSABS 0.10 09/22/2021    BASOSABS 0.04 09/22/2021     CMP:    Lab Results   Component Value Date     09/22/2021    K 3.9 09/22/2021    K 4.3 09/20/2021     09/22/2021    CO2 19 09/22/2021    BUN 19 09/22/2021    CREATININE 0.8 09/22/2021    GFRAA >60 09/22/2021    LABGLOM >60 09/22/2021    GLUCOSE 103 09/22/2021    GLUCOSE 97 12/27/2011    PROT 6.6 09/19/2021    LABALBU 3.9 09/19/2021    LABALBU 4.3 12/27/2011    CALCIUM 9.4 09/22/2021    BILITOT 0.4 09/19/2021    ALKPHOS 115 09/19/2021    AST 20 09/19/2021    ALT 14 09/19/2021     HgBA1c:    Lab Results   Component Value Date    LABA1C 6.2 09/19/2021     FLP:    Lab Results   Component Value Date    TRIG 217 09/19/2021    HDL 63 09/19/2021    LDLCALC 229 09/19/2021    LABVLDL 43 09/19/2021     CTA  Head/neck, CTP  CTA neck:     Mild stenosis at the origin of right common carotid artery.     Moderate stenosis at the origin right subclavian artery.     Mild stenosis at the origin of bilateral cervical ICA.      18 mm ground-glass area within the right upper lobe may be of  infectious/inflammatory or neoplastic in etiology.  For further evaluation  dedicated chest CT examination can be obtained.     CTA HEAD:     2 mm inferiorly projecting outpouching arising from the left supraclinoid  carotid artery mostly consistent with an aneurysm.     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     CT PERFUSION:     No infarct core. No perfusion mismatch.     MRI brain   1. Acute or early subacute foci of infarction in the right paracentral sarai. 2. 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 Also with uncontrolled vascular risk factors of hyperlipidemia, hypertension. Was previously on aspirin daily prior to admission. She is now on DAPT and statin therapy for secondary prevention.     Plan:     Continue DAPT    Intolerance to statins    Risk factor modification    Exam is nonfocal and she did well on formal cognitive evaluation so I have no reason to say that she cannot drive from neurology standpoint    Return to office in 1 year or sooner as needed    Call with questions or concerns    Eusebio Bonilla PA-C  8:16 AM  12/10/2021

## 2021-12-13 ENCOUNTER — OFFICE VISIT (OUTPATIENT)
Dept: FAMILY MEDICINE CLINIC | Age: 86
End: 2021-12-13
Payer: MEDICARE

## 2021-12-13 VITALS
HEIGHT: 66 IN | HEART RATE: 70 BPM | TEMPERATURE: 98 F | WEIGHT: 145 LBS | OXYGEN SATURATION: 98 % | DIASTOLIC BLOOD PRESSURE: 80 MMHG | SYSTOLIC BLOOD PRESSURE: 122 MMHG | BODY MASS INDEX: 23.3 KG/M2

## 2021-12-13 DIAGNOSIS — I10 PRIMARY HYPERTENSION: ICD-10-CM

## 2021-12-13 DIAGNOSIS — E03.9 ACQUIRED HYPOTHYROIDISM: Primary | ICD-10-CM

## 2021-12-13 DIAGNOSIS — R73.03 PREDIABETES: ICD-10-CM

## 2021-12-13 DIAGNOSIS — Z91.81 AT HIGH RISK FOR FALLS: ICD-10-CM

## 2021-12-13 DIAGNOSIS — E78.2 MIXED HYPERLIPIDEMIA: ICD-10-CM

## 2021-12-13 PROCEDURE — 99214 OFFICE O/P EST MOD 30 MIN: CPT | Performed by: FAMILY MEDICINE

## 2021-12-13 RX ORDER — LEVOTHYROXINE SODIUM 0.05 MG/1
50 TABLET ORAL DAILY
Qty: 90 TABLET | Refills: 1 | Status: SHIPPED
Start: 2021-12-13 | End: 2022-03-07 | Stop reason: SINTOL

## 2021-12-13 RX ORDER — CLOPIDOGREL BISULFATE 75 MG/1
75 TABLET ORAL DAILY
Qty: 90 TABLET | Refills: 1 | Status: SHIPPED
Start: 2021-12-13 | End: 2022-03-07 | Stop reason: SINTOL

## 2021-12-13 ASSESSMENT — ENCOUNTER SYMPTOMS
EYE PAIN: 0
BACK PAIN: 0
WHEEZING: 0
VOMITING: 0
SORE THROAT: 0
SINUS PAIN: 0
CHEST TIGHTNESS: 0
CONSTIPATION: 0
TROUBLE SWALLOWING: 0
COUGH: 0
SHORTNESS OF BREATH: 0
DIARRHEA: 0
ABDOMINAL PAIN: 0
NAUSEA: 0

## 2021-12-13 NOTE — PROGRESS NOTES
12/13/21    Name: Mary Mcintosh  PVJ:[id number]   Sex:female   Age:87 y.o. Chief Complaint   Patient presents with    Cerebrovascular Accident    Dizziness    Fatigue     Patient presents to office for visit. She has been cleared to resume driving, so patient is happy about this. Patient has the b vitamins she started, she wants doctor's opinion on the brand. Patient continues to have numbness in her hands and feet, mostly in the morning. She has a spot on the back of her neck she would like doctor to look at and she wants doctor to feel the top of her head. Patient denies any falls. She says she is feeling more improved since her last appointment. She is not as fatigued and she is not having dizziness as often. Here for a check up    She has been doing much better  Carla Maldonado a few months ago  Had post concussive s/s  She has been seeing neurology and has finally been cleared to drive  Her memory is stabiled and back to baseline  They spent the weekend doing jewels cards    The hematoma she had on her scalp is all resolved    Also has a skin bump she wants me to look at on the nape of her neck  It appers to be a small 2 to 3mm dermal cyst'  Benign    Dizziness much better  Answers questions much faster today and appropriate  Also memory much better    [facility]    Review of Systems   Constitutional: Negative for appetite change, fatigue and fever. HENT: Negative for congestion, ear pain, sinus pain, sore throat and trouble swallowing. Eyes: Negative for pain. Respiratory: Negative for cough, chest tightness, shortness of breath and wheezing. Cardiovascular: Negative for chest pain, palpitations and leg swelling. Gastrointestinal: Negative for abdominal pain, constipation, diarrhea, nausea and vomiting. Endocrine: Negative for cold intolerance and heat intolerance.    Genitourinary: Negative for difficulty urinating, dysuria, frequency, hematuria, pelvic pain and Diagnosis Date    Dementia ([address]     Diverticulosis     GIST (gastrointestinal stroma tumor), malignant, colon ([address]     Hyperlipidemia     Hypertension     MI (myocardial infarction) ([address] 2015    Mitral regurgitation     per cardiology    PUD (peptic ulcer disease)      Patient Active Problem List    Diagnosis Date Noted    NSTEMI (non-ST elevated myocardial infarction) ([address] 06/03/2016    GIST (gastrointestinal stromal tumor), malignant ([address] 05/25/2016    Aortic dissection ([address] 09/20/2021    Pulmonary nodules 09/20/2021    Statin intolerance 09/20/2021    Acute cerebrovascular accident (CVA) ([address] 09/16/2021    Type 2 diabetes mellitus without complication, without long-term current use of insulin ([address] 01/20/2021    Dementia ([address]     Acquired hypothyroidism 11/20/2019    Hyperlipidemia 11/15/2019    Disorder of thyroid gland 11/15/2019    Carotid stenosis, right 11/15/2019    Onychomycosis 06/14/2019    Difficulty walking 06/14/2019    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 ([address] 06/14/2016    Malignant neoplasm of abdomen ([address] 06/14/2016    Hypertension     PUD (peptic ulcer disease)       Past Surgical History:   Procedure Laterality Date    CAROTID ENDARTERECTOMY Right 04/2019    Dr Justus Quarles Bilateral 2015    COLONOSCOPY  12/2014    DILATATION, ESOPHAGUS      ENDOSCOPY, COLON, DIAGNOSTIC  09/02/2016    marking of gastric tumor    HYSTERECTOMY      YUMIKO/BSO    OTHER SURGICAL HISTORY  09/09/2016    Laparoscopic Robotic Assisted Partial Gastrectomy    SHOULDER SURGERY Left     rotator cuff right    TONSILLECTOMY AND ADENOIDECTOMY      UPPER GASTROINTESTINAL ENDOSCOPY  12/2014    UPPER GASTROINTESTINAL ENDOSCOPY  5/25/2016      Social History     Tobacco History     Smoking Status  Former Smoker Quit date  1/1/1969 Smoking Frequency  1 pack/day for 18 years (18 pk yrs)    Smokeless Tobacco Use  Never Used          Alcohol History     Alcohol Use Status  Yes Comment  glass of wine with dinner          Drug Use     Drug Use Status  No          Sexual Activity     Sexually Active  Not Currently Partners  Male Comment              /80   Pulse 70   Temp 98 °F (36.7 °C)   Ht 5' 6\" (1.676 m)   Wt 145 lb (65.8 kg)   SpO2 98%   BMI 23.40 kg/m²     EXAM:   Physical Exam  Vitals and nursing note reviewed. Constitutional:       General: She is not in acute distress. Appearance: She is well-developed. She is not ill-appearing. HENT:      Head: Normocephalic and atraumatic. Right Ear: Tympanic membrane normal.      Left Ear: Tympanic membrane normal.      Nose: Nose normal.      Mouth/Throat:      Mouth: Mucous membranes are moist.   Eyes:      Pupils: Pupils are equal, round, and reactive to light. Cardiovascular:      Rate and Rhythm: Normal rate and regular rhythm. Pulmonary:      Effort: Pulmonary effort is normal.      Breath sounds: Normal breath sounds. Abdominal:      General: Bowel sounds are normal.      Palpations: Abdomen is soft. Musculoskeletal:      Cervical back: Normal range of motion. Comments: Gait slow but steadier   Skin:     General: Skin is warm and dry. Neurological:      Mental Status: She is alert and oriented to person, place, and time. Mental status is at baseline. Psychiatric:         Mood and Affect: Mood normal.         Thought Content: Thought content normal.          Juan A Mendieta was seen today for cerebrovascular accident, dizziness and fatigue. Diagnoses and all orders for this visit:    Acquired hypothyroidism  Comments:  has been stable  labs in 6 months  continue dose for now, refills sent  Orders:  -     levothyroxine (SYNTHROID) 50 MCG tablet; Take 1 tablet by mouth daily  -     T4, Free; Future  -     TSH without Reflex;  Future    At high risk for falls  Comments:  fell a few months ago  obtained concussion  she is doing much better, ha resolved  finally back to driving    Mixed hyperlipidemia  Comments:  refuses statins due to side effcts  she has been managing with diet  Orders:  -     Lipid Panel; Future    Primary hypertension  Comments:  well controlled  doing great  no issues with meds    Prediabetes  -     CBC Auto Differential; Future  -     Comprehensive Metabolic Panel; Future  -     Hemoglobin A1C; Future  -     Microalbumin, Ur; Future    Other orders  -     clopidogrel (PLAVIX) 75 MG tablet; Take 1 tablet by mouth daily        I independently reviewed and updated the chief complaint, HPI, past medical and surgical history, medications, allergies and ROS as entered by the LPN. Seen by: Micki Lawson DO  On the basis of positive falls risk screening, assessment and plan is as follows: home safety tips provided.

## 2021-12-28 ENCOUNTER — OFFICE VISIT (OUTPATIENT)
Dept: FAMILY MEDICINE CLINIC | Age: 86
End: 2021-12-28
Payer: MEDICARE

## 2021-12-28 VITALS
DIASTOLIC BLOOD PRESSURE: 78 MMHG | RESPIRATION RATE: 16 BRPM | HEART RATE: 94 BPM | BODY MASS INDEX: 23.14 KG/M2 | WEIGHT: 144 LBS | TEMPERATURE: 98.1 F | SYSTOLIC BLOOD PRESSURE: 118 MMHG | OXYGEN SATURATION: 95 % | HEIGHT: 66 IN

## 2021-12-28 DIAGNOSIS — U07.1 COVID-19: ICD-10-CM

## 2021-12-28 DIAGNOSIS — B34.9 VIRAL ILLNESS: Primary | ICD-10-CM

## 2021-12-28 LAB
Lab: ABNORMAL
PERFORMING INSTRUMENT: ABNORMAL
QC PASS/FAIL: ABNORMAL
SARS-COV-2, POC: DETECTED

## 2021-12-28 PROCEDURE — 99213 OFFICE O/P EST LOW 20 MIN: CPT | Performed by: FAMILY MEDICINE

## 2021-12-28 PROCEDURE — 87426 SARSCOV CORONAVIRUS AG IA: CPT | Performed by: FAMILY MEDICINE

## 2021-12-28 RX ORDER — DEXAMETHASONE 6 MG/1
6 TABLET ORAL 2 TIMES DAILY WITH MEALS
Qty: 20 TABLET | Refills: 0 | Status: SHIPPED
Start: 2021-12-28 | End: 2022-01-07

## 2021-12-28 NOTE — PROGRESS NOTES
21  [place] Col[place] : 1934 Sex: female  Age: 80 y.o. Assessment and Plan:  Harleen Wallace was seen today for dizziness, cough, sinusitis, nasal congestion, diarrhea and nausea. Diagnoses and all orders for this visit:    Viral illness  -     POCT COVID-19, Antigen  -     dexamethasone (DECADRON) 6 MG tablet; Take 1 tablet by mouth 2 times daily (with meals) for 10 days    COVID-19  -     dexamethasone (DECADRON) 6 MG tablet; Take 1 tablet by mouth 2 times daily (with meals) for 10 days    Her rapid antigen test was positive for Covid. Is no focus of infection ears, throat, lungs sounding good will with a 5-day course of dexamethasone. Continue with symptomatic care including Tylenol, fluids, rest, Mucinex. Needs to isolate for 10 days. If complaints worsen in any way present to the emergency room immediately    No follow-ups on file. Chief Complaint   Patient presents with    Dizziness     onset     Cough    Sinusitis    Nasal Congestion    Diarrhea    Nausea       Congestion, pressure, drainage, facial tenderness, mild headache, dizziness, cough, diarrhea, nausea, onset 2 days ago. Was vaccinated against Covid. Denies fever, chills, diaphoresis, nausea, vomiting, decreased oral intake. Denies other GI or  complaints. OTC treatments minimally effective. Review of Systems   Constitutional: Negative for appetite change, fatigue and unexpected weight change. HENT: Positive for congestion, postnasal drip and sinus pressure. Negative for ear pain, hearing loss, sinus pain, sore throat and trouble swallowing. Eyes: Negative for photophobia, pain, discharge and redness. Respiratory: Negative for cough, chest tightness and shortness of breath. Cardiovascular: Negative for chest pain, palpitations and leg swelling. Gastrointestinal: Positive for diarrhea. Negative for abdominal pain, blood in stool, nausea and vomiting. Endocrine: Negative.     Genitourinary: Negative for dysuria, flank pain, frequency and hematuria. Musculoskeletal: Positive for myalgias. Negative for arthralgias, back pain and joint swelling. Skin: Negative. Allergic/Immunologic: Negative. Neurological: Positive for dizziness. Negative for seizures, syncope, weakness, light-headedness, numbness and headaches. Hematological: Negative for adenopathy. Does not bruise/bleed easily. Psychiatric/Behavioral: Negative. Current Outpatient Medications:     clopidogrel (PLAVIX) 75 MG tablet, Take 1 tablet by mouth daily, Disp: 90 tablet, Rfl: 1    levothyroxine (SYNTHROID) 50 MCG tablet, Take 1 tablet by mouth daily, Disp: 90 tablet, Rfl: 1    Omega-3 Fatty Acids (FISH OIL) 1000 MG CAPS, Take 1,000 mg by mouth daily, Disp: , Rfl:     triamcinolone (NASACORT) 55 MCG/ACT nasal inhaler, 2 sprays by Each Nostril route daily, Disp: , Rfl:     amLODIPine (NORVASC) 2.5 MG tablet, Take 1 tablet by mouth daily, Disp: 90 tablet, Rfl: 3    loratadine (CLARITIN) 10 MG tablet, Take 10 mg by mouth daily as needed (ALLERGIES) , Disp: , Rfl:     aspirin 81 MG tablet, Take 81 mg by mouth daily , Disp: , Rfl:     dexamethasone (DECADRON) 6 MG tablet, Take 1 tablet by mouth 2 times daily (with meals) for 10 days, Disp: 20 tablet, Rfl: 0  Allergies   Allergen Reactions    Carafate [Sucralfate] Other (See Comments)     Unable to explain    Cefdinir Other (See Comments)     Unable to explain    Cetirizine & Related Other (See Comments)     Makes her forgetful and sleepy    Lisinopril Other (See Comments)     Difficulty swallowing, reflux, headache, dizziness, leg cramps    Pravachol [Pravastatin Sodium] Other (See Comments)     Patient states \"it almost killed me. \" Spoke with Patient's Daughter and she stated \"body cramps and extreme weakness. \"    Protonix [Pantoprazole Sodium] Other (See Comments)     Unable to explain    Statins Other (See Comments)     Intolerable. Patient states \"it almost killed me. \" Spoke with Patient's Daughter and she stated \"body cramps and extreme weakness. \"       Past Medical History:   Diagnosis Date    D[facility]     Diverticulosis     GIST (gastrointestinal stroma tumor), malignant, colon ([facility]     Hyperlipidemia     Hypertension     MI (myocardial infarction) ([facility]     Mitral regurgitation     per cardiology    PUD (peptic ulcer disease)      Past Surgical History:   Procedure Laterality Date    CAROTID ENDARTERECTOMY Right 2019    Dr Jami Javier Bilateral     COLONOSCOPY  2014    DILATATION, ESOPHAGUS      ENDOSCOPY, COLON, DIAGNOSTIC  2016    marking of gastric tumor    HYSTERECTOMY      YUMIKO/BSO    OTHER SURGICAL HISTORY  2016    Laparoscopic Robotic Assisted Partial Gastrectomy    SHOULDER SURGERY Left     rotator cuff right    TONSILLECTOMY AND ADENOIDECTOMY      UPPER GASTROINTESTINAL ENDOSCOPY  2014    UPPER GASTROINTESTINAL ENDOSCOPY  2016     Family History   Problem Relation Age of Onset    Colon Cancer Father     COPD Mother         tobacco    Cancer Sister         breast     Social History     Socioeconomic History    Marital status:      Spouse name: Not on file    Number of children: Not on file    Years of education: Not on file    Highest education level: Not on file   Occupational History    Occupation: retired    Tobacco Use    Smoking status: Former Smoker     Packs/day: 1.00     Years: 18.00     Pack years: 18.00     Quit date:      Years since quittin.0    Smokeless tobacco: Never Used   Vaping Use    Vaping Use: Never used   Substance and Sexual Activity    Alcohol use: Yes     Comment: glass of wine with dinner    Drug use: No    Sexual activity: Not Currently     Partners: Male     Comment:    Other Topics Concern    Not on file   Social History Narrative    Not on file     Social Determinants of Health     Financial Resource Strain:     Difficulty of Paying Living Expenses: Not on file   Food Insecurity:     Worried About Running Out of Food in the Last Year: Not on file    [place] of Food in the Last Year: Not on file   Transportation Needs:     Lack of Transportation (Medical): Not on file    Lack of Transportation (Non-Medical): Not on file   Physical Activity:     Days of Exercise per Week: Not on file    Minutes of Exercise per Session: Not on file   Stress:     Feeling of Stress : Not on file   Social Connections:     Frequency of Communication with Friends and Family: Not on file    Frequency of Social Gatherings with Friends and Family: Not on file    Attends Alevism Services: Not on file    Active Member of [address] [place] [address] [employer] or Organizations: Not on file    Attends Club or Organization Meetings: Not on file    Marital Status: Not on file   Intimate Partner Violence:     Fear of Current or Ex-Partner: Not on file    Emotionally Abused: Not on file    Physically Abused: Not on file    Sexually Abused: Not on file   Housing Stability:     Unable to Pay for Housing in the Last Year: Not on file    Number of [place] in the Last Year: Not on file    Unstable Housing in the Last Year: Not on file       Vitals:    12/28/21 1559   BP: 118/78   Pulse: 94   Resp: 16   Temp: 98.1 °F (36.7 °C)   TempSrc: Temporal   SpO2: 95%   Weight: 144 lb (65.3 kg)   Height: 5' 6\" (1.676 m)       Physical Exam  Vitals and nursing note reviewed. Constitutional:       Appearance: She is well-developed. HENT:      Head: Atraumatic. Right Ear: External ear normal.      Left Ear: External ear normal.      Nose: Congestion present. Mouth/Throat:      Pharynx: Posterior oropharyngeal erythema present. No oropharyngeal exudate. Eyes:      Conjunctiva/sclera: Conjunctivae normal.      Pupils: Pupils are equal, round, and reactive to light. Neck:      Thyroid: No thyromegaly. Trachea: No tracheal deviation.    Cardiovascular: Rate and Rhythm: Normal rate and regular rhythm. Heart sounds: No murmur heard. No friction rub. No gallop. Pulmonary:      Effort: Pulmonary effort is normal. No respiratory distress. Breath sounds: Normal breath sounds. Abdominal:      General: Bowel sounds are normal.      Palpations: Abdomen is soft. Musculoskeletal:         General: No tenderness or deformity. Normal range of motion. Cervical back: Normal range of motion and neck supple. Lymphadenopathy:      Cervical: No cervical adenopathy. Skin:     General: Skin is warm and dry. Capillary Refill: Capillary refill takes less than 2 seconds. Findings: No rash. Neurological:      Mental Status: She is alert and oriented to person, place, and time. Sensory: No sensory deficit. Motor: No abnormal muscle tone.       Coordination: Coordination normal.      Deep Tendon Reflexes: Reflexes normal.             Seen By:  Chhaya Buenrostro DO

## 2021-12-30 ASSESSMENT — ENCOUNTER SYMPTOMS
NAUSEA: 0
EYE PAIN: 0
COUGH: 0
CHEST TIGHTNESS: 0
PHOTOPHOBIA: 0
DIARRHEA: 1
BLOOD IN STOOL: 0
SINUS PRESSURE: 1
BACK PAIN: 0
SHORTNESS OF BREATH: 0
ABDOMINAL PAIN: 0
TROUBLE SWALLOWING: 0
SORE THROAT: 0
EYE DISCHARGE: 0
VOMITING: 0
SINUS PAIN: 0
EYE REDNESS: 0
ALLERGIC/IMMUNOLOGIC NEGATIVE: 1

## 2022-01-07 ENCOUNTER — OFFICE VISIT (OUTPATIENT)
Dept: FAMILY MEDICINE CLINIC | Age: 87
End: 2022-01-07
Payer: MEDICARE

## 2022-01-07 VITALS
SYSTOLIC BLOOD PRESSURE: 132 MMHG | TEMPERATURE: 98.4 F | HEART RATE: 80 BPM | OXYGEN SATURATION: 98 % | DIASTOLIC BLOOD PRESSURE: 86 MMHG

## 2022-01-07 DIAGNOSIS — R73.9 HYPERGLYCEMIA: ICD-10-CM

## 2022-01-07 DIAGNOSIS — Z91.81 AT HIGH RISK FOR FALLS: ICD-10-CM

## 2022-01-07 DIAGNOSIS — R53.1 WEAKNESS: Primary | ICD-10-CM

## 2022-01-07 DIAGNOSIS — U07.1 COVID: ICD-10-CM

## 2022-01-07 DIAGNOSIS — R42 DIZZY: ICD-10-CM

## 2022-01-07 DIAGNOSIS — E03.9 ACQUIRED HYPOTHYROIDISM: ICD-10-CM

## 2022-01-07 PROCEDURE — 99214 OFFICE O/P EST MOD 30 MIN: CPT | Performed by: INTERNAL MEDICINE

## 2022-01-08 ASSESSMENT — ENCOUNTER SYMPTOMS
SINUS PAIN: 0
SORE THROAT: 0
SINUS PRESSURE: 0
DIARRHEA: 0
COUGH: 0
NAUSEA: 0
ABDOMINAL PAIN: 0
VOMITING: 0
SHORTNESS OF BREATH: 0
EYES NEGATIVE: 1
WHEEZING: 0
CHEST TIGHTNESS: 0

## 2022-01-08 NOTE — PROGRESS NOTES
[address] [facility] [address] [facility]     22  [place] Dhillon : 1934 Sex: female  Age: 80 y.o. Chief Complaint   Patient presents with    Fatigue    Dysuria       HPI  Patient presents to express care today accompanied by her  complaining of fatigue/weakness and balance issues. Initially there was mention made of dysuria but I cannot get that history out of either of them. She was seen on the  of last month in [facility] (I did review that note) and diagnosed with Covid. She was placed on 6 mg Decadron twice a day which she had been taking up until yesterday. She did stop this as she felt this might be causing her issues. Patient is prediabetic. Told him sugars may be getting too high for her. She does not check them at home. She denies any chest pain or shortness of breath. Patient was extremely hard of hearing so conversation went through the  basically          Review of Systems   Constitutional: Positive for fatigue. Negative for chills and fever. HENT: Negative for congestion, ear pain, postnasal drip, sinus pressure, sinus pain and sore throat. Eyes: Negative. Respiratory: Negative for cough, chest tightness, shortness of breath and wheezing. Cardiovascular: Negative for chest pain and palpitations. Gastrointestinal: Negative for abdominal pain, diarrhea, nausea and vomiting. Genitourinary: Negative for dysuria and frequency. Neurological: Positive for dizziness and light-headedness. Negative for syncope. REST OF PERTINENT ROS GONE OVER AND WAS NEGATIVE.                  Current Outpatient Medications:     clopidogrel (PLAVIX) 75 MG tablet, Take 1 tablet by mouth daily, Disp: 90 tablet, Rfl: 1    levothyroxine (SYNTHROID) 50 MCG tablet, Take 1 tablet by mouth daily, Disp: 90 tablet, Rfl: 1    Omega-3 Fatty Acids (FISH OIL) 1000 MG CAPS, Take 1,000 mg by mouth daily, Disp: , Rfl:     triamcinolone (NASACORT) 55 MCG/ACT nasal inhaler, 2 sprays by Each Nostril route daily, Disp: , Rfl:     amLODIPine (NORVASC) 2.5 MG tablet, Take 1 tablet by mouth daily, Disp: 90 tablet, Rfl: 3    loratadine (CLARITIN) 10 MG tablet, Take 10 mg by mouth daily as needed (ALLERGIES) , Disp: , Rfl:     aspirin 81 MG tablet, Take 81 mg by mouth daily , Disp: , Rfl:   Allergies   Allergen Reactions    Carafate [Sucralfate] Other (See Comments)     Unable to explain    Cefdinir Other (See Comments)     Unable to explain    Cetirizine & Related Other (See Comments)     Makes her forgetful and sleepy    Lisinopril Other (See Comments)     Difficulty swallowing, reflux, headache, dizziness, leg cramps    Pravachol [Pravastatin Sodium] Other (See Comments)     Patient states \"it almost killed me. \" Spoke with Patient's Daughter and she stated \"body cramps and extreme weakness. \"    Protonix [Pantoprazole Sodium] Other (See Comments)     Unable to explain    Statins Other (See Comments)     Intolerable. Patient states \"it almost killed me. \" Spoke with Patient's Daughter and she stated \"body cramps and extreme weakness. \"       Past Medical History:   Diagnosis Date    Dementia ([address]     Diverticulosis     GIST (gastrointestinal stroma tumor), malignant, colon ([address]     Hyperlipidemia     Hypertension     MI [facility]) ([facility] 2015    Mitral regurgitation     per cardiology    PUD (peptic ulcer disease)      Past Surgical History:   Procedure Laterality Date    CAROTID ENDARTERECTOMY Right 04/2019    Dr Melissa Durham Bilateral 2015    COLONOSCOPY  12/2014    DILATATION, ESOPHAGUS      ENDOSCOPY, COLON, DIAGNOSTIC  09/02/2016    marking of gastric tumor    HYSTERECTOMY      YUMIKO/BSO    OTHER SURGICAL HISTORY  09/09/2016    Laparoscopic Robotic Assisted Partial Gastrectomy    SHOULDER SURGERY Left     rotator cuff right    TONSILLECTOMY AND ADENOIDECTOMY      UPPER GASTROINTESTINAL ENDOSCOPY  12/2014    UPPER GASTROINTESTINAL ENDOSCOPY  2016     Family History   Problem Relation Age of Onset    Colon Cancer Father     COPD Mother         tobacco    Cancer Sister         breast     Social History     Socioeconomic History    Marital status:      Spouse name: Not on file    Number of children: Not on file    Years of education: Not on file    Highest education level: Not on file   Occupational History    Occupation: retired    Tobacco Use    Smoking status: Former Smoker     Packs/day: 1.00     Years: 18.00     Pack years: 18.00     Quit date:      Years since quittin.0    Smokeless tobacco: Never Used   Vaping Use    Vaping Use: Never used   Substance and Sexual Activity    Alcohol use: Yes     Comment: glass of wine with dinner    Drug use: No    Sexual activity: Not Currently     Partners: Male     Comment:    Other Topics Concern    Not on file   Social History Narrative    Not on file     Social Determinants of Health     Financial Resource Strain:     Difficulty of Paying Living Expenses: Not on file   Food Insecurity:     Worried About [employer] [employer] in the Last Year: Not on file    [address] in the Last Year: Not on file   Transportation Needs:     Lack of Transportation (Medical): Not on file    Lack of Transportation (Non-Medical):  Not on file   Physical Activity:     Days of Exercise per Week: Not on file    Minutes of Exercise per Session: Not on file   Stress:     Feeling of Stress : Not on file   Social Connections:     Frequency of Communication with Friends and Family: Not on file    Frequency of Social Gatherings with Friends and Family: Not on file    Attends Buddhist Services: Not on file    Active Member of Clubs or Organizations: Not on file    Attends Club or Organization Meetings: Not on file    Marital Status: Not on file   Intimate Partner Violence:     Fear of Current or Ex-Partner: Not on file    Emotionally Abused: Not on file   Dillan Morales Physically Abused: Not on file    Sexually Abused: Not on file   Housing Stability:     Unable to Pay for Housing in the Last Year: Not on file    Number of Places Lived in the Last Year: Not on file    Unstable Housing in the Last Year: Not on file       Vitals:    01/07/22 1208   BP: 132/86   Pulse: 80   Temp: 98.4 °F (36.9 °C)   SpO2: 98%       Physical Exam  Vitals and nursing note reviewed. Constitutional:       General: She is not in acute distress. HENT:      Head: Normocephalic and atraumatic. Right Ear: Tympanic membrane, ear canal and external ear normal.      Left Ear: Tympanic membrane, ear canal and external ear normal.      Nose: Nose normal.      Mouth/Throat:      Mouth: Mucous membranes are moist.      Pharynx: Oropharynx is clear. No posterior oropharyngeal erythema. Cardiovascular:      Rate and Rhythm: Normal rate and regular rhythm. Comments: Occasional ectopy  Pulmonary:      Effort: Pulmonary effort is normal. No respiratory distress. Breath sounds: Normal breath sounds. No wheezing, rhonchi or rales. Abdominal:      General: Bowel sounds are normal.      Palpations: Abdomen is soft. Tenderness: There is no abdominal tenderness. Musculoskeletal:         General: Normal range of motion. Cervical back: Normal range of motion and neck supple. No tenderness. Skin:     General: Skin is warm and dry. Neurological:      General: No focal deficit present. Mental Status: She is alert and oriented to person, place, and time. Sensory: No sensory deficit. Motor: No weakness. Psychiatric:         Mood and Affect: Mood normal.         Behavior: Behavior normal.                   Assessment and Plan:  James was seen today for fatigue and dysuria. Diagnoses and all orders for this visit:    Weakness  -     Comprehensive Metabolic Panel; Future  -     CBC Auto Differential; Future  -     TSH without Reflex;  Future  -     T4, Free; Future    Dizzy    COVID    At high risk for falls    Acquired hypothyroidism  -     TSH without Reflex; Future  -     T4, Free; Future    Hyperglycemia  -     Hemoglobin A1C; Future    Plan: I did order blood work. she was unable to collect a urine for us today. Asked her to stay off of the steroid. Hydrate well. To the emergency room over the weekend if any worsening. Follow-up with PCP in the next week. Fall precautions. Notify us if not improving. I did hold off on an EKG today as I do believe it was the steroid causing her issue. See how she does over the next couple days. Return in about 1 week (around 1/14/2022). Seen By:  Pollo Ruiz MD      *Document was created using voice recognition software. Note was reviewed however may contain grammatical errors.

## 2022-01-10 ENCOUNTER — TELEPHONE (OUTPATIENT)
Dept: FAMILY MEDICINE CLINIC | Age: 87
End: 2022-01-10

## 2022-01-10 ENCOUNTER — OFFICE VISIT (OUTPATIENT)
Dept: FAMILY MEDICINE CLINIC | Age: 87
End: 2022-01-10
Payer: MEDICARE

## 2022-01-10 VITALS
OXYGEN SATURATION: 98 % | HEIGHT: 66 IN | WEIGHT: 146.2 LBS | HEART RATE: 82 BPM | SYSTOLIC BLOOD PRESSURE: 130 MMHG | BODY MASS INDEX: 23.5 KG/M2 | TEMPERATURE: 97.9 F | DIASTOLIC BLOOD PRESSURE: 72 MMHG

## 2022-01-10 DIAGNOSIS — E86.0 DEHYDRATION: ICD-10-CM

## 2022-01-10 DIAGNOSIS — R73.9 HYPERGLYCEMIA: ICD-10-CM

## 2022-01-10 DIAGNOSIS — E03.9 ACQUIRED HYPOTHYROIDISM: ICD-10-CM

## 2022-01-10 DIAGNOSIS — U07.1 COVID-19: ICD-10-CM

## 2022-01-10 DIAGNOSIS — R53.1 WEAKNESS: ICD-10-CM

## 2022-01-10 DIAGNOSIS — R30.0 DYSURIA: Primary | ICD-10-CM

## 2022-01-10 DIAGNOSIS — R81 GLUCOSE FOUND IN URINE ON EXAMINATION: ICD-10-CM

## 2022-01-10 LAB
ALBUMIN SERPL-MCNC: 3.5 G/DL (ref 3.5–5.2)
ALP BLD-CCNC: 88 U/L (ref 35–104)
ALT SERPL-CCNC: 11 U/L (ref 0–32)
ANION GAP SERPL CALCULATED.3IONS-SCNC: 9 MMOL/L (ref 7–16)
AST SERPL-CCNC: 11 U/L (ref 0–31)
BASOPHILS ABSOLUTE: 0.02 E9/L (ref 0–0.2)
BASOPHILS RELATIVE PERCENT: 0.2 % (ref 0–2)
BILIRUB SERPL-MCNC: 0.3 MG/DL (ref 0–1.2)
BILIRUBIN, POC: ABNORMAL
BLOOD URINE, POC: ABNORMAL
BUN BLDV-MCNC: 15 MG/DL (ref 6–23)
CALCIUM SERPL-MCNC: 9.4 MG/DL (ref 8.6–10.2)
CHLORIDE BLD-SCNC: 101 MMOL/L (ref 98–107)
CHP ED QC CHECK: NORMAL
CLARITY, POC: ABNORMAL
CO2: 25 MMOL/L (ref 22–29)
COLOR, POC: YELLOW
CREAT SERPL-MCNC: 0.8 MG/DL (ref 0.5–1)
EOSINOPHILS ABSOLUTE: 0.12 E9/L (ref 0.05–0.5)
EOSINOPHILS RELATIVE PERCENT: 1.2 % (ref 0–6)
GFR AFRICAN AMERICAN: >60
GFR NON-AFRICAN AMERICAN: >60 ML/MIN/1.73
GLUCOSE BLD-MCNC: 124 MG/DL
GLUCOSE BLD-MCNC: 132 MG/DL (ref 74–99)
GLUCOSE URINE, POC: 250
HBA1C MFR BLD: 6.9 % (ref 4–5.6)
HCT VFR BLD CALC: 45.4 % (ref 34–48)
HEMOGLOBIN: 14.3 G/DL (ref 11.5–15.5)
IMMATURE GRANULOCYTES #: 0.12 E9/L
IMMATURE GRANULOCYTES %: 1.2 % (ref 0–5)
KETONES, POC: ABNORMAL
LEUKOCYTE EST, POC: ABNORMAL
LYMPHOCYTES ABSOLUTE: 1.39 E9/L (ref 1.5–4)
LYMPHOCYTES RELATIVE PERCENT: 14.1 % (ref 20–42)
MCH RBC QN AUTO: 30 PG (ref 26–35)
MCHC RBC AUTO-ENTMCNC: 31.5 % (ref 32–34.5)
MCV RBC AUTO: 95.4 FL (ref 80–99.9)
MONOCYTES ABSOLUTE: 1.14 E9/L (ref 0.1–0.95)
MONOCYTES RELATIVE PERCENT: 11.5 % (ref 2–12)
NEUTROPHILS ABSOLUTE: 7.09 E9/L (ref 1.8–7.3)
NEUTROPHILS RELATIVE PERCENT: 71.8 % (ref 43–80)
NITRITE, POC: ABNORMAL
PDW BLD-RTO: 13 FL (ref 11.5–15)
PH, POC: 5
PLATELET # BLD: 178 E9/L (ref 130–450)
PMV BLD AUTO: 11.4 FL (ref 7–12)
POTASSIUM SERPL-SCNC: 4.4 MMOL/L (ref 3.5–5)
PROTEIN, POC: ABNORMAL
RBC # BLD: 4.76 E12/L (ref 3.5–5.5)
SODIUM BLD-SCNC: 135 MMOL/L (ref 132–146)
SPECIFIC GRAVITY, POC: >=1.03
T4 FREE: 1.43 NG/DL (ref 0.93–1.7)
TOTAL PROTEIN: 6.2 G/DL (ref 6.4–8.3)
TSH SERPL DL<=0.05 MIU/L-ACNC: 4.57 UIU/ML (ref 0.27–4.2)
UROBILINOGEN, POC: 0.2
WBC # BLD: 9.9 E9/L (ref 4.5–11.5)

## 2022-01-10 PROCEDURE — 82962 GLUCOSE BLOOD TEST: CPT | Performed by: FAMILY MEDICINE

## 2022-01-10 PROCEDURE — 99214 OFFICE O/P EST MOD 30 MIN: CPT | Performed by: FAMILY MEDICINE

## 2022-01-10 PROCEDURE — 81002 URINALYSIS NONAUTO W/O SCOPE: CPT | Performed by: FAMILY MEDICINE

## 2022-01-10 ASSESSMENT — ENCOUNTER SYMPTOMS
BACK PAIN: 0
ABDOMINAL PAIN: 0
DIARRHEA: 0
VOMITING: 0
SHORTNESS OF BREATH: 0
EYE PAIN: 0
WHEEZING: 0
COUGH: 0
CONSTIPATION: 0
TROUBLE SWALLOWING: 0
NAUSEA: 0
SINUS PAIN: 0
CHEST TIGHTNESS: 0
SORE THROAT: 0

## 2022-01-10 NOTE — PROGRESS NOTES
1/10/22    Name: Marilou Amezquita  AP   Sex:female   Age:87 y.o. 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  Having a Lot of difficulty  Not drinking much fluids  Had been diagnosed with covid on   Denies SOB  No cough  Some head congestion but it was not bad  Was placed on decadron  She has had elevated sugars in the past  She did bring in a urine today which showed glucose but was unremarkable otherwise    Since Friday  She has been drinking more fluids  Stopped the steroid  She noticed a difference with in 24hrs of stopping that    She fells lke she is already about 80-90% better today  Will have her do labs today  She never went down Friday to get them done          Review of Systems   Constitutional: Positive for fatigue. Negative for appetite change and fever. HENT: Positive for congestion. Negative for ear pain, sinus pain, sore throat and trouble swallowing. Eyes: Negative for pain. Respiratory: Negative for cough, chest tightness, shortness of breath and wheezing. Cardiovascular: Negative for chest pain, palpitations and leg swelling. Gastrointestinal: Negative for abdominal pain, constipation, diarrhea, nausea and vomiting. Endocrine: Negative for cold intolerance and heat intolerance. Genitourinary: Positive for dysuria. Negative for difficulty urinating, hematuria and pelvic pain. Trouble urinating Friday but that has gotten better over the weekend   Musculoskeletal: Negative for back pain, gait problem and joint swelling. Skin: Negative for rash and wound. Neurological: Negative for dizziness, syncope and headaches. Hematological: Negative for adenopathy. Psychiatric/Behavioral: Negative for confusion, sleep disturbance and suicidal ideas.         Confusion is much better           Current Outpatient Medications:     clopidogrel (PLAVIX) 75 MG tablet, Take 1 tablet by mouth daily, Disp: 90 tablet, Rfl: 1    levothyroxine (SYNTHROID) 50 MCG tablet, Take 1 tablet by mouth daily, Disp: 90 tablet, Rfl: 1    Omega-3 Fatty Acids (FISH OIL) 1000 MG CAPS, Take 1,000 mg by mouth daily, Disp: , Rfl:     triamcinolone (NASACORT) 55 MCG/ACT nasal inhaler, 2 sprays by Each Nostril route daily, Disp: , Rfl:     amLODIPine (NORVASC) 2.5 MG tablet, Take 1 tablet by mouth daily, Disp: 90 tablet, Rfl: 3    loratadine (CLARITIN) 10 MG tablet, Take 10 mg by mouth daily as needed (ALLERGIES) , Disp: , Rfl:     aspirin 81 MG tablet, Take 81 mg by mouth daily , Disp: , Rfl:   Allergies   Allergen Reactions    Carafate [Sucralfate] Other (See Comments)     Unable to explain    Cefdinir Other (See Comments)     Unable to explain    Cetirizine & Related Other (See Comments)     Makes her forgetful and sleepy    Lisinopril Other (See Comments)     Difficulty swallowing, reflux, headache, dizziness, leg cramps    Pravachol [Pravastatin Sodium] Other (See Comments)     Patient states \"it almost killed me. \" Spoke with Patient's Daughter and she stated \"body cramps and extreme weakness. \"    Protonix [Pantoprazole Sodium] Other (See Comments)     Unable to explain    Statins Other (See Comments)     Intolerable. Patient states \"it almost killed me. \" Spoke with Patient's Daughter and she stated \"body cramps and extreme weakness. \"      Past Medical History:   Diagnosis Date    Dementia ([facility]     Diverticulosis     GIST (gastrointestinal stroma tumor), malignant, colon ([facility]     Hyperlipidemia     Hypertension     MI (myocardial infarction) ([facility] 2015    Mitral regurgitation     per cardiology    PUD (peptic ulcer disease)      Patient Active Problem List    Diagnosis Date Noted    NSTEMI (non-ST elevated myocardial infarction) ([facility] 06/03/2016    GIST (gastrointestinal stromal tumor), malignant ([facility] 05/25/2016    Aortic dissection ([address] 09/20/2021    Pulmonary nodules 09/20/2021    Statin intolerance 09/20/2021    Acute cerebrovascular accident (CVA) ([address] 09/16/2021    Type 2 diabetes mellitus without complication, without long-term current use of insulin ([address] 01/20/2021    Dementia ([facility]     Acquired hypothyroidism 11/20/2019    Hyperlipidemia 11/15/2019    Disorder of thyroid gland 11/15/2019    Carotid stenosis, right 11/15/2019    Onychomycosis 06/14/2019    Difficulty walking 06/14/2019    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 ([facility] 06/14/2016    Malignant neoplasm of abdomen ([facility] 06/14/2016    Hypertension     PUD (peptic ulcer disease)       Past Surgical History:   Procedure Laterality Date    CAROTID ENDARTERECTOMY Right 04/2019    Dr Melissa Durham Bilateral 2015    COLONOSCOPY  12/2014    DILATATION, ESOPHAGUS      ENDOSCOPY, COLON, DIAGNOSTIC  09/02/2016    marking of gastric tumor    HYSTERECTOMY      YUMIKO/BSO    OTHER SURGICAL HISTORY  09/09/2016    Laparoscopic Robotic Assisted Partial Gastrectomy    SHOULDER SURGERY Left     rotator cuff right    TONSILLECTOMY AND ADENOIDECTOMY      UPPER GASTROINTESTINAL ENDOSCOPY  12/2014    UPPER GASTROINTESTINAL ENDOSCOPY  5/25/2016      Social History     Tobacco History     Smoking Status  Former Smoker Quit date  1/1/1969 Smoking Frequency  1 pack/day for 18 years (18 pk yrs)    Smokeless Tobacco Use  Never Used          Alcohol History     Alcohol Use Status  Yes Comment  glass of wine with dinner          Drug Use     Drug Use Status  No          Sexual Activity     Sexually Active  Not Currently Partners  Male Comment                    /72   Pulse 82   Temp 97.9 °F (36.6 °C)   Ht 5' 6\" (1.676 m)   Wt 146 lb 3.2 oz (66.3 kg)   SpO2 98%   BMI 23.60 kg/m²     EXAM:   Physical Exam  Vitals and nursing note reviewed.    Constitutional:       General: She

## 2022-01-13 LAB — URINE CULTURE, ROUTINE: NORMAL

## 2022-03-07 ENCOUNTER — OFFICE VISIT (OUTPATIENT)
Dept: FAMILY MEDICINE CLINIC | Age: 87
End: 2022-03-07
Payer: MEDICARE

## 2022-03-07 VITALS
DIASTOLIC BLOOD PRESSURE: 80 MMHG | WEIGHT: 144.8 LBS | BODY MASS INDEX: 23.27 KG/M2 | SYSTOLIC BLOOD PRESSURE: 132 MMHG | HEART RATE: 56 BPM | TEMPERATURE: 98.2 F | HEIGHT: 66 IN | OXYGEN SATURATION: 98 %

## 2022-03-07 DIAGNOSIS — C16.1: ICD-10-CM

## 2022-03-07 DIAGNOSIS — E11.65 TYPE 2 DIABETES MELLITUS WITH HYPERGLYCEMIA, WITHOUT LONG-TERM CURRENT USE OF INSULIN (HCC): ICD-10-CM

## 2022-03-07 DIAGNOSIS — E03.9 ACQUIRED HYPOTHYROIDISM: ICD-10-CM

## 2022-03-07 DIAGNOSIS — R73.9 HYPERGLYCEMIA: ICD-10-CM

## 2022-03-07 DIAGNOSIS — E03.9 ACQUIRED HYPOTHYROIDISM: Primary | ICD-10-CM

## 2022-03-07 DIAGNOSIS — F01.50 VASCULAR DEMENTIA WITHOUT BEHAVIORAL DISTURBANCE (HCC): ICD-10-CM

## 2022-03-07 DIAGNOSIS — I71.20 THORACIC AORTIC ANEURYSM WITHOUT RUPTURE: ICD-10-CM

## 2022-03-07 PROBLEM — I71.00 AORTIC DISSECTION (HCC): Status: RESOLVED | Noted: 2021-09-20 | Resolved: 2022-03-07

## 2022-03-07 PROBLEM — E11.9 TYPE 2 DIABETES MELLITUS WITHOUT COMPLICATION, WITHOUT LONG-TERM CURRENT USE OF INSULIN (HCC): Status: RESOLVED | Noted: 2021-01-20 | Resolved: 2022-03-07

## 2022-03-07 LAB
ALBUMIN SERPL-MCNC: 4.4 G/DL (ref 3.5–5.2)
ALP BLD-CCNC: 110 U/L (ref 35–104)
ALT SERPL-CCNC: 9 U/L (ref 0–32)
ANION GAP SERPL CALCULATED.3IONS-SCNC: 17 MMOL/L (ref 7–16)
AST SERPL-CCNC: 20 U/L (ref 0–31)
BASOPHILS ABSOLUTE: 0.07 E9/L (ref 0–0.2)
BASOPHILS RELATIVE PERCENT: 1.1 % (ref 0–2)
BILIRUB SERPL-MCNC: 0.3 MG/DL (ref 0–1.2)
BUN BLDV-MCNC: 16 MG/DL (ref 6–23)
CALCIUM SERPL-MCNC: 10.3 MG/DL (ref 8.6–10.2)
CHLORIDE BLD-SCNC: 106 MMOL/L (ref 98–107)
CO2: 21 MMOL/L (ref 22–29)
CREAT SERPL-MCNC: 0.9 MG/DL (ref 0.5–1)
EOSINOPHILS ABSOLUTE: 0.1 E9/L (ref 0.05–0.5)
EOSINOPHILS RELATIVE PERCENT: 1.5 % (ref 0–6)
GFR AFRICAN AMERICAN: >60
GFR NON-AFRICAN AMERICAN: 59 ML/MIN/1.73
GLUCOSE BLD-MCNC: 99 MG/DL (ref 74–99)
HBA1C MFR BLD: 6.9 % (ref 4–5.6)
HCT VFR BLD CALC: 46.3 % (ref 34–48)
HEMOGLOBIN: 14.4 G/DL (ref 11.5–15.5)
IMMATURE GRANULOCYTES #: 0.02 E9/L
IMMATURE GRANULOCYTES %: 0.3 % (ref 0–5)
LYMPHOCYTES ABSOLUTE: 1.63 E9/L (ref 1.5–4)
LYMPHOCYTES RELATIVE PERCENT: 25 % (ref 20–42)
MCH RBC QN AUTO: 29.7 PG (ref 26–35)
MCHC RBC AUTO-ENTMCNC: 31.1 % (ref 32–34.5)
MCV RBC AUTO: 95.5 FL (ref 80–99.9)
MONOCYTES ABSOLUTE: 0.56 E9/L (ref 0.1–0.95)
MONOCYTES RELATIVE PERCENT: 8.6 % (ref 2–12)
NEUTROPHILS ABSOLUTE: 4.15 E9/L (ref 1.8–7.3)
NEUTROPHILS RELATIVE PERCENT: 63.5 % (ref 43–80)
PDW BLD-RTO: 13.7 FL (ref 11.5–15)
PLATELET # BLD: 153 E9/L (ref 130–450)
PMV BLD AUTO: 12.5 FL (ref 7–12)
POTASSIUM SERPL-SCNC: 4.6 MMOL/L (ref 3.5–5)
RBC # BLD: 4.85 E12/L (ref 3.5–5.5)
SODIUM BLD-SCNC: 144 MMOL/L (ref 132–146)
T4 FREE: 1.28 NG/DL (ref 0.93–1.7)
TOTAL PROTEIN: 7.3 G/DL (ref 6.4–8.3)
TSH SERPL DL<=0.05 MIU/L-ACNC: 2.75 UIU/ML (ref 0.27–4.2)
WBC # BLD: 6.5 E9/L (ref 4.5–11.5)

## 2022-03-07 PROCEDURE — 99214 OFFICE O/P EST MOD 30 MIN: CPT | Performed by: FAMILY MEDICINE

## 2022-03-07 ASSESSMENT — ENCOUNTER SYMPTOMS
SHORTNESS OF BREATH: 0
DIARRHEA: 0
EYE PAIN: 0
TROUBLE SWALLOWING: 0
SINUS PAIN: 0
WHEEZING: 0
VOMITING: 0
NAUSEA: 0
COUGH: 0
ABDOMINAL PAIN: 0
SORE THROAT: 0
CONSTIPATION: 0
BACK PAIN: 0
CHEST TIGHTNESS: 0

## 2022-03-07 NOTE — PROGRESS NOTES
3/7/22    Name: Paul Montiel  [facility]:[birth year]   Sex:female   Age:88 y.o. Chief Complaint   Patient presents with    Urinary Frequency    Hypothyroidism    Headache     Patient presents to office for visit. Patient stopped taking all of her medications except for the amlodipine. She says her urine had an odor to it and she believes the medication was causing it. Patient says she has to urinate all of the time and she is having accidents. She denies any falls since her last appointment. Patient says she was having a lot of headaches and has been taking Tylenol often. Patient here for recheck  Still having some confusion  Also still having some headaches, posteriorly since last fall  She takes tylemol and they go away  She does notice them at night when she goes to lay down more often    She has stopped her thyroid medication and her plavix  She says they were causing urinary issues but she is still urinating frequently  I did review labs with her again from January and her diabetes  Has not been well controlled and she does not remember that  She has not been watching diet at all, she eats a lot of bread and a lot of pasta  Will check labs today and see where she is  When done will not only send a letter but speak to  as well    Also stopped her plavix  I did re explain what this was for  She is convinced she should not be taking it  Despite history of TIA and vascular dementia'    HTN stable  Sees cardiology later this month  Still taking amlodipine'  Not checking lipids  She is not fasting and will not take statins  Per cardiology if they need labs they can order it          Review of Systems   Constitutional: Negative for appetite change, fatigue and fever. HENT: Negative for congestion, ear pain, sinus pain, sore throat and trouble swallowing. Eyes: Negative for pain. Respiratory: Negative for cough, chest tightness, shortness of breath and wheezing.     Cardiovascular: Negative for chest pain, palpitations and leg swelling. Gastrointestinal: Negative for abdominal pain, constipation, diarrhea, nausea and vomiting. Endocrine: Negative for cold intolerance and heat intolerance. Genitourinary: Positive for dysuria, enuresis and frequency. Negative for difficulty urinating, hematuria and pelvic pain. Musculoskeletal: Negative for arthralgias, back pain, gait problem, joint swelling and myalgias. Skin: Negative for rash and wound. Neurological: Positive for headaches. Negative for dizziness, syncope and light-headedness. Hematological: Negative for adenopathy. Psychiatric/Behavioral: Negative for confusion, dysphoric mood, self-injury, sleep disturbance and suicidal ideas. The patient is not nervous/anxious. Current Outpatient Medications:     Omega-3 Fatty Acids (FISH OIL) 1000 MG CAPS, Take 1,000 mg by mouth daily, Disp: , Rfl:     triamcinolone (NASACORT) 55 MCG/ACT nasal inhaler, 2 sprays by Each Nostril route daily, Disp: , Rfl:     amLODIPine (NORVASC) 2.5 MG tablet, Take 1 tablet by mouth daily, Disp: 90 tablet, Rfl: 3    loratadine (CLARITIN) 10 MG tablet, Take 10 mg by mouth daily as needed (ALLERGIES) , Disp: , Rfl:     aspirin 81 MG tablet, Take 81 mg by mouth daily , Disp: , Rfl:   Allergies   Allergen Reactions    Carafate [Sucralfate] Other (See Comments)     Unable to explain    Cefdinir Other (See Comments)     Unable to explain    Cetirizine & Related Other (See Comments)     Makes her forgetful and sleepy    Lisinopril Other (See Comments)     Difficulty swallowing, reflux, headache, dizziness, leg cramps    Pravachol [Pravastatin Sodium] Other (See Comments)     Patient states \"it almost killed me. \" Spoke with Patient's Daughter and she stated \"body cramps and extreme weakness. \"    Protonix [Pantoprazole Sodium] Other (See Comments)     Unable to explain    Statins Other (See Comments)     Intolerable. Patient states \"it almost killed me. \" Laura Lilli with Patient's Daughter and she stated \"body cramps and extreme weakness. \"      Past Medical History:   Diagnosis Date    Dementia ([address]     Diverticulosis     GIST (gastrointestinal stroma tumor), malignant, colon ([address]     Hyperlipidemia     Hypertension     MI (myocardial infarction) ([address] 2015    Mitral regurgitation     per cardiology    PUD (peptic ulcer disease)      Patient Active Problem List    Diagnosis Date Noted    NSTEMI (non-ST elevated myocardial infarction) ([address] 06/03/2016    GIST (gastrointestinal stromal tumor), malignant ([address] 05/25/2016    Pulmonary nodules 09/20/2021    Statin intolerance 09/20/2021    Acute cerebrovascular accident (CVA) ([address] 09/16/2021    Dementia ([address]     Acquired hypothyroidism 11/20/2019    Hyperlipidemia 11/15/2019    Disorder of thyroid gland 11/15/2019    Carotid stenosis, right 11/15/2019    Onychomycosis 06/14/2019    Difficulty walking 06/14/2019    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 ([address] 06/14/2016    Malignant neoplasm of abdomen ([address] 06/14/2016    Hypertension     PUD (peptic ulcer disease)       Past Surgical History:   Procedure Laterality Date    CAROTID ENDARTERECTOMY Right 04/2019    Dr Aneesh Collado Bilateral 2015    COLONOSCOPY  12/2014    DILATATION, ESOPHAGUS      ENDOSCOPY, COLON, DIAGNOSTIC  09/02/2016    marking of gastric tumor    HYSTERECTOMY      YUMIKO/BSO    OTHER SURGICAL HISTORY  09/09/2016    Laparoscopic Robotic Assisted Partial Gastrectomy    SHOULDER SURGERY Left     rotator cuff right    TONSILLECTOMY AND ADENOIDECTOMY      UPPER GASTROINTESTINAL ENDOSCOPY  12/2014    UPPER GASTROINTESTINAL ENDOSCOPY  5/25/2016      Social History     Tobacco History     Smoking Status  Former Smoker Quit date  1/1/1969 Smoking Frequency  1 pack/day for 18 years (18 pk yrs)    Smokeless Tobacco Use  Never Used          Alcohol History     Alcohol Use Status  Yes Comment  glass of wine with dinner          Drug Use     Drug Use Status  No          Sexual Activity     Sexually Active  Not Currently Partners  Male Comment              /80   Pulse 56   Temp 98.2 °F (36.8 °C)   Ht 5' 6\" (1.676 m)   Wt 144 lb 12.8 oz (65.7 kg)   SpO2 98%   BMI 23.37 kg/m²     EXAM:   Physical Exam  Vitals and nursing note reviewed. Constitutional:       Appearance: She is well-developed. HENT:      Head: Normocephalic and atraumatic. Eyes:      Pupils: Pupils are equal, round, and reactive to light. Cardiovascular:      Rate and Rhythm: Normal rate and regular rhythm. Pulmonary:      Effort: Pulmonary effort is normal.      Breath sounds: Normal breath sounds. Musculoskeletal:      Cervical back: Normal range of motion. Skin:     General: Skin is warm and dry. Neurological:      Mental Status: She is oriented to person, place, and time. Mental status is at baseline. Comments: Repeated answers to her questions a few times          Isaak Santos was seen today for urinary frequency, hypothyroidism and headache. Diagnoses and all orders for this visit:    Acquired hypothyroidism  Comments:  uncontrolled  she stopped thyroid medications  recheck labs today  she has not been taking meds  Orders:  -     T4, Free; Future  -     TSH; Future    Type 2 diabetes mellitus with hyperglycemia, without long-term current use of insulin ([address]  Comments:  not watching her diet  she does not remember going over her labs from january  will need to send letter & discuss w/   labs today  counsled about diet    Vascular dementia without behavioral disturbance ([facility]  Comments:  has gotten worse per patient  she has to write everything down  still driving though'  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  Comments:  no dissection stable        I independently reviewed and updated the chief complaint, HPI, past medical and surgical history, medications, allergies and ROS as entered by the LPN. Seen by:   Madelaine Gutierrez DO

## 2022-03-21 ENCOUNTER — OFFICE VISIT (OUTPATIENT)
Dept: CARDIOLOGY CLINIC | Age: 87
End: 2022-03-21
Payer: MEDICARE

## 2022-03-21 VITALS
HEIGHT: 64 IN | DIASTOLIC BLOOD PRESSURE: 80 MMHG | RESPIRATION RATE: 16 BRPM | WEIGHT: 143 LBS | HEART RATE: 79 BPM | SYSTOLIC BLOOD PRESSURE: 128 MMHG | BODY MASS INDEX: 24.41 KG/M2

## 2022-03-21 DIAGNOSIS — I65.21 CAROTID STENOSIS, RIGHT: Primary | ICD-10-CM

## 2022-03-21 DIAGNOSIS — I34.0 NONRHEUMATIC MITRAL VALVE REGURGITATION: ICD-10-CM

## 2022-03-21 DIAGNOSIS — I21.4 NSTEMI (NON-ST ELEVATED MYOCARDIAL INFARCTION) (HCC): ICD-10-CM

## 2022-03-21 DIAGNOSIS — I63.9 ACUTE CEREBROVASCULAR ACCIDENT (CVA) (HCC): ICD-10-CM

## 2022-03-21 DIAGNOSIS — I10 PRIMARY HYPERTENSION: ICD-10-CM

## 2022-03-21 PROCEDURE — 93000 ELECTROCARDIOGRAM COMPLETE: CPT | Performed by: INTERNAL MEDICINE

## 2022-03-21 PROCEDURE — 99214 OFFICE O/P EST MOD 30 MIN: CPT | Performed by: INTERNAL MEDICINE

## 2022-03-21 NOTE — PROGRESS NOTES
Markus Najera  1934  Date of Service: 3/21/2022    Patient Active Problem List    Diagnosis Date Noted    NSTEMI (non-ST elevated myocardial infarction) ([facility] 2016     Priority: High    GIST [facility]gastrointestinal stromal tumor[facility] ([facility] 2016     Priority: High    Pulmonary nodules 2021    Statin intolerance 2021    Acute cerebrovascular accident (CVA) ([facility] 2021    Dementia ([facility]     Acquired hypothyroidism 2019    Hyperlipidemia 11/15/2019    Disorder of thyroid gland 11/15/2019    Carotid stenosis, right 11/15/2019    Onychomycosis 2019    Difficulty walking 2019    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 ([facility] 2016    Malignant neoplasm of abdomen ([facility] 2016    Hypertension     PUD (peptic ulcer disease)        Social History     Socioeconomic History    Marital status:      Spouse name: None    Number of children: None    Years of education: None    Highest education level: None   Occupational History    Occupation: retired    Tobacco Use    Smoking status: Former Smoker     Packs/day: 1.00     Years: 18.00     Pack years: 18.00     Quit date:      Years since quittin.2    Smokeless tobacco: Never Used   Vaping Use    Vaping Use: Never used   Substance and Sexual Activity    Alcohol use: Yes     Comment: glass of wine with dinner    Drug use: No    Sexual activity: Not Currently     Partners: Male     Comment:    Other Topics Concern    None   Social History Narrative    None     Social Determinants of Health     Financial Resource Strain:     Difficulty of Paying Living Expenses: Not on file   Food Insecurity:     Worried About Running Out of Food in the Last Year: Not on file    [place] of Food in the Last Year: Not on file   Transportation Needs:     Lack of Transportation (Medical): Not on file    Lack of Transportation (Non-Medical):  Not on file   Physical Activity:     Days of Exercise per Week: Not on file    Minutes of Exercise per Session: Not on file   Stress:     Feeling of Stress : Not on file   Social Connections:     Frequency of Communication with Friends and Family: Not on file    Frequency of Social Gatherings with Friends and Family: Not on file    Attends Worship Services: Not on file    Active Member of [address] [place] [address] or Organizations: Not on file    Attends Club or Organization Meetings: Not on file    Marital Status: Not on file   Intimate Partner Violence:     Fear of Current or Ex-Partner: Not on file    Emotionally Abused: Not on file    Physically Abused: Not on file    Sexually Abused: Not on file   Housing Stability:     Unable to Pay for Housing in the Last Year: Not on file    Number of [place] in the Last Year: Not on file    Unstable Housing in the Last Year: Not on file       Current Outpatient Medications   Medication Sig Dispense Refill    triamcinolone (NASACORT) 55 MCG/ACT nasal inhaler 2 sprays by Each Nostril route daily      amLODIPine (NORVASC) 2.5 MG tablet Take 1 tablet by mouth daily 90 tablet 3    loratadine (CLARITIN) 10 MG tablet Take 10 mg by mouth daily as needed (ALLERGIES)       aspirin 81 MG tablet Take 81 mg by mouth daily       Omega-3 Fatty Acids (FISH OIL) 1000 MG CAPS Take 1,000 mg by mouth daily (Patient not taking: Reported on 3/21/2022)       No current facility-administered medications for this visit. Allergies   Allergen Reactions    Carafate [Sucralfate] Other (See Comments)     Unable to explain    Cefdinir Other (See Comments)     Unable to explain    Cetirizine & Related Other (See Comments)     Makes her forgetful and sleepy    Lisinopril Other (See Comments)     Difficulty swallowing, reflux, headache, dizziness, leg cramps    Pravachol [Pravastatin Sodium] Other (See Comments)     Patient states \"it almost killed me. \" Spoke with Patient's Daughter and she stated \"body cramps and extreme weakness. \"    Protonix [Pantoprazole Sodium] Other (See Comments)     Unable to explain    Statins Other (See Comments)     Intolerable. Patient states \"it almost killed me. \" Spoke with Patient's Daughter and she stated \"body cramps and extreme weakness. \"       Chief Complaint:  Doris Cannon is here today for follow up and management/recomendations for CAD     History of Present Illness: Doris Cannon is a difficult historian. She does some housework, goes upstairs, and goes shopping. She denies any chest discomfort or dyspnea with any of the above activities. She denies orthopnea/PND, or lower extremity edema. She denies any palpitations. REVIEW OF SYSTEMS:  As above. Patient does not complain of any fever, chills, nausea, vomiting or diarrhea. No focal, motor or neurological deficits. No changes in his/her vision, hearing, bowel or bladder habits. She is not known to have a history of thyroid problems. No recent nose bleeds. PHYSICAL EXAM:  Vitals:    03/21/22 0915   BP: 128/80   Pulse: 79   Resp: 16   Weight: 143 lb (64.9 kg)   Height: 5' 4\" (1.626 m)       GENERAL:  She is alert and oriented x 3, communicates well, in no distress. NECK:  No masses, trachea is mid position. Supple, full ROM, no JVD or bruits. No palpable thyromegaly or lymphadenopathy. HEART:  Regular rate and rhythm. Normal S1 and S2. There is an S4 gallop and a I-II/VI holosystolic murmur. LUNGS:  Clear to auscultation bilaterally. No use of accessory muscles. symmetrical excursion. Mildly decreased air movement  ABDOMEN:  Soft, non-tender. Normal bowel sounds. EXTREMITIES:  Full ROM x 4. No lower extremity edema bilateral.  Good distal pulses. EYES:  Extraocular muscles intact. PERRL. Normal lids & conjunctiva. ENT:  Nares are clear & not bleeding. Moist mucosa. Normal lips formation. No external masses   NEURO: no tremors, full ROM x 4, EOMI. SKIN:  Warm, dry and intact. Normal turgor. EKG: Sinus rhythm, 79 bpm, nl axis, nonspecific ST - T wave changes. Assessment:   1. Coronary artery disease as outlined above. Clinically no symptoms of recurring ischemia at this time. 2. Moderate mitral regurgitation. Well compensated today. 3. Hypertension, well controlled today  4. CVA  5. Hypercholesterolemia  6. Peptic ulcer disease  7. GI bleed  8. Stomach cancer  9. Dizziness as described above. More consistent with vertigo. Recommendations:  1. She is following the cholesterol with Dr. Hosea Castillo. She has been intolerant of statins. 2. Echocardiogram to follow the mitral regurgitation      Thank you for allowing me to participate in your patient's care.       [id number], [address] [facility]  Interventional Cardiology

## 2022-03-29 RX ORDER — AMLODIPINE BESYLATE 2.5 MG/1
2.5 TABLET ORAL DAILY
Qty: 90 TABLET | Refills: 3 | Status: SHIPPED | OUTPATIENT
Start: 2022-03-29

## 2022-05-23 ENCOUNTER — OFFICE VISIT (OUTPATIENT)
Dept: FAMILY MEDICINE CLINIC | Age: 87
End: 2022-05-23
Payer: MEDICARE

## 2022-05-23 VITALS
OXYGEN SATURATION: 99 % | HEIGHT: 66 IN | TEMPERATURE: 98.2 F | SYSTOLIC BLOOD PRESSURE: 126 MMHG | BODY MASS INDEX: 22.98 KG/M2 | HEART RATE: 76 BPM | WEIGHT: 143 LBS | DIASTOLIC BLOOD PRESSURE: 80 MMHG

## 2022-05-23 DIAGNOSIS — N18.31 STAGE 3A CHRONIC KIDNEY DISEASE (HCC): ICD-10-CM

## 2022-05-23 DIAGNOSIS — I10 PRIMARY HYPERTENSION: ICD-10-CM

## 2022-05-23 DIAGNOSIS — E11.9 TYPE 2 DIABETES MELLITUS WITHOUT COMPLICATION, WITHOUT LONG-TERM CURRENT USE OF INSULIN (HCC): Primary | ICD-10-CM

## 2022-05-23 DIAGNOSIS — J30.1 SEASONAL ALLERGIC RHINITIS DUE TO POLLEN: ICD-10-CM

## 2022-05-23 DIAGNOSIS — G44.309 POST-CONCUSSION HEADACHE: ICD-10-CM

## 2022-05-23 PROBLEM — N18.30 CHRONIC RENAL DISEASE, STAGE III (HCC): Status: ACTIVE | Noted: 2022-05-23

## 2022-05-23 PROCEDURE — 99214 OFFICE O/P EST MOD 30 MIN: CPT | Performed by: FAMILY MEDICINE

## 2022-05-23 PROCEDURE — 3044F HG A1C LEVEL LT 7.0%: CPT | Performed by: FAMILY MEDICINE

## 2022-05-23 ASSESSMENT — ENCOUNTER SYMPTOMS
SHORTNESS OF BREATH: 0
COUGH: 0
VOMITING: 0
TROUBLE SWALLOWING: 0
EYE PAIN: 0
CHEST TIGHTNESS: 0
CONSTIPATION: 0
WHEEZING: 0
NAUSEA: 0
BACK PAIN: 0
SINUS PAIN: 0
SORE THROAT: 0
DIARRHEA: 0
ABDOMINAL PAIN: 0

## 2022-05-23 NOTE — PROGRESS NOTES
5/23/22    Name: Katharina Sheikh  OWW:[id number]   Sex:female   Age:88 y.o. Chief Complaint   Patient presents with    Diabetes     Patient presents to office for visit. Her only prescription is the amlodipine which is filled by cardiology. Patient says she no longer has dizziness. She does have head pain when she lays down. She says she has had this head pain since her fall. She takes her allergy medications about three times a week. She denies any urinary complaints. She says she is having joint pain from time to time and she will take tylenol for this. Here for a check up  He started with a headache last night  She has not had any headaches for the last few weeks  She is worried b/c her post concussive vertigo lasted for so long and that finally went away  She has not beent aking ehr allergy meds though  We discussed these  She will get back on them  She scalp feels very sensitive  No vision changes  No rashes anywhere    As stated vertigo is gone, she has not had any issues in the last few wmonths    diabets  She is watching diet  Cut back on her sugar and carbs  Will repeat labs in a few months to be sure she is doing well    HTn  Stable  She is finally taking the amlodipine daily  Readings very good here in the office        Review of Systems   Constitutional: Negative for appetite change, fatigue and fever. HENT: Negative for congestion, ear pain, sinus pain, sore throat and trouble swallowing. Eyes: Negative for pain. Respiratory: Negative for cough, chest tightness, shortness of breath and wheezing. Cardiovascular: Negative for chest pain, palpitations and leg swelling. Gastrointestinal: Negative for abdominal pain, constipation, diarrhea, nausea and vomiting. Endocrine: Negative for cold intolerance and heat intolerance. Genitourinary: Negative for difficulty urinating, dysuria, frequency, hematuria and pelvic pain. Musculoskeletal: Positive for arthralgias.  Negative for back pain, gait problem, joint swelling and myalgias. Skin: Negative for rash and wound. Neurological: Negative for dizziness, syncope, light-headedness and headaches. Hematological: Negative for adenopathy. Psychiatric/Behavioral: Negative for confusion, dysphoric mood, self-injury, sleep disturbance and suicidal ideas. The patient is not nervous/anxious. Current Outpatient Medications:     amLODIPine (NORVASC) 2.5 MG tablet, Take 1 tablet by mouth daily, Disp: 90 tablet, Rfl: 3    triamcinolone (NASACORT) 55 MCG/ACT nasal inhaler, 2 sprays by Each Nostril route daily, Disp: , Rfl:     loratadine (CLARITIN) 10 MG tablet, Take 10 mg by mouth daily as needed (ALLERGIES) , Disp: , Rfl:     aspirin 81 MG tablet, Take 81 mg by mouth daily , Disp: , Rfl:   Allergies   Allergen Reactions    Carafate [Sucralfate] Other (See Comments)     Unable to explain    Cefdinir Other (See Comments)     Unable to explain    Cetirizine & Related Other (See Comments)     Makes her forgetful and sleepy    Lisinopril Other (See Comments)     Difficulty swallowing, reflux, headache, dizziness, leg cramps    Pravachol [Pravastatin Sodium] Other (See Comments)     Patient states \"it almost killed me. \" Spoke with Patient's Daughter and she stated \"body cramps and extreme weakness. \"    Protonix [Pantoprazole Sodium] Other (See Comments)     Unable to explain    Statins Other (See Comments)     Intolerable. Patient states \"it almost killed me. \" Spoke with Patient's Daughter and she stated \"body cramps and extreme weakness. \"      Past Medical History:   Diagnosis Date    D[facility]     Diverticulosis     GIST (gastrointestinal stroma tumor), malignant, colon ([facility]     Hyperlipidemia     Hypertension     MI (myocardial infarction) ([facility] 2015    Mitral regurgitation     per cardiology    PUD (peptic ulcer disease)      Patient Active Problem List    Diagnosis Date Noted    [facility] 06/[facility]/2016    GIST (gastrointestinal stromal tumor), malignant [facility] 05/25/2016    Type 2 diabetes mellitus 05/23/2022    Chronic renal disease, stage III ([facility] [813006] 05/23/2022    Pulmonary nodules 09/20/2021    Statin intolerance 09/20/2021    [facility]) [facility] 09/16/2021    Dementia ([facility]     Acquired hypothyroidism 11/20/2019    Hyperlipidemia 11/15/2019    Disorder of thyroid gland 11/15/2019    Carotid stenosis, right 11/15/2019    Onychomycosis 06/14/2019    Difficulty walking 06/14/2019    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 ([facility] 06/14/2016    Malignant neoplasm of abdomen ([facility] 06/14/2016    Hypertension     PUD (peptic ulcer disease)       Past Surgical History:   Procedure Laterality Date    CAROTID ENDARTERECTOMY Right 04/2019    Dr Misha Edwards Bilateral 2015    COLONOSCOPY  12/2014    DILATATION, ESOPHAGUS      ENDOSCOPY, COLON, DIAGNOSTIC  09/02/2016    marking of gastric tumor    HYSTERECTOMY      YUMIKO/BSO    OTHER SURGICAL HISTORY  09/09/2016    Laparoscopic Robotic Assisted Partial Gastrectomy    SHOULDER SURGERY Left     rotator cuff right    TONSILLECTOMY AND ADENOIDECTOMY      UPPER GASTROINTESTINAL ENDOSCOPY  12/2014    UPPER GASTROINTESTINAL ENDOSCOPY  5/25/2016      Social History     Tobacco History     Smoking Status  Former Smoker Quit date  1/1/1969 Smoking Frequency  1 pack/day for 18 years (18 pk yrs)    Smokeless Tobacco Use  Never Used          Alcohol History     Alcohol Use Status  Yes Comment  glass of wine with dinner          Drug Use     Drug Use Status  No          Sexual Activity     Sexually Active  Not Currently Partners  Male Comment              /80   Pulse 76   Temp 98.2 °F (36.8 °C)   Ht 5' 6\" (1.676 m)   Wt 143 lb (64.9 kg)   SpO2 99%   BMI 23.08 kg/m²     EXAM:   Physical Exam  Vitals and nursing note reviewed.    Constitutional: General: She is not in acute distress. Appearance: She is well-developed. She is not ill-appearing. HENT:      Head: Normocephalic and atraumatic. Comments: Scalp feels normal, having some headaches issues and scalp is sensitive posteriorly but no lesion, bumps or rashes     Right Ear: Tympanic membrane normal.      Left Ear: Tympanic membrane normal.      Nose: Nose normal.      Mouth/Throat:      Mouth: Mucous membranes are moist.   Eyes:      Pupils: Pupils are equal, round, and reactive to light. Cardiovascular:      Rate and Rhythm: Normal rate and regular rhythm. Pulmonary:      Effort: Pulmonary effort is normal.      Breath sounds: Normal breath sounds. Musculoskeletal:      Cervical back: Normal range of motion. Comments: Gait steady   Skin:     General: Skin is warm and dry. Neurological:      Mental Status: She is alert and oriented to person, place, and time. Psychiatric:         Mood and Affect: Mood normal.         Thought Content: Thought content normal.          Agustina Franklin was seen today for diabetes. Diagnoses and all orders for this visit:    Type 2 diabetes mellitus without complication, without long-term current use of insulin (HCC)  Comments:  she is watching diet  repeat labs in 3 months  if a1c goes up may need medications  Orders:  -     CBC with Auto Differential; Future  -     Comprehensive Metabolic Panel;  Future  -     Hemoglobin A1C; Future  -     Microalbumin, Ur; Future    Stage 3a chronic kidney disease (HCC)  Comments:  has been stable  encouraged her to stay hydrated    Primary hypertension  Comments:  seeing cardiology  still taking amlodipine  she is doing well  readings are good    Post-concussion headache  Comments:  vertigo gone that she has post concussive  now occasionally will have headaches  but not often    Seasonal allergic rhinitis due to pollen  Comments:  taking claritin  encouraged nasacort usee more pften lynn if headaches are

## 2022-06-13 ENCOUNTER — OFFICE VISIT (OUTPATIENT)
Dept: FAMILY MEDICINE CLINIC | Age: 87
End: 2022-06-13
Payer: MEDICARE

## 2022-06-13 VITALS
BODY MASS INDEX: 22.88 KG/M2 | HEIGHT: 66 IN | OXYGEN SATURATION: 97 % | WEIGHT: 142.4 LBS | HEART RATE: 80 BPM | DIASTOLIC BLOOD PRESSURE: 76 MMHG | TEMPERATURE: 98.3 F | SYSTOLIC BLOOD PRESSURE: 122 MMHG

## 2022-06-13 DIAGNOSIS — M51.36 LUMBAR DEGENERATIVE DISC DISEASE: ICD-10-CM

## 2022-06-13 DIAGNOSIS — N18.31 STAGE 3A CHRONIC KIDNEY DISEASE (HCC): ICD-10-CM

## 2022-06-13 DIAGNOSIS — G89.29 CHRONIC RIGHT-SIDED LOW BACK PAIN WITH RIGHT-SIDED SCIATICA: Primary | ICD-10-CM

## 2022-06-13 DIAGNOSIS — M54.41 CHRONIC RIGHT-SIDED LOW BACK PAIN WITH RIGHT-SIDED SCIATICA: Primary | ICD-10-CM

## 2022-06-13 DIAGNOSIS — I69.354 HEMIPLEGIA AND HEMIPARESIS FOLLOWING CEREBRAL INFARCTION AFFECTING LEFT NON-DOMINANT SIDE (HCC): ICD-10-CM

## 2022-06-13 PROCEDURE — 99214 OFFICE O/P EST MOD 30 MIN: CPT | Performed by: FAMILY MEDICINE

## 2022-06-13 PROCEDURE — 1123F ACP DISCUSS/DSCN MKR DOCD: CPT | Performed by: FAMILY MEDICINE

## 2022-06-13 ASSESSMENT — PATIENT HEALTH QUESTIONNAIRE - PHQ9
SUM OF ALL RESPONSES TO PHQ9 QUESTIONS 1 & 2: 0
2. FEELING DOWN, DEPRESSED OR HOPELESS: 0
SUM OF ALL RESPONSES TO PHQ QUESTIONS 1-9: 0
1. LITTLE INTEREST OR PLEASURE IN DOING THINGS: 0
SUM OF ALL RESPONSES TO PHQ QUESTIONS 1-9: 0

## 2022-06-13 ASSESSMENT — ENCOUNTER SYMPTOMS
SINUS PAIN: 0
DIARRHEA: 0
ABDOMINAL PAIN: 0
CONSTIPATION: 0
WHEEZING: 0
VOMITING: 0
NAUSEA: 0
TROUBLE SWALLOWING: 0
SHORTNESS OF BREATH: 0
SORE THROAT: 0
BACK PAIN: 0
EYE PAIN: 0
CHEST TIGHTNESS: 0
COUGH: 0

## 2022-06-13 NOTE — PROGRESS NOTES
6/13/22    Name: Meryle Christen  SLP:[birth year]   Sex:female   Age:88 y.o. Chief Complaint   Patient presents with    Spasms    Extremity Weakness     Patient presents to office for visit. Patient says last Thursday she had cramping in her right leg. Patient says then she had pain go down her right buttocks. She says her right leg just gave out on her and she went down. Patient says she did not fall all of the way. She says her  helped her up and she took mustard and some tylenol and then felt better. She says she still has that pain in her right buttocks. She says the pain is very dull. Patient does have trouble with urinary incontinence. She says the incontinence is the same that it always is, no better or worse. Patient here for chek on the cramping and right buttock pain she experienced Thursday  She is sure that was the day now b/c she was busy all day at home and at the [employer] hanging things over head, twisting a lot and then went home and was doing a lot of things there  That is the night she had issues with the low back pain and pain into right buttock that went down her leg  She had not been to eher exercise class for at least a week prior b/c her  is on vacation    She took tylenol did some stretching and took a few tsp of mustard and it went away   And now the pain is less then a toothache  She just wanted to get checked since she has been working out  No recent injuries though  There was an injury but it was 20 years ago    She is staying hydrated lynn with this heat  Advised to continue this lynn with  CKD but it has been stable    Since her strok the left sided weakness and hemiplegia is much better  [place] [facility]  [facility] has helped a lot            Review of Systems   Constitutional: Negative for appetite change, fatigue and fever. HENT: Negative for congestion, ear pain, sinus pain, sore throat and trouble swallowing. Eyes: Negative for pain. Respiratory: Negative for cough, chest tightness, shortness of breath and wheezing. Cardiovascular: Negative for chest pain, palpitations and leg swelling. Gastrointestinal: Negative for abdominal pain, constipation, diarrhea, nausea and vomiting. Endocrine: Negative for cold intolerance and heat intolerance. Genitourinary: Negative for difficulty urinating, hematuria and pelvic pain. Musculoskeletal: Positive for myalgias. Negative for back pain, gait problem and joint swelling. Skin: Negative for rash and wound. Neurological: Negative for dizziness, syncope and headaches. Hematological: Negative for adenopathy. Psychiatric/Behavioral: Negative for confusion, sleep disturbance and suicidal ideas. Current Outpatient Medications:     amLODIPine (NORVASC) 2.5 MG tablet, Take 1 tablet by mouth daily, Disp: 90 tablet, Rfl: 3    triamcinolone (NASACORT) 55 MCG/ACT nasal inhaler, 2 sprays by Each Nostril route daily, Disp: , Rfl:     loratadine (CLARITIN) 10 MG tablet, Take 10 mg by mouth daily as needed (ALLERGIES) , Disp: , Rfl:     aspirin 81 MG tablet, Take 81 mg by mouth daily , Disp: , Rfl:   Allergies   Allergen Reactions    Carafate [Sucralfate] Other (See Comments)     Unable to explain    Cefdinir Other (See Comments)     Unable to explain    Cetirizine & Related Other (See Comments)     Makes her forgetful and sleepy    Lisinopril Other (See Comments)     Difficulty swallowing, reflux, headache, dizziness, leg cramps    Pravachol [Pravastatin Sodium] Other (See Comments)     Patient states \"it almost killed me. \" Spoke with Patient's Daughter and she stated \"body cramps and extreme weakness. \"    Protonix [Pantoprazole Sodium] Other (See Comments)     Unable to explain    Statins Other (See Comments)     Intolerable. Patient states \"it almost killed me. \" Spoke with Patient's Daughter and she stated \"body cramps and extreme weakness. \"      Past Medical History:   Diagnosis Date    Dementia [facility])     Diverticulosis     GIST (gastrointestinal stroma tumor), malignant, colon ([address]     Hyperlipidemia     Hypertension     MI (myocardial infarction) ([address] 2015    Mitral regurgitation     per cardiology    PUD (peptic ulcer disease)      Patient Active Problem List    Diagnosis Date Noted    NSTEMI (non-ST elevated myocardial infarction) ([address] 06/03/2016    GIST (gastrointestinal stromal tumor), malignant ([address] 05/25/2016    Type 2 diabetes mellitus 05/23/2022    Chronic renal disease, stage III ([address] [419250] 05/23/2022    Pulmonary nodules 09/20/2021    Statin intolerance 09/20/2021    Acute cerebrovascular accident (CVA) ([address] 09/16/2021    Dementia [facility]     Acquired hypothyroidism 11/20/2019    Hyperlipidemia 11/15/2019    Disorder of thyroid gland 11/15/2019    Carotid stenosis, right 11/15/2019    Onychomycosis 06/14/2019    Difficulty walking 06/14/2019    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 ([address] 06/14/2016    Malignant neoplasm of abdomen ([address] 06/14/2016    Hypertension     PUD (peptic ulcer disease)       Past Surgical History:   Procedure Laterality Date    CAROTID ENDARTERECTOMY Right 04/2019    Dr Olga Carrasquillo Bilateral 2015    COLONOSCOPY  12/2014    DILATATION, ESOPHAGUS      ENDOSCOPY, COLON, DIAGNOSTIC  09/02/2016    marking of gastric tumor    HYSTERECTOMY (CERVIX STATUS UNKNOWN)      YUMIKO/BSO    OTHER SURGICAL HISTORY  09/09/2016    Laparoscopic Robotic Assisted Partial Gastrectomy    SHOULDER SURGERY Left     rotator cuff right    TONSILLECTOMY AND ADENOIDECTOMY      UPPER GASTROINTESTINAL ENDOSCOPY  12/2014    UPPER GASTROINTESTINAL ENDOSCOPY  5/25/2016      Social History     Tobacco History     Smoking Status  Former Smoker Quit date  1/1/1969 Smoking Frequency  1 pack/day for 18 years (18 pk yrs)    Smokeless Tobacco Use  Never Used          Alcohol History     Alcohol Use Status  Yes Comment  glass of wine with dinner          Drug Use     Drug Use Status  No          Sexual Activity     Sexually Active  Not Currently Partners  Male Comment              /76   Pulse 80   Temp 98.3 °F (36.8 °C)   Ht 5' 6\" (1.676 m)   Wt 142 lb 6.4 oz (64.6 kg)   SpO2 97%   BMI 22.98 kg/m²     EXAM:   Physical Exam  Vitals and nursing note reviewed. Constitutional:       Appearance: She is well-developed. HENT:      Head: Normocephalic and atraumatic. Eyes:      Pupils: Pupils are equal, round, and reactive to light. Cardiovascular:      Rate and Rhythm: Normal rate and regular rhythm. Pulmonary:      Effort: Pulmonary effort is normal.      Breath sounds: Normal breath sounds. Musculoskeletal:      Cervical back: Normal range of motion. Comments: Gait steady, right buttock marked up with green marker where her pain was  Not tender now and no bruising   Skin:     General: Skin is warm and dry. Dipak Bain was seen today for spasms and extremity weakness. Diagnoses and all orders for this visit:    Chronic right-sided low back pain with right-sided sciatica  Comments:  magnexium glycinate at bedtime nightly  mustard and tyelnol as needed  Orders:  -     XR LUMBAR SPINE (MIN 4 VIEWS); Future  -     XR SACROILIAC JOINTS (MIN 3 VIEWS); Future    Stage 3a chronic kidney disease (HCC)  Comments:  has been stable  encouraged more fluidintake and she has been better about this    Lumbar degenerative disc disease  Comments:  spurring and arthritic changes in vertebra as well  also right acroiliac arthritis    Hemiplegia and hemiparesis following cerebral infarction affecting left non-dominant side ([facility] [facility]  Comments:  improving greatly with exercises she is doing t the [place]  she even has a         I independently reviewed and updated the chief complaint, HPI, past medical and surgical history, medications, allergies and ROS as entered by the LPN. Seen by:   Joleen Corrales Renaldo, DO

## 2022-06-21 ENCOUNTER — OFFICE VISIT (OUTPATIENT)
Dept: FAMILY MEDICINE CLINIC | Age: 87
End: 2022-06-21
Payer: MEDICARE

## 2022-06-21 VITALS
HEIGHT: 66 IN | HEART RATE: 76 BPM | TEMPERATURE: 98.2 F | WEIGHT: 144.8 LBS | DIASTOLIC BLOOD PRESSURE: 84 MMHG | OXYGEN SATURATION: 97 % | BODY MASS INDEX: 23.27 KG/M2 | SYSTOLIC BLOOD PRESSURE: 136 MMHG

## 2022-06-21 DIAGNOSIS — R41.0 CONFUSION: ICD-10-CM

## 2022-06-21 DIAGNOSIS — R35.0 URINARY FREQUENCY: ICD-10-CM

## 2022-06-21 DIAGNOSIS — R41.0 CONFUSION: Primary | ICD-10-CM

## 2022-06-21 LAB
ALBUMIN SERPL-MCNC: 4.2 G/DL (ref 3.5–5.2)
ALP BLD-CCNC: 120 U/L (ref 35–104)
ALT SERPL-CCNC: <5 U/L (ref 0–32)
ANION GAP SERPL CALCULATED.3IONS-SCNC: 14 MMOL/L (ref 7–16)
AST SERPL-CCNC: 17 U/L (ref 0–31)
BASOPHILS ABSOLUTE: 0.05 E9/L (ref 0–0.2)
BASOPHILS RELATIVE PERCENT: 0.9 % (ref 0–2)
BILIRUB SERPL-MCNC: <0.2 MG/DL (ref 0–1.2)
BILIRUBIN, POC: NORMAL
BLOOD URINE, POC: NORMAL
BUN BLDV-MCNC: 22 MG/DL (ref 6–23)
CALCIUM SERPL-MCNC: 9.1 MG/DL (ref 8.6–10.2)
CHLORIDE BLD-SCNC: 104 MMOL/L (ref 98–107)
CLARITY, POC: CLEAR
CO2: 20 MMOL/L (ref 22–29)
COLOR, POC: NORMAL
CREAT SERPL-MCNC: 0.9 MG/DL (ref 0.5–1)
EOSINOPHILS ABSOLUTE: 0.13 E9/L (ref 0.05–0.5)
EOSINOPHILS RELATIVE PERCENT: 2.3 % (ref 0–6)
GFR AFRICAN AMERICAN: >60
GFR NON-AFRICAN AMERICAN: 59 ML/MIN/1.73
GLUCOSE BLD-MCNC: 131 MG/DL (ref 74–99)
GLUCOSE URINE, POC: NORMAL
HCT VFR BLD CALC: 47.2 % (ref 34–48)
HEMOGLOBIN: 14.7 G/DL (ref 11.5–15.5)
IMMATURE GRANULOCYTES #: 0.01 E9/L
IMMATURE GRANULOCYTES %: 0.2 % (ref 0–5)
KETONES, POC: NORMAL
LEUKOCYTE EST, POC: NORMAL
LYMPHOCYTES ABSOLUTE: 1.88 E9/L (ref 1.5–4)
LYMPHOCYTES RELATIVE PERCENT: 32.7 % (ref 20–42)
MCH RBC QN AUTO: 30.1 PG (ref 26–35)
MCHC RBC AUTO-ENTMCNC: 31.1 % (ref 32–34.5)
MCV RBC AUTO: 96.7 FL (ref 80–99.9)
MONOCYTES ABSOLUTE: 0.44 E9/L (ref 0.1–0.95)
MONOCYTES RELATIVE PERCENT: 7.7 % (ref 2–12)
NEUTROPHILS ABSOLUTE: 3.24 E9/L (ref 1.8–7.3)
NEUTROPHILS RELATIVE PERCENT: 56.2 % (ref 43–80)
NITRITE, POC: NORMAL
PDW BLD-RTO: 13.3 FL (ref 11.5–15)
PH, POC: 6
PLATELET # BLD: 194 E9/L (ref 130–450)
PMV BLD AUTO: 12.4 FL (ref 7–12)
POTASSIUM SERPL-SCNC: 4.4 MMOL/L (ref 3.5–5)
PROTEIN, POC: NORMAL
RBC # BLD: 4.88 E12/L (ref 3.5–5.5)
SODIUM BLD-SCNC: 138 MMOL/L (ref 132–146)
SPECIFIC GRAVITY, POC: <=1.005
TOTAL PROTEIN: 6.6 G/DL (ref 6.4–8.3)
UROBILINOGEN, POC: 0.2
WBC # BLD: 5.8 E9/L (ref 4.5–11.5)

## 2022-06-21 PROCEDURE — 1123F ACP DISCUSS/DSCN MKR DOCD: CPT | Performed by: FAMILY MEDICINE

## 2022-06-21 PROCEDURE — 81002 URINALYSIS NONAUTO W/O SCOPE: CPT | Performed by: FAMILY MEDICINE

## 2022-06-21 PROCEDURE — 99213 OFFICE O/P EST LOW 20 MIN: CPT | Performed by: FAMILY MEDICINE

## 2022-06-21 ASSESSMENT — ENCOUNTER SYMPTOMS
ABDOMINAL PAIN: 0
BACK PAIN: 0
SHORTNESS OF BREATH: 0
VOMITING: 0
NAUSEA: 0
WHEEZING: 0
TROUBLE SWALLOWING: 0
CHEST TIGHTNESS: 0
SORE THROAT: 0
DIARRHEA: 0
SINUS PAIN: 0
EYE PAIN: 0
CONSTIPATION: 0
COUGH: 0

## 2022-06-21 NOTE — PROGRESS NOTES
6/21/22    Name: Nigel Georges  JWX:4/84/[birth year]   Sex:female   Age:88 y.o. Chief Complaint   Patient presents with    Extremity Weakness    Gait Problem     Patient says she doesn't think she can take the Magnesium supplement suggested by PCP. She feels foggy headed. Patient says she couldn't remember the name of the doctor she was seeing when asked today. She is having nightmares. Patient feels unsteady on her feet. She denies any falls. Patient sanna b/c she has not been feeling well for a few days  She called yesterday to get an appt  She started the magnesium for her right leg pain the day after she was here last week  After 3 days she noticed her sleep was changed, having nightmares but still says she is sleeping thru the night  Also felt foggy  Trouble remembering which driveway was hers  She is answering questions appropriately today  She says she is drinking 5 to 6 glasses of fluids a day  Goes to bed around 11pm and gets up from 5 to 6:30 am  Sometimes she still sets her alarm clock    She does have a history of bladder infections  But she has also had a stroke and TIA in the past  Is not on anticoagulatoion b/c she has had a subdural hematoma due to a fall        Review of Systems   Constitutional: Positive for fatigue. Negative for appetite change and fever. HENT: Negative for congestion, ear pain, sinus pain, sore throat and trouble swallowing. Eyes: Negative for pain. Respiratory: Negative for cough, chest tightness, shortness of breath and wheezing. Cardiovascular: Negative for chest pain, palpitations and leg swelling. Gastrointestinal: Negative for abdominal pain, constipation, diarrhea, nausea and vomiting. Endocrine: Negative for cold intolerance and heat intolerance. Genitourinary: Negative for difficulty urinating, hematuria and pelvic pain. Musculoskeletal: Positive for gait problem. Negative for back pain and joint swelling. Skin: Negative for rash and wound. Neurological: Negative for dizziness, syncope and headaches. Hematological: Negative for adenopathy. Psychiatric/Behavioral: Positive for confusion and decreased concentration. Negative for dysphoric mood, self-injury, sleep disturbance and suicidal ideas. The patient is not nervous/anxious. Current Outpatient Medications:     amLODIPine (NORVASC) 2.5 MG tablet, Take 1 tablet by mouth daily, Disp: 90 tablet, Rfl: 3    triamcinolone (NASACORT) 55 MCG/ACT nasal inhaler, 2 sprays by Each Nostril route daily, Disp: , Rfl:     loratadine (CLARITIN) 10 MG tablet, Take 10 mg by mouth daily as needed (ALLERGIES) , Disp: , Rfl:     aspirin 81 MG tablet, Take 81 mg by mouth daily , Disp: , Rfl:   Allergies   Allergen Reactions    Carafate [Sucralfate] Other (See Comments)     Unable to explain    Cefdinir Other (See Comments)     Unable to explain    Cetirizine & Related Other (See Comments)     Makes her forgetful and sleepy    Lisinopril Other (See Comments)     Difficulty swallowing, reflux, headache, dizziness, leg cramps    Pravachol [Pravastatin Sodium] Other (See Comments)     Patient states \"it almost killed me. \" Spoke with Patient's Daughter and she stated \"body cramps and extreme weakness. \"    Protonix [Pantoprazole Sodium] Other (See Comments)     Unable to explain    Statins Other (See Comments)     Intolerable. Patient states \"it almost killed me. \" Spoke with Patient's Daughter and she stated \"body cramps and extreme weakness. \"      Past Medical History:   Diagnosis Date    [facility]     Diverticulosis     GIST (gastrointestinal stroma tumor), malignant, colon ([facility]     Hyperlipidemia     Hypertension     MI (myocardial infarction) ([facility] 2015    Mitral regurgitation     per cardiology    PUD (peptic ulcer disease)      Patient Active Problem List    Diagnosis Date Noted    NSTEMI (non-ST elevated myocardial infarction) ([facility] 06/03/2016    [facility]gastrointestinal stromal tumor[facility] 05/25/2016    Type 2 diabetes mellitus 05/23/2022    Chronic renal disease, stage III ([facility] [447578] 05/23/2022    Pulmonary nodules 09/20/2021    Statin intolerance 09/20/2021    Acute cerebrovascular accident ([facility]A) [facility] 09/16/2021    D[facility]     Acquired hypothyroidism 11/20/2019    Hyperlipidemia 11/15/2019    Disorder of thyroid gland 11/15/2019    Carotid stenosis, right 11/15/2019    Onychomycosis 06/14/2019    Difficulty walking 06/14/2019    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 ([facility] 06/14/2016    Malignant neoplasm of abdomen ([facility] 06/14/2016    Hypertension     PUD (peptic ulcer disease)       Past Surgical History:   Procedure Laterality Date    CAROTID ENDARTERECTOMY Right 04/2019    Dr Ольга Miller Bilateral 2015    COLONOSCOPY  12/2014    DILATATION, ESOPHAGUS      ENDOSCOPY, COLON, DIAGNOSTIC  09/02/2016    marking of gastric tumor    HYSTERECTOMY (CERVIX STATUS UNKNOWN)      YUMIKO/BSO    OTHER SURGICAL HISTORY  09/09/2016    Laparoscopic Robotic Assisted Partial Gastrectomy    SHOULDER SURGERY Left     rotator cuff right    TONSILLECTOMY AND ADENOIDECTOMY      UPPER GASTROINTESTINAL ENDOSCOPY  12/2014    UPPER GASTROINTESTINAL ENDOSCOPY  5/25/2016      Social History     Tobacco History     Smoking Status  Former Smoker Quit date  1/1/1969 Smoking Frequency  1 pack/day for 18 years (18 pk yrs)    Smokeless Tobacco Use  Never Used          Alcohol History     Alcohol Use Status  Yes Comment  glass of wine with dinner          Drug Use     Drug Use Status  No          Sexual Activity     Sexually Active  Not Currently Partners  Male Comment              /84   Pulse 76   Temp 98.2 °F (36.8 °C)   Ht 5' 6\" (1.676 m)   Wt 144 lb 12.8 oz (65.7 kg)   SpO2 97%   BMI 23.37 kg/m²     EXAM:   Physical Exam  Vitals and nursing note reviewed.    Constitutional:       General: She is not in acute distress. Appearance: She is well-developed. HENT:      Head: Normocephalic and atraumatic. Eyes:      Pupils: Pupils are equal, round, and reactive to light. Cardiovascular:      Rate and Rhythm: Normal rate and regular rhythm. Pulmonary:      Effort: Pulmonary effort is normal.      Breath sounds: Normal breath sounds. Abdominal:      General: Bowel sounds are normal.      Palpations: Abdomen is soft. Tenderness: There is no abdominal tenderness. Musculoskeletal:      Cervical back: Normal range of motion. Skin:     General: Skin is warm and dry. Neurological:      Mental Status: She is alert and oriented to person, place, and time. Psychiatric:         Mood and Affect: Mood normal.         Thought Content: Thought content normal.          Darlene Arnold was seen today for extremity weakness and gait problem. Diagnoses and all orders for this visit:    Confusion  Comments:  she thinks it is from the new magnesium she started  she stopped it saturday  labs today  recheck in a week  Orders:  -     CBC with Auto Differential; Future  -     Comprehensive Metabolic Panel; Future    Urinary frequency  -     Culture, Urine  -     POCT Urinalysis no Micro    UA looks okay today  Get urine cx  Will also get labs  She says she is staying hydrated  Stop magnesium supplement  conitinue asa  Recheck in 1 week to see if she is feelign better    I independently reviewed and updated the chief complaint, HPI, past medical and surgical history, medications, allergies and ROS as entered by the LPN. Seen by:   Navdeep Rice DO

## 2022-06-24 LAB — URINE CULTURE, ROUTINE: NORMAL

## 2022-06-30 ENCOUNTER — TELEPHONE (OUTPATIENT)
Dept: CARDIOLOGY | Age: 87
End: 2022-06-30

## 2022-06-30 NOTE — TELEPHONE ENCOUNTER
CALLED PATIENT AND LEFT MESSAGE TO SCHEDULE ECHO.     Electronically signed by Eva Adames on 6/30/2022 at 1:16 PM

## 2022-07-06 ENCOUNTER — OFFICE VISIT (OUTPATIENT)
Dept: FAMILY MEDICINE CLINIC | Age: 87
End: 2022-07-06
Payer: MEDICARE

## 2022-07-06 VITALS
HEART RATE: 72 BPM | HEIGHT: 66 IN | TEMPERATURE: 98.2 F | OXYGEN SATURATION: 97 % | WEIGHT: 143.6 LBS | SYSTOLIC BLOOD PRESSURE: 110 MMHG | DIASTOLIC BLOOD PRESSURE: 78 MMHG | BODY MASS INDEX: 23.08 KG/M2

## 2022-07-06 DIAGNOSIS — G31.84 MILD COGNITIVE IMPAIRMENT: Primary | ICD-10-CM

## 2022-07-06 DIAGNOSIS — M16.11 PRIMARY OSTEOARTHRITIS OF RIGHT HIP: ICD-10-CM

## 2022-07-06 PROCEDURE — 99213 OFFICE O/P EST LOW 20 MIN: CPT | Performed by: FAMILY MEDICINE

## 2022-07-06 PROCEDURE — 1123F ACP DISCUSS/DSCN MKR DOCD: CPT | Performed by: FAMILY MEDICINE

## 2022-07-06 ASSESSMENT — ENCOUNTER SYMPTOMS
NAUSEA: 0
SINUS PAIN: 0
WHEEZING: 0
VOMITING: 0
ABDOMINAL PAIN: 0
SORE THROAT: 0
SHORTNESS OF BREATH: 0
EYE PAIN: 0
BACK PAIN: 0
CHEST TIGHTNESS: 0
COUGH: 0
DIARRHEA: 0
TROUBLE SWALLOWING: 0
CONSTIPATION: 0

## 2022-07-06 NOTE — PROGRESS NOTES
7/6/22    Name: Kamla Velazquez  HMD:3/73[birth year]504   Sex:female   Age:88 y.o. Chief Complaint   Patient presents with    Hip Pain     Patient presents to office for visit. Her  is with her today. Patient is no longer taking magnesium. She feels much better. She isn't having vivid dreams. She is feeling more sharp. Patient says she still has right hip pain. She says tylenol really helps with the hip pain. Here for a recheck   with her  She was in last week with confusion after starting the magnesium supplement  Stopped it within 3 days of starting it  Doing much better    Still some right hip pain  She I staking tyelnol just as needed and that helps  Still [facility] but advised to increase swim to 2 days a week at least for the hip pain  She will think about increasing it'           Review of Systems   Constitutional: Negative for appetite change, fatigue and fever. HENT: Negative for congestion, ear pain, sinus pain, sore throat and trouble swallowing. Eyes: Negative for pain. Respiratory: Negative for cough, chest tightness, shortness of breath and wheezing. Cardiovascular: Negative for chest pain, palpitations and leg swelling. Gastrointestinal: Negative for abdominal pain, constipation, diarrhea, nausea and vomiting. Endocrine: Negative for cold intolerance and heat intolerance. Genitourinary: Negative for difficulty urinating, dysuria, frequency, hematuria and pelvic pain. Musculoskeletal: Positive for arthralgias. Negative for back pain, gait problem, joint swelling and myalgias. Skin: Negative for rash and wound. Neurological: Negative for dizziness, syncope, light-headedness and headaches. Hematological: Negative for adenopathy. Psychiatric/Behavioral: Negative for confusion, dysphoric mood, self-injury, sleep disturbance and suicidal ideas. The patient is not nervous/anxious.             Current Outpatient Medications:     amLODIPine (NORVASC) 2.5 MG tablet, Take 1 tablet by mouth daily, Disp: 90 tablet, Rfl: 3    triamcinolone (NASACORT) 55 MCG/ACT nasal inhaler, 2 sprays by Each Nostril route daily, Disp: , Rfl:     loratadine (CLARITIN) 10 MG tablet, Take 10 mg by mouth daily as needed (ALLERGIES) , Disp: , Rfl:     aspirin 81 MG tablet, Take 81 mg by mouth daily , Disp: , Rfl:   Allergies   Allergen Reactions    Carafate [Sucralfate] Other (See Comments)     Unable to explain    Cefdinir Other (See Comments)     Unable to explain    Cetirizine & Related Other (See Comments)     Makes her forgetful and sleepy    Lisinopril Other (See Comments)     Difficulty swallowing, reflux, headache, dizziness, leg cramps    Pravachol [Pravastatin Sodium] Other (See Comments)     Patient states \"it almost killed me. \" Spoke with Patient's Daughter and she stated \"body cramps and extreme weakness. \"    Protonix [Pantoprazole Sodium] Other (See Comments)     Unable to explain    Statins Other (See Comments)     Intolerable. Patient states \"it almost killed me. \" Spoke with Patient's Daughter and she stated \"body cramps and extreme weakness. \"      Past Medical History:   Diagnosis Date    [facility]     Diverticulosis     GIST (gastrointestinal stroma tumor), malignant, colon ([address]     Hyperlipidemia     Hypertension     MI (myocardial infarction) ([facility] 2015    Mitral regurgitation     per cardiology    PUD (peptic ulcer disease)      Patient Active Problem List    Diagnosis Date Noted    NSTEMI (non-ST elevated myocardial infarction) ([address] 06/03/2016    GIST (gastrointestinal stromal tumor), malignant ([address] 05/25/2016    Type 2 diabetes mellitus 05/23/2022    Chronic renal disease, stage III ([address] [092302] 05/23/2022    Pulmonary nodules 09/20/2021    Statin intolerance 09/20/2021    Acute cerebrovascular accident (CVA) ([address] 09/16/2021    Dementia ([address]     Acquired hypothyroidism 11/20/2019    Hyperlipidemia 11/15/2019    Disorder of thyroid gland 11/15/2019    Carotid stenosis, right 11/15/2019    Onychomycosis 06/14/2019    Difficulty walking 06/14/2019    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 ([facility] 06/14/2016    Malignant neoplasm of abdomen ([facility] 06/14/2016    Hypertension     PUD (peptic ulcer disease)       Past Surgical History:   Procedure Laterality Date    CAROTID ENDARTERECTOMY Right 04/2019    Dr Cruz Flattery Bilateral 2015    COLONOSCOPY  12/2014    DILATATION, ESOPHAGUS      ENDOSCOPY, COLON, DIAGNOSTIC  09/02/2016    marking of gastric tumor    HYSTERECTOMY (CERVIX STATUS UNKNOWN)      YUMIKO/BSO    OTHER SURGICAL HISTORY  09/09/2016    Laparoscopic Robotic Assisted Partial Gastrectomy    SHOULDER SURGERY Left     rotator cuff right    TONSILLECTOMY AND ADENOIDECTOMY      UPPER GASTROINTESTINAL ENDOSCOPY  12/2014    UPPER GASTROINTESTINAL ENDOSCOPY  5/25/2016      Social History     Tobacco History     Smoking Status  Former Smoker Quit date  1/1/1969 Smoking Frequency  1 pack/day for 18 years (18 pk yrs)    Smokeless Tobacco Use  Never Used          Alcohol History     Alcohol Use Status  Yes Comment  glass of wine with dinner          Drug Use     Drug Use Status  No          Sexual Activity     Sexually Active  Not Currently Partners  Male Comment              /78   Pulse 72   Temp 98.2 °F (36.8 °C)   Ht 5' 6\" (1.676 m)   Wt 143 lb 9.6 oz (65.1 kg)   SpO2 97%   BMI 23.18 kg/m²     EXAM:   Physical Exam  Vitals and nursing note reviewed. Constitutional:       General: She is not in acute distress. Appearance: She is well-developed. She is not ill-appearing. HENT:      Head: Normocephalic and atraumatic. Eyes:      Pupils: Pupils are equal, round, and reactive to light. Cardiovascular:      Rate and Rhythm: Normal rate and regular rhythm. Pulmonary:      Effort: Pulmonary effort is normal.      Breath sounds: Normal breath sounds. Musculoskeletal:      Cervical back: Normal range of motion. Comments: Gait steady, still right hip pain, continues to go to the [facility]   Skin:     General: Skin is warm and dry. Neurological:      Mental Status: She is alert and oriented to person, place, and time. Mental status is at baseline. Comments: Back to baseline, oriented, feeling much better   Psychiatric:         Mood and Affect: Mood normal.         Thought Content: Thought content normal.          Akosua Patiño was seen today for hip pain. Diagnoses and all orders for this visit:    Mild cognitive impairment  Comments:  back to baseline  stopped th magnesium  still some short term memory issues    Primary osteoarthritis of right hip  Comments:  going to the [facility]  try to increase pool to 2 days a week  working w/ a         I independently reviewed and updated the chief complaint, HPI, past medical and surgical history, medications, allergies and ROS as entered by the LPN. Seen by:   Celia Stuart DO

## 2022-08-16 ENCOUNTER — OFFICE VISIT (OUTPATIENT)
Dept: FAMILY MEDICINE CLINIC | Age: 87
End: 2022-08-16
Payer: MEDICARE

## 2022-08-16 VITALS
HEART RATE: 72 BPM | WEIGHT: 142.4 LBS | BODY MASS INDEX: 22.88 KG/M2 | OXYGEN SATURATION: 98 % | TEMPERATURE: 97.5 F | DIASTOLIC BLOOD PRESSURE: 82 MMHG | HEIGHT: 66 IN | SYSTOLIC BLOOD PRESSURE: 130 MMHG

## 2022-08-16 DIAGNOSIS — R41.0 CONFUSION: ICD-10-CM

## 2022-08-16 DIAGNOSIS — M54.41 CHRONIC RIGHT-SIDED LOW BACK PAIN WITH RIGHT-SIDED SCIATICA: Primary | ICD-10-CM

## 2022-08-16 DIAGNOSIS — M53.3 SACROILIAC JOINT DISEASE: ICD-10-CM

## 2022-08-16 DIAGNOSIS — N18.31 TYPE 2 DIABETES MELLITUS WITH STAGE 3A CHRONIC KIDNEY DISEASE, WITHOUT LONG-TERM CURRENT USE OF INSULIN (HCC): ICD-10-CM

## 2022-08-16 DIAGNOSIS — E11.22 TYPE 2 DIABETES MELLITUS WITH STAGE 3A CHRONIC KIDNEY DISEASE, WITHOUT LONG-TERM CURRENT USE OF INSULIN (HCC): ICD-10-CM

## 2022-08-16 DIAGNOSIS — G89.29 CHRONIC RIGHT-SIDED LOW BACK PAIN WITH RIGHT-SIDED SCIATICA: Primary | ICD-10-CM

## 2022-08-16 DIAGNOSIS — G31.84 MILD COGNITIVE IMPAIRMENT WITH MEMORY LOSS: ICD-10-CM

## 2022-08-16 LAB
ALBUMIN SERPL-MCNC: 4.5 G/DL (ref 3.5–5.2)
ALP BLD-CCNC: 111 U/L (ref 35–104)
ALT SERPL-CCNC: 9 U/L (ref 0–32)
ANION GAP SERPL CALCULATED.3IONS-SCNC: 12 MMOL/L (ref 7–16)
AST SERPL-CCNC: 19 U/L (ref 0–31)
BASOPHILS ABSOLUTE: 0.05 E9/L (ref 0–0.2)
BASOPHILS RELATIVE PERCENT: 1.1 % (ref 0–2)
BILIRUB SERPL-MCNC: 0.3 MG/DL (ref 0–1.2)
BUN BLDV-MCNC: 15 MG/DL (ref 6–23)
CALCIUM SERPL-MCNC: 9.8 MG/DL (ref 8.6–10.2)
CHLORIDE BLD-SCNC: 105 MMOL/L (ref 98–107)
CO2: 22 MMOL/L (ref 22–29)
CREAT SERPL-MCNC: 1 MG/DL (ref 0.5–1)
EOSINOPHILS ABSOLUTE: 0.17 E9/L (ref 0.05–0.5)
EOSINOPHILS RELATIVE PERCENT: 3.6 % (ref 0–6)
GFR AFRICAN AMERICAN: >60
GFR NON-AFRICAN AMERICAN: 52 ML/MIN/1.73
GLUCOSE BLD-MCNC: 101 MG/DL (ref 74–99)
HBA1C MFR BLD: 6.3 % (ref 4–5.6)
HCT VFR BLD CALC: 46 % (ref 34–48)
HEMOGLOBIN: 14.6 G/DL (ref 11.5–15.5)
IMMATURE GRANULOCYTES #: 0.01 E9/L
IMMATURE GRANULOCYTES %: 0.2 % (ref 0–5)
LYMPHOCYTES ABSOLUTE: 1.35 E9/L (ref 1.5–4)
LYMPHOCYTES RELATIVE PERCENT: 28.8 % (ref 20–42)
MCH RBC QN AUTO: 30.5 PG (ref 26–35)
MCHC RBC AUTO-ENTMCNC: 31.7 % (ref 32–34.5)
MCV RBC AUTO: 96.2 FL (ref 80–99.9)
MICROALBUMIN UR-MCNC: <12 MG/L
MONOCYTES ABSOLUTE: 0.48 E9/L (ref 0.1–0.95)
MONOCYTES RELATIVE PERCENT: 10.3 % (ref 2–12)
NEUTROPHILS ABSOLUTE: 2.62 E9/L (ref 1.8–7.3)
NEUTROPHILS RELATIVE PERCENT: 56 % (ref 43–80)
PDW BLD-RTO: 13.4 FL (ref 11.5–15)
PLATELET # BLD: 179 E9/L (ref 130–450)
PMV BLD AUTO: 12.4 FL (ref 7–12)
POTASSIUM SERPL-SCNC: 4.3 MMOL/L (ref 3.5–5)
RBC # BLD: 4.78 E12/L (ref 3.5–5.5)
SODIUM BLD-SCNC: 139 MMOL/L (ref 132–146)
T4 FREE: 1.17 NG/DL (ref 0.93–1.7)
TOTAL PROTEIN: 7.1 G/DL (ref 6.4–8.3)
TSH SERPL DL<=0.05 MIU/L-ACNC: 4.13 UIU/ML (ref 0.27–4.2)
WBC # BLD: 4.7 E9/L (ref 4.5–11.5)

## 2022-08-16 PROCEDURE — 99214 OFFICE O/P EST MOD 30 MIN: CPT | Performed by: FAMILY MEDICINE

## 2022-08-16 PROCEDURE — 1123F ACP DISCUSS/DSCN MKR DOCD: CPT | Performed by: FAMILY MEDICINE

## 2022-08-16 PROCEDURE — 3044F HG A1C LEVEL LT 7.0%: CPT | Performed by: FAMILY MEDICINE

## 2022-08-16 RX ORDER — ACETAMINOPHEN 325 MG/1
650 TABLET ORAL EVERY 6 HOURS PRN
COMMUNITY

## 2022-08-16 ASSESSMENT — ENCOUNTER SYMPTOMS
WHEEZING: 0
SHORTNESS OF BREATH: 0
SORE THROAT: 0
ABDOMINAL PAIN: 0
SINUS PAIN: 0
BACK PAIN: 1
NAUSEA: 0
TROUBLE SWALLOWING: 0
COUGH: 0
EYE PAIN: 0
VOMITING: 0
DIARRHEA: 0
CHEST TIGHTNESS: 0
CONSTIPATION: 0

## 2022-08-16 NOTE — PROGRESS NOTES
8/16/22    Name: Freddy Lai  WKO:[id number]/40/4809   Sex:female   Age:88 y.o. Chief Complaint   Patient presents with    Pelvic Pain    Back Pain    Eye Problem     Patient presents to office for visit. She says she just hasn't been feeling right. She has been having right lower abdominal/pelvic pain that wraps around to her right lower back. Patient says this pain is more frequent and more painful than it has been in the past. She says she will take a tylenol and the pain will go away. Patient says the pain can be so intense that she is concerned it may be her appendix. Patient also has been having floaters in her vision. She says the floaters can be so bad that it is hard to do her crossword puzzles. Patient denies cloudiness in her vision. She denies any issues with moving her bowels. Patient says that her urinary incontinence has worsened.      Here with continue right side, low back pain  At times the pain is in the anterior hip area but mostly low back right side and into buttock area  She is taking tylenol once a day most days but there are times she is pretty sure she has to take it more often'  She is doing PT at the [facility] with a  every Wednesday  She does chair yoga too and she thinks she is doing it more often but she is not sure, recommended at least 4 days a week if not more  But we talked about possible referral for injections'  She agreed to try Dr Madina Travis  She asked about [facility] but the issue is she would not be able to drive there    She talked about floater, sometimes they are there daily some times they are better  She needs an appt with [facility] care [facility]  She has a history of strokes in the past and has refuses any anticoagulation long term  Last TIA was in September 2021 but she has had some decline in her cognitive function since then  She does at times not remember how to get somewhere in her car and goes the long way, forgets the short cuts, or it takes her longer to do something b/c she has to concentrate so hard on how to do it  She was also here by herself today, so everything about todays appt was written down for her  Will recheck some blood work today to be sure everything is okay    Diabetes  Has been stable  Diet controlled  Will get labs today  Counseled about diet    HTN  Readings good  St=he is still taking her meds per patient  No changes      Review of Systems   Constitutional:  Negative for appetite change, fatigue and fever. HENT:  Negative for congestion, ear pain, sinus pain, sore throat and trouble swallowing. Eyes:  Positive for visual disturbance. Negative for pain. Respiratory:  Negative for cough, chest tightness, shortness of breath and wheezing. Cardiovascular:  Negative for chest pain, palpitations and leg swelling. Gastrointestinal:  Negative for abdominal pain, constipation, diarrhea, nausea and vomiting. Endocrine: Negative for cold intolerance and heat intolerance. Genitourinary:  Positive for enuresis and pelvic pain. Negative for difficulty urinating, dysuria, frequency and hematuria. Musculoskeletal:  Positive for back pain. Negative for arthralgias, gait problem, joint swelling and myalgias. Skin:  Negative for rash and wound. Neurological:  Negative for dizziness, syncope and headaches. Hematological:  Negative for adenopathy. Psychiatric/Behavioral:  Positive for confusion. Negative for dysphoric mood, self-injury, sleep disturbance and suicidal ideas. The patient is nervous/anxious.           Current Outpatient Medications:     acetaminophen (TYLENOL) 325 MG tablet, Take 650 mg by mouth every 6 hours as needed, Disp: , Rfl:     amLODIPine (NORVASC) 2.5 MG tablet, Take 1 tablet by mouth daily, Disp: 90 tablet, Rfl: 3    triamcinolone (NASACORT) 55 MCG/ACT nasal inhaler, 2 sprays by Each Nostril route daily, Disp: , Rfl:     loratadine (CLARITIN) 10 MG tablet, Take 10 mg by mouth daily as needed (ALLERGIES) , Disp: , Rfl:     aspirin 81 MG tablet, Take 81 mg by mouth daily , Disp: , Rfl:   Allergies   Allergen Reactions    Carafate [Sucralfate] Other (See Comments)     Unable to explain    Cefdinir Other (See Comments)     Unable to explain    Cetirizine & Related Other (See Comments)     Makes her forgetful and sleepy    Lisinopril Other (See Comments)     Difficulty swallowing, reflux, headache, dizziness, leg cramps    Pravachol [Pravastatin Sodium] Other (See Comments)     Patient states \"it almost killed me. \" Spoke with Patient's Daughter and she stated \"body cramps and extreme weakness. \"    Protonix [Pantoprazole Sodium] Other (See Comments)     Unable to explain    Statins Other (See Comments)     Intolerable. Patient states \"it almost killed me. \" Spoke with Patient's Daughter and she stated \"body cramps and extreme weakness. \"      Past Medical History:   Diagnosis Date    Dementia ([facility]     Diverticulosis     GIST (gastrointestinal stroma tumor), malignant, colon ([facility]     Hyperlipidemia     Hypertension     MI (myocardial infarction) ([facility] [facility] 2015    Mitral regurgitation     per cardiology    PUD (peptic ulcer disease)      Patient Active Problem List    Diagnosis Date Noted    NSTEMI (non-ST elevated myocardial infarction) ([facility] 06/03/2016    GIST (gastrointestinal stromal tumor), malignant ([facility] [facility] 05/25/2016    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    Mild cognitive impairment with memory loss 08/16/2022    Chronic right-sided low back pain with right-sided sciatica 08/16/2022    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/16/2022    Type 2 diabetes mellitus 05/23/2022    Chronic renal disease, stage III [facility]) [458991] 05/23/2022    Pulmonary nodules 09/20/2021    Statin intolerance 09/20/2021    Acute cerebrovascular accident (CVA) ([facility] [facility] 09/16/2021    Dementia ([facility]     Acquired hypothyroidism 11/20/2019    Hyperlipidemia 11/15/2019    Disorder of thyroid gland 11/15/2019    Carotid stenosis, right 11/15/2019    Onychomycosis 06/14/2019    Difficulty walking 06/14/2019    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 ([facility] [address] 06/14/2016    Malignant neoplasm of abdomen ([facility] [address] 06/14/2016    Hypertension     PUD (peptic ulcer disease)       Past Surgical History:   Procedure Laterality Date    CAROTID ENDARTERECTOMY Right 04/2019    Dr Sandralee Cabot Bilateral 2015    COLONOSCOPY  12/2014    DILATATION, ESOPHAGUS      ENDOSCOPY, COLON, DIAGNOSTIC  09/02/2016    marking of gastric tumor    HYSTERECTOMY (CERVIX STATUS UNKNOWN)      YUMIKO/BSO    OTHER SURGICAL HISTORY  09/09/2016    Laparoscopic Robotic Assisted Partial Gastrectomy    SHOULDER SURGERY Left     rotator cuff right    TONSILLECTOMY AND ADENOIDECTOMY      UPPER GASTROINTESTINAL ENDOSCOPY  12/2014    UPPER GASTROINTESTINAL ENDOSCOPY  5/25/2016      Social History       Tobacco History       Smoking Status  Former Quit Date  1969 Smoking Frequency  1 pack/day for 18.00 years (18.00 pk-yrs) Smoking Tobacco Type  Cigarettes quit in 1969      Smokeless Tobacco Use  Never              Alcohol History       Alcohol Use Status  Yes Comment  glass of wine with dinner              Drug Use       Drug Use Status  No              Sexual Activity       Sexually Active  Not Currently Partners  Male Comment                  /82   Pulse 72   Temp 97.5 °F (36.4 °C)   Ht 5' 6\" (1.676 m)   Wt 142 lb 6.4 oz (64.6 kg)   SpO2 98%   BMI 22.98 kg/m²     EXAM:   Physical Exam  Vitals and nursing note reviewed. Constitutional:       General: She is not in acute distress. Appearance: She is well-developed. HENT:      Head: Normocephalic and atraumatic. Right Ear: Tympanic membrane normal.      Left Ear: Tympanic membrane normal.   Eyes:      Pupils: Pupils are equal, round, and reactive to light. Cardiovascular:      Rate and Rhythm: Normal rate and regular rhythm.    Pulmonary:      Effort: Pulmonary effort is normal.      Breath sounds: Normal

## 2022-11-03 ENCOUNTER — TELEPHONE (OUTPATIENT)
Dept: FAMILY MEDICINE CLINIC | Age: 87
End: 2022-11-03

## 2022-11-03 NOTE — TELEPHONE ENCOUNTER
----- Message from Fred Robles sent at 11/2/2022  4:11 PM EDT -----  Subject: Message to Provider    QUESTIONS  Information for Provider? hilaria fraire is a patient of tad Cortés has an   appointment on 29th, she wanted to know if you could look up bursa to see   if that is what is going on with her. and if you would like to see her   before her appointment on the 29th she would like to know  ---------------------------------------------------------------------------  --------------  [id number]  [phone]; OK to leave message on voicemail  ---------------------------------------------------------------------------  --------------  SCRIPT ANSWERS  Relationship to Patient?  Self

## 2022-11-03 NOTE — TELEPHONE ENCOUNTER
I tried to get more information from James but she was not able to offer a lot of information. She said that she went to therapy and they told her that she has a [place]". I asked her which bursa but she was not sure. States that she is have pain in her right groin. Said that the therapist saw it on Xray. I asked her if she has had a recent Xray and she does not know. I saw that Kaela referred her to [facility]. I called and spoke w/ the therapist that did her eval. States that she tried to explain that the body has bursae. She has \"flared up\" the bursa at the greater trochanter from the way she is walking. She has a significant limp and is putting extra pressure on her one side. I can let her know that rest, ice, and stretching can help. Do you have any additional recommendation that you would like me to relay? I am assuming she is not able to use NSAIDs or a low dose steroid because of her GIST and PUD.

## 2022-11-04 NOTE — TELEPHONE ENCOUNTER
Per Dr Myles Mckay     She can take her Tylenol 500mg every 6hrs   And ice or heat which ever works better.  Also lidocaine patches over the counter   Thx you

## 2022-11-29 ENCOUNTER — OFFICE VISIT (OUTPATIENT)
Dept: FAMILY MEDICINE CLINIC | Age: 87
End: 2022-11-29
Payer: MEDICARE

## 2022-11-29 VITALS
OXYGEN SATURATION: 98 % | HEART RATE: 74 BPM | DIASTOLIC BLOOD PRESSURE: 80 MMHG | TEMPERATURE: 98.2 F | HEIGHT: 66 IN | SYSTOLIC BLOOD PRESSURE: 128 MMHG | BODY MASS INDEX: 23.28 KG/M2 | WEIGHT: 144.84 LBS

## 2022-11-29 VITALS
SYSTOLIC BLOOD PRESSURE: 128 MMHG | WEIGHT: 144.8 LBS | HEART RATE: 74 BPM | DIASTOLIC BLOOD PRESSURE: 80 MMHG | HEIGHT: 66 IN | BODY MASS INDEX: 23.27 KG/M2 | OXYGEN SATURATION: 98 % | TEMPERATURE: 98.2 F

## 2022-11-29 DIAGNOSIS — E11.22 TYPE 2 DIABETES MELLITUS WITH STAGE 3A CHRONIC KIDNEY DISEASE, WITHOUT LONG-TERM CURRENT USE OF INSULIN (HCC): ICD-10-CM

## 2022-11-29 DIAGNOSIS — N18.31 TYPE 2 DIABETES MELLITUS WITH STAGE 3A CHRONIC KIDNEY DISEASE, WITHOUT LONG-TERM CURRENT USE OF INSULIN (HCC): ICD-10-CM

## 2022-11-29 DIAGNOSIS — I10 PRIMARY HYPERTENSION: ICD-10-CM

## 2022-11-29 DIAGNOSIS — E03.9 ACQUIRED HYPOTHYROIDISM: ICD-10-CM

## 2022-11-29 DIAGNOSIS — Z00.00 MEDICARE ANNUAL WELLNESS VISIT, SUBSEQUENT: Primary | ICD-10-CM

## 2022-11-29 DIAGNOSIS — M25.551 RIGHT HIP PAIN: Primary | ICD-10-CM

## 2022-11-29 PROCEDURE — 1123F ACP DISCUSS/DSCN MKR DOCD: CPT | Performed by: FAMILY MEDICINE

## 2022-11-29 PROCEDURE — G0439 PPPS, SUBSEQ VISIT: HCPCS | Performed by: FAMILY MEDICINE

## 2022-11-29 ASSESSMENT — ENCOUNTER SYMPTOMS
CONSTIPATION: 0
NAUSEA: 0
ABDOMINAL PAIN: 0
WHEEZING: 0
EYE PAIN: 0
SHORTNESS OF BREATH: 0
COUGH: 0
VOMITING: 0
CHEST TIGHTNESS: 0
SINUS PAIN: 0
DIARRHEA: 0
SORE THROAT: 0
TROUBLE SWALLOWING: 0
BACK PAIN: 0

## 2022-11-29 ASSESSMENT — PATIENT HEALTH QUESTIONNAIRE - PHQ9
SUM OF ALL RESPONSES TO PHQ9 QUESTIONS 1 & 2: 2
2. FEELING DOWN, DEPRESSED OR HOPELESS: 1
SUM OF ALL RESPONSES TO PHQ QUESTIONS 1-9: 2
1. LITTLE INTEREST OR PLEASURE IN DOING THINGS: 1
SUM OF ALL RESPONSES TO PHQ QUESTIONS 1-9: 2
1. LITTLE INTEREST OR PLEASURE IN DOING THINGS: 1
SUM OF ALL RESPONSES TO PHQ9 QUESTIONS 1 & 2: 2
2. FEELING DOWN, DEPRESSED OR HOPELESS: 1
SUM OF ALL RESPONSES TO PHQ QUESTIONS 1-9: 2
SUM OF ALL RESPONSES TO PHQ QUESTIONS 1-9: 2

## 2022-11-29 ASSESSMENT — LIFESTYLE VARIABLES
HOW MANY STANDARD DRINKS CONTAINING ALCOHOL DO YOU HAVE ON A TYPICAL DAY: 1 OR 2
HOW OFTEN DO YOU HAVE A DRINK CONTAINING ALCOHOL: 2-3 TIMES A WEEK

## 2022-11-29 NOTE — PROGRESS NOTES
11/29/22    Name: Farhad Khan  XOY:[birth year]   Sex:female   Age:88 y.o. Chief Complaint   Patient presents with    Hypertension    Hip Pain     Here for follow up  She has been doing gairly well    Still having the right hip pain  Saw DR Minerva Kumari and had SI joint injections and epidurals in back and there is no change in her hip pain  The pain is in the anterior hip, in groin is where she points to and occasionally on the lateral hipover greater trochanter is where she points'  Taking 1 advil here and there when she has to go somewhere does help but she does not like to take it a lot    Will get xrays of just the hip today  It is affecting her walking now and she is changing what she does due to the pain    She  will need an MRI  And sehe agreed to see DR Lianna Rosales for hip injections    HTN  Stable  Readings have been good  No changes in meds  She is taking them with no issues    Diabetes'  She is trying to watch diet  Labs in 3 months  Also encouraged her to stay hydrated        Review of Systems   Constitutional:  Negative for appetite change, fatigue and fever. HENT:  Negative for congestion, ear pain, sinus pain, sore throat and trouble swallowing. Eyes:  Negative for pain. Respiratory:  Negative for cough, chest tightness, shortness of breath and wheezing. Cardiovascular:  Negative for chest pain, palpitations and leg swelling. Gastrointestinal:  Negative for abdominal pain, constipation, diarrhea, nausea and vomiting. Endocrine: Negative for cold intolerance and heat intolerance. Genitourinary:  Negative for difficulty urinating, hematuria and pelvic pain. Musculoskeletal:  Positive for arthralgias, gait problem and myalgias. Negative for back pain and joint swelling. Skin:  Negative for rash and wound. Neurological:  Negative for dizziness, syncope and headaches. Hematological:  Negative for adenopathy. Psychiatric/Behavioral:  Positive for confusion.  Negative for sleep disturbance and suicidal ideas. Current Outpatient Medications:     acetaminophen (TYLENOL) 325 MG tablet, Take 650 mg by mouth every 6 hours as needed, Disp: , Rfl:     amLODIPine (NORVASC) 2.5 MG tablet, Take 1 tablet by mouth daily, Disp: 90 tablet, Rfl: 3    triamcinolone (NASACORT) 55 MCG/ACT nasal inhaler, 2 sprays by Each Nostril route daily, Disp: , Rfl:     loratadine (CLARITIN) 10 MG tablet, Take 10 mg by mouth daily as needed (ALLERGIES) , Disp: , Rfl:     aspirin 81 MG tablet, Take 81 mg by mouth daily , Disp: , Rfl:   Allergies   Allergen Reactions    Carafate [Sucralfate] Other (See Comments)     Unable to explain    Cefdinir Other (See Comments)     Unable to explain    Cetirizine & Related Other (See Comments)     Makes her forgetful and sleepy    Lisinopril Other (See Comments)     Difficulty swallowing, reflux, headache, dizziness, leg cramps    Pravachol [Pravastatin Sodium] Other (See Comments)     Patient states \"it almost killed me. \" Spoke with Patient's Daughter and she stated \"body cramps and extreme weakness. \"    Protonix [Pantoprazole Sodium] Other (See Comments)     Unable to explain    Statins Other (See Comments)     Intolerable. Patient states \"it almost killed me. \" Spoke with Patient's Daughter and she stated \"body cramps and extreme weakness. \"      Past Medical History:   Diagnosis Date    Dementia ([facility]     Diverticulosis     GIST (gastrointestinal stroma tumor), malignant, colon ([facility] [facility]     Hyperlipidemia     Hypertension     MI (myocardial infarction) (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    Mild cognitive impairment with memory loss 08/16/2022    Chronic right-sided low back pain with right-sided sciatica 08/16/2022    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/16/2022    Type 2 diabetes mellitus 05/23/2022    Chronic renal disease, stage III [facility]) [817211] 05/23/2022    Pulmonary nodules 09/20/2021    Statin intolerance 09/20/2021    Acute cerebrovascular accident (CVA) ([facility] 09/16/2021    Dementia ([facility] [address]     Acquired hypothyroidism 11/20/2019    Hyperlipidemia 11/15/2019    Disorder of thyroid gland 11/15/2019    Carotid stenosis, right 11/15/2019    Onychomycosis 06/14/2019    Difficulty walking 06/14/2019    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 ([facility] [address] 06/14/2016    Malignant neoplasm of abdomen ([facility] [address] 06/14/2016    Hypertension     PUD (peptic ulcer disease)       Past Surgical History:   Procedure Laterality Date    CAROTID ENDARTERECTOMY Right 04/2019    Dr Bud Dunn Bilateral 2015    COLONOSCOPY  12/2014    DILATATION, ESOPHAGUS      ENDOSCOPY, COLON, DIAGNOSTIC  09/02/2016    marking of gastric tumor    HYSTERECTOMY (CERVIX STATUS UNKNOWN)      YUMIKO/BSO    OTHER SURGICAL HISTORY  09/09/2016    Laparoscopic Robotic Assisted Partial Gastrectomy    SHOULDER SURGERY Left     rotator cuff right    TONSILLECTOMY AND ADENOIDECTOMY      UPPER GASTROINTESTINAL ENDOSCOPY  12/2014    UPPER GASTROINTESTINAL ENDOSCOPY  5/25/2016      Social History       Tobacco History       Smoking Status  Former Quit Date  1969 Smoking Frequency  1 pack/day for 18.00 years (18.00 pk-yrs) Smoking Tobacco Type  Cigarettes quit in 1969      Smokeless Tobacco Use  Never              Alcohol History       Alcohol Use Status  Yes Comment  glass of wine with dinner              Drug Use       Drug Use Status  No              Sexual Activity       Sexually Active  Not Currently Partners  Male Comment                        /80   Pulse 74   Temp 98.2 °F (36.8 °C)   Ht 5' 6\" (1.676 m)   Wt 144 lb 12.8 oz (65.7 kg)   SpO2 98%   BMI 23.37 kg/m²     EXAM:   Physical Exam  Vitals how she does  Orders:  -     T4, Free; Future  -     TSH; Future      Seen by:   Vinnie Giron, DO

## 2022-11-29 NOTE — PATIENT INSTRUCTIONS
Personalized Preventive Plan for Arvin Vasquez - 11/29/2022  Medicare offers a range of preventive health benefits. Some of the tests and screenings are paid in full while other may be subject to a deductible, co-insurance, and/or copay. Some of these benefits include a comprehensive review of your medical history including lifestyle, illnesses that may run in your family, and various assessments and screenings as appropriate. After reviewing your medical record and screening and assessments performed today your provider may have ordered immunizations, labs, imaging, and/or referrals for you. A list of these orders (if applicable) as well as your Preventive Care list are included within your After Visit Summary for your review. Other Preventive Recommendations:    A preventive eye exam performed by an eye specialist is recommended every 1-2 years to screen for glaucoma; cataracts, macular degeneration, and other eye disorders. A preventive dental visit is recommended every 6 months. Try to get at least 150 minutes of exercise per week or 10,000 steps per day on a pedometer . Order or download the FREE \"Exercise & Physical Activity: Your Everyday Guide\" from [employer]. Call 4-726.821.3253 or search [employer] online. You need 1832-5476 mg of calcium and 1848-4102 IU of vitamin D per day. It is possible to meet your calcium requirement with diet alone, but a vitamin D supplement is usually necessary to meet this goal.  When exposed to the sun, use a sunscreen that protects against both UVA and UVB radiation with an SPF of 30 or greater. Reapply every 2 to 3 hours or after sweating, drying off with a towel, or swimming. Always wear a seat belt when traveling in a car. Always wear a helmet when riding a bicycle or motorcycle.

## 2022-12-19 ENCOUNTER — OFFICE VISIT (OUTPATIENT)
Dept: ORTHOPEDIC SURGERY | Age: 87
End: 2022-12-19

## 2022-12-19 VITALS — WEIGHT: 144 LBS | BODY MASS INDEX: 23.14 KG/M2 | HEIGHT: 66 IN

## 2022-12-19 DIAGNOSIS — M25.551 RIGHT HIP PAIN: Primary | ICD-10-CM

## 2022-12-19 DIAGNOSIS — M54.16 LUMBAR RADICULOPATHY: ICD-10-CM

## 2022-12-19 RX ORDER — GABAPENTIN 100 MG/1
100 CAPSULE ORAL 3 TIMES DAILY
Qty: 90 CAPSULE | Refills: 0 | Status: SHIPPED | OUTPATIENT
Start: 2022-12-19 | End: 2023-01-18

## 2022-12-19 NOTE — PROGRESS NOTES
[facility]  PRIMARY CARE SPORTS MEDICINE  DATE OF VISIT : 2022    Patient : Sonya Hauser  Age : 80 y.o.   : 1934  MRN : [id number]   ______________________________________________________________________    Chief Complaint :   Chief Complaint   Patient presents with    Hip Pain     (R) hip pain. Pain has been ongoing for 1 months time without LALA. Patient states that pain is located in the groin area and has some pain radiating to lateral aspect of the hip. Patient does have pain that radiates down the leg. HPI : Sonya Hauser is 80 y.o. female who presented to the clinic today for evaluation of right hip pain. Onset of the symptoms was 1 month ago, with no known mechanism of injury. Current symptoms include hip pain with radiation down the leg and intermittent episodes of numbness/tingling. Patient denies fevers, chills, point tenderness over the spine, saddle anesthesia, loss of bowel or bladder function, or decreased muscle strength/sensation in the lower extremities. Pain is aggravated by any weight bearing especially deep squats. Evaluation to date: XRs the right hip which demonstrate no acute fractures or dislocations. Treatment to date: avoidance of offending activity and OTC analgesics which are not very effective.      Past Medical History :  Past Medical History:   Diagnosis Date    Dementia ([facility]     Diverticulosis     GIST (gastrointestinal stroma tumor), malignant, colon ([facility]     Hyperlipidemia     Hypertension     MI (myocardial infarction) ([facility] 2015    Mitral regurgitation     per cardiology    PUD (peptic ulcer disease)      Past Surgical History:   Procedure Laterality Date    CAROTID ENDARTERECTOMY Right 2019    Dr Curyr Rizzo Bilateral 2015    COLONOSCOPY  2014    DILATATION, ESOPHAGUS      ENDOSCOPY, COLON, DIAGNOSTIC  2016    marking of gastric tumor    HYSTERECTOMY (CERVIX STATUS UNKNOWN)      YUMIKO/BSO    OTHER SURGICAL HISTORY  09/09/2016    Laparoscopic Robotic Assisted Partial Gastrectomy    SHOULDER SURGERY Left     rotator cuff right    TONSILLECTOMY AND ADENOIDECTOMY      UPPER GASTROINTESTINAL ENDOSCOPY  12/2014    UPPER GASTROINTESTINAL ENDOSCOPY  5/25/2016       Allergies : Allergies   Allergen Reactions    Carafate [Sucralfate] Other (See Comments)     Unable to explain    Cefdinir Other (See Comments)     Unable to explain    Cetirizine & Related Other (See Comments)     Makes her forgetful and sleepy    Lisinopril Other (See Comments)     Difficulty swallowing, reflux, headache, dizziness, leg cramps    Pravachol [Pravastatin Sodium] Other (See Comments)     Patient states \"it almost killed me. \" Spoke with Patient's Daughter and she stated \"body cramps and extreme weakness. \"    Protonix [Pantoprazole Sodium] Other (See Comments)     Unable to explain    Statins Other (See Comments)     Intolerable. Patient states \"it almost killed me. \" Spoke with Patient's Daughter and she stated \"body cramps and extreme weakness. \"       Medication List :    Current Outpatient Medications   Medication Sig Dispense Refill    gabapentin (NEURONTIN) 100 MG capsule Take 1 capsule by mouth 3 times daily for 30 days.  Intended supply: 30 days 90 capsule 0    acetaminophen (TYLENOL) 325 MG tablet Take 650 mg by mouth every 6 hours as needed      amLODIPine (NORVASC) 2.5 MG tablet Take 1 tablet by mouth daily 90 tablet 3    triamcinolone (NASACORT) 55 MCG/ACT nasal inhaler 2 sprays by Each Nostril route daily      loratadine (CLARITIN) 10 MG tablet Take 10 mg by mouth daily as needed (ALLERGIES)       aspirin 81 MG tablet Take 81 mg by mouth daily        No current facility-administered medications for this visit.      ______________________________________________________________________    Physical Exam :    Vitals: Ht 5' 6\" (1.676 m)   Wt 144 lb (65.3 kg)   BMI 23.24 kg/m²   General Appearance: Nontoxic, awake, alert, and in no acute distress. Chest wall/Lung: Respirations regular and unlabored. No cyanosis. Heart: RRR, distal pulses intact. Neurologic: Alert&Oriented x3. Sensation grossly intact. No focal motor deficits detected. Musculokeletal: ROM of the lumbar spine is grossly normal.  No paraspinal muscle tenderness palpation. RIGHT Hip:  ROM: Forward flexion to 130, IR 40, ER 45  TTP: ( - ) Greater trochanter, ( - ) Hip flexors, ( - ) SI Joint  Special tests: ( - ) Logroll, ( - ) FADIR, ( - ) MIQUEL, ( - ) SLR, ( - ) Stinchfield, ( - ) Pelvic shear   ______________________________________________________________________    Assessment & Plan :    1. Right hip pain  2. Lumbar radiculopathy  Patient presents to the office today for evaluation of right hip pain. History, referring provider note, physical exam and imaging (as interpreted by me) are consistent with lumbar radiculopathy. Treatment options discussed with patient in the office today including activity modification, oral anti-inflammatories, physical therapy, injection options, advanced imaging in the form of a MRI and referral to an orthopedic surgeon for discussion of surgical opinion. Patient wishes to proceed with conservative treatment in the form of a trial of oral gabapentin which was electronically prescribed to the pharmacy today. Patient will follow up in 6 weeks for reevaluation of symptoms and consider escalation therapy should symptoms persist.  Patient is agreeable with above plan all questions and concerns were addressed in the office today.     - gabapentin (NEURONTIN) 100 MG capsule; Take 1 capsule by mouth 3 times daily for 30 days. Intended supply: 30 days  Dispense: 90 capsule; Refill: 0    Return to Office: Return in about 6 weeks (around 1/30/2023) for re-evaluation.     Etelvina Rojas MD

## 2023-02-01 ENCOUNTER — OFFICE VISIT (OUTPATIENT)
Dept: ORTHOPEDIC SURGERY | Age: 88
End: 2023-02-01
Payer: MEDICARE

## 2023-02-01 VITALS — HEIGHT: 66 IN | BODY MASS INDEX: 23.14 KG/M2 | WEIGHT: 144 LBS

## 2023-02-01 DIAGNOSIS — M54.16 LUMBAR RADICULOPATHY: ICD-10-CM

## 2023-02-01 PROCEDURE — 99213 OFFICE O/P EST LOW 20 MIN: CPT | Performed by: FAMILY MEDICINE

## 2023-02-01 PROCEDURE — 1123F ACP DISCUSS/DSCN MKR DOCD: CPT | Performed by: FAMILY MEDICINE

## 2023-02-01 RX ORDER — GABAPENTIN 100 MG/1
100 CAPSULE ORAL 3 TIMES DAILY
Qty: 90 CAPSULE | Refills: 2 | Status: SHIPPED | OUTPATIENT
Start: 2023-02-01 | End: 2023-05-02

## 2023-02-01 NOTE — PROGRESS NOTES
[facility]  PRIMARY CARE SPORTS MEDICINE  DATE OF VISIT : 2023    Patient : Hugo Fischer  Age : 80 y.o.   : 1934  MRN : [id number]   ______________________________________________________________________    Chief Complaint :   Chief Complaint   Patient presents with    Follow-up     Patient is in office f/u for lumbar radiculopathy. Patient states that medication prescribed in office has been helping with symptoms and needs a relief on medication. HPI : Hugo Fischer is 80 y.o. female who presented to the clinic today for follow-up of lumbar radiculopathy. Patient states gabapentin has significantly improved her symptoms. She is compliant with physical therapy and reports no new injuries or changes in symptoms. Patient denies fevers, chills, point tenderness over the spine, saddle anesthesia, loss of bowel or bladder function, or decreased muscle strength/sensation in the lower extremities. Past Medical History :  Past Medical History:   Diagnosis Date    Dementia ([address]     Diverticulosis     GIST (gastrointestinal stroma tumor), malignant, colon ([address]     Hyperlipidemia     Hypertension     MI (myocardial infarction) ([address]     Mitral regurgitation     per cardiology    PUD (peptic ulcer disease)      Past Surgical History:   Procedure Laterality Date    CAROTID ENDARTERECTOMY Right 2019    Dr Yang Double Bilateral 2015    COLONOSCOPY  2014    DILATATION, ESOPHAGUS      ENDOSCOPY, COLON, DIAGNOSTIC  2016    marking of gastric tumor    HYSTERECTOMY (CERVIX STATUS UNKNOWN)      YUMIKO/BSO    OTHER SURGICAL HISTORY  2016    Laparoscopic Robotic Assisted Partial Gastrectomy    SHOULDER SURGERY Left     rotator cuff right    TONSILLECTOMY AND ADENOIDECTOMY      UPPER GASTROINTESTINAL ENDOSCOPY  2014    UPPER GASTROINTESTINAL ENDOSCOPY  2016       Allergies :    Allergies   Allergen Reactions    Carafate [Sucralfate] Other (See Comments)     Unable to explain    Cefdinir Other (See Comments)     Unable to explain    Cetirizine & Related Other (See Comments)     Makes her forgetful and sleepy    Lisinopril Other (See Comments)     Difficulty swallowing, reflux, headache, dizziness, leg cramps    Pravachol [Pravastatin Sodium] Other (See Comments)     Patient states \"it almost killed me. \" Spoke with Patient's Daughter and she stated \"body cramps and extreme weakness. \"    Protonix [Pantoprazole Sodium] Other (See Comments)     Unable to explain    Statins Other (See Comments)     Intolerable. Patient states \"it almost killed me. \" Spoke with Patient's Daughter and she stated \"body cramps and extreme weakness. \"       Medication List :    Current Outpatient Medications   Medication Sig Dispense Refill    gabapentin (NEURONTIN) 100 MG capsule Take 1 capsule by mouth 3 times daily for 90 days. Intended supply: 30 days 90 capsule 2    acetaminophen (TYLENOL) 325 MG tablet Take 650 mg by mouth every 6 hours as needed      amLODIPine (NORVASC) 2.5 MG tablet Take 1 tablet by mouth daily 90 tablet 3    triamcinolone (NASACORT) 55 MCG/ACT nasal inhaler 2 sprays by Each Nostril route daily      loratadine (CLARITIN) 10 MG tablet Take 10 mg by mouth daily as needed (ALLERGIES)       aspirin 81 MG tablet Take 81 mg by mouth daily        No current facility-administered medications for this visit.      ______________________________________________________________________    Physical Exam :    Vitals: Ht 5' 6\" (1.676 m)   Wt 144 lb (65.3 kg)   BMI 23.24 kg/m²   General Appearance: Nontoxic, awake, alert, and in no acute distress. Chest wall/Lung: Respirations regular and unlabored. No cyanosis. Heart: RRR, distal pulses intact. Neurologic: Alert&Oriented x3. Sensation grossly intact.  No focal motor deficits detected.  ______________________________________________________________________    Assessment & Plan :    1. Lumbar radiculopathy  Patient presents to the office today for follow-up of lumbar radiculopathy. Patient has been compliant with current treatment plan of physical therapy and oral gabapentin which has significantly improved her symptoms. Patient requesting repeat fill for gabapentin which was electronically prescribed to the pharmacy today. Patient will contact our office if her symptoms worsen or change. Patient is agreeable with the above plan and all questions and concerns were addressed in the office today.    - gabapentin (NEURONTIN) 100 MG capsule; Take 1 capsule by mouth 3 times daily for 90 days. Intended supply: 30 days  Dispense: 90 capsule; Refill: 2    Return to Office: Return if symptoms worsen or fail to improve.     Rosey Stephenson MD

## 2023-02-21 DIAGNOSIS — N18.31 TYPE 2 DIABETES MELLITUS WITH STAGE 3A CHRONIC KIDNEY DISEASE, WITHOUT LONG-TERM CURRENT USE OF INSULIN (HCC): ICD-10-CM

## 2023-02-21 DIAGNOSIS — E11.22 TYPE 2 DIABETES MELLITUS WITH STAGE 3A CHRONIC KIDNEY DISEASE, WITHOUT LONG-TERM CURRENT USE OF INSULIN (HCC): ICD-10-CM

## 2023-02-21 DIAGNOSIS — E03.9 ACQUIRED HYPOTHYROIDISM: ICD-10-CM

## 2023-02-21 LAB
ALBUMIN SERPL-MCNC: 4.1 G/DL (ref 3.5–5.2)
ALP BLD-CCNC: 108 U/L (ref 35–104)
ALT SERPL-CCNC: 8 U/L (ref 0–32)
ANION GAP SERPL CALCULATED.3IONS-SCNC: 13 MMOL/L (ref 7–16)
AST SERPL-CCNC: 17 U/L (ref 0–31)
BASOPHILS ABSOLUTE: 0.06 E9/L (ref 0–0.2)
BASOPHILS RELATIVE PERCENT: 1 % (ref 0–2)
BILIRUB SERPL-MCNC: 0.2 MG/DL (ref 0–1.2)
BUN BLDV-MCNC: 24 MG/DL (ref 6–23)
CALCIUM SERPL-MCNC: 9.3 MG/DL (ref 8.6–10.2)
CHLORIDE BLD-SCNC: 105 MMOL/L (ref 98–107)
CO2: 21 MMOL/L (ref 22–29)
CREAT SERPL-MCNC: 1 MG/DL (ref 0.5–1)
EOSINOPHILS ABSOLUTE: 0.1 E9/L (ref 0.05–0.5)
EOSINOPHILS RELATIVE PERCENT: 1.7 % (ref 0–6)
GFR SERPL CREATININE-BSD FRML MDRD: 54 ML/MIN/1.73
GLUCOSE BLD-MCNC: 170 MG/DL (ref 74–99)
HBA1C MFR BLD: 6.4 % (ref 4–5.6)
HCT VFR BLD CALC: 41.8 % (ref 34–48)
HEMOGLOBIN: 13.7 G/DL (ref 11.5–15.5)
IMMATURE GRANULOCYTES #: 0.01 E9/L
IMMATURE GRANULOCYTES %: 0.2 % (ref 0–5)
LYMPHOCYTES ABSOLUTE: 1.72 E9/L (ref 1.5–4)
LYMPHOCYTES RELATIVE PERCENT: 29.9 % (ref 20–42)
MCH RBC QN AUTO: 29.8 PG (ref 26–35)
MCHC RBC AUTO-ENTMCNC: 32.8 % (ref 32–34.5)
MCV RBC AUTO: 91.1 FL (ref 80–99.9)
MICROALBUMIN UR-MCNC: 29.3 MG/L
MONOCYTES ABSOLUTE: 0.43 E9/L (ref 0.1–0.95)
MONOCYTES RELATIVE PERCENT: 7.5 % (ref 2–12)
NEUTROPHILS ABSOLUTE: 3.43 E9/L (ref 1.8–7.3)
NEUTROPHILS RELATIVE PERCENT: 59.7 % (ref 43–80)
PDW BLD-RTO: 13.1 FL (ref 11.5–15)
PLATELET # BLD: 193 E9/L (ref 130–450)
PMV BLD AUTO: 12.9 FL (ref 7–12)
POTASSIUM SERPL-SCNC: 4.3 MMOL/L (ref 3.5–5)
RBC # BLD: 4.59 E12/L (ref 3.5–5.5)
SODIUM BLD-SCNC: 139 MMOL/L (ref 132–146)
TOTAL PROTEIN: 6.3 G/DL (ref 6.4–8.3)
WBC # BLD: 5.8 E9/L (ref 4.5–11.5)

## 2023-02-22 LAB
T4 FREE: 1.08 NG/DL (ref 0.93–1.7)
TSH SERPL DL<=0.05 MIU/L-ACNC: 4.65 UIU/ML (ref 0.27–4.2)

## 2023-02-28 ENCOUNTER — OFFICE VISIT (OUTPATIENT)
Dept: FAMILY MEDICINE CLINIC | Age: 88
End: 2023-02-28
Payer: MEDICARE

## 2023-02-28 VITALS
TEMPERATURE: 97.9 F | DIASTOLIC BLOOD PRESSURE: 68 MMHG | BODY MASS INDEX: 23.63 KG/M2 | OXYGEN SATURATION: 97 % | WEIGHT: 147 LBS | HEIGHT: 66 IN | HEART RATE: 74 BPM | SYSTOLIC BLOOD PRESSURE: 122 MMHG

## 2023-02-28 DIAGNOSIS — N18.31 TYPE 2 DIABETES MELLITUS WITH STAGE 3A CHRONIC KIDNEY DISEASE, WITHOUT LONG-TERM CURRENT USE OF INSULIN (HCC): Primary | ICD-10-CM

## 2023-02-28 DIAGNOSIS — E11.22 TYPE 2 DIABETES MELLITUS WITH STAGE 3A CHRONIC KIDNEY DISEASE, WITHOUT LONG-TERM CURRENT USE OF INSULIN (HCC): Primary | ICD-10-CM

## 2023-02-28 DIAGNOSIS — C16.1: ICD-10-CM

## 2023-02-28 DIAGNOSIS — K59.09 OTHER CONSTIPATION: ICD-10-CM

## 2023-02-28 DIAGNOSIS — M54.41 CHRONIC RIGHT-SIDED LOW BACK PAIN WITH RIGHT-SIDED SCIATICA: ICD-10-CM

## 2023-02-28 DIAGNOSIS — F01.50 VASCULAR DEMENTIA WITHOUT BEHAVIORAL DISTURBANCE (HCC): ICD-10-CM

## 2023-02-28 DIAGNOSIS — G89.29 CHRONIC RIGHT-SIDED LOW BACK PAIN WITH RIGHT-SIDED SCIATICA: ICD-10-CM

## 2023-02-28 DIAGNOSIS — I10 PRIMARY HYPERTENSION: ICD-10-CM

## 2023-02-28 PROCEDURE — 3044F HG A1C LEVEL LT 7.0%: CPT | Performed by: FAMILY MEDICINE

## 2023-02-28 PROCEDURE — 1123F ACP DISCUSS/DSCN MKR DOCD: CPT | Performed by: FAMILY MEDICINE

## 2023-02-28 PROCEDURE — 99214 OFFICE O/P EST MOD 30 MIN: CPT | Performed by: FAMILY MEDICINE

## 2023-02-28 RX ORDER — METFORMIN HYDROCHLORIDE 500 MG/1
500 TABLET, EXTENDED RELEASE ORAL
Qty: 90 TABLET | Refills: 1 | Status: SHIPPED | OUTPATIENT
Start: 2023-02-28

## 2023-02-28 RX ORDER — POLYETHYLENE GLYCOL 3350 17 G/17G
17 POWDER, FOR SOLUTION ORAL DAILY
Qty: 1530 G | Refills: 1 | Status: CANCELLED | OUTPATIENT
Start: 2023-02-28 | End: 2023-03-30

## 2023-02-28 RX ORDER — IBUPROFEN 200 MG
200 TABLET ORAL 2 TIMES DAILY
COMMUNITY

## 2023-02-28 RX ORDER — DOCUSATE SODIUM 100 MG/1
100 CAPSULE, LIQUID FILLED ORAL NIGHTLY
Qty: 90 CAPSULE | Refills: 1 | Status: SHIPPED | OUTPATIENT
Start: 2023-02-28

## 2023-02-28 ASSESSMENT — PATIENT HEALTH QUESTIONNAIRE - PHQ9
SUM OF ALL RESPONSES TO PHQ9 QUESTIONS 1 & 2: 0
SUM OF ALL RESPONSES TO PHQ QUESTIONS 1-9: 0
1. LITTLE INTEREST OR PLEASURE IN DOING THINGS: 0
2. FEELING DOWN, DEPRESSED OR HOPELESS: 0

## 2023-02-28 ASSESSMENT — ENCOUNTER SYMPTOMS
ABDOMINAL PAIN: 0
VOMITING: 0
SINUS PAIN: 0
COUGH: 0
BACK PAIN: 0
TROUBLE SWALLOWING: 0
SORE THROAT: 0
WHEEZING: 0
CONSTIPATION: 1
NAUSEA: 0
DIARRHEA: 0
SHORTNESS OF BREATH: 0
CHEST TIGHTNESS: 0
EYE PAIN: 0

## 2023-02-28 NOTE — PROGRESS NOTES
2/28/23    Name: Marisela Carney  QGQ:[birth year]   Sex:female   Age:89 y.o. Chief Complaint   Patient presents with    Hypertension    Constipation     Patient presents to office for visit. She did have labs done. Patient says she is having a hard time remembering names. She says sometimes she will think that black colored items are red. Patient says that she has a large amount of urine when she voids and sometimes it has an odor to it. Patient denies urinary frequency. She is having trouble moving her bowels. Patient says that she has a bowel movement about every 4 days and it is difficult to go and her stools are very large and hard to pass. Patient was started on gabapentin. She says the gabapentin is a miracle drug and she may start taking it three times a day instead of twice a day. She takes the gabapentin with an advil. Patient says that she had a rash on her left abdomen, she can't remember exactly when this happened. The rash did not last long and it was not painful.      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[employer]  Remembers to take medications  Good hygeine  Still cooks, not losing her keys  Not forgetting to pay bills  Will need a mocha    Diabetes  With CKD  We discussed sugars, her sugars seem to be going up in labs  Will add metoformin once a day  This this age she is not going to change diet  Advised about side effects  Also cautioned about regular nsaid use and gi bleeding and kdney function    Right hip with sciatic pain  Has been put on gabapentin by DR Patrice Whiteside and she really seemsto be doing much better but taking motrin with ir twice a day  Will continue to follow labs    HTN  Readings have been good  She sees cardiology next months  Still taking amlodipine    Review of Systems   Constitutional:  Negative for appetite change, fatigue and fever. HENT:  Negative for congestion, ear pain, sinus pain, sore throat and trouble swallowing. Eyes:  Negative for pain. Respiratory:  Negative for cough, chest tightness, shortness of breath and wheezing. Cardiovascular:  Negative for chest pain, palpitations and leg swelling. Gastrointestinal:  Positive for constipation. Negative for abdominal pain, diarrhea, nausea and vomiting. Endocrine: Positive for polyuria. Negative for cold intolerance and heat intolerance. Genitourinary:  Negative for difficulty urinating, dysuria, frequency, hematuria and pelvic pain. Musculoskeletal:  Negative for arthralgias, back pain, gait problem, joint swelling and myalgias. Skin:  Negative for rash and wound. Neurological:  Negative for dizziness, syncope, light-headedness and headaches. Hematological:  Negative for adenopathy. Psychiatric/Behavioral:  Positive for decreased concentration. Negative for confusion, dysphoric mood, self-injury, sleep disturbance and suicidal ideas. The patient is not nervous/anxious. Current Outpatient Medications:     ibuprofen (ADVIL;MOTRIN) 200 MG tablet, Take 200 mg by mouth 2 times daily, Disp: , Rfl:     metFORMIN (GLUCOPHAGE-XR) 500 MG extended release tablet, Take 1 tablet by mouth daily (with breakfast), Disp: 90 tablet, Rfl: 1    docusate sodium (COLACE) 100 MG capsule, Take 1 capsule by mouth nightly For constipation, Disp: 90 capsule, Rfl: 1    gabapentin (NEURONTIN) 100 MG capsule, Take 1 capsule by mouth 3 times daily for 90 days.  Intended supply: 30 days, Disp: 90 capsule, Rfl: 2    amLODIPine (NORVASC) 2.5 MG tablet, Take 1 tablet by mouth daily, Disp: 90 tablet, Rfl: 3    loratadine (CLARITIN) 10 MG tablet, Take 10 mg by mouth daily as needed (ALLERGIES) , Disp: , Rfl:     aspirin 81 MG tablet, Take 81 mg by mouth daily , Disp: , Rfl:   Allergies   Allergen Reactions    Carafate [Sucralfate] Other (See Comments) Unable to explain    Cefdinir Other (See Comments)     Unable to explain    Cetirizine & Related Other (See Comments)     Makes her forgetful and sleepy    Lisinopril Other (See Comments)     Difficulty swallowing, reflux, headache, dizziness, leg cramps    Pravachol [Pravastatin Sodium] Other (See Comments)     Patient states \"it almost killed me. \" Spoke with Patient's Daughter and she stated \"body cramps and extreme weakness. \"    Protonix [Pantoprazole Sodium] Other (See Comments)     Unable to explain    Statins Other (See Comments)     Intolerable. Patient states \"it almost killed me. \" Spoke with Patient's Daughter and she stated \"body cramps and extreme weakness. \"      Past Medical History:   Diagnosis Date    Dementia ([address]     Diverticulosis     GIST (gastrointestinal stroma tumor), malignant, colon ([address]     Hyperlipidemia     Hypertension     MI (myocardial infarction) ([address] 2015    Mitral regurgitation     per cardiology    PUD (peptic ulcer disease)      Patient Active Problem List    Diagnosis Date Noted    NSTEMI (non-ST elevated myocardial infarction) ([address] 06/03/2016    GIST (gastrointestinal stromal tumor), malignant ([address] 05/25/2016    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    Mild cognitive impairment with memory loss 08/16/2022    Chronic right-sided low back pain with right-sided sciatica 08/16/2022    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/16/2022    Type 2 diabetes mellitus 05/23/2022    Chronic renal disease, stage III ([address] [176525] 05/23/2022    Pulmonary nodules 09/20/2021    Statin intolerance 09/20/2021    Acute cerebrovascular accident (CVA) ([address] 09/16/2021    Dementia ([address]     Acquired hypothyroidism 11/20/2019    Hyperlipidemia 11/15/2019    Disorder of thyroid gland 11/15/2019    Carotid stenosis, right 11/15/2019    Onychomycosis 06/14/2019    Difficulty walking 06/14/2019    Malignant neoplasm of abdomen ([address] 06/14/2016    Hypertension     PUD (peptic ulcer disease) Past Surgical History:   Procedure Laterality Date    CAROTID ENDARTERECTOMY Right 04/2019    Dr Brigitte Teran Bilateral 2015    COLONOSCOPY  12/2014    DILATATION, ESOPHAGUS      ENDOSCOPY, COLON, DIAGNOSTIC  09/02/2016    marking of gastric tumor    HYSTERECTOMY (CERVIX STATUS UNKNOWN)      YUMIKO/BSO    OTHER SURGICAL HISTORY  09/09/2016    Laparoscopic Robotic Assisted Partial Gastrectomy    SHOULDER SURGERY Left     rotator cuff right    TONSILLECTOMY AND ADENOIDECTOMY      UPPER GASTROINTESTINAL ENDOSCOPY  12/2014    UPPER GASTROINTESTINAL ENDOSCOPY  5/25/2016      Social History       Tobacco History       Smoking Status  Former Quit Date  1969 Smoking Frequency  1 pack/day for 18.00 years (18.00 pk-yrs) Smoking Tobacco Type  Cigarettes quit in 1969      Smokeless Tobacco Use  Never              Alcohol History       Alcohol Use Status  Yes Comment  glass of wine with dinner              Drug Use       Drug Use Status  No              Sexual Activity       Sexually Active  Not Currently Partners  Male Comment                  /68   Pulse 74   Temp 97.9 °F (36.6 °C)   Ht 5' 6\" (1.676 m)   Wt 147 lb (66.7 kg)   SpO2 97%   BMI 23.73 kg/m²     EXAM:   Physical Exam  Vitals and nursing note reviewed. Constitutional:       General: She is not in acute distress. Appearance: She is well-developed. She is not ill-appearing or toxic-appearing. HENT:      Head: Normocephalic and atraumatic. Right Ear: Tympanic membrane normal.      Left Ear: Tympanic membrane normal.      Nose: No congestion. Eyes:      Pupils: Pupils are equal, round, and reactive to light. Cardiovascular:      Rate and Rhythm: Normal rate and regular rhythm. Pulmonary:      Effort: Pulmonary effort is normal. No respiratory distress. Breath sounds: Normal breath sounds. No wheezing or rhonchi. Musculoskeletal:      Cervical back: Normal range of motion.    Skin:     General: Skin is warm and dry. Neurological:      Mental Status: She is alert and oriented to person, place, and time. Comments: But admits she has been becoming more forgetful, still driving but sometimes will forget where she is going, and forgets names a lot, cautioned her about driving. Recommend she have a discussion with her , alissa needs done next visit        Sharyle Perl was seen today for hypertension and constipation. 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 ([facility]  Comments:  has been doing well  no longer seeing oncology  she continues to decline further scopes    Primary hypertension  Comments:  still seeing cardiology year;y  readigns have been good  she remains on amlodipine    Chronic right-sided low back pain with right-sided sciatica  Comments:  has see Dr Rafy Tristan and is on gabapentin  seems to be tolerating it well  no increased tiredness or confusion  helping pain a great deal  caution w nsaid use    Other constipation  Comments:  not drinking enough water  encouraged more fluids  colace daily and more fruits and veggies  Orders:  -     docusate sodium (COLACE) 100 MG capsule; Take 1 capsule by mouth nightly For constipation      I independently reviewed and updated the chief complaint, HPI, past medical and surgical history, medications, allergies and ROS as entered by the LPN. Seen by:   Shilo Beck DO

## 2023-03-29 ENCOUNTER — OFFICE VISIT (OUTPATIENT)
Dept: FAMILY MEDICINE CLINIC | Age: 88
End: 2023-03-29
Payer: MEDICARE

## 2023-03-29 ENCOUNTER — OFFICE VISIT (OUTPATIENT)
Dept: CARDIOLOGY CLINIC | Age: 88
End: 2023-03-29
Payer: MEDICARE

## 2023-03-29 VITALS
BODY MASS INDEX: 23.3 KG/M2 | DIASTOLIC BLOOD PRESSURE: 82 MMHG | HEART RATE: 75 BPM | HEIGHT: 66 IN | SYSTOLIC BLOOD PRESSURE: 128 MMHG | WEIGHT: 145 LBS | RESPIRATION RATE: 14 BRPM

## 2023-03-29 VITALS
SYSTOLIC BLOOD PRESSURE: 138 MMHG | DIASTOLIC BLOOD PRESSURE: 68 MMHG | HEART RATE: 64 BPM | HEIGHT: 66 IN | BODY MASS INDEX: 23.46 KG/M2 | WEIGHT: 146 LBS | OXYGEN SATURATION: 98 % | TEMPERATURE: 98.2 F

## 2023-03-29 DIAGNOSIS — I34.0 NONRHEUMATIC MITRAL VALVE REGURGITATION: ICD-10-CM

## 2023-03-29 DIAGNOSIS — E03.9 ACQUIRED HYPOTHYROIDISM: ICD-10-CM

## 2023-03-29 DIAGNOSIS — E11.9 TYPE 2 DIABETES MELLITUS WITHOUT COMPLICATION, WITHOUT LONG-TERM CURRENT USE OF INSULIN (HCC): ICD-10-CM

## 2023-03-29 DIAGNOSIS — K59.1 FUNCTIONAL DIARRHEA: Primary | ICD-10-CM

## 2023-03-29 DIAGNOSIS — I21.4 NSTEMI (NON-ST ELEVATED MYOCARDIAL INFARCTION) (HCC): Primary | ICD-10-CM

## 2023-03-29 DIAGNOSIS — M54.41 CHRONIC RIGHT-SIDED LOW BACK PAIN WITH RIGHT-SIDED SCIATICA: ICD-10-CM

## 2023-03-29 DIAGNOSIS — G89.29 CHRONIC RIGHT-SIDED LOW BACK PAIN WITH RIGHT-SIDED SCIATICA: ICD-10-CM

## 2023-03-29 PROCEDURE — 1123F ACP DISCUSS/DSCN MKR DOCD: CPT | Performed by: INTERNAL MEDICINE

## 2023-03-29 PROCEDURE — 3044F HG A1C LEVEL LT 7.0%: CPT | Performed by: FAMILY MEDICINE

## 2023-03-29 PROCEDURE — 93000 ELECTROCARDIOGRAM COMPLETE: CPT | Performed by: INTERNAL MEDICINE

## 2023-03-29 PROCEDURE — 99213 OFFICE O/P EST LOW 20 MIN: CPT | Performed by: INTERNAL MEDICINE

## 2023-03-29 PROCEDURE — 1123F ACP DISCUSS/DSCN MKR DOCD: CPT | Performed by: FAMILY MEDICINE

## 2023-03-29 PROCEDURE — 99214 OFFICE O/P EST MOD 30 MIN: CPT | Performed by: FAMILY MEDICINE

## 2023-03-29 ASSESSMENT — ENCOUNTER SYMPTOMS
ABDOMINAL PAIN: 0
DIARRHEA: 0
VOMITING: 0
SORE THROAT: 0
SHORTNESS OF BREATH: 0
SINUS PAIN: 0
WHEEZING: 0
BACK PAIN: 0
NAUSEA: 0
TROUBLE SWALLOWING: 0
CHEST TIGHTNESS: 0
CONSTIPATION: 0
COUGH: 0
EYE PAIN: 0

## 2023-03-29 NOTE — PROGRESS NOTES
3/29/23    Name: Jonas Santos  UVI:[birth year]   Sex:female   Age:89 y.o. Chief Complaint   Patient presents with    Diarrhea     Patient presents to office for visit. She had an issue over the weekend with loose stools and incontinence at her house. Patient found something in her shoe the next day and didn't know if it came from her bowels or if possibly her cat put something in her shoes. The patient brought in what was found in her shoe, it could be stool, it could have been from her cat since it looked as if there was a leaf and possibly a tree nut in it. The color was very dark brown and the specimen was very hard. Patient says that her loose stools were yellow and lumpy. She says she often has loose stools and sometimes does not make it to the bathroom. Patient never started the metformin because she didn't know what it was for. Here for bowel issues  Last week she was having more bowel issues  She got really constipated and was not even sure how many days she had gone with no movement  So she took a docusate and then had terrible diarrhea 2 days later, it went everywhere    No blood in the stool  Not very much abdominal pain  We discussed her diet at length  Not nearly enough fiber  a day  Drinking fluids, mostly coffee or tea but none the less she is drinking  Will have her do metamucil capful or gummies  NO white bread, whole wheat or whole grain  Oatmeal a few tiimes a week  Also probiotics or yogart daily  She will make these changes and see if it helps  Would not recommend colonoscopy unless necessary.     No urinary complaints  No blood in urine    Her energy level is good, no weight loss  Appetite has been good    Diabetes  We discussed the metformin  She never started it and now with these bowel issues will hold off  She thinks it was the cookies she was eating over the holidays'  So will recheck labs in may and see how she is doing    Thyroid-hypothyroidism  She is taking her meds daily  Tsh

## 2023-03-29 NOTE — PROGRESS NOTES
rt sided facial pain this am Daughter and she stated \"body cramps and extreme weakness. \"       Chief Complaint:  April Roberson is here today for follow up and management/recomendations for CAD     History of Present Illness: April Roberson is somewhat of a difficult historian. She does some housework, goes upstairs, and goes shopping. She denies any chest discomfort or dyspnea with any of the above activities. She denies orthopnea/PND, or lower extremity edema. She denies any palpitations. REVIEW OF SYSTEMS:  As above. Patient does not complain of any fever, chills, nausea, vomiting or diarrhea. No focal, motor or neurological deficits. No changes in his/her vision, hearing, bowel or bladder habits. She is not known to have a history of thyroid problems. No recent nose bleeds. PHYSICAL EXAM:  Vitals:    03/29/23 0908   BP: 128/82   Pulse: 75   Resp: 14   Weight: 145 lb (65.8 kg)   Height: 5' 6\" (1.676 m)       GENERAL:  She is alert and oriented x 3, communicates well, in no distress. NECK:  No masses, trachea is mid position. Supple, full ROM, no JVD or bruits. No palpable thyromegaly or lymphadenopathy. HEART:  Regular rate and rhythm. Normal S1 and S2. There is an S4 gallop and a I-II/VI holosystolic murmur at the apex. LUNGS:  Clear to auscultation bilaterally. Mildly decreased effort. No use of accessory muscles. symmetrical excursion. ABDOMEN:  Soft, non-tender. Normal bowel sounds. EXTREMITIES:  Full ROM x 4. No lower extremity edema bilatera on exam l.  Good distal pulses. EYES:  Extraocular muscles intact. PERRL. Normal lids & conjunctiva. ENT:  Nares are clear & not bleeding. Moist mucosa. Normal lips formation. No external masses   NEURO: no tremors, full ROM x 4, EOMI. SKIN:  Warm, dry and intact. Normal turgor. EKG: Sinus rhythm, 75 bpm, nl axis, nonspecific ST - T wave changes. Assessment:   Coronary artery disease as outlined above.   Clinically no symptoms of recurring

## 2023-04-21 RX ORDER — AMLODIPINE BESYLATE 2.5 MG/1
2.5 TABLET ORAL DAILY
Qty: 90 TABLET | Refills: 3 | Status: SHIPPED | OUTPATIENT
Start: 2023-04-21

## 2023-05-04 ENCOUNTER — HOSPITAL ENCOUNTER (OUTPATIENT)
Dept: CARDIOLOGY | Age: 88
Discharge: HOME | End: 2023-05-04
Payer: MEDICARE

## 2023-05-04 DIAGNOSIS — I34.0 NONRHEUMATIC MITRAL VALVE REGURGITATION: ICD-10-CM

## 2023-05-04 PROCEDURE — 93306 TTE W/DOPPLER COMPLETE: CPT | Performed by: PSYCHIATRY & NEUROLOGY

## 2023-05-09 ENCOUNTER — TELEPHONE (OUTPATIENT)
Dept: CARDIOLOGY CLINIC | Age: 88
End: 2023-05-09

## 2023-05-09 PROBLEM — I34.2 NONRHEUMATIC MITRAL VALVE STENOSIS: Status: ACTIVE | Noted: 2023-05-09

## 2023-05-09 NOTE — TELEPHONE ENCOUNTER
Pt calling again for her echo results - I did relay Dr. Arabella Cerna message below verbatim and she would like to speak with someone in the office. Thank you.

## 2023-05-24 ENCOUNTER — TELEPHONE (OUTPATIENT)
Dept: ADMINISTRATIVE | Age: 88
End: 2023-05-24

## 2023-05-24 ENCOUNTER — OFFICE VISIT (OUTPATIENT)
Dept: FAMILY MEDICINE CLINIC | Age: 88
End: 2023-05-24
Payer: MEDICARE

## 2023-05-24 VITALS
OXYGEN SATURATION: 98 % | TEMPERATURE: 98.1 F | DIASTOLIC BLOOD PRESSURE: 74 MMHG | SYSTOLIC BLOOD PRESSURE: 132 MMHG | HEART RATE: 80 BPM | BODY MASS INDEX: 23.27 KG/M2 | WEIGHT: 144.8 LBS | HEIGHT: 66 IN

## 2023-05-24 DIAGNOSIS — M54.16 LUMBAR RADICULOPATHY: ICD-10-CM

## 2023-05-24 DIAGNOSIS — I05.9 MITRAL VALVE DISEASE: ICD-10-CM

## 2023-05-24 DIAGNOSIS — I10 PRIMARY HYPERTENSION: Primary | ICD-10-CM

## 2023-05-24 DIAGNOSIS — R35.0 FREQUENCY OF URINATION: ICD-10-CM

## 2023-05-24 DIAGNOSIS — G31.84 MILD COGNITIVE IMPAIRMENT WITH MEMORY LOSS: ICD-10-CM

## 2023-05-24 LAB
BILIRUBIN, POC: ABNORMAL
BLOOD URINE, POC: ABNORMAL
CLARITY, POC: ABNORMAL
COLOR, POC: YELLOW
GLUCOSE URINE, POC: ABNORMAL
KETONES, POC: ABNORMAL
LEUKOCYTE EST, POC: ABNORMAL
NITRITE, POC: ABNORMAL
PH, POC: 5
PROTEIN, POC: ABNORMAL
SPECIFIC GRAVITY, POC: 1.01
UROBILINOGEN, POC: 0.2

## 2023-05-24 PROCEDURE — 1123F ACP DISCUSS/DSCN MKR DOCD: CPT | Performed by: FAMILY MEDICINE

## 2023-05-24 PROCEDURE — 99215 OFFICE O/P EST HI 40 MIN: CPT | Performed by: FAMILY MEDICINE

## 2023-05-24 PROCEDURE — 81002 URINALYSIS NONAUTO W/O SCOPE: CPT | Performed by: FAMILY MEDICINE

## 2023-05-24 RX ORDER — GABAPENTIN 100 MG/1
100 CAPSULE ORAL DAILY
Qty: 90 CAPSULE | Refills: 1 | Status: SHIPPED | OUTPATIENT
Start: 2023-05-24 | End: 2023-08-22

## 2023-05-24 RX ORDER — AMLODIPINE BESYLATE 2.5 MG/1
2.5 TABLET ORAL 2 TIMES DAILY
Qty: 180 TABLET | Refills: 1 | Status: SHIPPED | OUTPATIENT
Start: 2023-05-24

## 2023-05-24 RX ORDER — HYDRALAZINE HYDROCHLORIDE 25 MG/1
25 TABLET, FILM COATED ORAL 3 TIMES DAILY
COMMUNITY
Start: 2023-04-23

## 2023-05-24 ASSESSMENT — ENCOUNTER SYMPTOMS
CHEST TIGHTNESS: 0
CONSTIPATION: 0
ABDOMINAL PAIN: 0
NAUSEA: 0
SHORTNESS OF BREATH: 0
DIARRHEA: 0
VOMITING: 0
WHEEZING: 0
EYE PAIN: 0
COUGH: 0
BACK PAIN: 0
SINUS PAIN: 0
TROUBLE SWALLOWING: 0
SORE THROAT: 0

## 2023-05-24 NOTE — PROGRESS NOTES
MINI-MENTAL STATUS EXAM (Ayesha Mcardle)    What is the Year? Season? Date? Day? Month?   (indicate # correct)        3    Where are we: State? County? Town? Place?  Floor? (indicate # correct)        3    Name 3 objects and have pt repeat.                (indicate # correct)        2    Serial 7's or spell \"world\" backwards.                 (indicate # correct)        3    Recall 3 objects                (indicate # correct)        2    Patient names a pencil & a watch                (indicate # correct)        2    Read and obey \"Close your eyes\"                (indicate 1 if correct)     1    Copy intersecting pentagons                (indicate 1 if correct)     1    Write a sentence                (indicate 1 if correct)     1    Repeat \"no ifs, ands, or buts\"                (indicate 1 if correct)     1    Follow 3-stage command                (indicate # done correctly) 3                                            ------------  TOTAL SCORE   (max = 30)                  21      REFERENCE INFORMATION FOR THE CLINICIAN:    \"Low\" score    = 0-23  \"Normal\" score = 24-30

## 2023-05-24 NOTE — PROGRESS NOTES
5/24/23    Name: Yuriy   BLANCO:0/32/1980   Sex:female   Age:89 y.o. Chief Complaint   Patient presents with    Dizziness    Hypertension     Patient presents to office for visit. She was in the hospital in April for dizziness and falling. Patient is still having dizziness when standing or laying down. She says her legs feel stronger now. She has a cane, but did not really use it to ambulate. Patient says she only uses her cane if she is going to be in areas that she can not hold on to. Patient says she isn't having issues with her bowels anymore. She says that her urine stinks. Patient says she is taking all of her prescription medications together in the morning. She does have a note for doctor describing the issues she has been having since her last appointment.      Here for a follow up  She was in the ED at the end of April, she fell in the park with her daughter, she thinks she twisted her ankle, but then when she got home from the park she could not remember anything and her  took her to the ED  She feels like that was very stressful and that's why her blood presssures were up but it was up consistantly'  She was there overnight, they did labs and an echo'  They also started her on hydralazine  She does not recall seeing any cardiologists while there  The hydralazine was initially 25mg 3 times a day'  The first couple days she was very dizzy and light headed and had nausea  Also had terrible hot flashes at night  She decreased it to twice a day but still not feeling well'  It sounds like she is really only taking the morning dose b/c she gonzalez snot like the side effects  We discussed her bp and will increase the amlodipine to 2.5mg twice a day  She agreed to this  She would like to see cardiology but does not want to see DR Liza Irizarry  She would like to see someone else  She thought they were going to call her for a follow up and did not    She also admits her memory is getting worse  Her moca has

## 2023-06-01 NOTE — TELEPHONE ENCOUNTER
Patient's sister Liza Cruz) notified of echo results and Dr. Guardado's recommendation. She will have patient call to schedule F/U.

## 2023-06-02 NOTE — TELEPHONE ENCOUNTER
Patient called stating she has decided to stay with Dr. Rebeca Smith. Dot records scanned. Please advise regarding F/U.

## 2023-06-18 NOTE — TELEPHONE ENCOUNTER
----- Message from Simone Aguayo DO sent at 5/9/2023  9:23 AM EDT -----  Let her know that the heart function is good. The moderate mitral regurgitation still looks moderate. No

## 2023-06-23 ENCOUNTER — OFFICE VISIT (OUTPATIENT)
Dept: FAMILY MEDICINE CLINIC | Age: 88
End: 2023-06-23

## 2023-06-23 VITALS
DIASTOLIC BLOOD PRESSURE: 76 MMHG | WEIGHT: 144.8 LBS | HEIGHT: 66 IN | TEMPERATURE: 97.4 F | BODY MASS INDEX: 23.27 KG/M2 | HEART RATE: 74 BPM | SYSTOLIC BLOOD PRESSURE: 130 MMHG | OXYGEN SATURATION: 96 %

## 2023-06-23 DIAGNOSIS — G89.29 CHRONIC RIGHT-SIDED LOW BACK PAIN WITH RIGHT-SIDED SCIATICA: Primary | ICD-10-CM

## 2023-06-23 DIAGNOSIS — E11.22 TYPE 2 DIABETES MELLITUS WITH STAGE 3A CHRONIC KIDNEY DISEASE, WITHOUT LONG-TERM CURRENT USE OF INSULIN (HCC): ICD-10-CM

## 2023-06-23 DIAGNOSIS — R19.4 CHANGE IN BOWEL HABITS: ICD-10-CM

## 2023-06-23 DIAGNOSIS — I10 PRIMARY HYPERTENSION: ICD-10-CM

## 2023-06-23 DIAGNOSIS — G31.84 MILD COGNITIVE IMPAIRMENT WITH MEMORY LOSS: ICD-10-CM

## 2023-06-23 DIAGNOSIS — N18.31 TYPE 2 DIABETES MELLITUS WITH STAGE 3A CHRONIC KIDNEY DISEASE, WITHOUT LONG-TERM CURRENT USE OF INSULIN (HCC): ICD-10-CM

## 2023-06-23 DIAGNOSIS — M54.41 CHRONIC RIGHT-SIDED LOW BACK PAIN WITH RIGHT-SIDED SCIATICA: Primary | ICD-10-CM

## 2023-06-23 RX ORDER — TRAMADOL HYDROCHLORIDE 50 MG/1
50 TABLET ORAL EVERY MORNING
Qty: 30 TABLET | Refills: 0 | Status: SHIPPED | OUTPATIENT
Start: 2023-06-23 | End: 2023-07-23

## 2023-06-23 ASSESSMENT — ENCOUNTER SYMPTOMS
WHEEZING: 0
COUGH: 0
NAUSEA: 0
EYE PAIN: 0
TROUBLE SWALLOWING: 0
BACK PAIN: 0
CONSTIPATION: 0
ABDOMINAL PAIN: 0
SINUS PAIN: 0
CHEST TIGHTNESS: 0
DIARRHEA: 0
SORE THROAT: 0
VOMITING: 0
SHORTNESS OF BREATH: 0

## 2023-06-23 NOTE — PROGRESS NOTES
Ambulates very slowly, gait is guarded        Batsheva Kinney was seen today for altered mental status and dizziness. Diagnoses and all orders for this visit:    Chronic right-sided low back pain with right-sided sciatica  Comments:  stop gabapentin to see if this is making memory owrse  try tramadol 50mg daily in the morning instead  recheck in 3 weeks  Orders:  -     traMADol (ULTRAM) 50 MG tablet; Take 1 tablet by mouth every morning for 30 days. Stop gabapentin Max Daily Amount: 50 mg    Mild cognitive impairment with memory loss  Comments:  stop gabapentin, this may be making it worse  needs to get hearing aids working  recheck in 3 weeks    Primary hypertension  Comments:  amlodipine 2.5mg bid  tolerating this very well adn readigns are much better  sees DR Nba Claros in july    Type 2 diabetes mellitus with stage 3a chronic kidney disease, without long-term current use of insulin ([facility]  Comments:  she is watching diet a little better  trying to eat better but still not enough fruits and veggies  continue fiber gummies daily  labs before next appt    Change in bowel habits  Comments:  fiber gummies daily have improved this a great deal  goes every day now and they are formed, no diarrhea        I independently reviewed and updated the chief complaint, HPI, past medical and surgical history, medications, allergies and ROS as entered by the LPN. Seen by:   Ольга Shannon DO

## 2023-06-27 ENCOUNTER — TELEPHONE (OUTPATIENT)
Dept: FAMILY MEDICINE CLINIC | Age: 88
End: 2023-06-27

## 2023-06-28 ENCOUNTER — TELEPHONE (OUTPATIENT)
Dept: CARDIOLOGY CLINIC | Age: 88
End: 2023-06-28

## 2023-07-14 ENCOUNTER — OFFICE VISIT (OUTPATIENT)
Dept: FAMILY MEDICINE CLINIC | Age: 88
End: 2023-07-14

## 2023-07-14 VITALS
HEIGHT: 66 IN | TEMPERATURE: 98 F | DIASTOLIC BLOOD PRESSURE: 72 MMHG | HEART RATE: 80 BPM | OXYGEN SATURATION: 96 % | SYSTOLIC BLOOD PRESSURE: 130 MMHG | WEIGHT: 143.6 LBS | BODY MASS INDEX: 23.08 KG/M2

## 2023-07-14 DIAGNOSIS — M54.41 CHRONIC RIGHT-SIDED LOW BACK PAIN WITH RIGHT-SIDED SCIATICA: ICD-10-CM

## 2023-07-14 DIAGNOSIS — N18.2 CKD (CHRONIC KIDNEY DISEASE) STAGE 2, GFR 60-89 ML/MIN: ICD-10-CM

## 2023-07-14 DIAGNOSIS — I10 PRIMARY HYPERTENSION: Primary | ICD-10-CM

## 2023-07-14 DIAGNOSIS — G89.29 CHRONIC RIGHT-SIDED LOW BACK PAIN WITH RIGHT-SIDED SCIATICA: ICD-10-CM

## 2023-07-14 DIAGNOSIS — G31.84 MILD COGNITIVE IMPAIRMENT WITH MEMORY LOSS: ICD-10-CM

## 2023-07-14 ASSESSMENT — ENCOUNTER SYMPTOMS
BACK PAIN: 0
SORE THROAT: 0
WHEEZING: 0
SHORTNESS OF BREATH: 0
SINUS PAIN: 0
CHEST TIGHTNESS: 0
EYE PAIN: 0
COUGH: 0
NAUSEA: 0
TROUBLE SWALLOWING: 0
ABDOMINAL PAIN: 0
CONSTIPATION: 0
VOMITING: 0
DIARRHEA: 0

## 2023-07-21 ENCOUNTER — OFFICE VISIT (OUTPATIENT)
Dept: CARDIOLOGY CLINIC | Age: 88
End: 2023-07-21
Payer: MEDICARE

## 2023-07-21 VITALS
SYSTOLIC BLOOD PRESSURE: 118 MMHG | DIASTOLIC BLOOD PRESSURE: 78 MMHG | BODY MASS INDEX: 23.13 KG/M2 | HEIGHT: 66 IN | WEIGHT: 143.9 LBS | RESPIRATION RATE: 18 BRPM | HEART RATE: 74 BPM

## 2023-07-21 DIAGNOSIS — I05.9 MITRAL VALVE DISEASE: ICD-10-CM

## 2023-07-21 DIAGNOSIS — I10 PRIMARY HYPERTENSION: ICD-10-CM

## 2023-07-21 DIAGNOSIS — I21.4 NSTEMI (NON-ST ELEVATED MYOCARDIAL INFARCTION) (HCC): Primary | ICD-10-CM

## 2023-07-21 PROCEDURE — 99214 OFFICE O/P EST MOD 30 MIN: CPT | Performed by: INTERNAL MEDICINE

## 2023-07-21 PROCEDURE — 1123F ACP DISCUSS/DSCN MKR DOCD: CPT | Performed by: INTERNAL MEDICINE

## 2023-07-21 PROCEDURE — 93000 ELECTROCARDIOGRAM COMPLETE: CPT | Performed by: INTERNAL MEDICINE

## 2023-07-21 RX ORDER — AMLODIPINE BESYLATE 2.5 MG/1
2.5 TABLET ORAL DAILY
Qty: 90 TABLET | Refills: 3 | Status: SHIPPED | OUTPATIENT
Start: 2023-07-21

## 2023-07-21 NOTE — PROGRESS NOTES
Kimberly Pedrazawale  2/22/1934  Date of Service: 7/21/2023    Patient Active Problem List    Diagnosis Date Noted    NSTEMI (non-ST elevated myocardial infarction) ([address]) 06/03/2016     Priority: High    GIST (gastrointestinal stromal tumor), malignant ([address]) 05/25/2016     Priority: High    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     Priority: Medium    Mild cognitive impairment with memory loss 08/16/2022     Priority: Medium    Chronic right-sided low back pain with right-sided sciatica 08/16/2022     Priority: Medium     Overview Note:     has been worse lately  encouraged more chair yoga  will refer to Dr Galen Hughes for possible injections  tylenol does help significantly w/pain        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/16/2022     Priority: Medium     Overview Note:     more sifnificant on the right  refer for possible injections        Type 2 diabetes mellitus 05/23/2022     Priority: Medium    Chronic renal disease, stage III [facility]) [326001] 05/23/2022     Priority: Medium    Nonrheumatic mitral valve stenosis 05/09/2023     Overview Note:     Borderline        Pulmonary nodules 09/20/2021    Statin intolerance 09/20/2021    Acute cerebrovascular accident (CVA) ([address]) 09/16/2021    Dementia ([address])     Acquired hypothyroidism 11/20/2019    Hyperlipidemia 11/15/2019    Disorder of thyroid gland 11/15/2019    Carotid stenosis, right 11/15/2019    Onychomycosis 06/14/2019    Difficulty walking 06/14/2019    Malignant neoplasm of abdomen ([address]) 06/14/2016    Hypertension     PUD (peptic ulcer disease)        Social History     Socioeconomic History    Marital status:      Spouse name: None    Number of children: None    Years of education: None    Highest education level: None   Occupational History    Occupation: retired    Tobacco Use    Smoking status: Former     Packs/day: 1.00     Years: 18.00     Pack years: 18.00     Types: Cigarettes     Quit date: 1969     Years since quitting:

## 2023-09-13 ENCOUNTER — OFFICE VISIT (OUTPATIENT)
Dept: FAMILY MEDICINE CLINIC | Age: 88
End: 2023-09-13
Payer: MEDICARE

## 2023-09-13 VITALS
OXYGEN SATURATION: 96 % | SYSTOLIC BLOOD PRESSURE: 110 MMHG | HEART RATE: 72 BPM | WEIGHT: 143.2 LBS | TEMPERATURE: 98.2 F | HEIGHT: 66 IN | DIASTOLIC BLOOD PRESSURE: 64 MMHG | BODY MASS INDEX: 23.01 KG/M2

## 2023-09-13 DIAGNOSIS — N18.31 TYPE 2 DIABETES MELLITUS WITH STAGE 3A CHRONIC KIDNEY DISEASE, WITHOUT LONG-TERM CURRENT USE OF INSULIN (HCC): ICD-10-CM

## 2023-09-13 DIAGNOSIS — G31.84 MILD COGNITIVE IMPAIRMENT: ICD-10-CM

## 2023-09-13 DIAGNOSIS — I10 PRIMARY HYPERTENSION: Primary | ICD-10-CM

## 2023-09-13 DIAGNOSIS — E11.22 TYPE 2 DIABETES MELLITUS WITH STAGE 3A CHRONIC KIDNEY DISEASE, WITHOUT LONG-TERM CURRENT USE OF INSULIN (HCC): ICD-10-CM

## 2023-09-13 DIAGNOSIS — E07.9 THYROID DYSFUNCTION: ICD-10-CM

## 2023-09-13 PROCEDURE — 3044F HG A1C LEVEL LT 7.0%: CPT | Performed by: FAMILY MEDICINE

## 2023-09-13 PROCEDURE — 99214 OFFICE O/P EST MOD 30 MIN: CPT | Performed by: FAMILY MEDICINE

## 2023-09-13 PROCEDURE — 1123F ACP DISCUSS/DSCN MKR DOCD: CPT | Performed by: FAMILY MEDICINE

## 2023-09-13 ASSESSMENT — ENCOUNTER SYMPTOMS
SINUS PAIN: 0
COUGH: 0
CHEST TIGHTNESS: 0
SHORTNESS OF BREATH: 0
SORE THROAT: 0
BACK PAIN: 1
NAUSEA: 0
ABDOMINAL PAIN: 0
VOMITING: 0
TROUBLE SWALLOWING: 0
CONSTIPATION: 0
EYE PAIN: 0
WHEEZING: 0
DIARRHEA: 0

## 2023-09-13 NOTE — PROGRESS NOTES
long-term current use of insulin (HCC)  Comments:  watching diet  diet controlled right now'  would benefit from jardiance  limit nsaids  Orders:  -     Hemoglobin A1C; Future    Mild cognitive impairment  Comments:  some memory loss  no more gabapentin it made it much owrse        I independently reviewed and updated the chief complaint, HPI, past medical and surgical history, medications, allergies and ROS as entered by the LPN. Seen by:   Elizabeth Perrin DO

## 2023-09-18 ENCOUNTER — OFFICE VISIT (OUTPATIENT)
Dept: FAMILY MEDICINE CLINIC | Age: 88
End: 2023-09-18
Payer: MEDICARE

## 2023-09-18 VITALS
OXYGEN SATURATION: 97 % | HEIGHT: 66 IN | HEART RATE: 78 BPM | TEMPERATURE: 96.8 F | DIASTOLIC BLOOD PRESSURE: 76 MMHG | SYSTOLIC BLOOD PRESSURE: 126 MMHG | RESPIRATION RATE: 15 BRPM | WEIGHT: 142 LBS | BODY MASS INDEX: 22.82 KG/M2

## 2023-09-18 DIAGNOSIS — T63.441A BEE STING REACTION, ACCIDENTAL OR UNINTENTIONAL, INITIAL ENCOUNTER: Primary | ICD-10-CM

## 2023-09-18 PROCEDURE — 99213 OFFICE O/P EST LOW 20 MIN: CPT | Performed by: FAMILY MEDICINE

## 2023-09-18 PROCEDURE — 1123F ACP DISCUSS/DSCN MKR DOCD: CPT | Performed by: FAMILY MEDICINE

## 2023-09-18 RX ORDER — METHYLPREDNISOLONE 4 MG/1
TABLET ORAL
Qty: 1 KIT | Refills: 0 | Status: SHIPPED | OUTPATIENT
Start: 2023-09-18 | End: 2023-09-24

## 2023-11-24 DIAGNOSIS — E03.9 ACQUIRED HYPOTHYROIDISM: ICD-10-CM

## 2023-11-24 DIAGNOSIS — N18.31 TYPE 2 DIABETES MELLITUS WITH STAGE 3A CHRONIC KIDNEY DISEASE, WITHOUT LONG-TERM CURRENT USE OF INSULIN (HCC): ICD-10-CM

## 2023-11-24 DIAGNOSIS — I10 PRIMARY HYPERTENSION: ICD-10-CM

## 2023-11-24 DIAGNOSIS — E11.22 TYPE 2 DIABETES MELLITUS WITH STAGE 3A CHRONIC KIDNEY DISEASE, WITHOUT LONG-TERM CURRENT USE OF INSULIN (HCC): ICD-10-CM

## 2023-11-24 DIAGNOSIS — E07.9 THYROID DYSFUNCTION: ICD-10-CM

## 2023-11-24 LAB
ABSOLUTE IMMATURE GRANULOCYTE: <0.03 K/UL (ref 0–0.58)
ALBUMIN SERPL-MCNC: 4.4 G/DL (ref 3.5–5.2)
ALP BLD-CCNC: 120 U/L (ref 35–104)
ALT SERPL-CCNC: 9 U/L (ref 0–32)
ANION GAP SERPL CALCULATED.3IONS-SCNC: 15 MMOL/L (ref 7–16)
AST SERPL-CCNC: 19 U/L (ref 0–31)
BASOPHILS ABSOLUTE: 0.08 K/UL (ref 0–0.2)
BASOPHILS RELATIVE PERCENT: 1 % (ref 0–2)
BILIRUB SERPL-MCNC: 0.3 MG/DL (ref 0–1.2)
BUN BLDV-MCNC: 20 MG/DL (ref 6–23)
CALCIUM SERPL-MCNC: 10.1 MG/DL (ref 8.6–10.2)
CHLORIDE BLD-SCNC: 105 MMOL/L (ref 98–107)
CO2: 23 MMOL/L (ref 22–29)
CREAT SERPL-MCNC: 1 MG/DL (ref 0.5–1)
CREATININE URINE: 122.3 MG/DL (ref 29–226)
EOSINOPHILS ABSOLUTE: 0.16 K/UL (ref 0.05–0.5)
EOSINOPHILS RELATIVE PERCENT: 2 % (ref 0–6)
GFR SERPL CREATININE-BSD FRML MDRD: 56 ML/MIN/1.73M2
GLUCOSE BLD-MCNC: 98 MG/DL (ref 74–99)
HCT VFR BLD CALC: 47.7 % (ref 34–48)
HEMOGLOBIN: 15.1 G/DL (ref 11.5–15.5)
IMMATURE GRANULOCYTES: 0 % (ref 0–5)
LYMPHOCYTES ABSOLUTE: 1.78 K/UL (ref 1.5–4)
LYMPHOCYTES RELATIVE PERCENT: 24 % (ref 20–42)
MCH RBC QN AUTO: 30.5 PG (ref 26–35)
MCHC RBC AUTO-ENTMCNC: 31.7 G/DL (ref 32–34.5)
MCV RBC AUTO: 96.4 FL (ref 80–99.9)
MICROALBUMIN/CREAT 24H UR: 14 MG/L (ref 0–19)
MICROALBUMIN/CREAT UR-RTO: 12 MCG/MG CREAT (ref 0–30)
MONOCYTES ABSOLUTE: 0.65 K/UL (ref 0.1–0.95)
MONOCYTES RELATIVE PERCENT: 9 % (ref 2–12)
NEUTROPHILS ABSOLUTE: 4.66 K/UL (ref 1.8–7.3)
NEUTROPHILS RELATIVE PERCENT: 63 % (ref 43–80)
PDW BLD-RTO: 13.2 % (ref 11.5–15)
PLATELET # BLD: 219 K/UL (ref 130–450)
PMV BLD AUTO: 12.3 FL (ref 7–12)
POTASSIUM SERPL-SCNC: 4.8 MMOL/L (ref 3.5–5)
RBC # BLD: 4.95 M/UL (ref 3.5–5.5)
SODIUM BLD-SCNC: 143 MMOL/L (ref 132–146)
T4 FREE: 1.1 NG/DL (ref 0.9–1.7)
TOTAL PROTEIN: 7 G/DL (ref 6.4–8.3)
TSH SERPL DL<=0.05 MIU/L-ACNC: 4.74 UIU/ML (ref 0.27–4.2)
WBC # BLD: 7.4 K/UL (ref 4.5–11.5)

## 2023-11-25 LAB — HBA1C MFR BLD: 6.5 % (ref 4–5.6)

## 2023-12-05 ENCOUNTER — OFFICE VISIT (OUTPATIENT)
Dept: FAMILY MEDICINE CLINIC | Age: 88
End: 2023-12-05
Payer: MEDICARE

## 2023-12-05 VITALS
DIASTOLIC BLOOD PRESSURE: 80 MMHG | BODY MASS INDEX: 22.82 KG/M2 | OXYGEN SATURATION: 94 % | SYSTOLIC BLOOD PRESSURE: 128 MMHG | TEMPERATURE: 98.2 F | HEART RATE: 72 BPM | WEIGHT: 142 LBS | HEIGHT: 66 IN

## 2023-12-05 VITALS
HEIGHT: 66 IN | SYSTOLIC BLOOD PRESSURE: 128 MMHG | HEART RATE: 72 BPM | DIASTOLIC BLOOD PRESSURE: 80 MMHG | TEMPERATURE: 98.2 F | BODY MASS INDEX: 22.82 KG/M2 | WEIGHT: 142 LBS | OXYGEN SATURATION: 94 %

## 2023-12-05 DIAGNOSIS — G31.84 MILD COGNITIVE IMPAIRMENT WITH MEMORY LOSS: ICD-10-CM

## 2023-12-05 DIAGNOSIS — B02.9 HERPES ZOSTER WITHOUT COMPLICATION: Primary | ICD-10-CM

## 2023-12-05 DIAGNOSIS — Z00.00 MEDICARE ANNUAL WELLNESS VISIT, SUBSEQUENT: Primary | ICD-10-CM

## 2023-12-05 DIAGNOSIS — I10 PRIMARY HYPERTENSION: ICD-10-CM

## 2023-12-05 DIAGNOSIS — Z91.81 AT HIGH RISK FOR FALLS: ICD-10-CM

## 2023-12-05 DIAGNOSIS — N18.31 TYPE 2 DIABETES MELLITUS WITH STAGE 3A CHRONIC KIDNEY DISEASE, WITHOUT LONG-TERM CURRENT USE OF INSULIN (HCC): ICD-10-CM

## 2023-12-05 DIAGNOSIS — E11.22 TYPE 2 DIABETES MELLITUS WITH STAGE 3A CHRONIC KIDNEY DISEASE, WITHOUT LONG-TERM CURRENT USE OF INSULIN (HCC): ICD-10-CM

## 2023-12-05 PROCEDURE — 99214 OFFICE O/P EST MOD 30 MIN: CPT | Performed by: FAMILY MEDICINE

## 2023-12-05 PROCEDURE — 1123F ACP DISCUSS/DSCN MKR DOCD: CPT | Performed by: FAMILY MEDICINE

## 2023-12-05 PROCEDURE — G0439 PPPS, SUBSEQ VISIT: HCPCS | Performed by: FAMILY MEDICINE

## 2023-12-05 PROCEDURE — 3044F HG A1C LEVEL LT 7.0%: CPT | Performed by: FAMILY MEDICINE

## 2023-12-05 RX ORDER — VALACYCLOVIR HYDROCHLORIDE 1 G/1
1000 TABLET, FILM COATED ORAL 2 TIMES DAILY
Qty: 20 TABLET | Refills: 0 | Status: SHIPPED | OUTPATIENT
Start: 2023-12-05 | End: 2023-12-15

## 2023-12-05 ASSESSMENT — ENCOUNTER SYMPTOMS
VOMITING: 0
COUGH: 0
SHORTNESS OF BREATH: 0
EYE PAIN: 0
CHEST TIGHTNESS: 0
SORE THROAT: 0
WHEEZING: 0
SINUS PAIN: 0
BACK PAIN: 0
TROUBLE SWALLOWING: 0
CONSTIPATION: 0
NAUSEA: 0
ABDOMINAL PAIN: 0
DIARRHEA: 0

## 2023-12-05 ASSESSMENT — PATIENT HEALTH QUESTIONNAIRE - PHQ9
2. FEELING DOWN, DEPRESSED OR HOPELESS: 0
SUM OF ALL RESPONSES TO PHQ QUESTIONS 1-9: 0
1. LITTLE INTEREST OR PLEASURE IN DOING THINGS: 0
SUM OF ALL RESPONSES TO PHQ9 QUESTIONS 1 & 2: 0
SUM OF ALL RESPONSES TO PHQ QUESTIONS 1-9: 0

## 2023-12-15 ENCOUNTER — OFFICE VISIT (OUTPATIENT)
Dept: FAMILY MEDICINE CLINIC | Age: 88
End: 2023-12-15

## 2023-12-15 VITALS
SYSTOLIC BLOOD PRESSURE: 124 MMHG | DIASTOLIC BLOOD PRESSURE: 78 MMHG | BODY MASS INDEX: 23.11 KG/M2 | TEMPERATURE: 97.7 F | HEIGHT: 66 IN | WEIGHT: 143.8 LBS | HEART RATE: 73 BPM | RESPIRATION RATE: 18 BRPM | OXYGEN SATURATION: 96 %

## 2023-12-15 DIAGNOSIS — R25.2 BILATERAL LEG CRAMPS: Primary | ICD-10-CM

## 2023-12-15 NOTE — PROGRESS NOTES
Chief Complaint       Leg Pain      History of Present Illness   Source of history provided by: patient. Sayra Layton is a 80 y.o. old female presenting to the walk in clinic for evaluation of bilateral leg pain which has been present for the past 10 days. Patient was seen by her PCP on 12/5/2023 and diagnosed with shingles. She was placed on a 10-day course of acyclovir. Patient reports that ever since she started this medication, she started to develop leg cramps. Denies any known injury to either leg or ankle. Denies any swelling or bruising. Pain is sometimes exacerbated by ambulation. Denies any weakness, paresthesias, calf pain/edema, foot/ankle pain, hip pain, back pain, abrasions, fever, chills, rash, or any other symptoms. ROS    Unless otherwise stated in this report or unable to obtain because of the patient's clinical or mental status as evidenced by the medical record, this patients's positive and negative responses for Review of Systems, constitutional, psych, eyes, ENT, cardiovascular, respiratory, gastrointestinal, neurological, genitourinary, musculoskeletal, integument systems and systems related to the presenting problem are either stated in the preceding or were not pertinent or were negative for the symptoms and/or complaints related to the medical problem.delbert. Past Medical History:  has a past medical history of Dementia ([address]), Diverticulosis, GIST (gastrointestinal stroma tumor), malignant, colon ([address]), Hyperlipidemia, Hypertension, MI (myocardial infarction) ([address]), Mitral regurgitation, and PUD (peptic ulcer disease). Past Surgical History:  has a past surgical history that includes Hysterectomy; Upper gastrointestinal endoscopy (12/2014); Colonoscopy (12/2014); Upper gastrointestinal endoscopy (5/25/2016); shoulder surgery (Left); Endoscopy, colon, diagnostic (09/02/2016); Dilatation, esophagus; other surgical history (09/09/2016);  Carotid endarterectomy (Right,

## 2024-01-22 ENCOUNTER — TELEPHONE (OUTPATIENT)
Dept: FAMILY MEDICINE CLINIC | Age: 89
End: 2024-01-22

## 2024-01-22 NOTE — TELEPHONE ENCOUNTER
Piedad calling for an ER follow up after being taken by Ambulance to  a few days ago.     She was feeling very weak and having trouble walking.     Please advise when you can see her?

## 2024-01-31 ENCOUNTER — OFFICE VISIT (OUTPATIENT)
Dept: FAMILY MEDICINE CLINIC | Age: 89
End: 2024-01-31
Payer: MEDICARE

## 2024-01-31 VITALS
DIASTOLIC BLOOD PRESSURE: 82 MMHG | SYSTOLIC BLOOD PRESSURE: 132 MMHG | TEMPERATURE: 97.4 F | HEART RATE: 74 BPM | HEIGHT: 66 IN | WEIGHT: 140 LBS | BODY MASS INDEX: 22.5 KG/M2 | OXYGEN SATURATION: 97 %

## 2024-01-31 DIAGNOSIS — C49.A2 MALIGNANT GASTROINTESTINAL STROMAL TUMOR (GIST) OF STOMACH (HCC): ICD-10-CM

## 2024-01-31 DIAGNOSIS — F01.50 VASCULAR DEMENTIA WITHOUT BEHAVIORAL DISTURBANCE (HCC): Primary | ICD-10-CM

## 2024-01-31 DIAGNOSIS — E83.51 HYPOCALCEMIA: ICD-10-CM

## 2024-01-31 DIAGNOSIS — R20.2 NUMBNESS AND TINGLING: ICD-10-CM

## 2024-01-31 DIAGNOSIS — N18.31 TYPE 2 DIABETES MELLITUS WITH STAGE 3A CHRONIC KIDNEY DISEASE, WITHOUT LONG-TERM CURRENT USE OF INSULIN (HCC): ICD-10-CM

## 2024-01-31 DIAGNOSIS — R20.0 NUMBNESS AND TINGLING: ICD-10-CM

## 2024-01-31 DIAGNOSIS — E11.22 TYPE 2 DIABETES MELLITUS WITH STAGE 3A CHRONIC KIDNEY DISEASE, WITHOUT LONG-TERM CURRENT USE OF INSULIN (HCC): ICD-10-CM

## 2024-01-31 DIAGNOSIS — I10 PRIMARY HYPERTENSION: ICD-10-CM

## 2024-01-31 DIAGNOSIS — E07.9 THYROID DYSFUNCTION: ICD-10-CM

## 2024-01-31 DIAGNOSIS — R26.81 GAIT INSTABILITY: ICD-10-CM

## 2024-01-31 LAB
ABSOLUTE IMMATURE GRANULOCYTE: <0.03 K/UL (ref 0–0.58)
ALBUMIN SERPL-MCNC: 4.5 G/DL (ref 3.5–5.2)
ALP BLD-CCNC: 111 U/L (ref 35–104)
ALT SERPL-CCNC: 9 U/L (ref 0–32)
ANION GAP SERPL CALCULATED.3IONS-SCNC: 20 MMOL/L (ref 7–16)
AST SERPL-CCNC: 20 U/L (ref 0–31)
BASOPHILS ABSOLUTE: 0.07 K/UL (ref 0–0.2)
BASOPHILS RELATIVE PERCENT: 1 % (ref 0–2)
BILIRUB SERPL-MCNC: 0.3 MG/DL (ref 0–1.2)
BUN BLDV-MCNC: 22 MG/DL (ref 6–23)
CALCIUM SERPL-MCNC: 10.1 MG/DL (ref 8.6–10.2)
CHLORIDE BLD-SCNC: 103 MMOL/L (ref 98–107)
CO2: 18 MMOL/L (ref 22–29)
CREAT SERPL-MCNC: 0.9 MG/DL (ref 0.5–1)
EOSINOPHILS ABSOLUTE: 0.09 K/UL (ref 0.05–0.5)
EOSINOPHILS RELATIVE PERCENT: 1 % (ref 0–6)
FOLATE: 10.8 NG/ML (ref 4.8–24.2)
GFR SERPL CREATININE-BSD FRML MDRD: 59 ML/MIN/1.73M2
GLUCOSE BLD-MCNC: 80 MG/DL (ref 74–99)
HCT VFR BLD CALC: 48.8 % (ref 34–48)
HEMOGLOBIN: 15.4 G/DL (ref 11.5–15.5)
IMMATURE GRANULOCYTES: 0 % (ref 0–5)
LYMPHOCYTES ABSOLUTE: 1.72 K/UL (ref 1.5–4)
LYMPHOCYTES RELATIVE PERCENT: 27 % (ref 20–42)
MCH RBC QN AUTO: 30.7 PG (ref 26–35)
MCHC RBC AUTO-ENTMCNC: 31.6 G/DL (ref 32–34.5)
MCV RBC AUTO: 97.2 FL (ref 80–99.9)
MONOCYTES ABSOLUTE: 0.54 K/UL (ref 0.1–0.95)
MONOCYTES RELATIVE PERCENT: 9 % (ref 2–12)
NEUTROPHILS ABSOLUTE: 3.88 K/UL (ref 1.8–7.3)
NEUTROPHILS RELATIVE PERCENT: 62 % (ref 43–80)
PDW BLD-RTO: 13.2 % (ref 11.5–15)
PLATELET, FLUORESCENCE: 176 K/UL (ref 130–450)
PMV BLD AUTO: 13.2 FL (ref 7–12)
POTASSIUM SERPL-SCNC: 4.2 MMOL/L (ref 3.5–5)
RBC # BLD: 5.02 M/UL (ref 3.5–5.5)
SODIUM BLD-SCNC: 141 MMOL/L (ref 132–146)
T4 FREE: 1.3 NG/DL (ref 0.9–1.7)
TOTAL PROTEIN: 7.3 G/DL (ref 6.4–8.3)
TSH SERPL DL<=0.05 MIU/L-ACNC: 5.08 UIU/ML (ref 0.27–4.2)
VITAMIN B-12: 327 PG/ML (ref 211–946)
VITAMIN D 25-HYDROXY: 25.2 NG/ML (ref 30–100)
WBC # BLD: 6.3 K/UL (ref 4.5–11.5)

## 2024-01-31 PROCEDURE — 99214 OFFICE O/P EST MOD 30 MIN: CPT | Performed by: FAMILY MEDICINE

## 2024-01-31 PROCEDURE — G2211 COMPLEX E/M VISIT ADD ON: HCPCS | Performed by: FAMILY MEDICINE

## 2024-01-31 PROCEDURE — 1123F ACP DISCUSS/DSCN MKR DOCD: CPT | Performed by: FAMILY MEDICINE

## 2024-01-31 RX ORDER — MEMANTINE HYDROCHLORIDE 5 MG/1
5 TABLET ORAL 2 TIMES DAILY
Qty: 60 TABLET | Refills: 2 | Status: SHIPPED | OUTPATIENT
Start: 2024-01-31

## 2024-01-31 RX ORDER — AMLODIPINE BESYLATE 5 MG/1
5 TABLET ORAL
COMMUNITY
Start: 2024-01-17

## 2024-01-31 ASSESSMENT — PATIENT HEALTH QUESTIONNAIRE - PHQ9
SUM OF ALL RESPONSES TO PHQ9 QUESTIONS 1 & 2: 0
SUM OF ALL RESPONSES TO PHQ QUESTIONS 1-9: 0
2. FEELING DOWN, DEPRESSED OR HOPELESS: 0
SUM OF ALL RESPONSES TO PHQ QUESTIONS 1-9: 0
SUM OF ALL RESPONSES TO PHQ QUESTIONS 1-9: 0
1. LITTLE INTEREST OR PLEASURE IN DOING THINGS: 0
SUM OF ALL RESPONSES TO PHQ QUESTIONS 1-9: 0

## 2024-01-31 ASSESSMENT — ENCOUNTER SYMPTOMS
SINUS PAIN: 0
SORE THROAT: 0
ABDOMINAL PAIN: 0
NAUSEA: 0
WHEEZING: 0
COUGH: 0
BACK PAIN: 0
VOMITING: 0
CONSTIPATION: 0
CHEST TIGHTNESS: 0
DIARRHEA: 0
EYE PAIN: 0
TROUBLE SWALLOWING: 0
SHORTNESS OF BREATH: 0

## 2024-01-31 NOTE — PROGRESS NOTES
24    Name: Piedad Kinsey  :1934   Sex:female   Age:89 y.o.    Chief Complaint   Patient presents with    Follow-Up from Hospital     Patient presents to office for ER follow up. Patient has been to ER twice first on 24 and then again on 24. Patient says she feels weak. She feels as if she does not have clear thoughts. She is having a hard time finding the words she wants to say. Patient says she forgets people's names. Patient says she usually does not forget where she was going when she leaves the house. It is not clear what dose of Amlodipine she is taking. Patient says her hands and feet are numb. She feels good if she takes Advil. Patient's  is with her. He says patient seems more like herself after taking an advil. She is using a cane to ambulate.     Here for recheck after being in the ED twice  The first visit was on   [place] weak, numbness in hands and feet and confused  She was diagnosed with uti and treated  She says she took all of the antibiotics  She was not eating or drinking very much at all  and she recalled'    She is drinking a little more since then but not eating well'  It does not appear that either of them are cooking  She really does need to eat better, drink more fluids'    Then went back to the ED last week with just weakness  Labs were all good'  But still some numbness in hands and feet    Will get more labs due to her poor diet, lynn vitamin levels  Thyroid labs    Also she is becoming more forgetful, more confused about things, forgetting peoples names and faces  Forgetting to do things around the home'  She is still driving though and feels she is ok to do that even thought she has been more confused.  She was also not taking her bp meds for a while before the first hospital appt despite having planty of refills from the cardiologist    We discussed her memory, she has vasclar dementia/cognitiveimpairment  It is getting worse  We discussed

## 2024-02-01 RX ORDER — LEVOTHYROXINE SODIUM 0.03 MG/1
25 TABLET ORAL DAILY
Qty: 90 TABLET | Refills: 0 | Status: SHIPPED | OUTPATIENT
Start: 2024-02-01

## 2024-02-16 ENCOUNTER — TELEPHONE (OUTPATIENT)
Dept: FAMILY MEDICINE CLINIC | Age: 89
End: 2024-02-16

## 2024-02-16 NOTE — TELEPHONE ENCOUNTER
Piedad calling about the Memantine.  Since starting this medication, she is having trouble thinking & concentrating.

## 2024-02-16 NOTE — TELEPHONE ENCOUNTER
I did not see Carrol's last message and called the patient to let her know you said she could stop the medication. Patient added that when she was taking 5mg bid she had issues concentrating just in the morning. She started cutting her AM dose in half a few days ago and has not had an issue since. She would like to know if she can continue taking 2.5mg qam and 5mg qhs

## 2024-02-21 ENCOUNTER — OFFICE VISIT (OUTPATIENT)
Dept: FAMILY MEDICINE CLINIC | Age: 89
End: 2024-02-21
Payer: MEDICARE

## 2024-02-21 VITALS
SYSTOLIC BLOOD PRESSURE: 138 MMHG | HEIGHT: 66 IN | OXYGEN SATURATION: 97 % | WEIGHT: 140.8 LBS | DIASTOLIC BLOOD PRESSURE: 88 MMHG | TEMPERATURE: 97.9 F | HEART RATE: 72 BPM | BODY MASS INDEX: 22.63 KG/M2

## 2024-02-21 DIAGNOSIS — T88.7XXA MEDICATION SIDE EFFECT: Primary | ICD-10-CM

## 2024-02-21 DIAGNOSIS — F01.50 VASCULAR DEMENTIA WITHOUT BEHAVIORAL DISTURBANCE (HCC): ICD-10-CM

## 2024-02-21 DIAGNOSIS — R30.0 DYSURIA: ICD-10-CM

## 2024-02-21 DIAGNOSIS — E03.9 ACQUIRED HYPOTHYROIDISM: ICD-10-CM

## 2024-02-21 DIAGNOSIS — I10 PRIMARY HYPERTENSION: ICD-10-CM

## 2024-02-21 LAB
BILIRUBIN, POC: NORMAL
BLOOD URINE, POC: NORMAL
CLARITY, POC: CLEAR
COLOR, POC: YELLOW
GLUCOSE URINE, POC: NORMAL
KETONES, POC: NORMAL
LEUKOCYTE EST, POC: NORMAL
NITRITE, POC: NORMAL
PH, POC: 7
PROTEIN, POC: NORMAL
SPECIFIC GRAVITY, POC: 1.01
UROBILINOGEN, POC: 0.2

## 2024-02-21 PROCEDURE — 1123F ACP DISCUSS/DSCN MKR DOCD: CPT | Performed by: FAMILY MEDICINE

## 2024-02-21 PROCEDURE — 99214 OFFICE O/P EST MOD 30 MIN: CPT | Performed by: FAMILY MEDICINE

## 2024-02-21 PROCEDURE — 81002 URINALYSIS NONAUTO W/O SCOPE: CPT | Performed by: FAMILY MEDICINE

## 2024-02-21 RX ORDER — LANOLIN ALCOHOL/MO/W.PET/CERES
1000 CREAM (GRAM) TOPICAL 2 TIMES DAILY
COMMUNITY

## 2024-02-21 ASSESSMENT — ENCOUNTER SYMPTOMS
NAUSEA: 0
WHEEZING: 0
TROUBLE SWALLOWING: 0
SHORTNESS OF BREATH: 0
BACK PAIN: 0
ABDOMINAL PAIN: 0
VOMITING: 0
SORE THROAT: 0
SINUS PAIN: 0
DIARRHEA: 0
CONSTIPATION: 0
EYE PAIN: 0
COUGH: 0
CHEST TIGHTNESS: 0

## 2024-02-21 NOTE — PROGRESS NOTES
24    Name: Piedad Kinsey  :1934   Sex:female   Age:89 y.o.    Chief Complaint   Patient presents with    foggy    Numbness     Patient presents to office for visit. This nurse carried patient's cane so she would not trip. Patient has bag of medications with her. Patient says that since starting the memantine and levothyroxine she has not felt right. She wakes up and feels foggy and her hands are numb. She is dizzy. Patient is concerned that she is having a stroke. On her medication list that she has with her, it says she is taking 2 amlodipine in the am, but she is only taking one. Patient stopped memantine completely, but her symptoms have not improved. Patient is concerned she may have had a stroke. She says the left side of her face, under her eye feels weird. Patient does not remember speaking to this nurse Friday on the phone. She does not remember the conversation that we had. She denies falls, but feels as if she could easily fall.     Patient here with new complaints memory has been foggy yesterday and last week wed and Thursday were bad days  Talked to Genet Friday and she does not remember that    She was here 3 weeks ago and a few days after that she started the memantidine and levothyroxine at the same time  She is not sure what is causing or symtptoms or if they even are, she has a history of vascular dementia  She has been foggy off and on and for the last week-she thinks - she is waking up in the morning with hands numb and trouble walking with feet numbness, it is bilateral, one is not owrse then the other  Facial features appear symmetric right now    Her s/s may certainly be from the memantidine  She does not tolerate some medications well at all  Will have her stop this completely   Also switch the levothyroxine to night time with just a sip of water  Continue the amlodipine in the morning    She needs to stay hydrated  And no skipping meals    Previous imaging are in there for

## 2024-02-23 LAB
CULTURE: NORMAL
CULTURE: NORMAL
SPECIMEN DESCRIPTION: NORMAL

## 2024-02-28 ENCOUNTER — OFFICE VISIT (OUTPATIENT)
Dept: FAMILY MEDICINE CLINIC | Age: 89
End: 2024-02-28
Payer: MEDICARE

## 2024-02-28 VITALS
WEIGHT: 140 LBS | HEIGHT: 66 IN | HEART RATE: 72 BPM | SYSTOLIC BLOOD PRESSURE: 130 MMHG | DIASTOLIC BLOOD PRESSURE: 72 MMHG | TEMPERATURE: 97.9 F | OXYGEN SATURATION: 95 % | BODY MASS INDEX: 22.5 KG/M2

## 2024-02-28 VITALS
WEIGHT: 139.99 LBS | HEART RATE: 72 BPM | DIASTOLIC BLOOD PRESSURE: 72 MMHG | OXYGEN SATURATION: 95 % | HEIGHT: 66 IN | BODY MASS INDEX: 22.5 KG/M2 | TEMPERATURE: 97.9 F | SYSTOLIC BLOOD PRESSURE: 130 MMHG

## 2024-02-28 DIAGNOSIS — I10 PRIMARY HYPERTENSION: ICD-10-CM

## 2024-02-28 DIAGNOSIS — E03.9 ACQUIRED HYPOTHYROIDISM: Primary | ICD-10-CM

## 2024-02-28 DIAGNOSIS — R26.81 GAIT INSTABILITY: ICD-10-CM

## 2024-02-28 DIAGNOSIS — G31.84 MILD COGNITIVE IMPAIRMENT WITH MEMORY LOSS: ICD-10-CM

## 2024-02-28 DIAGNOSIS — T88.7XXA MEDICATION SIDE EFFECT: ICD-10-CM

## 2024-02-28 DIAGNOSIS — Z00.00 MEDICARE ANNUAL WELLNESS VISIT, SUBSEQUENT: Primary | ICD-10-CM

## 2024-02-28 PROCEDURE — 99214 OFFICE O/P EST MOD 30 MIN: CPT | Performed by: FAMILY MEDICINE

## 2024-02-28 PROCEDURE — 1123F ACP DISCUSS/DSCN MKR DOCD: CPT | Performed by: FAMILY MEDICINE

## 2024-02-28 PROCEDURE — G0439 PPPS, SUBSEQ VISIT: HCPCS | Performed by: FAMILY MEDICINE

## 2024-02-28 SDOH — ECONOMIC STABILITY: HOUSING INSECURITY
IN THE LAST 12 MONTHS, WAS THERE A TIME WHEN YOU DID NOT HAVE A STEADY PLACE TO SLEEP OR SLEPT IN A SHELTER (INCLUDING NOW)?: PATIENT DECLINED

## 2024-02-28 SDOH — ECONOMIC STABILITY: FOOD INSECURITY: WITHIN THE PAST 12 MONTHS, THE FOOD YOU BOUGHT JUST DIDN'T LAST AND YOU DIDN'T HAVE MONEY TO GET MORE.: PATIENT DECLINED

## 2024-02-28 SDOH — ECONOMIC STABILITY: FOOD INSECURITY: WITHIN THE PAST 12 MONTHS, YOU WORRIED THAT YOUR FOOD WOULD RUN OUT BEFORE YOU GOT MONEY TO BUY MORE.: PATIENT DECLINED

## 2024-02-28 SDOH — ECONOMIC STABILITY: INCOME INSECURITY: HOW HARD IS IT FOR YOU TO PAY FOR THE VERY BASICS LIKE FOOD, HOUSING, MEDICAL CARE, AND HEATING?: PATIENT DECLINED

## 2024-02-28 ASSESSMENT — ENCOUNTER SYMPTOMS
WHEEZING: 0
CHEST TIGHTNESS: 0
TROUBLE SWALLOWING: 0
CONSTIPATION: 0
SORE THROAT: 0
SINUS PAIN: 0
SHORTNESS OF BREATH: 0
ABDOMINAL PAIN: 0
EYE PAIN: 0
VOMITING: 0
BACK PAIN: 0
COUGH: 0
NAUSEA: 0
DIARRHEA: 0

## 2024-02-28 ASSESSMENT — PATIENT HEALTH QUESTIONNAIRE - PHQ9
SUM OF ALL RESPONSES TO PHQ QUESTIONS 1-9: 0
SUM OF ALL RESPONSES TO PHQ9 QUESTIONS 1 & 2: 0
SUM OF ALL RESPONSES TO PHQ QUESTIONS 1-9: 0
2. FEELING DOWN, DEPRESSED OR HOPELESS: 0
1. LITTLE INTEREST OR PLEASURE IN DOING THINGS: 0

## 2024-02-28 ASSESSMENT — LIFESTYLE VARIABLES
HOW OFTEN DO YOU HAVE A DRINK CONTAINING ALCOHOL: 2-3 TIMES A WEEK
HOW MANY STANDARD DRINKS CONTAINING ALCOHOL DO YOU HAVE ON A TYPICAL DAY: 1 OR 2

## 2024-02-28 NOTE — PROGRESS NOTES
Medicare Annual Wellness Visit    Piedad Kinsey is here for Medicare AWV    Assessment & Plan   Medicare annual wellness visit, subsequent    Recommendations for Preventive Services Due: see orders and patient instructions/AVS.  Recommended screening schedule for the next 5-10 years is provided to the patient in written form: see Patient Instructions/AVS.     Return for Medicare Annual Wellness Visit in 1 year.     Subjective   The following acute and/or chronic problems were also addressed today:  Screenings, vaccine and advance care plans    Patient's complete Health Risk Assessment and screening values have been reviewed and are found in Flowsheets. The following problems were reviewed today and where indicated follow up appointments were made and/or referrals ordered.    Positive Risk Factor Screenings with Interventions:    Fall Risk:  Do you feel unsteady or are you worried about falling? : (!) yes  2 or more falls in past year?: (!) yes  Fall with injury in past year?: (!) yes     Interventions:    Reviewed medications, home hazards, visual acuity, and co-morbidities that can increase risk for falls             Activity, Diet, and Weight:  On average, how many days per week do you engage in moderate to strenuous exercise (like a brisk walk)?: 3 days  On average, how many minutes do you engage in exercise at this level?: 30 min    Do you eat balanced/healthy meals regularly?: (!) No    Body mass index is 22.6 kg/m².    Do you eat balanced/healthy meals regularly Interventions:  Patient declines any further evaluation or treatment                           Objective   Vitals:    02/28/24 1136   BP: 130/72   Pulse: 72   Temp: 97.9 °F (36.6 °C)   SpO2: 95%   Weight: 63.5 kg (139 lb 15.9 oz)   Height: 1.676 m (5' 6\")      Body mass index is 22.6 kg/m².      General Appearance: alert and oriented to person, place and time, well developed and well- nourished, in no acute distress  Skin: warm and dry, no rash or

## 2024-02-28 NOTE — PROGRESS NOTES
better  fogginess is gone, still some short term memory issues    Gait instability  Comments:  [facility] [place] and she is doing really well  she is essentially one on one        I independently reviewed and updated the chief complaint, HPI, past medical and surgical history, medications, allergies and ROS as entered by the LPN.      Seen by:  Becca Macario, DO

## 2024-03-12 ENCOUNTER — OFFICE VISIT (OUTPATIENT)
Dept: FAMILY MEDICINE CLINIC | Age: 89
End: 2024-03-12

## 2024-03-12 VITALS
DIASTOLIC BLOOD PRESSURE: 80 MMHG | OXYGEN SATURATION: 97 % | WEIGHT: 141 LBS | BODY MASS INDEX: 22.66 KG/M2 | HEART RATE: 68 BPM | SYSTOLIC BLOOD PRESSURE: 126 MMHG | RESPIRATION RATE: 17 BRPM | TEMPERATURE: 97.5 F | HEIGHT: 66 IN

## 2024-03-12 DIAGNOSIS — R32 URINARY INCONTINENCE, UNSPECIFIED TYPE: Primary | ICD-10-CM

## 2024-03-12 DIAGNOSIS — R32 URINARY INCONTINENCE, UNSPECIFIED TYPE: ICD-10-CM

## 2024-03-12 LAB
BILIRUBIN, POC: NORMAL
BLOOD URINE, POC: NORMAL
CLARITY, POC: CLEAR
COLOR, POC: YELLOW
GLUCOSE URINE, POC: NORMAL
KETONES, POC: NORMAL
LEUKOCYTE EST, POC: NORMAL
NITRITE, POC: NORMAL
PH, POC: 5.5
PROTEIN, POC: NORMAL
SPECIFIC GRAVITY, POC: 1.01
UROBILINOGEN, POC: 0.2

## 2024-03-12 RX ORDER — CIPROFLOXACIN 250 MG/1
250 TABLET, FILM COATED ORAL 2 TIMES DAILY
Qty: 14 TABLET | Refills: 0 | Status: SHIPPED | OUTPATIENT
Start: 2024-03-12 | End: 2024-03-19

## 2024-03-12 ASSESSMENT — ENCOUNTER SYMPTOMS
ALLERGIC/IMMUNOLOGIC NEGATIVE: 1
TROUBLE SWALLOWING: 0
PHOTOPHOBIA: 0
VOMITING: 0
CHEST TIGHTNESS: 0
NAUSEA: 0
BLOOD IN STOOL: 0
EYE PAIN: 0
ABDOMINAL PAIN: 0
SHORTNESS OF BREATH: 0
SINUS PAIN: 0
SORE THROAT: 0
BACK PAIN: 0
COUGH: 0
EYE DISCHARGE: 0
DIARRHEA: 0
EYE REDNESS: 0

## 2024-03-12 NOTE — PROGRESS NOTES
cervical adenopathy.   Skin:     General: Skin is warm and dry.      Capillary Refill: Capillary refill takes less than 2 seconds.      Findings: No rash.   Neurological:      Mental Status: She is alert and oriented to person, place, and time.      Sensory: No sensory deficit.      Motor: No abnormal muscle tone.      Coordination: Coordination normal.      Deep Tendon Reflexes: Reflexes normal.             Seen By:  Lawrence Louis,

## 2024-03-14 LAB
CULTURE: NORMAL
CULTURE: NORMAL
SPECIMEN DESCRIPTION: NORMAL

## 2024-03-16 ENCOUNTER — OFFICE VISIT (OUTPATIENT)
Dept: FAMILY MEDICINE CLINIC | Age: 89
End: 2024-03-16

## 2024-03-16 VITALS
HEIGHT: 66 IN | WEIGHT: 143 LBS | HEART RATE: 81 BPM | TEMPERATURE: 97.6 F | BODY MASS INDEX: 22.98 KG/M2 | SYSTOLIC BLOOD PRESSURE: 138 MMHG | OXYGEN SATURATION: 99 % | DIASTOLIC BLOOD PRESSURE: 78 MMHG

## 2024-03-16 DIAGNOSIS — G31.84 MILD COGNITIVE IMPAIRMENT WITH MEMORY LOSS: ICD-10-CM

## 2024-03-16 DIAGNOSIS — R82.90 FOUL SMELLING URINE: ICD-10-CM

## 2024-03-16 DIAGNOSIS — E03.9 ACQUIRED HYPOTHYROIDISM: ICD-10-CM

## 2024-03-16 DIAGNOSIS — R53.1 WEAKNESS: Primary | ICD-10-CM

## 2024-03-16 DIAGNOSIS — R53.1 WEAKNESS: ICD-10-CM

## 2024-03-16 LAB
A/G RATIO: 1.6 RATIO (ref 1.1–2.2)
ALBUMIN SERPL-MCNC: 4.2 G/DL (ref 3.5–5)
ALP BLD-CCNC: 100 U/L (ref 42–121)
ALT SERPL-CCNC: 9 U/L (ref 7–52)
ANION GAP SERPL CALCULATED.3IONS-SCNC: 10 MEQ/L (ref 4–14)
AST SERPL-CCNC: 14 U/L (ref 10–41)
BASOPHILS ABSOLUTE: 0 K/UL (ref 0–0.2)
BASOPHILS RELATIVE PERCENT: 0.8 % (ref 0–1.5)
BILIRUB SERPL-MCNC: 0.3 MG/DL (ref 0.3–1.5)
BILIRUBIN, POC: NEGATIVE
BLOOD URINE, POC: NEGATIVE
BUN BLDV-MCNC: 19 MG/DL (ref 7–25)
CALCIUM SERPL-MCNC: 9.3 MG/DL (ref 8.5–10.5)
CHLORIDE BLD-SCNC: 105 MEQ/L (ref 98–107)
CLARITY, POC: CLEAR
CO2: 23 MEQ/L (ref 21–31)
COLOR, POC: NORMAL
CREAT SERPL-MCNC: 0.83 MG/DL (ref 0.6–1.2)
CREATININE + EGFR PANEL: 78 ML/MIN
EOSINOPHILS ABSOLUTE: 0.1 K/UL (ref 0–0.33)
EOSINOPHILS RELATIVE PERCENT: 1.7 % (ref 0–3)
GFR NON-AFRICAN AMERICAN: 65 ML/MIN
GLOBULIN: 2.7 G/DL (ref 1.9–3.9)
GLUCOSE BLD-MCNC: 165 MG/DL (ref 70–99)
GLUCOSE URINE, POC: NEGATIVE
HCT VFR BLD CALC: 42 % (ref 36–44)
HEMOGLOBIN: 13.9 G/DL (ref 12–15)
KETONES, POC: NEGATIVE
LEUKOCYTE EST, POC: NEGATIVE
LYMPHOCYTES ABSOLUTE: 1.4 K/UL (ref 1.1–4.8)
LYMPHOCYTES RELATIVE PERCENT: 24.6 % (ref 24–44)
MCH RBC QN AUTO: 30.1 PG (ref 28–34)
MCHC RBC AUTO-ENTMCNC: 33 G/DL (ref 33–37)
MCV RBC AUTO: 90.9 FL (ref 80–100)
MONOCYTES ABSOLUTE: 0.4 K/UL (ref 0.2–0.7)
MONOCYTES RELATIVE PERCENT: 6.8 % (ref 3.4–9)
NEUTROPHILS ABSOLUTE: 3.9 K/UL (ref 1.83–8.7)
NITRITE, POC: NEGATIVE
PDW BLD-RTO: 13.4 % (ref 10.9–14.3)
PH, POC: 5.5
PLATELET # BLD: 183 K/UL (ref 150–450)
PMV BLD AUTO: 9.4 FL (ref 7.4–10.4)
POTASSIUM SERPL-SCNC: 3.7 MEQ/L (ref 3.6–5)
PROTEIN, POC: NEGATIVE
RBC # BLD: 4.61 M/UL (ref 4–4.9)
SEGMENTED NEUTROPHILS RELATIVE PERCENT: 66.1 % (ref 40–74)
SODIUM BLD-SCNC: 138 MEQ/L (ref 135–145)
SPECIFIC GRAVITY, POC: 1.01
T4 FREE: 1.04 NG/DL (ref 0.61–1.12)
TOTAL PROTEIN: 6.9 G/DL (ref 6.2–8)
TSH SERPL DL<=0.05 MIU/L-ACNC: 2.47 UIU/ML (ref 0.34–5.6)
UROBILINOGEN, POC: 0.2

## 2024-03-16 ASSESSMENT — ENCOUNTER SYMPTOMS
COUGH: 0
CONSTIPATION: 0
WHEEZING: 0
ABDOMINAL PAIN: 0
SORE THROAT: 0
SINUS PRESSURE: 0
EYES NEGATIVE: 1
DIARRHEA: 0
CHEST TIGHTNESS: 0
NAUSEA: 0
SHORTNESS OF BREATH: 0
VOMITING: 0

## 2024-03-16 NOTE — PROGRESS NOTES
[facility]     3/16/24  Piedad Kinsey : 1934 Sex: female  Age: 89 y.o.    Chief Complaint   Patient presents with    Fatigue     Felt weak and was sweating this AM. Unsure if she has a UTI. States urine had a strong odor.     Frequent/Recurrent UTI     [facility] 3[facility]12 for urinary incontinence, started on Cipro, told to stop Cipro Friday when culture came back negative.        HPI    Patient presents to [facility] today complaining of fatigue and weakness.  States she had some sweating this morning and was unsure if maybe she had a urinary tract infection as she states it did have a strong odor.  She was seen to [facility] last week for suspected UTI at that time and was started on Cipro but was called to have her stop it after culture came back negative.  Looking back to the chart she has complained of weakness and fatigue for some time now.  I reviewed previous labs.  She denies any chest pain or shortness of breath.  Blood pressure looks to be okay.  States she is taking her thyroid medication.  She does not check blood sugars at home.  Looks like she has been through the emergency room a couple times over the past few months related to weakness.  She is a very difficult historian.  Some of the issue may be her hearing however it is noted that she does have a diagnosis of mild cognitive/memory impairment.  She denies any fever or chills.  Denies any respiratory symptoms including cough or shortness of breath.  No abdominal complaints of nausea vomiting or diarrhea.  She does not have complaint of frequency or dysuria with this foul-smelling urine.    Review of Systems   Constitutional:  Positive for fatigue. Negative for chills and fever.   HENT:  Negative for congestion, ear pain, postnasal drip, sinus pressure and sore throat.    Eyes: Negative.    Respiratory:  Negative for cough, chest tightness, shortness of breath and wheezing.    Cardiovascular:  Negative for chest

## 2024-03-17 ENCOUNTER — TELEPHONE (OUTPATIENT)
Dept: FAMILY MEDICINE CLINIC | Age: 89
End: 2024-03-17

## 2024-03-17 NOTE — TELEPHONE ENCOUNTER
Good afternoon Janelle.  Apparently there was some mixup with labs on this patient yesterday.  I saw her in [facility] and ordered some tests.  It looks like they were inadvertently sent to you.  They looked okay and I will have the girls call the patient.

## 2024-03-17 NOTE — TELEPHONE ENCOUNTER
Please let patient know that the labs that I ordered from yesterday looked okay.  Should follow-up with her PCP.

## 2024-03-18 ENCOUNTER — TELEPHONE (OUTPATIENT)
Dept: FAMILY MEDICINE CLINIC | Age: 89
End: 2024-03-18

## 2024-03-18 LAB
CULTURE: NORMAL
CULTURE: NORMAL
SPECIMEN DESCRIPTION: NORMAL

## 2024-03-22 DIAGNOSIS — E03.9 ACQUIRED HYPOTHYROIDISM: ICD-10-CM

## 2024-03-22 DIAGNOSIS — I10 PRIMARY HYPERTENSION: ICD-10-CM

## 2024-03-22 LAB
ALBUMIN SERPL-MCNC: 4.4 G/DL (ref 3.5–5.2)
ALP BLD-CCNC: 112 U/L (ref 35–104)
ALT SERPL-CCNC: 10 U/L (ref 0–32)
ANION GAP SERPL CALCULATED.3IONS-SCNC: 15 MMOL/L (ref 7–16)
AST SERPL-CCNC: 20 U/L (ref 0–31)
BILIRUB SERPL-MCNC: 0.3 MG/DL (ref 0–1.2)
BUN BLDV-MCNC: 17 MG/DL (ref 6–23)
CALCIUM SERPL-MCNC: 9.6 MG/DL (ref 8.6–10.2)
CHLORIDE BLD-SCNC: 102 MMOL/L (ref 98–107)
CO2: 23 MMOL/L (ref 22–29)
CREAT SERPL-MCNC: 0.9 MG/DL (ref 0.5–1)
GFR SERPL CREATININE-BSD FRML MDRD: >60 ML/MIN/1.73M2
GLUCOSE BLD-MCNC: 85 MG/DL (ref 74–99)
POTASSIUM SERPL-SCNC: 4.2 MMOL/L (ref 3.5–5)
SODIUM BLD-SCNC: 140 MMOL/L (ref 132–146)
TOTAL PROTEIN: 6.9 G/DL (ref 6.4–8.3)
TSH SERPL DL<=0.05 MIU/L-ACNC: 3.25 UIU/ML (ref 0.27–4.2)

## 2024-03-25 ENCOUNTER — TELEPHONE (OUTPATIENT)
Dept: FAMILY MEDICINE CLINIC | Age: 89
End: 2024-03-25

## 2024-03-25 NOTE — TELEPHONE ENCOUNTER
----- Message from Joanna Nia sent at 3/25/2024 11:19 AM EDT -----  Subject: Appointment Request    Reason for Call: New Patient/New to Provider Appointment needed: New   Patient Request Appointment    QUESTIONS    Reason for appointment request? Requested Provider unavailable - Dr. Dave Gonzalez     Additional Information for Provider? Requests to schedule an appointment   Dr. Dave Gonzalez, patient was advised by another patient, Kimberlyn Fuller to schedule with him. Please contact patient. No New Patient   appointments currently available.   ---------------------------------------------------------------------------  --------------  CALL BACK INFO  [phone]; OK to leave message on voicemail  ---------------------------------------------------------------------------  --------------  SCRIPT ANSWERS

## 2024-03-26 ENCOUNTER — OFFICE VISIT (OUTPATIENT)
Dept: FAMILY MEDICINE CLINIC | Age: 89
End: 2024-03-26
Payer: MEDICARE

## 2024-03-26 VITALS
WEIGHT: 141.8 LBS | OXYGEN SATURATION: 96 % | BODY MASS INDEX: 22.79 KG/M2 | HEART RATE: 52 BPM | TEMPERATURE: 97.9 F | HEIGHT: 66 IN | DIASTOLIC BLOOD PRESSURE: 80 MMHG | SYSTOLIC BLOOD PRESSURE: 138 MMHG

## 2024-03-26 DIAGNOSIS — G31.84 MILD COGNITIVE IMPAIRMENT WITH MEMORY LOSS: ICD-10-CM

## 2024-03-26 DIAGNOSIS — G89.29 CHRONIC RIGHT-SIDED LOW BACK PAIN WITH RIGHT-SIDED SCIATICA: ICD-10-CM

## 2024-03-26 DIAGNOSIS — I10 PRIMARY HYPERTENSION: Primary | ICD-10-CM

## 2024-03-26 DIAGNOSIS — E03.9 ACQUIRED HYPOTHYROIDISM: ICD-10-CM

## 2024-03-26 DIAGNOSIS — M54.41 CHRONIC RIGHT-SIDED LOW BACK PAIN WITH RIGHT-SIDED SCIATICA: ICD-10-CM

## 2024-03-26 PROCEDURE — 1123F ACP DISCUSS/DSCN MKR DOCD: CPT | Performed by: FAMILY MEDICINE

## 2024-03-26 PROCEDURE — 99214 OFFICE O/P EST MOD 30 MIN: CPT | Performed by: FAMILY MEDICINE

## 2024-03-26 ASSESSMENT — ENCOUNTER SYMPTOMS
EYE PAIN: 0
CHEST TIGHTNESS: 0
BACK PAIN: 0
DIARRHEA: 0
WHEEZING: 0
NAUSEA: 0
VOMITING: 0
TROUBLE SWALLOWING: 0
SHORTNESS OF BREATH: 0
CONSTIPATION: 0
ABDOMINAL PAIN: 0
COUGH: 0
SORE THROAT: 0
SINUS PAIN: 0

## 2024-03-26 NOTE — PROGRESS NOTES
doing the treadmill 5 minutes a day at home  tylenol as needed        I independently reviewed and updated the chief complaint, HPI, past medical and surgical history, medications, allergies and ROS as entered by the LPN.      Seen by:  Becca Macario DO

## 2024-03-28 ENCOUNTER — TELEPHONE (OUTPATIENT)
Dept: FAMILY MEDICINE CLINIC | Age: 89
End: 2024-03-28

## 2024-03-28 NOTE — TELEPHONE ENCOUNTER
----- Message from Genet Cid sent at 3/28/2024  8:08 AM EDT -----  Subject: Message to Provider    QUESTIONS  Information for Provider? Pt would like PCP to call her about some   questions she has. She would not give specifics on her questions. Please   call back.   ---------------------------------------------------------------------------  --------------  CALL BACK INFO  [phone]; OK to leave message on voicemail  ---------------------------------------------------------------------------  --------------  SCRIPT ANSWERS  Relationship to Patient? Self

## 2024-03-28 NOTE — TELEPHONE ENCOUNTER
Piedad returned call and requested an appointment w/ Dr Macario. Patient would like to discuss BMV form that was completed 2/28/24. I asked if there was a question we could answer over the phone, but she declined and would prefer to discuss w/ doctor face to face in the office.

## 2024-04-01 ENCOUNTER — OFFICE VISIT (OUTPATIENT)
Dept: PRIMARY CARE CLINIC | Age: 89
End: 2024-04-01
Payer: MEDICARE

## 2024-04-01 ENCOUNTER — TELEPHONE (OUTPATIENT)
Dept: FAMILY MEDICINE CLINIC | Age: 89
End: 2024-04-01

## 2024-04-01 VITALS
RESPIRATION RATE: 16 BRPM | WEIGHT: 140 LBS | DIASTOLIC BLOOD PRESSURE: 82 MMHG | OXYGEN SATURATION: 98 % | HEIGHT: 66 IN | HEART RATE: 70 BPM | SYSTOLIC BLOOD PRESSURE: 128 MMHG | BODY MASS INDEX: 22.5 KG/M2

## 2024-04-01 DIAGNOSIS — E03.9 HYPOTHYROIDISM, UNSPECIFIED TYPE: ICD-10-CM

## 2024-04-01 DIAGNOSIS — I10 PRIMARY HYPERTENSION: ICD-10-CM

## 2024-04-01 DIAGNOSIS — G31.84 MILD COGNITIVE IMPAIRMENT WITH MEMORY LOSS: Primary | ICD-10-CM

## 2024-04-01 PROCEDURE — 99213 OFFICE O/P EST LOW 20 MIN: CPT | Performed by: FAMILY MEDICINE

## 2024-04-01 PROCEDURE — 1123F ACP DISCUSS/DSCN MKR DOCD: CPT | Performed by: FAMILY MEDICINE

## 2024-04-01 ASSESSMENT — ENCOUNTER SYMPTOMS
FACIAL SWELLING: 0
APNEA: 0
WHEEZING: 0
SORE THROAT: 0
DIARRHEA: 0
VOMITING: 0
ABDOMINAL PAIN: 0
PHOTOPHOBIA: 0
SINUS PRESSURE: 0
NAUSEA: 0
COLOR CHANGE: 0
SHORTNESS OF BREATH: 0
BACK PAIN: 0
CHEST TIGHTNESS: 0
BLOOD IN STOOL: 0
COUGH: 0

## 2024-04-01 NOTE — PROGRESS NOTES
Chief Complaint:   Chief Complaint   Patient presents with    Established New Doctor       Hypertension  This is a chronic problem. The current episode started more than 1 year ago. The problem has been resolved since onset. The problem is controlled. Pertinent negatives include no chest pain, headaches, palpitations or shortness of breath. Past treatments include calcium channel blockers. The current treatment provides significant improvement.       Patient Active Problem List   Diagnosis    Hypertension    PUD (peptic ulcer disease)    GIST (gastrointestinal stromal tumor), malignant (HCC)    NSTEMI (non-ST elevated myocardial infarction) (HCC)    Onychomycosis    Difficulty walking    Hyperlipidemia    Disorder of thyroid gland    Carotid stenosis, right    Acquired hypothyroidism    Mild cognitive impairment    Acute cerebrovascular accident (CVA) (HCC)    Pulmonary nodules    Statin intolerance    Type 2 diabetes mellitus    Chronic renal disease, stage III (HCC) [815382]    Type 2 diabetes mellitus with chronic kidney disease    Mild cognitive impairment with memory loss    Chronic right-sided low back pain with right-sided sciatica    Sacroiliac joint disease    Nonrheumatic mitral valve stenosis    Vascular dementia without behavioral disturbance (HCC)       Past Medical History:   Diagnosis Date    Dementia (HCC)     Diverticulosis     GIST (gastrointestinal stroma tumor), malignant, colon (HCC)     Hyperlipidemia     Hypertension     MI (myocardial infarction) (HCC) 2015    Mitral regurgitation     per cardiology    PUD (peptic ulcer disease)        Past Surgical History:   Procedure Laterality Date    CAROTID ENDARTERECTOMY Right 04/2019    Dr Nazario    CATARACT REMOVAL WITH IMPLANT Bilateral 2015    COLONOSCOPY  12/2014    DILATATION, ESOPHAGUS      ENDOSCOPY, COLON, DIAGNOSTIC  09/02/2016    marking of gastric tumor    HYSTERECTOMY (CERVIX STATUS UNKNOWN)      YUMIKO/BSO    OTHER SURGICAL HISTORY

## 2024-04-01 NOTE — TELEPHONE ENCOUNTER
Patient called in this morning requesting this nurse to call patient.   Called and spoke with patient. Patient says this nurse called her last week and she was asked why. Advised patient that I was returning her call. Patient asked if this nurse remembered why she had called. This nurse advised patient that she has appointment with new provider today, patient says she does intend to keep that appointment. Patient asked if there are papers in her chart from the [facility], advised that there are. Patient would like copy, she plans to get copy when she sees new provider today.

## 2024-04-05 ENCOUNTER — TELEPHONE (OUTPATIENT)
Dept: PRIMARY CARE CLINIC | Age: 89
End: 2024-04-05

## 2024-04-05 NOTE — TELEPHONE ENCOUNTER
----- Message from Lindy Dunbar sent at 4/5/2024  8:19 AM EDT -----  Subject: Message to Provider    QUESTIONS  Information for Provider? Piedad would like you to return call. Would like   test results. Thank you   ---------------------------------------------------------------------------  --------------  CALL BACK INFO  [phone]; OK to leave message on voicemail  ---------------------------------------------------------------------------  --------------  SCRIPT ANSWERS  undefined

## 2024-04-24 ENCOUNTER — HOSPITAL ENCOUNTER (INPATIENT)
Age: 89
LOS: 6 days | Discharge: NURSING, REHAB OR LONG-TERM CARE | DRG: 069 | End: 2024-04-30
Attending: EMERGENCY MEDICINE | Admitting: INTERNAL MEDICINE
Payer: MEDICARE

## 2024-04-24 ENCOUNTER — APPOINTMENT (OUTPATIENT)
Dept: CT IMAGING | Age: 89
DRG: 069 | End: 2024-04-24
Payer: MEDICARE

## 2024-04-24 ENCOUNTER — APPOINTMENT (OUTPATIENT)
Dept: GENERAL RADIOLOGY | Age: 89
DRG: 069 | End: 2024-04-24
Payer: MEDICARE

## 2024-04-24 DIAGNOSIS — R29.90 STROKE-LIKE SYMPTOMS: ICD-10-CM

## 2024-04-24 DIAGNOSIS — G45.9 TIA (TRANSIENT ISCHEMIC ATTACK): Primary | ICD-10-CM

## 2024-04-24 LAB
ALBUMIN SERPL-MCNC: 5.2 G/DL (ref 3.5–5.2)
ALP SERPL-CCNC: 148 U/L (ref 35–104)
ALT SERPL-CCNC: 12 U/L (ref 0–32)
ANION GAP SERPL CALCULATED.3IONS-SCNC: 12 MMOL/L (ref 7–16)
AST SERPL-CCNC: 17 U/L (ref 0–31)
BASOPHILS # BLD: 0.06 K/UL (ref 0–0.2)
BASOPHILS NFR BLD: 1 % (ref 0–2)
BILIRUB SERPL-MCNC: 0.3 MG/DL (ref 0–1.2)
BUN SERPL-MCNC: 14 MG/DL (ref 6–23)
CALCIUM SERPL-MCNC: 10.7 MG/DL (ref 8.6–10.2)
CHLORIDE SERPL-SCNC: 101 MMOL/L (ref 98–107)
CO2 SERPL-SCNC: 28 MMOL/L (ref 22–29)
CREAT SERPL-MCNC: 0.8 MG/DL (ref 0.5–1)
EOSINOPHIL # BLD: 0.12 K/UL (ref 0.05–0.5)
EOSINOPHILS RELATIVE PERCENT: 2 % (ref 0–6)
ERYTHROCYTE [DISTWIDTH] IN BLOOD BY AUTOMATED COUNT: 12.7 % (ref 11.5–15)
GFR SERPL CREATININE-BSD FRML MDRD: 70 ML/MIN/1.73M2
GLUCOSE BLD-MCNC: 102 MG/DL (ref 74–99)
GLUCOSE SERPL-MCNC: 87 MG/DL (ref 74–99)
HCT VFR BLD AUTO: 52.2 % (ref 34–48)
HGB BLD-MCNC: 16.6 G/DL (ref 11.5–15.5)
IMM GRANULOCYTES # BLD AUTO: <0.03 K/UL (ref 0–0.58)
IMM GRANULOCYTES NFR BLD: 0 % (ref 0–5)
INR PPP: 1
LYMPHOCYTES NFR BLD: 2.36 K/UL (ref 1.5–4)
LYMPHOCYTES RELATIVE PERCENT: 41 % (ref 20–42)
MCH RBC QN AUTO: 29.8 PG (ref 26–35)
MCHC RBC AUTO-ENTMCNC: 31.8 G/DL (ref 32–34.5)
MCV RBC AUTO: 93.7 FL (ref 80–99.9)
MONOCYTES NFR BLD: 0.58 K/UL (ref 0.1–0.95)
MONOCYTES NFR BLD: 10 % (ref 2–12)
NEUTROPHILS NFR BLD: 46 % (ref 43–80)
NEUTS SEG NFR BLD: 2.62 K/UL (ref 1.8–7.3)
PARTIAL THROMBOPLASTIN TIME: 28.4 SEC (ref 24.5–35.1)
PLATELET # BLD AUTO: 209 K/UL (ref 130–450)
PMV BLD AUTO: 11.9 FL (ref 7–12)
POTASSIUM SERPL-SCNC: 3.9 MMOL/L (ref 3.5–5)
PROT SERPL-MCNC: 8.5 G/DL (ref 6.4–8.3)
PROTHROMBIN TIME: 10.6 SEC (ref 9.3–12.4)
RBC # BLD AUTO: 5.57 M/UL (ref 3.5–5.5)
SODIUM SERPL-SCNC: 141 MMOL/L (ref 132–146)
TROPONIN I SERPL HS-MCNC: 23 NG/L (ref 0–9)
WBC OTHER # BLD: 5.8 K/UL (ref 4.5–11.5)

## 2024-04-24 PROCEDURE — 6370000000 HC RX 637 (ALT 250 FOR IP)

## 2024-04-24 PROCEDURE — 93005 ELECTROCARDIOGRAM TRACING: CPT | Performed by: EMERGENCY MEDICINE

## 2024-04-24 PROCEDURE — 80053 COMPREHEN METABOLIC PANEL: CPT

## 2024-04-24 PROCEDURE — 2580000003 HC RX 258

## 2024-04-24 PROCEDURE — 85025 COMPLETE CBC W/AUTO DIFF WBC: CPT

## 2024-04-24 PROCEDURE — 4A03X5D MEASUREMENT OF ARTERIAL FLOW, INTRACRANIAL, EXTERNAL APPROACH: ICD-10-PCS | Performed by: PSYCHIATRY & NEUROLOGY

## 2024-04-24 PROCEDURE — 85610 PROTHROMBIN TIME: CPT

## 2024-04-24 PROCEDURE — 99449 NTRPROF PH1/NTRNET/EHR 31/>: CPT | Performed by: PSYCHIATRY & NEUROLOGY

## 2024-04-24 PROCEDURE — 82962 GLUCOSE BLOOD TEST: CPT

## 2024-04-24 PROCEDURE — 71045 X-RAY EXAM CHEST 1 VIEW: CPT

## 2024-04-24 PROCEDURE — 70450 CT HEAD/BRAIN W/O DYE: CPT

## 2024-04-24 PROCEDURE — 70498 CT ANGIOGRAPHY NECK: CPT

## 2024-04-24 PROCEDURE — 70496 CT ANGIOGRAPHY HEAD: CPT

## 2024-04-24 PROCEDURE — 99285 EMERGENCY DEPT VISIT HI MDM: CPT

## 2024-04-24 PROCEDURE — 2140000000 HC CCU INTERMEDIATE R&B

## 2024-04-24 PROCEDURE — 84484 ASSAY OF TROPONIN QUANT: CPT

## 2024-04-24 PROCEDURE — 85730 THROMBOPLASTIN TIME PARTIAL: CPT

## 2024-04-24 PROCEDURE — 6360000004 HC RX CONTRAST MEDICATION: Performed by: RADIOLOGY

## 2024-04-24 RX ORDER — 0.9 % SODIUM CHLORIDE 0.9 %
1000 INTRAVENOUS SOLUTION INTRAVENOUS ONCE
Status: COMPLETED | OUTPATIENT
Start: 2024-04-24 | End: 2024-04-25

## 2024-04-24 RX ORDER — ASPIRIN 325 MG
325 TABLET, DELAYED RELEASE (ENTERIC COATED) ORAL ONCE
Status: COMPLETED | OUTPATIENT
Start: 2024-04-24 | End: 2024-04-24

## 2024-04-24 RX ORDER — HYDRALAZINE HYDROCHLORIDE 20 MG/ML
10 INJECTION INTRAMUSCULAR; INTRAVENOUS ONCE
Status: DISCONTINUED | OUTPATIENT
Start: 2024-04-24 | End: 2024-04-30 | Stop reason: HOSPADM

## 2024-04-24 RX ORDER — CLOPIDOGREL 300 MG/1
300 TABLET, FILM COATED ORAL ONCE
Status: COMPLETED | OUTPATIENT
Start: 2024-04-24 | End: 2024-04-24

## 2024-04-24 RX ADMIN — SODIUM CHLORIDE 1000 ML: 9 INJECTION, SOLUTION INTRAVENOUS at 22:29

## 2024-04-24 RX ADMIN — ASPIRIN 325 MG: 325 TABLET, COATED ORAL at 22:26

## 2024-04-24 RX ADMIN — CLOPIDOGREL BISULFATE 300 MG: 300 TABLET, FILM COATED ORAL at 22:25

## 2024-04-24 RX ADMIN — IOPAMIDOL 60 ML: 755 INJECTION, SOLUTION INTRAVENOUS at 20:51

## 2024-04-24 ASSESSMENT — PAIN - FUNCTIONAL ASSESSMENT: PAIN_FUNCTIONAL_ASSESSMENT: NONE - DENIES PAIN

## 2024-04-25 ENCOUNTER — APPOINTMENT (OUTPATIENT)
Dept: CT IMAGING | Age: 89
DRG: 069 | End: 2024-04-25
Payer: MEDICARE

## 2024-04-25 LAB
BILIRUB UR QL STRIP: NEGATIVE
CLARITY UR: CLEAR
COLOR UR: YELLOW
EKG ATRIAL RATE: 81 BPM
EKG P-R INTERVAL: 264 MS
EKG Q-T INTERVAL: 392 MS
EKG QRS DURATION: 86 MS
EKG QTC CALCULATION (BAZETT): 455 MS
EKG R AXIS: -37 DEGREES
EKG T AXIS: 81 DEGREES
EKG VENTRICULAR RATE: 81 BPM
GLUCOSE UR STRIP-MCNC: NEGATIVE MG/DL
HGB UR QL STRIP.AUTO: ABNORMAL
KETONES UR STRIP-MCNC: NEGATIVE MG/DL
LEUKOCYTE ESTERASE UR QL STRIP: ABNORMAL
NITRITE UR QL STRIP: NEGATIVE
PH UR STRIP: 7.5 [PH] (ref 5–9)
PROT UR STRIP-MCNC: NEGATIVE MG/DL
RBC #/AREA URNS HPF: NORMAL /HPF
SP GR UR STRIP: 1.01 (ref 1–1.03)
UROBILINOGEN UR STRIP-ACNC: 0.2 EU/DL (ref 0–1)
WBC #/AREA URNS HPF: NORMAL /HPF

## 2024-04-25 PROCEDURE — 2580000003 HC RX 258: Performed by: INTERNAL MEDICINE

## 2024-04-25 PROCEDURE — 97165 OT EVAL LOW COMPLEX 30 MIN: CPT

## 2024-04-25 PROCEDURE — 97161 PT EVAL LOW COMPLEX 20 MIN: CPT

## 2024-04-25 PROCEDURE — 81001 URINALYSIS AUTO W/SCOPE: CPT

## 2024-04-25 PROCEDURE — 97530 THERAPEUTIC ACTIVITIES: CPT

## 2024-04-25 PROCEDURE — 6360000004 HC RX CONTRAST MEDICATION: Performed by: RADIOLOGY

## 2024-04-25 PROCEDURE — 2140000000 HC CCU INTERMEDIATE R&B

## 2024-04-25 PROCEDURE — 74177 CT ABD & PELVIS W/CONTRAST: CPT

## 2024-04-25 PROCEDURE — 6370000000 HC RX 637 (ALT 250 FOR IP): Performed by: INTERNAL MEDICINE

## 2024-04-25 PROCEDURE — 6360000002 HC RX W HCPCS: Performed by: INTERNAL MEDICINE

## 2024-04-25 PROCEDURE — 93010 ELECTROCARDIOGRAM REPORT: CPT | Performed by: INTERNAL MEDICINE

## 2024-04-25 PROCEDURE — 97535 SELF CARE MNGMENT TRAINING: CPT

## 2024-04-25 RX ORDER — ONDANSETRON 2 MG/ML
4 INJECTION INTRAMUSCULAR; INTRAVENOUS EVERY 6 HOURS PRN
Status: DISCONTINUED | OUTPATIENT
Start: 2024-04-25 | End: 2024-04-30 | Stop reason: HOSPADM

## 2024-04-25 RX ORDER — POLYETHYLENE GLYCOL 3350 17 G/17G
17 POWDER, FOR SOLUTION ORAL DAILY PRN
Status: DISCONTINUED | OUTPATIENT
Start: 2024-04-25 | End: 2024-04-30 | Stop reason: HOSPADM

## 2024-04-25 RX ORDER — ONDANSETRON 2 MG/ML
4 INJECTION INTRAMUSCULAR; INTRAVENOUS ONCE
Status: DISCONTINUED | OUTPATIENT
Start: 2024-04-25 | End: 2024-04-30 | Stop reason: HOSPADM

## 2024-04-25 RX ORDER — FENTANYL CITRATE 50 UG/ML
25 INJECTION, SOLUTION INTRAMUSCULAR; INTRAVENOUS ONCE
Status: DISCONTINUED | OUTPATIENT
Start: 2024-04-25 | End: 2024-04-30 | Stop reason: HOSPADM

## 2024-04-25 RX ORDER — POTASSIUM CHLORIDE 7.45 MG/ML
10 INJECTION INTRAVENOUS PRN
Status: DISCONTINUED | OUTPATIENT
Start: 2024-04-25 | End: 2024-04-30 | Stop reason: HOSPADM

## 2024-04-25 RX ORDER — ACETAMINOPHEN 325 MG/1
650 TABLET ORAL EVERY 6 HOURS PRN
Status: DISCONTINUED | OUTPATIENT
Start: 2024-04-25 | End: 2024-04-30 | Stop reason: HOSPADM

## 2024-04-25 RX ORDER — AMLODIPINE BESYLATE 5 MG/1
2.5 TABLET ORAL DAILY
Status: DISCONTINUED | OUTPATIENT
Start: 2024-04-25 | End: 2024-04-30 | Stop reason: HOSPADM

## 2024-04-25 RX ORDER — POTASSIUM CHLORIDE 20 MEQ/1
40 TABLET, EXTENDED RELEASE ORAL PRN
Status: DISCONTINUED | OUTPATIENT
Start: 2024-04-25 | End: 2024-04-30 | Stop reason: HOSPADM

## 2024-04-25 RX ORDER — SODIUM CHLORIDE 9 MG/ML
INJECTION, SOLUTION INTRAVENOUS PRN
Status: DISCONTINUED | OUTPATIENT
Start: 2024-04-25 | End: 2024-04-30 | Stop reason: HOSPADM

## 2024-04-25 RX ORDER — SODIUM CHLORIDE 0.9 % (FLUSH) 0.9 %
5-40 SYRINGE (ML) INJECTION PRN
Status: DISCONTINUED | OUTPATIENT
Start: 2024-04-25 | End: 2024-04-30 | Stop reason: HOSPADM

## 2024-04-25 RX ORDER — ENOXAPARIN SODIUM 100 MG/ML
40 INJECTION SUBCUTANEOUS DAILY
Status: DISCONTINUED | OUTPATIENT
Start: 2024-04-25 | End: 2024-04-30 | Stop reason: HOSPADM

## 2024-04-25 RX ORDER — MAGNESIUM SULFATE IN WATER 40 MG/ML
2000 INJECTION, SOLUTION INTRAVENOUS PRN
Status: DISCONTINUED | OUTPATIENT
Start: 2024-04-25 | End: 2024-04-30 | Stop reason: HOSPADM

## 2024-04-25 RX ORDER — ASPIRIN 81 MG/1
81 TABLET ORAL DAILY
Status: DISCONTINUED | OUTPATIENT
Start: 2024-04-25 | End: 2024-04-30 | Stop reason: HOSPADM

## 2024-04-25 RX ORDER — ONDANSETRON 4 MG/1
4 TABLET, ORALLY DISINTEGRATING ORAL EVERY 8 HOURS PRN
Status: DISCONTINUED | OUTPATIENT
Start: 2024-04-25 | End: 2024-04-30 | Stop reason: HOSPADM

## 2024-04-25 RX ORDER — SODIUM CHLORIDE 0.9 % (FLUSH) 0.9 %
5-40 SYRINGE (ML) INJECTION EVERY 12 HOURS SCHEDULED
Status: DISCONTINUED | OUTPATIENT
Start: 2024-04-25 | End: 2024-04-30 | Stop reason: HOSPADM

## 2024-04-25 RX ORDER — LEVOTHYROXINE SODIUM 0.05 MG/1
25 TABLET ORAL DAILY
Status: DISCONTINUED | OUTPATIENT
Start: 2024-04-25 | End: 2024-04-30 | Stop reason: HOSPADM

## 2024-04-25 RX ORDER — ACETAMINOPHEN 650 MG/1
650 SUPPOSITORY RECTAL EVERY 6 HOURS PRN
Status: DISCONTINUED | OUTPATIENT
Start: 2024-04-25 | End: 2024-04-30 | Stop reason: HOSPADM

## 2024-04-25 RX ADMIN — SODIUM CHLORIDE, PRESERVATIVE FREE 10 ML: 5 INJECTION INTRAVENOUS at 10:27

## 2024-04-25 RX ADMIN — ASPIRIN 81 MG: 81 TABLET, COATED ORAL at 10:28

## 2024-04-25 RX ADMIN — AMLODIPINE BESYLATE 2.5 MG: 5 TABLET ORAL at 10:28

## 2024-04-25 RX ADMIN — SODIUM CHLORIDE, PRESERVATIVE FREE 10 ML: 5 INJECTION INTRAVENOUS at 20:15

## 2024-04-25 RX ADMIN — ACETAMINOPHEN 650 MG: 325 TABLET ORAL at 17:57

## 2024-04-25 RX ADMIN — IOPAMIDOL 75 ML: 755 INJECTION, SOLUTION INTRAVENOUS at 01:51

## 2024-04-25 RX ADMIN — ENOXAPARIN SODIUM 40 MG: 100 INJECTION SUBCUTANEOUS at 10:28

## 2024-04-25 ASSESSMENT — PAIN DESCRIPTION - DESCRIPTORS: DESCRIPTORS: ACHING;CRAMPING;DISCOMFORT

## 2024-04-25 ASSESSMENT — PAIN DESCRIPTION - LOCATION: LOCATION: ARM;ELBOW

## 2024-04-25 ASSESSMENT — PAIN SCALES - GENERAL
PAINLEVEL_OUTOF10: 0
PAINLEVEL_OUTOF10: 5
PAINLEVEL_OUTOF10: 0

## 2024-04-25 ASSESSMENT — PAIN DESCRIPTION - ORIENTATION: ORIENTATION: RIGHT

## 2024-04-25 ASSESSMENT — PAIN - FUNCTIONAL ASSESSMENT: PAIN_FUNCTIONAL_ASSESSMENT: PREVENTS OR INTERFERES WITH ALL ACTIVE AND SOME PASSIVE ACTIVITIES

## 2024-04-25 NOTE — ED NOTES
Patient incontinent of dried stool and urine up back.  Incontinence care & bed linen change provided.

## 2024-04-25 NOTE — VIRTUAL HEALTH
Piedad Kinsey, gregory and interprofessional telephone consultation for emergent care as a stroke alert for this patient. History and images and treatment plan of care was discussed in multiple phone calls to take care of this patient emergently    The patient was located at Facility (Appt Department): [facility] EMERGENCY DEPARTMENT  [address] [id number]  Loc: [phone]  The provider was located at Facility (Login Dept): [facility]  [address] [address]  [phone]  Confirm you are appropriately licensed, registered, or certified to deliver care in the [facility] where the patient is located as indicated above. If you are not or unsure, please re-schedule the visit: Yes, I confirm.   Consults              I was contacted by the ED team 2030 hours to review an acute code stroke  and review of cerebral vasculature imaging study to investigate if there is an large vessel occlusion. The patient has a NIHSS of 10 . Head CT is negative for ICH . The last known well time was reported as 0730    The cerebral vascular imaging demonstrates NO LVO.BASILAR STENOSIS    The cerebral perfusion imaging demonstrates not indicated    I have reviewed the image independently and in almost all Stroke Alerts/OLESYA Stroke Alerts and Brain Alerts my review is completed documented and discussed with the bedside physician.Decisions are made on independent review mentioned in my notes as radiology reports are not in yet       VIZ LVO was utilized to assist with triage    Modified John Scale 1      Assessment:  1.Transient Ischemic attack with rapidly resolving symptoms in ER right now  2. Stroke like episode    Plan:  1.  Initially TNK was offered however secondary to rapidly resolving symptoms or NIH resolved to a 1 all the way from a 10 and at the time of discussion it is not indicated to proceed

## 2024-04-25 NOTE — H&P
[facility]              [address] [id number]                           HISTORY & PHYSICAL      PATIENT NAME: MICK BOURGEOIS               : 1934  MED REC NO: [id number]                        ROOM:   ACCOUNT NO: [id number]                       ADMIT DATE: 2024  PROVIDER: Raad Maciel DO      PRIMARY CARE PHYSICIAN:  Blas Love    CHIEF COMPLAINT:  Confusion, left-sided weakness, facial droop.    HISTORY OF PRESENT ILLNESS:  The patient is a 89-year-old  female who presented to the emergency room with confusion, left-sided weakness, facial droop.  She was seen in the emergency room.  Her blood pressure was markedly elevated.  A stroke alert was initiated.  She was admitted for further evaluation and treatment.  Diagnostic evaluation revealed cerebrovascular disease,    MEDICATIONS:  Prior to admission amlodipine, aspirin, levothyroxine, niacin.    PAST MEDICAL HISTORY:  Dementia, diverticulosis, gastrointestinal stroma tumor, hyperlipidemia, hypertension, MI, hypothyroidism.    PAST SURGICAL HISTORY:  Right carotid endarterectomy, bilateral eye cataract surgery, esophageal dilatation, hysterectomy, YUMIKO-BSO, laparoscopic robotic-assisted partial gastrectomy, left shoulder rotator cuff surgery, tonsillectomy, adenoidectomy.    REVIEW OF SYSTEMS:  Remarkable for above-stated chief complaint.    ALLERGIES:  TO CARAFATE, CEFDINIR, CETIRIZINE, GABAPENTIN, LISINOPRIL, MEMANTINE, PRAVASTATIN, PROTONIX, AND STATINS.      SOCIAL HISTORY:  The patient quit tobacco.  Occasional alcohol.    PHYSICAL EXAMINATION:  GENERAL APPEARANCE:  Reveals a 89-year-old  female who is alert, cooperative, and a poor historian.  VITAL SIGNS:  On admission, temperature 97.8, pulse 78, respirations 22, blood pressure 228/113.  HEAD:  Normocephalic, atraumatic.  EYES:  Pupils equal and reactive to light.  Extraocular muscles intact.  Fundi

## 2024-04-25 NOTE — ED PROVIDER NOTES
04/24/24 2107 (!) 228/113 -- 78 22 98 % 1.676 m (5' 6\") 63.5 kg (140 lb)       Oxygen Saturation Interpretation: Normal        Counseling:  I have spoken with the patient and discussed today’s results, in addition to providing specific details for the plan of care and counseling regarding the diagnosis and prognosis.  Their questions are answered at this time and they are agreeable with the plan of admission.    --------------------------------- ADDITIONAL PROVIDER NOTES ---------------------------------    This patient has remained hemodynamically stable during their ED course.    Diagnosis:  1. TIA (transient ischemic attack)    2. Stroke-like symptoms        Disposition:  Patient's disposition: Admit to telemetry  Patient's condition is stable.      Attending was present and available throughout encounter including all critical portions; See Attending Note/Attestation for Final Plan     ATTENDING PROVIDER ATTESTATION:     I have personally performed and/or participated in the history, exam, medical decision making, and procedures and agree with all pertinent clinical information.      I have also reviewed and agree with the past medical, family and social history unless otherwise noted.    I have discussed this patient in detail with the resident, and provided the instruction and education regarding stroke symptoms.    My findings/Plan: I was the primary provider for patient.  Patient primary hypertension history of hypothyroidism history of mild cognitive impairment presenting here because of weakness on the left side.  Patient reportedly slurring her speech.  Patient was in her car when symptoms started.  Patient had no fall or injury patient reporting no complaints of any chest pain or difficulty breathing there is no abdominal pain there is no vomiting she reports no headaches.  Patient is awake alert oriented x 3 heart exam normal lungs are clear abdomen soft nontender.  Patient is flaccid on left upper

## 2024-04-25 NOTE — ACP (ADVANCE CARE PLANNING)
Advance Care Planning   Healthcare Decision Maker:    Primary Decision Maker: Flavio Kinsey - Spouse - [phone]    Secondary Decision Maker: Milana Pierce - Child - [phone]    Secondary Decision Maker: Vonda Rod - Child - [phone]    Click here to complete Healthcare Decision Makers including selection of the Healthcare Decision Maker Relationship (ie \"Primary\").  Today we documented Decision Maker(s) consistent with Legal Next of Kin hierarchy.       WILFREDO spoke with pt who states she has POA paper work & her  is her POA.  Pt has 2 children.  Electronically signed by Ines Schmid RN on 4/25/2024 at 11:43 AM

## 2024-04-25 NOTE — CONSULTS
NEUROLOGY CONSULT NOTE      Requesting Physician:  Mario Hines MD    Reason for Consult:  Evaluate for TIA, strokelike symptoms    History of Present Illness:  Piedad Kinsey is a 89 y.o. female  with h/o Dementia, HTN, HLD, CAD s/p MI, DM type II, CKD, Diverticulosis, and peptic ulcer disease who was admitted to [facility] on 4/24/2024 with presentation of strokelike symptoms.  Patient with last known well 1936 on day of presentation.  Patient apparently had onset of confusion with left-sided facial droop reported.  EMS was called and patient brought to the ED for further evaluation.  There is no report of any other associated symptoms.  She did complain of some lower abdominal pain that has been present for about 4 hours.  No report of any specific provocative event or factors.  On ED presentation, her NIHSS was 12 with blood pressure of 228/113.  This is remained persistently elevated but is significantly lower compared to admission.  CT scan of the head did not show any acute intracranial abnormalities.  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  Stable 70 to 90% stenosis of the origin of the right subclavian artery also noted.  Telestroke service was consulted with recommendation for TNK was withdrawn given rapidly resolving deficits.  NIHSS dropped from 12-->1 in the ED.  Patient subsequently admitted for neurology consultation given concern for CVA versus TIA.      Past Medical History:        Diagnosis Date    Dementia (HCC)     Diverticulosis     GIST (gastrointestinal stroma tumor), malignant, colon (HCC)     Hyperlipidemia     Hypertension     MI (myocardial infarction) (HCC) 2015    Mitral regurgitation     per cardiology    PUD (peptic ulcer disease)            Procedure Laterality Date    CAROTID ENDARTERECTOMY

## 2024-04-25 NOTE — ED NOTES
Stroke/ [facility]ulma Alert Time:stroke @ 2025      Time Neurologist called:Dr Mclaughlin @ 2031  :    Time CT Notified:       BRAIN alert time: (If applicable)

## 2024-04-25 NOTE — ED NOTES
I went to reach her, patient patient was having abdominal pain she states that it started about 4 hours ago its mainly lower.  Patient reporting nausea.  Patient tender lower abdomen she is awake alert oriented x 3 patient was ordered fentanyl IV as well as Zofran.  Patient was also ordered CT ab pelvis.  I did reassess the patient all after 3 AM.  Patient is abdominal pain has improved.  Still awaiting results of CT.    I did review CT findings around 4:15 AM.  CT showed distended bladder as well as colitis and possible diverticulitis.  Urinalysis was ordered  Levon Arceo MD  04/25/24 0124       Levon Arceo MD  04/25/24 0414

## 2024-04-26 LAB
CHOLEST SERPL-MCNC: 319 MG/DL
HBA1C MFR BLD: 6.5 % (ref 4–5.6)
HDLC SERPL-MCNC: 80 MG/DL
LDLC SERPL CALC-MCNC: 206 MG/DL
TRIGL SERPL-MCNC: 165 MG/DL
VLDLC SERPL CALC-MCNC: 33 MG/DL

## 2024-04-26 PROCEDURE — 6370000000 HC RX 637 (ALT 250 FOR IP): Performed by: INTERNAL MEDICINE

## 2024-04-26 PROCEDURE — 99232 SBSQ HOSP IP/OBS MODERATE 35: CPT | Performed by: PHYSICIAN ASSISTANT

## 2024-04-26 PROCEDURE — 80061 LIPID PANEL: CPT

## 2024-04-26 PROCEDURE — 97535 SELF CARE MNGMENT TRAINING: CPT

## 2024-04-26 PROCEDURE — 97530 THERAPEUTIC ACTIVITIES: CPT

## 2024-04-26 PROCEDURE — 2140000000 HC CCU INTERMEDIATE R&B

## 2024-04-26 PROCEDURE — 6360000002 HC RX W HCPCS: Performed by: INTERNAL MEDICINE

## 2024-04-26 PROCEDURE — 2580000003 HC RX 258: Performed by: INTERNAL MEDICINE

## 2024-04-26 PROCEDURE — 36415 COLL VENOUS BLD VENIPUNCTURE: CPT

## 2024-04-26 PROCEDURE — 83036 HEMOGLOBIN GLYCOSYLATED A1C: CPT

## 2024-04-26 PROCEDURE — 6370000000 HC RX 637 (ALT 250 FOR IP): Performed by: PHYSICIAN ASSISTANT

## 2024-04-26 RX ORDER — CLOPIDOGREL BISULFATE 75 MG/1
75 TABLET ORAL DAILY
Status: DISCONTINUED | OUTPATIENT
Start: 2024-04-26 | End: 2024-04-30 | Stop reason: HOSPADM

## 2024-04-26 RX ORDER — EZETIMIBE 10 MG/1
10 TABLET ORAL NIGHTLY
Status: DISCONTINUED | OUTPATIENT
Start: 2024-04-26 | End: 2024-04-30 | Stop reason: HOSPADM

## 2024-04-26 RX ADMIN — ENOXAPARIN SODIUM 40 MG: 100 INJECTION SUBCUTANEOUS at 09:35

## 2024-04-26 RX ADMIN — ACETAMINOPHEN 650 MG: 325 TABLET ORAL at 16:45

## 2024-04-26 RX ADMIN — ACETAMINOPHEN 650 MG: 325 TABLET ORAL at 20:21

## 2024-04-26 RX ADMIN — LEVOTHYROXINE SODIUM 25 MCG: 0.05 TABLET ORAL at 09:34

## 2024-04-26 RX ADMIN — ASPIRIN 81 MG: 81 TABLET, COATED ORAL at 09:35

## 2024-04-26 RX ADMIN — AMLODIPINE BESYLATE 2.5 MG: 5 TABLET ORAL at 09:34

## 2024-04-26 RX ADMIN — SODIUM CHLORIDE, PRESERVATIVE FREE 10 ML: 5 INJECTION INTRAVENOUS at 20:21

## 2024-04-26 RX ADMIN — EZETIMIBE 10 MG: 10 TABLET ORAL at 20:21

## 2024-04-26 RX ADMIN — SODIUM CHLORIDE, PRESERVATIVE FREE 10 ML: 5 INJECTION INTRAVENOUS at 09:35

## 2024-04-26 RX ADMIN — CLOPIDOGREL BISULFATE 75 MG: 75 TABLET ORAL at 13:06

## 2024-04-26 ASSESSMENT — PAIN DESCRIPTION - LOCATION: LOCATION: SHOULDER

## 2024-04-26 ASSESSMENT — PAIN DESCRIPTION - FREQUENCY: FREQUENCY: INTERMITTENT

## 2024-04-26 ASSESSMENT — PAIN DESCRIPTION - ORIENTATION: ORIENTATION: RIGHT

## 2024-04-26 ASSESSMENT — PAIN SCALES - GENERAL
PAINLEVEL_OUTOF10: 0
PAINLEVEL_OUTOF10: 2

## 2024-04-26 ASSESSMENT — PAIN SCALES - WONG BAKER: WONGBAKER_NUMERICALRESPONSE: NO HURT

## 2024-04-26 ASSESSMENT — PAIN DESCRIPTION - ONSET: ONSET: GRADUAL

## 2024-04-26 ASSESSMENT — PAIN - FUNCTIONAL ASSESSMENT: PAIN_FUNCTIONAL_ASSESSMENT: ACTIVITIES ARE NOT PREVENTED

## 2024-04-26 ASSESSMENT — PAIN DESCRIPTION - DESCRIPTORS: DESCRIPTORS: ACHING

## 2024-04-26 ASSESSMENT — PAIN DESCRIPTION - PAIN TYPE: TYPE: ACUTE PAIN

## 2024-04-26 NOTE — CONSULTS
[facility]              [address] [id number]                              CONSULTATION      PATIENT NAME: MICK BOURGEOIS               : 1934  MED REC NO: [id number]                        ROOM: 6420  ACCOUNT NO: [id number]                       ADMIT DATE: 2024  PROVIDER: Rodrigo Kent MD      CONSULT DATE:  2024    CHIEF COMPLAINT:  CVA.    HISTORY OF PRESENT ILLNESS:  The patient was admitted to [facility] on 2024 with sudden onset of left hemiparesis and left homonymous hemianopsia.  She did not show a hemorrhage on CT scan and did not show significant vascular occlusion as far as I can tell.  She did not receive TNK because it looked like her symptoms were improving.  She was seen by therapy yesterday and still had significant deficits for therapy.  She was mod to max for transfers, maximal assist just to take a couple of steps, and was also maximal assist for most of her ADLs.  I was asked to see her for planning for the rehab.    PAST MEDICAL HISTORY:    1. Some level of dementia, although she functions at home and actually was taking her driving test again when this episode occurred.  2. Hypertension.  3. Coronary artery disease with prior MI.    ALLERGIES:  CARAFATE, CEPHALOSPORINS, GABAPENTIN, LISINOPRIL, MEMANTINE, PRAVACHOL, PROTONIX, AND STATINS.      CURRENT MEDICATIONS:  Norvasc, aspirin, Lovenox, Apresoline, and Synthroid.    SOCIAL HISTORY:  She lives with her .    REVIEW OF SYSTEMS:  HEENT:  Negative for prior HEENT problems.  CARDIOPULMONARY: Negative for prior cardiac or pulmonary problems.  GI:  Positive for prior gastric cancer.  :  Negative.  ORTHOPEDIC: Negative.  NEUROLOGIC:  Positive for her current deficits.    PHYSICAL EXAMINATION:  GENERAL:  Well-nourished, well-developed white female, in no acute distress.  HEENT:  Head, normocephalic and atraumatic.  Eyes, pupils equal, round,

## 2024-04-27 ENCOUNTER — APPOINTMENT (OUTPATIENT)
Dept: MRI IMAGING | Age: 89
DRG: 069 | End: 2024-04-27
Payer: MEDICARE

## 2024-04-27 PROCEDURE — 6370000000 HC RX 637 (ALT 250 FOR IP): Performed by: PHYSICIAN ASSISTANT

## 2024-04-27 PROCEDURE — 2580000003 HC RX 258: Performed by: INTERNAL MEDICINE

## 2024-04-27 PROCEDURE — 6360000002 HC RX W HCPCS: Performed by: INTERNAL MEDICINE

## 2024-04-27 PROCEDURE — 99231 SBSQ HOSP IP/OBS SF/LOW 25: CPT | Performed by: NURSE PRACTITIONER

## 2024-04-27 PROCEDURE — 2140000000 HC CCU INTERMEDIATE R&B

## 2024-04-27 PROCEDURE — 70551 MRI BRAIN STEM W/O DYE: CPT

## 2024-04-27 PROCEDURE — 6370000000 HC RX 637 (ALT 250 FOR IP): Performed by: INTERNAL MEDICINE

## 2024-04-27 RX ADMIN — ENOXAPARIN SODIUM 40 MG: 100 INJECTION SUBCUTANEOUS at 08:37

## 2024-04-27 RX ADMIN — LEVOTHYROXINE SODIUM 25 MCG: 0.05 TABLET ORAL at 08:34

## 2024-04-27 RX ADMIN — AMLODIPINE BESYLATE 2.5 MG: 5 TABLET ORAL at 08:33

## 2024-04-27 RX ADMIN — CLOPIDOGREL BISULFATE 75 MG: 75 TABLET ORAL at 08:33

## 2024-04-27 RX ADMIN — ASPIRIN 81 MG: 81 TABLET, COATED ORAL at 08:33

## 2024-04-27 RX ADMIN — SODIUM CHLORIDE, PRESERVATIVE FREE 10 ML: 5 INJECTION INTRAVENOUS at 19:46

## 2024-04-27 RX ADMIN — SODIUM CHLORIDE, PRESERVATIVE FREE 10 ML: 5 INJECTION INTRAVENOUS at 08:38

## 2024-04-27 ASSESSMENT — PAIN SCALES - WONG BAKER
WONGBAKER_NUMERICALRESPONSE: NO HURT
WONGBAKER_NUMERICALRESPONSE: NO HURT

## 2024-04-27 ASSESSMENT — PAIN SCALES - GENERAL
PAINLEVEL_OUTOF10: 0
PAINLEVEL_OUTOF10: 0

## 2024-04-27 NOTE — CONSULTS
Patient is a 89-year-old female presenting with strokelike symptoms to the ED. Presenting NIHSS/12 rapid resolution in ED deficits and therefore was considered not a candidate for TNKase. She was significantly hypertensive with systolic blood pressures in the 230s range presentation to the ED suggesting possible hypertensive crisis induced neurologic deficits in his elderly female. She was treated conservatively in the ED with blood pressure management and started on antiplatelet therapy which will be continued. She warrants stroke workup with MRI and echocardiogram for determination of extent possible stroke and determination of stroke etiology    Impression:  1. Hypertensive crisis  2. TIA versus CVA  3. Stroke risk factors of HTN, HLD, DM, cerebrovascular disease with carotid stenosis, and age.  4. Cerebrovascular disease with right ICA stenosis basilar artery stenosis.  5. Generalized weakness: Likely multifactorial given patient's age, underlying medical conditions, and acute change with consideration of UTI.    Recommendations:  1. MRI and echocardiogram for completion of stroke workup.  2. Dual antiplatelet therapy with aspirin and Plavix for treatment of acute TIA versus CVA.  3. PT/OT evaluation and therapy  4. Consider for alternate therapy in place of high-dose statin given patient's statin intolerance documented in history.  5. Cardiac monitoring  6. Continue meds as before.  7. Plan of care was discussed with patient bedside and all questions were answered.

## 2024-04-28 PROCEDURE — 6370000000 HC RX 637 (ALT 250 FOR IP): Performed by: INTERNAL MEDICINE

## 2024-04-28 PROCEDURE — 99232 SBSQ HOSP IP/OBS MODERATE 35: CPT | Performed by: NURSE PRACTITIONER

## 2024-04-28 PROCEDURE — 2580000003 HC RX 258: Performed by: INTERNAL MEDICINE

## 2024-04-28 PROCEDURE — 6360000002 HC RX W HCPCS: Performed by: INTERNAL MEDICINE

## 2024-04-28 PROCEDURE — 6370000000 HC RX 637 (ALT 250 FOR IP): Performed by: PHYSICIAN ASSISTANT

## 2024-04-28 PROCEDURE — 2140000000 HC CCU INTERMEDIATE R&B

## 2024-04-28 RX ORDER — LORAZEPAM 2 MG/ML
0.5 INJECTION INTRAMUSCULAR ONCE
Status: COMPLETED | OUTPATIENT
Start: 2024-04-28 | End: 2024-04-28

## 2024-04-28 RX ADMIN — SODIUM CHLORIDE, PRESERVATIVE FREE 10 ML: 5 INJECTION INTRAVENOUS at 20:53

## 2024-04-28 RX ADMIN — SODIUM CHLORIDE, PRESERVATIVE FREE 10 ML: 5 INJECTION INTRAVENOUS at 10:22

## 2024-04-28 RX ADMIN — CLOPIDOGREL BISULFATE 75 MG: 75 TABLET ORAL at 10:21

## 2024-04-28 RX ADMIN — LORAZEPAM 0.5 MG: 2 INJECTION INTRAMUSCULAR; INTRAVENOUS at 22:35

## 2024-04-28 RX ADMIN — ASPIRIN 81 MG: 81 TABLET, COATED ORAL at 10:21

## 2024-04-28 RX ADMIN — AMLODIPINE BESYLATE 2.5 MG: 5 TABLET ORAL at 10:21

## 2024-04-28 RX ADMIN — LEVOTHYROXINE SODIUM 25 MCG: 0.05 TABLET ORAL at 05:58

## 2024-04-28 RX ADMIN — ENOXAPARIN SODIUM 40 MG: 100 INJECTION SUBCUTANEOUS at 10:21

## 2024-04-28 ASSESSMENT — PAIN SCALES - WONG BAKER: WONGBAKER_NUMERICALRESPONSE: NO HURT

## 2024-04-28 NOTE — DISCHARGE INSTR - COC
Risk Assessment Score:  Readmission Risk              Risk of Unplanned Readmission:  13           Discharging to Facility/ Agency   Name: [facility]  Address:  Phone:  Fax:    Dialysis Facility (if applicable)   Name:  Address:  Dialysis Schedule:  Phone:  Fax:    / signature: Electronically signed by Ines Schmid RN on 4/29/2024 at 2:24 PM      PHYSICIAN SECTION    Prognosis: {Prognosis:8453466585}    Condition at Discharge: { Patient Condition:487641591}    Rehab Potential (if transferring to Rehab): {Prognosis:2578695226}    Recommended Labs or Other Treatments After Discharge: ***    Physician Certification: I certify the above information and transfer of Piedad Kinsey  is necessary for the continuing treatment of the diagnosis listed and that she requires Skilled Nursing Facility for less 30 days.     Update Admission H&P: {CHP DME Changes in HandP:798317623}    PHYSICIAN SIGNATURE:  {Esignature:[id number]}Electronically signed by Raad Maciel DO on 4/28/2024 at 10:42 AM

## 2024-04-29 ENCOUNTER — APPOINTMENT (OUTPATIENT)
Age: 89
DRG: 069 | End: 2024-04-29
Payer: MEDICARE

## 2024-04-29 LAB
ALBUMIN SERPL-MCNC: 4.2 G/DL (ref 3.5–5.2)
ALP SERPL-CCNC: 103 U/L (ref 35–104)
ALT SERPL-CCNC: 24 U/L (ref 0–32)
ANION GAP SERPL CALCULATED.3IONS-SCNC: 11 MMOL/L (ref 7–16)
AST SERPL-CCNC: 33 U/L (ref 0–31)
BILIRUB SERPL-MCNC: 0.4 MG/DL (ref 0–1.2)
BUN SERPL-MCNC: 20 MG/DL (ref 6–23)
CALCIUM SERPL-MCNC: 9.7 MG/DL (ref 8.6–10.2)
CHLORIDE SERPL-SCNC: 107 MMOL/L (ref 98–107)
CO2 SERPL-SCNC: 23 MMOL/L (ref 22–29)
CREAT SERPL-MCNC: 0.9 MG/DL (ref 0.5–1)
ECHO BSA: 1.72 M2
ECHO EST RA PRESSURE: 3 MMHG
ECHO LA DIAMETER INDEX: 1.69 CM/M2
ECHO LA DIAMETER: 2.9 CM
ECHO LA VOL A-L A2C: 27 ML (ref 22–52)
ECHO LA VOL A-L A4C: 48 ML (ref 22–52)
ECHO LA VOL BP: 38 ML (ref 22–52)
ECHO LA VOL MOD A2C: 23 ML (ref 22–52)
ECHO LA VOL MOD A4C: 44 ML (ref 22–52)
ECHO LA VOL/BSA BIPLANE: 22 ML/M2 (ref 16–34)
ECHO LA VOLUME AREA LENGTH: 43 ML
ECHO LA VOLUME INDEX A-L A2C: 16 ML/M2 (ref 16–34)
ECHO LA VOLUME INDEX A-L A4C: 28 ML/M2 (ref 16–34)
ECHO LA VOLUME INDEX AREA LENGTH: 25 ML/M2 (ref 16–34)
ECHO LA VOLUME INDEX MOD A2C: 13 ML/M2 (ref 16–34)
ECHO LA VOLUME INDEX MOD A4C: 26 ML/M2 (ref 16–34)
ECHO LV FRACTIONAL SHORTENING: 18 % (ref 28–44)
ECHO LV INTERNAL DIMENSION DIASTOLE INDEX: 2.27 CM/M2
ECHO LV INTERNAL DIMENSION DIASTOLIC: 3.9 CM (ref 3.9–5.3)
ECHO LV INTERNAL DIMENSION SYSTOLIC INDEX: 1.86 CM/M2
ECHO LV INTERNAL DIMENSION SYSTOLIC: 3.2 CM
ECHO LV IVSD: 1.2 CM (ref 0.6–0.9)
ECHO LV IVSS: 1.2 CM
ECHO LV MASS 2D: 159.3 G (ref 67–162)
ECHO LV MASS INDEX 2D: 92.6 G/M2 (ref 43–95)
ECHO LV POSTERIOR WALL DIASTOLIC: 1.2 CM (ref 0.6–0.9)
ECHO LV POSTERIOR WALL SYSTOLIC: 0.9 CM
ECHO LV RELATIVE WALL THICKNESS RATIO: 0.62
ECHO LVOT AREA: 3.1 CM2
ECHO LVOT DIAM: 2 CM
ECHO MV "A" WAVE DURATION: 134.2 MSEC
ECHO MV A VELOCITY: 1.68 M/S
ECHO MV AREA PHT: 3.3 CM2
ECHO MV E DECELERATION TIME (DT): 335.5 MS
ECHO MV E VELOCITY: 0.92 M/S
ECHO MV E/A RATIO: 0.55
ECHO MV MAX VELOCITY: 1.9 M/S
ECHO MV MEAN GRADIENT: 5 MMHG
ECHO MV MEAN VELOCITY: 1.1 M/S
ECHO MV PEAK GRADIENT: 14 MMHG
ECHO MV PRESSURE HALF TIME (PHT): 67.2 MS
ECHO MV VTI: 37.1 CM
ECHO RIGHT VENTRICULAR SYSTOLIC PRESSURE (RVSP): 32 MMHG
ECHO TV REGURGITANT MAX VELOCITY: 2.7 M/S
ECHO TV REGURGITANT PEAK GRADIENT: 29 MMHG
ERYTHROCYTE [DISTWIDTH] IN BLOOD BY AUTOMATED COUNT: 12.7 % (ref 11.5–15)
GFR SERPL CREATININE-BSD FRML MDRD: 64 ML/MIN/1.73M2
GLUCOSE SERPL-MCNC: 115 MG/DL (ref 74–99)
HCT VFR BLD AUTO: 48.6 % (ref 34–48)
HGB BLD-MCNC: 15.6 G/DL (ref 11.5–15.5)
MCH RBC QN AUTO: 29.9 PG (ref 26–35)
MCHC RBC AUTO-ENTMCNC: 32.1 G/DL (ref 32–34.5)
MCV RBC AUTO: 93.3 FL (ref 80–99.9)
PLATELET # BLD AUTO: 200 K/UL (ref 130–450)
PMV BLD AUTO: 11.6 FL (ref 7–12)
POTASSIUM SERPL-SCNC: 3.9 MMOL/L (ref 3.5–5)
PROT SERPL-MCNC: 6.5 G/DL (ref 6.4–8.3)
RBC # BLD AUTO: 5.21 M/UL (ref 3.5–5.5)
SODIUM SERPL-SCNC: 141 MMOL/L (ref 132–146)
WBC OTHER # BLD: 6.6 K/UL (ref 4.5–11.5)

## 2024-04-29 PROCEDURE — 6360000002 HC RX W HCPCS: Performed by: INTERNAL MEDICINE

## 2024-04-29 PROCEDURE — 93308 TTE F-UP OR LMTD: CPT

## 2024-04-29 PROCEDURE — 93308 TTE F-UP OR LMTD: CPT | Performed by: INTERNAL MEDICINE

## 2024-04-29 PROCEDURE — 85027 COMPLETE CBC AUTOMATED: CPT

## 2024-04-29 PROCEDURE — 2140000000 HC CCU INTERMEDIATE R&B

## 2024-04-29 PROCEDURE — 6370000000 HC RX 637 (ALT 250 FOR IP): Performed by: PHYSICIAN ASSISTANT

## 2024-04-29 PROCEDURE — 2580000003 HC RX 258: Performed by: INTERNAL MEDICINE

## 2024-04-29 PROCEDURE — 80053 COMPREHEN METABOLIC PANEL: CPT

## 2024-04-29 PROCEDURE — 99231 SBSQ HOSP IP/OBS SF/LOW 25: CPT | Performed by: NURSE PRACTITIONER

## 2024-04-29 PROCEDURE — 93321 DOPPLER ECHO F-UP/LMTD STD: CPT | Performed by: INTERNAL MEDICINE

## 2024-04-29 PROCEDURE — 36415 COLL VENOUS BLD VENIPUNCTURE: CPT

## 2024-04-29 PROCEDURE — 6370000000 HC RX 637 (ALT 250 FOR IP): Performed by: INTERNAL MEDICINE

## 2024-04-29 PROCEDURE — 97530 THERAPEUTIC ACTIVITIES: CPT

## 2024-04-29 PROCEDURE — 97535 SELF CARE MNGMENT TRAINING: CPT

## 2024-04-29 PROCEDURE — 93325 DOPPLER ECHO COLOR FLOW MAPG: CPT | Performed by: INTERNAL MEDICINE

## 2024-04-29 RX ORDER — ACETAMINOPHEN 325 MG/1
650 TABLET ORAL EVERY 6 HOURS PRN
Qty: 120 TABLET | Refills: 3 | COMMUNITY
Start: 2024-04-29

## 2024-04-29 RX ORDER — EZETIMIBE 10 MG/1
10 TABLET ORAL NIGHTLY
Qty: 30 TABLET | Refills: 0
Start: 2024-04-29

## 2024-04-29 RX ORDER — CLOPIDOGREL BISULFATE 75 MG/1
75 TABLET ORAL DAILY
Qty: 30 TABLET | Refills: 0
Start: 2024-04-29 | End: 2024-07-25

## 2024-04-29 RX ORDER — ASPIRIN 81 MG/1
81 TABLET ORAL DAILY
Qty: 30 TABLET | Refills: 0
Start: 2024-04-29

## 2024-04-29 RX ADMIN — ASPIRIN 81 MG: 81 TABLET, COATED ORAL at 09:26

## 2024-04-29 RX ADMIN — CLOPIDOGREL BISULFATE 75 MG: 75 TABLET ORAL at 09:26

## 2024-04-29 RX ADMIN — LEVOTHYROXINE SODIUM 25 MCG: 0.05 TABLET ORAL at 05:32

## 2024-04-29 RX ADMIN — ENOXAPARIN SODIUM 40 MG: 100 INJECTION SUBCUTANEOUS at 09:26

## 2024-04-29 RX ADMIN — AMLODIPINE BESYLATE 2.5 MG: 5 TABLET ORAL at 09:26

## 2024-04-29 RX ADMIN — SODIUM CHLORIDE, PRESERVATIVE FREE 10 ML: 5 INJECTION INTRAVENOUS at 09:27

## 2024-04-29 ASSESSMENT — PAIN SCALES - WONG BAKER: WONGBAKER_NUMERICALRESPONSE: NO HURT

## 2024-04-29 NOTE — PLAN OF CARE
Problem: Chronic Conditions and Co-morbidities  Goal: Patient's chronic conditions and co-morbidity symptoms are monitored and maintained or improved  Outcome: Progressing     Problem: Discharge Planning  Goal: Discharge to home or other facility with appropriate resources  Outcome: Progressing     Problem: Pain  Goal: Verbalizes/displays adequate comfort level or baseline comfort level  Outcome: Progressing     Problem: Skin/Tissue Integrity  Goal: Absence of new skin breakdown  Description: 1.  Monitor for areas of redness and/or skin breakdown  2.  Assess vascular access sites hourly  3.  Every 4-6 hours minimum:  Change oxygen saturation probe site  4.  Every 4-6 hours:  If on nasal continuous positive airway pressure, respiratory therapy assess nares and determine need for appliance change or resting period.  Outcome: Progressing     Problem: Safety - Adult  Goal: Free from fall injury  Outcome: Progressing     Problem: ABCDS Injury Assessment  Goal: Absence of physical injury  Outcome: Progressing     
  Problem: Chronic Conditions and Co-morbidities  Goal: Patient's chronic conditions and co-morbidity symptoms are monitored and maintained or improved  Outcome: Progressing     Problem: Discharge Planning  Goal: Discharge to home or other facility with appropriate resources  Outcome: Progressing     Problem: Pain  Goal: Verbalizes/displays adequate comfort level or baseline comfort level  Outcome: Progressing  Flowsheets (Taken 4/25/2024 2015)  Verbalizes/displays adequate comfort level or baseline comfort level: Assess pain using appropriate pain scale     Problem: Skin/Tissue Integrity  Goal: Absence of new skin breakdown  Description: 1.  Monitor for areas of redness and/or skin breakdown  2.  Assess vascular access sites hourly  3.  Every 4-6 hours minimum:  Change oxygen saturation probe site  4.  Every 4-6 hours:  If on nasal continuous positive airway pressure, respiratory therapy assess nares and determine need for appliance change or resting period.  Outcome: Progressing     Problem: Safety - Adult  Goal: Free from fall injury  Outcome: Progressing     
  Problem: Chronic Conditions and Co-morbidities  Goal: Patient's chronic conditions and co-morbidity symptoms are monitored and maintained or improved  Outcome: Progressing  Flowsheets  Taken 4/26/2024 2051 by Elba Barney RN  Care Plan - Patient's Chronic Conditions and Co-Morbidity Symptoms are Monitored and Maintained or Improved:   Monitor and assess patient's chronic conditions and comorbid symptoms for stability, deterioration, or improvement   Collaborate with multidisciplinary team to address chronic and comorbid conditions and prevent exacerbation or deterioration   Update acute care plan with appropriate goals if chronic or comorbid symptoms are exacerbated and prevent overall improvement and discharge  Taken 4/26/2024 2047 by Elba Barney RN  Care Plan - Patient's Chronic Conditions and Co-Morbidity Symptoms are Monitored and Maintained or Improved:   Monitor and assess patient's chronic conditions and comorbid symptoms for stability, deterioration, or improvement   Collaborate with multidisciplinary team to address chronic and comorbid conditions and prevent exacerbation or deterioration   Update acute care plan with appropriate goals if chronic or comorbid symptoms are exacerbated and prevent overall improvement and discharge  Taken 4/26/2024 0900 by Magda Chamorro RN  Care Plan - Patient's Chronic Conditions and Co-Morbidity Symptoms are Monitored and Maintained or Improved: Monitor and assess patient's chronic conditions and comorbid symptoms for stability, deterioration, or improvement     Problem: Discharge Planning  Goal: Discharge to home or other facility with appropriate resources  Outcome: Progressing  Flowsheets  Taken 4/26/2024 2047 by Elba Barney RN  Discharge to home or other facility with appropriate resources:   Identify barriers to discharge with patient and caregiver   Identify discharge learning needs (meds, wound care, etc)  Taken 4/26/2024 0900 by Magda Chamorro 
  Problem: Chronic Conditions and Co-morbidities  Goal: Patient's chronic conditions and co-morbidity symptoms are monitored and maintained or improved  Outcome: Progressing  Flowsheets (Taken 4/28/2024 0912)  Care Plan - Patient's Chronic Conditions and Co-Morbidity Symptoms are Monitored and Maintained or Improved: Monitor and assess patient's chronic conditions and comorbid symptoms for stability, deterioration, or improvement     Problem: Discharge Planning  Goal: Discharge to home or other facility with appropriate resources  Outcome: Progressing  Flowsheets (Taken 4/28/2024 0912)  Discharge to home or other facility with appropriate resources: Identify barriers to discharge with patient and caregiver     Problem: Pain  Goal: Verbalizes/displays adequate comfort level or baseline comfort level  Outcome: Progressing  Flowsheets (Taken 4/28/2024 0845)  Verbalizes/displays adequate comfort level or baseline comfort level: Assess pain using appropriate pain scale     Problem: Skin/Tissue Integrity  Goal: Absence of new skin breakdown  Description: 1.  Monitor for areas of redness and/or skin breakdown  2.  Assess vascular access sites hourly  3.  Every 4-6 hours minimum:  Change oxygen saturation probe site  4.  Every 4-6 hours:  If on nasal continuous positive airway pressure, respiratory therapy assess nares and determine need for appliance change or resting period.  Outcome: Progressing     Problem: Safety - Adult  Goal: Free from fall injury  Outcome: Progressing     Problem: ABCDS Injury Assessment  Goal: Absence of physical injury  Outcome: Progressing     
home or other facility with appropriate resources  4/27/2024 0930 by Harriett Reed RN  Outcome: Progressing  Flowsheets (Taken 4/27/2024 0815)  Discharge to home or other facility with appropriate resources: Identify barriers to discharge with patient and caregiver  4/26/2024 2051 by Elba Barney RN  Outcome: Progressing  Flowsheets  Taken 4/26/2024 2047 by Elba Barney RN  Discharge to home or other facility with appropriate resources:   Identify barriers to discharge with patient and caregiver   Identify discharge learning needs (meds, wound care, etc)  Taken 4/26/2024 0900 by Magda Chamorro RN  Discharge to home or other facility with appropriate resources: Identify barriers to discharge with patient and caregiver     Problem: Pain  Goal: Verbalizes/displays adequate comfort level or baseline comfort level  4/27/2024 0930 by Harriett Reed RN  Outcome: Progressing  4/26/2024 2051 by Elba Barney RN  Outcome: Progressing  Flowsheets  Taken 4/26/2024 2051  Verbalizes/displays adequate comfort level or baseline comfort level:   Assess pain using appropriate pain scale   Encourage patient to monitor pain and request assistance   Administer analgesics based on type and severity of pain and evaluate response   Implement non-pharmacological measures as appropriate and evaluate response  Taken 4/26/2024 2021  Verbalizes/displays adequate comfort level or baseline comfort level:   Encourage patient to monitor pain and request assistance   Assess pain using appropriate pain scale   Administer analgesics based on type and severity of pain and evaluate response   Implement non-pharmacological measures as appropriate and evaluate response     Problem: Skin/Tissue Integrity  Goal: Absence of new skin breakdown  Description: 1.  Monitor for areas of redness and/or skin breakdown  2.  Assess vascular access sites hourly  3.  Every 4-6 hours minimum:  Change oxygen saturation probe site  4.  Every 4-6 hours:  If

## 2024-04-30 VITALS
HEART RATE: 74 BPM | TEMPERATURE: 98 F | DIASTOLIC BLOOD PRESSURE: 85 MMHG | HEIGHT: 66 IN | BODY MASS INDEX: 22.5 KG/M2 | WEIGHT: 140 LBS | SYSTOLIC BLOOD PRESSURE: 113 MMHG | RESPIRATION RATE: 18 BRPM | OXYGEN SATURATION: 97 %

## 2024-04-30 PROCEDURE — 6360000002 HC RX W HCPCS: Performed by: INTERNAL MEDICINE

## 2024-04-30 PROCEDURE — 6370000000 HC RX 637 (ALT 250 FOR IP): Performed by: PHYSICIAN ASSISTANT

## 2024-04-30 PROCEDURE — 2580000003 HC RX 258: Performed by: INTERNAL MEDICINE

## 2024-04-30 PROCEDURE — 6370000000 HC RX 637 (ALT 250 FOR IP): Performed by: INTERNAL MEDICINE

## 2024-04-30 RX ADMIN — AMLODIPINE BESYLATE 2.5 MG: 5 TABLET ORAL at 09:37

## 2024-04-30 RX ADMIN — EZETIMIBE 10 MG: 10 TABLET ORAL at 01:32

## 2024-04-30 RX ADMIN — SODIUM CHLORIDE, PRESERVATIVE FREE 10 ML: 5 INJECTION INTRAVENOUS at 01:32

## 2024-04-30 RX ADMIN — SODIUM CHLORIDE, PRESERVATIVE FREE 10 ML: 5 INJECTION INTRAVENOUS at 09:49

## 2024-04-30 RX ADMIN — CLOPIDOGREL BISULFATE 75 MG: 75 TABLET ORAL at 09:36

## 2024-04-30 RX ADMIN — ASPIRIN 81 MG: 81 TABLET, COATED ORAL at 09:36

## 2024-04-30 RX ADMIN — LEVOTHYROXINE SODIUM 25 MCG: 0.05 TABLET ORAL at 05:59

## 2024-04-30 RX ADMIN — ENOXAPARIN SODIUM 40 MG: 100 INJECTION SUBCUTANEOUS at 09:36

## 2024-04-30 NOTE — CARE COORDINATION
4/26:  Update CM Note:  Pt presented to the ER for stoke like s/s from home. Pt is on room air at 94% & SQ Lovenox. MRI needed.  Neurology is following.  Cm spoke with pt's  & pt & her family.  Cm presented SNF - list.  Per  he would like her to be dc to [facility] not [facility].  Cm sent referral per Janice.  Will need MNUA,Ambulance & PASRR done once facility confirmed.  CM spoke with daughter & she left a list of choices bedside for [place] [facility] [place] [facility] [place]?.  Will check back on Monday.  Sw/WILFREDO will continue to follow.   Electronically signed by Ines Schmid RN on 4/26/2024 at 3:19 PM    The Plan for Transition of Care is related to the following treatment goals: SNF    The Patient and/or patient representative  was provided with a choice of provider and agrees   with the discharge plan. [x] Yes [] No    Freedom of choice list was provided with basic dialogue that supports the patient's individualized plan of care/goals, treatment preferences and shares the quality data associated with the providers. [x] Yes [] No    
4/29:  Update CM Note:  Pt presented to the ER for stoke like s/s from home. Pt is dc today.  Pt is pending pre-cert once PT/OT evals are in.  pt's  & pt & her family.  Cm presented SNF - list.  Per  he would like her to be dc [facility] not [facility].  Cm sent referral per Janice & she can accept.  MUNA,PASRR & Ambulance form completed in placed in envelope in soft chart.  Cm did speak with  who states family maybe able to transport when pre-cert obtained.  Sw/CM will continue to follow.  Electronically signed by Ines Schmid RN on 4/29/2024 at 11:08 AM    
4/30:  Update CM Note:  Pt presented to the ER for stoke like s/s from home. Pt is dc today.  Pt is pending pre-cert once PT/OT evals are in.  pt's  & pt & her family.  Cm presented SNF - list.  Per  he would like her to be dc [facility] not [facility].  Cm sent referral per Janice & she can accept.   stated he would transfer.  MUNA,PASRR & Ambulance form completed in placed in envelope in soft chart.  Sw/CM will continue to follow.  Electronically signed by Ines Schmid RN on 4/30/2024 at 11:06 AM    
Consult received. Dr. Kent to [facility] for ARU.   
Left voicemail for , Flavio to return call to discuss ARU for patient.    Spoke with patient's  who says that they chose [facility] for rehab needs.  Will update Dr. Kent.     
community resources:    Current services prior to admission:              Current DME:              Type of Home Care services:       ADLS  Prior functional level: Independent in ADLs/IADLs  Current functional level: Independent in ADLs/IADLs    PT AM-PAC:   /24  OT AM-PAC:   /24    Family can provide assistance at DC: Yes  Would you like Case Management to discuss the discharge plan with any other family members/significant others, and if so, who? Yes (spoke with daughter & sister bedside)  Plans to Return to Present Housing: Yes  Other Identified Issues/Barriers to RETURNING to current housing:   Potential Assistance needed at discharge:              Potential DME:    Patient expects to discharge to:    Plan for transportation at discharge:      Financial    Payor: [employer] MEDICARE / Plan: [employer] MEDICARE-ADVANTAGE PPO / Product Type: Medicare /     Does insurance require precert for SNF: Yes    Potential assistance Purchasing Medications:    Meds-to-Beds request:        [facility] - [place], [place] - [address] - P [phone] - F [phone]  [address] [facility] [address]  Phone: [phone] Fax: [phone]      Notes:    Factors facilitating achievement of predicted outcomes: Family support    Barriers to discharge:     Additional Case Management Notes:     The Plan for Transition of Care is related to the following treatment goals of TIA (transient ischemic attack) [G45.9]  Stroke-like symptoms [R29.90]    IF APPLICABLE: The Patient and/or patient representative Piedad and her family were provided with a choice of provider and agrees with the discharge plan. Freedom of choice list with basic dialogue that supports the patient's individualized plan of care/goals and shares the quality data associated with the providers was provided to:     Patient Representative Name:       The Patient and/or Patient Representative Agree with the Discharge Plan?      Ines Schmid RN  Case Management Department  Ph:

## 2024-04-30 NOTE — PROGRESS NOTES
[facility] Medicine    Subjective:  pt alert conversive      Current Facility-Administered Medications:     amLODIPine (NORVASC) tablet 2.5 mg, 2.5 mg, Oral, Daily, Raad Maciel DO, 2.5 mg at 04/25/24 1028    aspirin EC tablet 81 mg, 81 mg, Oral, Daily, Raad Maciel DO, 81 mg at 04/25/24 1028    levothyroxine (SYNTHROID) tablet 25 mcg, 25 mcg, Oral, Daily, Raad Maciel DO    sodium chloride flush 0.9 % injection 5-40 mL, 5-40 mL, IntraVENous, 2 times per day, Raad Maciel DO, 10 mL at 04/25/24 2015    sodium chloride flush 0.9 % injection 5-40 mL, 5-40 mL, IntraVENous, PRN, Raad Maciel DO    0.9 % sodium chloride infusion, , IntraVENous, PRN, Raad Maciel DO    potassium chloride (KLOR-CON M) extended release tablet 40 mEq, 40 mEq, Oral, PRN **OR** potassium bicarb-citric acid (EFFER-K) effervescent tablet 40 mEq, 40 mEq, Oral, PRN **OR** potassium chloride 10 mEq/100 mL IVPB (Peripheral Line), 10 mEq, IntraVENous, PRN, Raad Maciel DO    magnesium sulfate 2000 mg in 50 mL IVPB premix, 2,000 mg, IntraVENous, PRN, Raad Maciel DO    enoxaparin (LOVENOX) injection 40 mg, 40 mg, SubCUTAneous, Daily, Raad Maciel DO, 40 mg at 04/25/24 1028    ondansetron (ZOFRAN-ODT) disintegrating tablet 4 mg, 4 mg, Oral, Q8H PRN **OR** ondansetron (ZOFRAN) injection 4 mg, 4 mg, IntraVENous, Q6H PRN, Raad Maciel DO    polyethylene glycol (GLYCOLAX) packet 17 g, 17 g, Oral, Daily PRN, Raad Maciel DO    acetaminophen (TYLENOL) tablet 650 mg, 650 mg, Oral, Q6H PRN, 650 mg at 04/25/24 7187 **OR** acetaminophen (TYLENOL) suppository 650 mg, 650 mg, Rectal, Q6H PRN, Raad Maciel, DO    fentaNYL (SUBLIMAZE) injection 25 mcg, 25 mcg, IntraVENous, Once, Jay Torres, DO    ondansetron (ZOFRAN) injection 4 mg, 4 mg, IntraVENous, Once, Jay Torres,     hydrALAZINE (APRESOLINE) injection 10 mg, 10 mg, IntraVENous, Once, Jay Torres, DO    Objective:    BP 
  [facility] Medicine    Subjective:  pt alert conversive sitting up in chair  at bedside agreeable to rehab      Current Facility-Administered Medications:     clopidogrel (PLAVIX) tablet 75 mg, 75 mg, Oral, Daily, Sandra Awad PA, 75 mg at 04/30/24 0936    ezetimibe (ZETIA) tablet 10 mg, 10 mg, Oral, Nightly, Raad Maciel DO, 10 mg at 04/30/24 0132    amLODIPine (NORVASC) tablet 2.5 mg, 2.5 mg, Oral, Daily, Raad Maciel DO, 2.5 mg at 04/30/24 0937    aspirin EC tablet 81 mg, 81 mg, Oral, Daily, Raad Maciel DO, 81 mg at 04/30/24 0936    levothyroxine (SYNTHROID) tablet 25 mcg, 25 mcg, Oral, Daily, Raad Maciel DO, 25 mcg at 04/30/24 0559    sodium chloride flush 0.9 % injection 5-40 mL, 5-40 mL, IntraVENous, 2 times per day, Raad Maciel DO, 10 mL at 04/30/24 0949    sodium chloride flush 0.9 % injection 5-40 mL, 5-40 mL, IntraVENous, PRN, Raad Maciel DO    0.9 % sodium chloride infusion, , IntraVENous, PRN, Raad Maciel DO    potassium chloride (KLOR-CON M) extended release tablet 40 mEq, 40 mEq, Oral, PRN **OR** potassium bicarb-citric acid (EFFER-K) effervescent tablet 40 mEq, 40 mEq, Oral, PRN **OR** potassium chloride 10 mEq/100 mL IVPB (Peripheral Line), 10 mEq, IntraVENous, PRN, Raad Maciel DO    magnesium sulfate 2000 mg in 50 mL IVPB premix, 2,000 mg, IntraVENous, PRN, Raad Maciel DO    enoxaparin (LOVENOX) injection 40 mg, 40 mg, SubCUTAneous, Daily, Raad Maciel DO, 40 mg at 04/30/24 0936    ondansetron (ZOFRAN-ODT) disintegrating tablet 4 mg, 4 mg, Oral, Q8H PRN **OR** ondansetron (ZOFRAN) injection 4 mg, 4 mg, IntraVENous, Q6H PRN, Raad Maciel, DO    polyethylene glycol (GLYCOLAX) packet 17 g, 17 g, Oral, Daily PRN, Raad Maciel, DO    acetaminophen (TYLENOL) tablet 650 mg, 650 mg, Oral, Q6H PRN, 650 mg at 04/26/24 2021 **OR** acetaminophen (TYLENOL) suppository 650 mg, 650 mg, Rectal, Q6H PRN, Raad Maciel, DO    
  OCCUPATIONAL THERAPY INITIAL EVALUATION    [facility]  [address], [place]        Date:2024                                                  Patient Name: Piedad Kinsey    MRN: [id number]    : 1934    Room: [address] [place] [address]        Evaluating OT: Marisol Noguera OTR/L; [id number]       Referring Provider: Raad Maciel DO     Specific Provider Orders/Date: OT Eval and Treat 24       Diagnosis: TIA (transient ischemic attack)      Surgery: None this admission     Pertinent Medical History:  has a past medical history of Dementia (HCC), Diverticulosis, GIST (gastrointestinal stroma tumor), malignant, colon (HCC), Hyperlipidemia, Hypertension, MI (myocardial infarction) (HCC), Mitral regurgitation, and PUD (peptic ulcer disease).     Recommended Adaptive Equipment: BSC, AE LB dressing/bathing PRN     Precautions:  Fall Risk, [place], +alarms, L hemiparesis & decreased coordination     Assessment of current deficits    [x] Functional mobility  [x]ADLs  [x] Strength               [x]Cognition    [x] Functional transfers   [x] IADLs         [x] Safety Awareness   [x]Endurance    [x] Fine Coordination              [x] Balance      [] Vision/perception   []Sensation     [x]Gross Motor Coordination  [] ROM  [] Delirium                   [x] Motor Control     OT PLAN OF CARE   OT POC based on physician orders, patient diagnosis and results of clinical assessment    Frequency/Duration 3-5 days/wk for 2 weeks PRN   Specific OT Treatment Interventions to include:   * Instruction/training on adapted ADL techniques and AE recommendations to increase functional independence within precautions       * Training on energy conservation strategies, correct breathing pattern and techniques to improve independence/tolerance for self-care routine  * Functional transfer/mobility training/DME recommendations for increased independence, safety, and fall 
  [facility] Medicine    Subjective:  pt alert conversive sitting up in chair family member at bedside mri + cva      Current Facility-Administered Medications:     clopidogrel (PLAVIX) tablet 75 mg, 75 mg, Oral, Daily, Sandra Awad PA, 75 mg at 04/28/24 1021    ezetimibe (ZETIA) tablet 10 mg, 10 mg, Oral, Nightly, Raad Maciel, , 10 mg at 04/26/24 2021    amLODIPine (NORVASC) tablet 2.5 mg, 2.5 mg, Oral, Daily, Raad Maciel DO, 2.5 mg at 04/28/24 1021    aspirin EC tablet 81 mg, 81 mg, Oral, Daily, Raad Maciel DO, 81 mg at 04/28/24 1021    levothyroxine (SYNTHROID) tablet 25 mcg, 25 mcg, Oral, Daily, Raad Maciel DO, 25 mcg at 04/28/24 0558    sodium chloride flush 0.9 % injection 5-40 mL, 5-40 mL, IntraVENous, 2 times per day, Raad Maciel DO, 10 mL at 04/28/24 1022    sodium chloride flush 0.9 % injection 5-40 mL, 5-40 mL, IntraVENous, PRN, Raad Maciel DO    0.9 % sodium chloride infusion, , IntraVENous, PRN, Raad Maciel DO    potassium chloride (KLOR-CON M) extended release tablet 40 mEq, 40 mEq, Oral, PRN **OR** potassium bicarb-citric acid (EFFER-K) effervescent tablet 40 mEq, 40 mEq, Oral, PRN **OR** potassium chloride 10 mEq/100 mL IVPB (Peripheral Line), 10 mEq, IntraVENous, PRN, Raad Maciel DO    magnesium sulfate 2000 mg in 50 mL IVPB premix, 2,000 mg, IntraVENous, PRN, Raad Maciel DO    enoxaparin (LOVENOX) injection 40 mg, 40 mg, SubCUTAneous, Daily, Raad Maciel DO, 40 mg at 04/28/24 1021    ondansetron (ZOFRAN-ODT) disintegrating tablet 4 mg, 4 mg, Oral, Q8H PRN **OR** ondansetron (ZOFRAN) injection 4 mg, 4 mg, IntraVENous, Q6H PRN, Raad Maciel, DO    polyethylene glycol (GLYCOLAX) packet 17 g, 17 g, Oral, Daily PRN, Raad Maciel, DO    acetaminophen (TYLENOL) tablet 650 mg, 650 mg, Oral, Q6H PRN, 650 mg at 04/26/24 2021 **OR** acetaminophen (TYLENOL) suppository 650 mg, 650 mg, Rectal, Q6H PRN, Raad Maciel, DO    
  [facility] Medicine    Subjective:  pt alert conversive cooperative      Current Facility-Administered Medications:     clopidogrel (PLAVIX) tablet 75 mg, 75 mg, Oral, Daily, Sandra Awad PA, 75 mg at 04/28/24 1021    ezetimibe (ZETIA) tablet 10 mg, 10 mg, Oral, Nightly, Raad Maciel DO, 10 mg at 04/26/24 2021    amLODIPine (NORVASC) tablet 2.5 mg, 2.5 mg, Oral, Daily, Raad Maciel DO, 2.5 mg at 04/28/24 1021    aspirin EC tablet 81 mg, 81 mg, Oral, Daily, Raad Maciel DO, 81 mg at 04/28/24 1021    levothyroxine (SYNTHROID) tablet 25 mcg, 25 mcg, Oral, Daily, Raad Maciel DO, 25 mcg at 04/29/24 0532    sodium chloride flush 0.9 % injection 5-40 mL, 5-40 mL, IntraVENous, 2 times per day, Raad Maciel DO, 10 mL at 04/28/24 2053    sodium chloride flush 0.9 % injection 5-40 mL, 5-40 mL, IntraVENous, PRN, Raad Maciel DO    0.9 % sodium chloride infusion, , IntraVENous, PRN, Raad Maciel DO    potassium chloride (KLOR-CON M) extended release tablet 40 mEq, 40 mEq, Oral, PRN **OR** potassium bicarb-citric acid (EFFER-K) effervescent tablet 40 mEq, 40 mEq, Oral, PRN **OR** potassium chloride 10 mEq/100 mL IVPB (Peripheral Line), 10 mEq, IntraVENous, PRN, Raad Maciel DO    magnesium sulfate 2000 mg in 50 mL IVPB premix, 2,000 mg, IntraVENous, PRN, Raad Maciel DO    enoxaparin (LOVENOX) injection 40 mg, 40 mg, SubCUTAneous, Daily, Raad Maciel DO, 40 mg at 04/28/24 1021    ondansetron (ZOFRAN-ODT) disintegrating tablet 4 mg, 4 mg, Oral, Q8H PRN **OR** ondansetron (ZOFRAN) injection 4 mg, 4 mg, IntraVENous, Q6H PRN, [place], Raad M, DO    polyethylene glycol (GLYCOLAX) packet 17 g, 17 g, Oral, Daily PRN, Raad Maciel, DO    acetaminophen (TYLENOL) tablet 650 mg, 650 mg, Oral, Q6H PRN, 650 mg at 04/26/24 2021 **OR** acetaminophen (TYLENOL) suppository 650 mg, 650 mg, Rectal, Q6H PRN, Raad Maciel, DO    fentaNYL (SUBLIMAZE) injection 25 mcg, 25 mcg, 
Assisted pt to restroom.  Pt uses a walker with steady gait.  
Assisted to the restroom.  Positive BM and urine OP  
Assisted to the restroom.  Positive urine OP  
Call placed to ECHO department to have echo read.   
Dr. Maciel notified pt will not be able to discharge today as she will need precert for SNF. Per Dr. Maciel, no downgrade at this time.   
MRI screening needs completed, thank you.  
[facility]  Neurology follow up     Date:  4/29/2024  Patient Name:  Piedad Kinsey  YOB: 1934  MRN: [id number]     PCP:  Dave Li DO   Referring:  No ref. provider found    Chief Complaint: stroke like symptoms     History obtained from: patient, chart     Assessment  Acute stroke right basal ganglia likely due to HTN  --- prior history of stroke in 9/2021  --- Evidence of uncontrolled vascular risk factors of HTN (228/113) , HLD (), borderline DM (A1C ranging from 6.1-6.9 over the last couple years) , advanced age  --- Vessel imaging with significant intracranial atherosclerosis.     Plan  DAPT ASA and Plavix x 90 days due to intracranial atherosclerosis  Intolerance to statins- would recommend non statin agent if possible   Echo completed pending   Therapy evaluations   Stroke education   DVT prophylaxis   Stroke clinic follow up  Neurology will sign off       History of Present Illness:  Piedad Kinsey is a 89 y.o.  female presenting for evaluation of stroke.    PMH significant for dementia, HTN, HLD, diabetes, MI, prior stroke, PUD, CKD    She presented with L side weakness, confusion and facial droop. BP on arrival was 228/113. Stroke alert was called. NIH was 10, but rapidly improving down to a 1 therefore TNK was no longer indicated. She was loaded with ASA and Plavix. Has intracranial stenosis, but no LVO.     MRI brain shows an acute RBG stroke, echo pending. Was on ASA 81 mg daily PTA.     She feels mostly improved- denies vision loss, speech difficulty, weakness, numbness.     She does have a prior stroke in 2021 that had presented with L hemiparesis and balance impairment- but these symptoms appear to have resolved.     Pt in chair at bedside with  at bedside.  Discussed stroke workup results, Echo pending read.  Addressed questions and concerns.      Physical Examination  Vitals   Vitals:    04/29/24 0014 04/29/24 0423 04/29/24 0815 04/29/24 
Occupational Therapy  OT BEDSIDE TREATMENT NOTE   [facility]  [address] [place], [place]        Date:2024  Patient Name: Piedad Kinsey  MRN: [id number]  : 1934  Room: [address] [place] [address]A     Per OT Eval:     Evaluating OT: Marisol Noguera OTR/L; [id number]        Referring Provider: Raad Maciel DO     Specific Provider Orders/Date: OT Eval and Treat 24        Diagnosis: TIA (transient ischemic attack)      Surgery: None this admission     Pertinent Medical History:  has a past medical history of Dementia (HCC), Diverticulosis, GIST (gastrointestinal stroma tumor), malignant, colon (HCC), Hyperlipidemia, Hypertension, MI (myocardial infarction) (HCC), Mitral regurgitation, and PUD (peptic ulcer disease).      Recommended Adaptive Equipment: BSC, AE LB dressing/bathing PRN      Precautions:  Fall Risk, [place], +alarms, L hemiparesis & decreased coordination      Assessment of current deficits    [x] Functional mobility            [x]ADLs           [x] Strength                  [x]Cognition    [x] Functional transfers          [x] IADLs         [x] Safety Awareness   [x]Endurance    [x] Fine Coordination                         [x] Balance      [] Vision/perception   []Sensation      [x]Gross Motor Coordination             [] ROM           [] Delirium                   [x] Motor Control      OT PLAN OF CARE   OT POC based on physician orders, patient diagnosis and results of clinical assessment     Frequency/Duration 3-5 days/wk for 2 weeks PRN   Specific OT Treatment Interventions to include:   * Instruction/training on adapted ADL techniques and AE recommendations to increase functional independence within precautions       * Training on energy conservation strategies, correct breathing pattern and techniques to improve independence/tolerance for self-care routine  * Functional transfer/mobility training/DME recommendations for increased 
Paged Dr. Maciel regarding pt getting combative  
Patient  and patient spouse ,Flavio , verbalized all personal belongings brought in this admission are present and accounted for and in patient belonging bag. Nurse to nurse report called to [facility], spoke with Kirsty. Was updated on patient status and continuation of care. Awaiting transport to [facility].Electronically signed by Enrrique Boateng RN on 4/30/2024 at 12:02 PM   
Patient is alert and oriented to self and is resting in bed eating supper. No signs of distress noted at present time. Patient re oriented to place. Bed alarm is on. Will continue to monitor patient.   
Patient's hearing aid x 2 removed and placed in an envelope. Envelope given to patient. Patient left CT department with hearing aid. X 2   
Physical Therapy  Physical Therapy Note    Name: Piedad Kinsey  : 1934  MRN: [id number]      Date of Service: 2024  Pt would benefit from acute rehab at discharge and is in need of a PM&R consult.    Nate Lutz, PT, DPT  [id number]      
Physical Therapy  Physical Therapy Treatment Note    Name: Piedad Kinsey  : 1934  MRN: [id number]      Date of Service: 2024    Evaluating PT:  Nate Lutz PT, DPT [id number]    Room #:  6420/6420-A  Diagnosis:  TIA (transient ischemic attack) [G45.9]  Stroke-like symptoms [R29.90]  PMHx/PSHx:    Past Medical History:   Diagnosis Date    Dementia (HCC)     Diverticulosis     GIST (gastrointestinal stroma tumor), malignant, colon (HCC)     Hyperlipidemia     Hypertension     MI (myocardial infarction) (HCC)     Mitral regurgitation     per cardiology    PUD (peptic ulcer disease)      Procedure/Surgery:  none  Precautions:  Falls, chair alarm, mild [place], L hemiparesis  Equipment Needs:  TBD    SUBJECTIVE:    Pt lives with  in a 1 story home with 4-5 step(s) to enter and 1 rail(s).      Pt ambulated with SPC x 1 week and was independent PTA.    OBJECTIVE:   Initial Evaluation  Date: 24 Treatment Date: 24 Short Term/ Long Term   Goals   AM-PAC 6 Clicks 10/24 11/24    Was pt agreeable to Eval/treatment? Yes Yes    Does pt have pain? No c/o pain Denied    Bed Mobility  Rolling: NT  Supine to sit: ModA with HOB elevated  Sit to supine: NT  Scooting: ModA Rolling: NT  Supine to sit: NT  Sit to supine: ModA  Scooting: ModA SBA   Transfers Sit to stand: ModA  Stand to sit: ModA  Stand pivot: MaxA with WW Sit to stand: ModA  Stand to sit: ModA  Stand pivot: ModA with WW SBA with WW   Ambulation   A few steps to bedside commode with MaxA with WW  A few steps to chair with MaxA with WW 40' x2 with FWW and ModA >100 feet with SBA with WW   Stair negotiation: ascended and descended NT NT >2 steps with 1 rail J[place]   ROM BUE:  WFL  BLE:  WFL     Strength BUE:  LUE weakness  RLE:  4/5  LLE:  3+/5 except L hip flexion 3/5  Increase by 1/3 MMT grade   Balance Sitting EOB:  Julee  Dynamic Standing:  MaxA with WW Sitting EOB:  SBA  Dynamic Standing:  MaxA with WW Sitting EOB:  Independent  Dynamic 
Physical Therapy  Physical Therapy Treatment Note    Name: iPedad Kinsey  : 1934  MRN: [id number]      Date of Service: 2024    Evaluating PT:  Nate Lutz PT, DPT [id number]    Room #:  6420/6420-A  Diagnosis:  TIA (transient ischemic attack) [G45.9]  Stroke-like symptoms [R29.90]  PMHx/PSHx:   has a past medical history of Dementia (HCC), Diverticulosis, GIST (gastrointestinal stroma tumor), malignant, colon (HCC), Hyperlipidemia, Hypertension, MI (myocardial infarction) (HCC), Mitral regurgitation, and PUD (peptic ulcer disease).   has a past surgical history that includes Hysterectomy; Upper gastrointestinal endoscopy (2014); Colonoscopy (2014); Upper gastrointestinal endoscopy (2016); shoulder surgery (Left); Endoscopy, colon, diagnostic (2016); Dilatation, esophagus; other surgical history (2016); Carotid endarterectomy (Right, 2019); Tonsillectomy and adenoidectomy; and Cataract removal with implant (Bilateral, ).  Procedure/Surgery:  none  Precautions:  Falls, chair alarm, mild [place], L hemiparesis, TSM  Equipment Needs:  TBD    SUBJECTIVE:    Pt lives with  in a 1 story home with 4-5 step(s) to enter and 1 rail(s).      Pt ambulated with SPC x 1 week and was independent PTA.    OBJECTIVE:   Initial Evaluation  Date: 24 Treatment Date: 2024 Short Term/ Long Term   Goals   AM-PAC 6 Clicks 10/24 16/24    Was pt agreeable to Eval/treatment? Yes Yes    Does pt have pain? No c/o pain No    Bed Mobility  Rolling: NT  Supine to sit: ModA with HOB elevated  Sit to supine: NT  Scooting: ModA Rolling: NT  Supine to sit: NT  Sit to supine: NT  Scooting: NT SBA   Transfers Sit to stand: ModA  Stand to sit: ModA  Stand pivot: MaxA with WW Sit to stand: [place]  Stand to sit: [place]  Stand pivot: NT SBA with WW   Ambulation   A few steps to bedside commode with MaxA with WW  A few steps to chair with MaxA with WW 50' x2 with WW Julee >100 feet with SBA with WW   Stair 
Pt combative.  Swung her fist at me.  Has climbed out of the bed multiple times.  
[facility]  Neurology follow up     Date:  4/28/2024  Patient Name:  Piedad Kinsey  YOB: 1934  MRN: [id number]     PCP:  Dave Li DO   Referring:  No ref. provider found    Chief Complaint: stroke like symptoms     History obtained from: patient, chart     Assessment  Acute stroke right basal ganglia likely due to HTN  --- prior history of stroke in 9/2021  --- Evidence of uncontrolled vascular risk factors of HTN (228/113) , HLD (), borderline DM (A1C ranging from 6.1-6.9 over the last couple years) , advanced age  --- Vessel imaging with significant intracranial atherosclerosis.     Plan  DAPT ASA and Plavix x 90 days due to intracranial atherosclerosis  Intolerance to statins- would recommend non statin agent if possible   Echo   Therapy evaluations   Risk factor modification   Stroke education   DVT prophylaxis   Will follow       History of Present Illness:  Piedad Kinsey is a 89 y.o.  female presenting for evaluation of stroke.    PMH significant for dementia, HTN, HLD, diabetes, MI, prior stroke, PUD, CKD    She presented with L side weakness, confusion and facial droop. BP on arrival was 228/113. Stroke alert was called. NIH was 10, but rapidly improving down to a 1 therefore TNK was no longer indicated. She was loaded with ASA and Plavix. Has intracranial stenosis, but no LVO.     MRI brain shows an acute RBG stroke, echo pending. Was on ASA 81 mg daily PTA.     She feels mostly improved- denies vision loss, speech difficulty, weakness, numbness.     She does have a prior stroke in 2021 that had presented with L hemiparesis and balance impairment- but these symptoms appear to have resolved.     Pt in chair at bedside with  helping her to get up.  Advised of MRI brain and Echo pending.  Addressed questions and concerns.      Medical History:   Past Medical History:   Diagnosis Date    Dementia (HCC)     Diverticulosis     GIST (gastrointestinal 
Unknown  -- Refer to Clinical Documentation Reviewer    PROVIDER RESPONSE TEXT:    This patient has Confusion and Left sided hemiplegia and aphasia due to TIA.    Query created by: Brooklynn Vines on 4/26/2024 7:50 AM      Electronically signed by:  Raad Maciel DO 4/30/2024 6:25 PM          
Problem:    TIA (transient ischemic attack)  Resolved Problems:    * No resolved hospital problems. *      Plan:  Increase activity dc planning awaiting mri and echo        Raad Maciel DO  1:56 PM  4/27/2024    
goals.    Patient and or family understand(s) diagnosis, prognosis, and plan of care:   [x] Yes [] No      PHYSICAL THERAPY PLAN OF CARE:    PT POC is established based on physician order and patient diagnosis     Referring provider/PT Order:  Raad Maciel, DO  /04/24/24 2215  PT eval and treat  Diagnosis:  TIA (transient ischemic attack) [G45.9]  Stroke-like symptoms [R29.90]  Specific instructions for next treatment:  Progress activity     Current Treatment Recommendations:     [x] Strengthening to improve independence with functional mobility   [] ROM to improve independence with functional mobility   [x] Balance Training to improve static/dynamic balance and to reduce fall risk  [x] Endurance Training to improve activity tolerance during functional mobility   [x] Transfer Training to improve safety and independence with all functional transfers   [x] Gait Training to improve gait mechanics, endurance and asses need for appropriate assistive device  [x] Stair Training in preparation for safe discharge home and/or into the community   [] Positioning to prevent skin breakdown and contractures  [x] Safety and Education Training   [x] Patient/Caregiver Education   [] HEP  [] Other     PT long term treatment goals are located in above grid    Frequency of treatments: 2-5x/week x 1-2 weeks.    Time in  1410  Time out  1435    Total Treatment Time  10 minutes     Evaluation Time includes thorough review of current medical information, gathering information on past medical history/social history and prior level of function, completion of standardized testing/informal observation of tasks, assessment of data and education on plan of care and goals.    CPT codes:  [x] Low Complexity PT evaluation 51008  [] Moderate Complexity PT evaluation 05324  [] High Complexity PT evaluation 98175  [] PT Re-evaluation 38101  [] Gait training 66572 - minutes  [] Manual therapy 34832 - minutes  [x] Therapeutic activities [id number] 10 
impairment    Lisinopril Other (See Comments)     Difficulty swallowing, reflux, headache, dizziness, leg cramps    Memantine Other (See Comments)     Brain fog and hand numbness    Pravachol [Pravastatin Sodium] Other (See Comments)     Patient states \"it almost killed me.\" Spoke with Patient's Daughter and she stated \"body cramps and extreme weakness.\"    Protonix [Pantoprazole Sodium] Other (See Comments)     Unable to explain    Statins Other (See Comments)     Intolerable. Patient states \"it almost killed me.\" Spoke with Patient's Daughter and she stated \"body cramps and extreme weakness.\"        Physical Examination  Vitals   Vitals:    04/25/24 2015 04/25/24 2307 04/26/24 0308 04/26/24 0943   BP: (!) 130/93 129/72 123/74 (!) 162/82   Pulse: 76 83 77 97   Resp: 11 17 17 16   Temp: 97.9 °F (36.6 °C) 98.2 °F (36.8 °C) 98.8 °F (37.1 °C) 97.7 °F (36.5 °C)   TempSrc: Temporal Oral Oral Oral   SpO2: 97% 97% 100% 99%   Weight:       Height:            General: Patient appears in no acute distress.   HEENT: Normocephalic, atraumatic  Chest: no dyspnea  Heart: RRR  Extremities/Peripheral vascular: No edema/swelling noted.     Neurologic Examination    Mental Status  Alert, and oriented to person, place and time. Speech is fluent with intact comprehension. No evidence of memory impairment. Attention and concentration appeared normal.     Cranial Nerves  II. Visual fields - L HH   III, IV, VI: Pupils equally round and reactive to light, 3 to 2 mm bilaterally.   EOMs: full, no nystagmus.   V. Facial sensation intact to light touch bilaterally  VII: Facial movements symmetric and strong  VIII: Hearing intact to voice  IX,X: Palate elevates symmetrically. No dysarthria  XI: Sternocleidomastoid and trapezius 5/5 bilaterally   XII: Tongue is midline    Motor    Mild L hemiparesis     Tone: Normal in all four limbs    Bulk: Normal in all four limbs with no evidence of atrophy    Pronator drift: absent 
pivot: Stand by Assist   Commode: Stand by Assist     Functional Mobility Maximal Assist  Use of ww to<>from [facility]<>bedside chair  Luiz  Pt ambulated short household distance in room EOB<>Bathroom with w.w. Stand by Assist w/ use of Appropriate AD   Balance Sitting:     Static - Minimal Assist     Dynamic - Minimal Assist  Standing: Maximal Assist w/ ww  Sitting EOB:  SBA    Standing:  Luiz Sitting:     Static: Independent      Dynamic: Independent   Standing: Stand by Assist    Activity Tolerance Fair tolerance w/ light activity  Fair- Good   Visual/  Perceptual Glasses: Yes  Visual tracking WFL       Safety Fair-   Good  during ADL completion      Hand Dominance Right    AROM (PROM) Strength Additional Info:  Goal: (PRN)   RUE  Shoulder flexion ~120 degrees, distally WFL 4-/5 grossly tested Fair+  and FMC/dexterity noted during ADL tasks    Improve overall RUE strength WFL for participation in functional tasks         LUE Shoulder flexion ~90 degrees, distally WFL Shoulder 3-/5, distally 4-/5 grossly tested Fair  and fair- FMC/dexterity noted during ADL tasks    Improve overall LUE strength WFL for participation in functional tasks      Hearing: [place]  Sensation:  No c/o numbness or tingling  Tone: WFL  Edema: Unremarkable       Education:  Pt was educated on role of OT, goals to be reached, importance of OOB activity, safety and hand placement with transfers, safety/balance and walker management with functional mobility, and techniques to assist with LB dressing tasks    Comments: Upon arrival pt seated upright in bed, agreeable to therapy, visitor present, speaking with nursing okaying pt to be seen this session. At end of session, pt seated upright in chair, visitor still present, all lines and tubes intact, call light within reach, nursing aware.    Pt has made fair progress towards set goals.   Continue with current plan of care      Treatment Time In: 1:20pm           Treatment Time Out: 1:44pm           
criteria.   5. No sign of stenosis of the right carotid system or of the vertebral   arteries in the neck.         CTA NECK W CONTRAST   Final Result   1. 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   2. 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   3. CTA of the neck shows stable 70-90% stenosis of the origin of the right   subclavian artery.   4. Stable mild irregular narrowing of the origin and proximal portion of the   left internal carotid artery with minimal, less than 50% stenosis using   NASCET criteria.   5. No sign of stenosis of the right carotid system or of the vertebral   arteries in the neck.         MRI BRAIN WO CONTRAST    (Results Pending)       All labs and imaging studies reviewed independently today    Electronically signed by NOHELIA Osman CNP on 4/27/2024 at 3:41 PM

## 2024-05-01 ENCOUNTER — OUTSIDE SERVICES (OUTPATIENT)
Dept: PRIMARY CARE CLINIC | Age: 89
End: 2024-05-01
Payer: MEDICARE

## 2024-05-01 ENCOUNTER — TELEPHONE (OUTPATIENT)
Dept: NEUROLOGY | Age: 89
End: 2024-05-01

## 2024-05-01 DIAGNOSIS — I63.9 ACUTE CEREBROVASCULAR ACCIDENT (CVA) (HCC): Primary | ICD-10-CM

## 2024-05-01 DIAGNOSIS — I10 PRIMARY HYPERTENSION: ICD-10-CM

## 2024-05-01 DIAGNOSIS — I16.1 HYPERTENSIVE EMERGENCY: ICD-10-CM

## 2024-05-01 DIAGNOSIS — G45.9 TIA (TRANSIENT ISCHEMIC ATTACK): ICD-10-CM

## 2024-05-01 DIAGNOSIS — Z91.81 AT MAXIMUM RISK FOR FALL: ICD-10-CM

## 2024-05-01 DIAGNOSIS — I65.21 CAROTID STENOSIS, RIGHT: ICD-10-CM

## 2024-05-01 DIAGNOSIS — R53.1 GENERALIZED WEAKNESS: ICD-10-CM

## 2024-05-01 DIAGNOSIS — F01.50 VASCULAR DEMENTIA WITHOUT BEHAVIORAL DISTURBANCE (HCC): ICD-10-CM

## 2024-05-01 DIAGNOSIS — R53.81 PHYSICAL DECONDITIONING: ICD-10-CM

## 2024-05-01 DIAGNOSIS — E03.9 ACQUIRED HYPOTHYROIDISM: ICD-10-CM

## 2024-05-01 LAB
ALBUMIN SERPL-MCNC: 4.1 G/DL (ref 3.5–5.2)
ALP SERPL-CCNC: 102 U/L (ref 35–104)
ALT SERPL-CCNC: 20 U/L (ref 0–32)
ANION GAP SERPL CALCULATED.3IONS-SCNC: 18 MMOL/L (ref 7–16)
AST SERPL-CCNC: 21 U/L (ref 0–31)
BILIRUB SERPL-MCNC: 0.4 MG/DL (ref 0–1.2)
BUN SERPL-MCNC: 21 MG/DL (ref 6–23)
CALCIUM SERPL-MCNC: 9.7 MG/DL (ref 8.6–10.2)
CHLORIDE SERPL-SCNC: 105 MMOL/L (ref 98–107)
CO2 SERPL-SCNC: 18 MMOL/L (ref 22–29)
CREAT SERPL-MCNC: 0.9 MG/DL (ref 0.5–1)
ERYTHROCYTE [DISTWIDTH] IN BLOOD BY AUTOMATED COUNT: 12.7 % (ref 11.5–15)
GFR, ESTIMATED: 58 ML/MIN/1.73M2
GLUCOSE SERPL-MCNC: 120 MG/DL (ref 74–99)
HBA1C MFR BLD: 6.4 % (ref 4–5.6)
HCT VFR BLD AUTO: 43.8 % (ref 34–48)
HGB BLD-MCNC: 14.3 G/DL (ref 11.5–15.5)
MCH RBC QN AUTO: 30.2 PG (ref 26–35)
MCHC RBC AUTO-ENTMCNC: 32.6 G/DL (ref 32–34.5)
MCV RBC AUTO: 92.6 FL (ref 80–99.9)
PLATELET # BLD AUTO: 219 K/UL (ref 130–450)
PMV BLD AUTO: 12.4 FL (ref 7–12)
POTASSIUM SERPL-SCNC: 4.1 MMOL/L (ref 3.5–5)
PROT SERPL-MCNC: 6.6 G/DL (ref 6.4–8.3)
RBC # BLD AUTO: 4.73 M/UL (ref 3.5–5.5)
SODIUM SERPL-SCNC: 141 MMOL/L (ref 132–146)
TSH SERPL DL<=0.05 MIU/L-ACNC: 4.73 UIU/ML (ref 0.27–4.2)
WBC OTHER # BLD: 6.2 K/UL (ref 4.5–11.5)

## 2024-05-01 PROCEDURE — 99305 1ST NF CARE MODERATE MDM 35: CPT | Performed by: STUDENT IN AN ORGANIZED HEALTH CARE EDUCATION/TRAINING PROGRAM

## 2024-05-01 NOTE — PROGRESS NOTES
Insecurity: No Food Insecurity (4/26/2024)    Hunger Vital Sign     Worried About Running Out of Food in the Last Year: Never true     Ran Out of Food in the Last Year: Never true   Transportation Needs: No Transportation Needs (4/26/2024)    PRAPARE - Transportation     Lack of Transportation (Medical): No     Lack of Transportation (Non-Medical): No   Physical Activity: Insufficiently Active (2/28/2024)    Exercise Vital Sign     Days of Exercise per Week: 3 days     Minutes of Exercise per Session: 30 min   Housing Stability: Low Risk  (4/26/2024)    Housing Stability Vital Sign     Unable to Pay for Housing in the Last Year: No     Number of Places Lived in the Last Year: 1     Unstable Housing in the Last Year: No        HPI    Piedad Kinsey is a very pleasant and cooperative yet confused 89-year-old female.  Patient is unsure and unclear of why she was in the hospital.  I advised her she was in the hospital as she had a stroke.  I stated that her symptoms improved and thus she was discharged to our facility in a stable condition to gain her strength back.  Currently, patient has no specific complaints.  She denies any fever, chills, lightheadedness, dizziness, chest pain, or shortness of breath.     Allergies   Allergen Reactions    Carafate [Sucralfate] Other (See Comments)     Unable to explain    Cefdinir Other (See Comments)     Unable to explain    Cetirizine & Related Other (See Comments)     Makes her forgetful and sleepy    Gabapentin Dizziness or Vertigo     Significant cognitive impairment    Lisinopril Other (See Comments)     Difficulty swallowing, reflux, headache, dizziness, leg cramps    Memantine Other (See Comments)     Brain fog and hand numbness    Pravachol [Pravastatin Sodium] Other (See Comments)     Patient states \"it almost killed me.\" Spoke with Patient's Daughter and she stated \"body cramps and extreme weakness.\"    Protonix [Pantoprazole Sodium] Other (See Comments)     Unable to

## 2024-05-01 NOTE — TELEPHONE ENCOUNTER
Viktoria HARRIS @ [facility] called to schedule patient for a  HFU/TIA (transient ischemic attack) YZ dc 04/30. Please contact Viktoria to schedule.

## 2024-05-04 ENCOUNTER — OUTSIDE SERVICES (OUTPATIENT)
Dept: PRIMARY CARE CLINIC | Age: 89
End: 2024-05-04

## 2024-05-04 DIAGNOSIS — R53.81 PHYSICAL DECONDITIONING: ICD-10-CM

## 2024-05-04 DIAGNOSIS — I63.9 ACUTE CEREBROVASCULAR ACCIDENT (CVA) (HCC): Primary | ICD-10-CM

## 2024-05-04 DIAGNOSIS — I16.1 HYPERTENSIVE EMERGENCY: ICD-10-CM

## 2024-05-04 DIAGNOSIS — E11.22 TYPE 2 DIABETES MELLITUS WITH STAGE 3 CHRONIC KIDNEY DISEASE, WITHOUT LONG-TERM CURRENT USE OF INSULIN, UNSPECIFIED WHETHER STAGE 3A OR 3B CKD (HCC): ICD-10-CM

## 2024-05-04 DIAGNOSIS — G31.84 MILD COGNITIVE IMPAIRMENT WITH MEMORY LOSS: ICD-10-CM

## 2024-05-04 DIAGNOSIS — G45.9 TIA (TRANSIENT ISCHEMIC ATTACK): ICD-10-CM

## 2024-05-04 DIAGNOSIS — I65.21 CAROTID STENOSIS, RIGHT: ICD-10-CM

## 2024-05-04 DIAGNOSIS — I10 PRIMARY HYPERTENSION: ICD-10-CM

## 2024-05-04 DIAGNOSIS — E03.9 HYPOTHYROIDISM, UNSPECIFIED TYPE: ICD-10-CM

## 2024-05-04 DIAGNOSIS — R53.1 GENERALIZED WEAKNESS: ICD-10-CM

## 2024-05-04 DIAGNOSIS — E03.9 ACQUIRED HYPOTHYROIDISM: ICD-10-CM

## 2024-05-04 DIAGNOSIS — F01.50 VASCULAR DEMENTIA WITHOUT BEHAVIORAL DISTURBANCE (HCC): ICD-10-CM

## 2024-05-04 DIAGNOSIS — N18.30 TYPE 2 DIABETES MELLITUS WITH STAGE 3 CHRONIC KIDNEY DISEASE, WITHOUT LONG-TERM CURRENT USE OF INSULIN, UNSPECIFIED WHETHER STAGE 3A OR 3B CKD (HCC): ICD-10-CM

## 2024-05-04 ASSESSMENT — ENCOUNTER SYMPTOMS
WHEEZING: 0
SORE THROAT: 0
SINUS PRESSURE: 0
EYE DISCHARGE: 0
VOMITING: 0
DIARRHEA: 0
COUGH: 0
CONSTIPATION: 0
SHORTNESS OF BREATH: 0
EYE REDNESS: 0
CHEST TIGHTNESS: 0
EYE PAIN: 0
ABDOMINAL DISTENTION: 0
EYE ITCHING: 0
NAUSEA: 0

## 2024-05-07 ENCOUNTER — OUTSIDE SERVICES (OUTPATIENT)
Dept: PRIMARY CARE CLINIC | Age: 89
End: 2024-05-07

## 2024-05-07 DIAGNOSIS — I63.9 ACUTE CEREBROVASCULAR ACCIDENT (CVA) (HCC): Primary | ICD-10-CM

## 2024-05-07 DIAGNOSIS — I16.1 HYPERTENSIVE EMERGENCY: ICD-10-CM

## 2024-05-07 DIAGNOSIS — F01.50 VASCULAR DEMENTIA WITHOUT BEHAVIORAL DISTURBANCE (HCC): ICD-10-CM

## 2024-05-07 DIAGNOSIS — I10 PRIMARY HYPERTENSION: ICD-10-CM

## 2024-05-07 DIAGNOSIS — G31.84 MILD COGNITIVE IMPAIRMENT WITH MEMORY LOSS: ICD-10-CM

## 2024-05-07 DIAGNOSIS — E11.22 TYPE 2 DIABETES MELLITUS WITH STAGE 3 CHRONIC KIDNEY DISEASE, WITHOUT LONG-TERM CURRENT USE OF INSULIN, UNSPECIFIED WHETHER STAGE 3A OR 3B CKD (HCC): ICD-10-CM

## 2024-05-07 DIAGNOSIS — G45.9 TIA (TRANSIENT ISCHEMIC ATTACK): ICD-10-CM

## 2024-05-07 DIAGNOSIS — I21.4 NSTEMI (NON-ST ELEVATED MYOCARDIAL INFARCTION) (HCC): ICD-10-CM

## 2024-05-07 DIAGNOSIS — N18.30 STAGE 3 CHRONIC KIDNEY DISEASE, UNSPECIFIED WHETHER STAGE 3A OR 3B CKD (HCC): ICD-10-CM

## 2024-05-07 DIAGNOSIS — E03.9 ACQUIRED HYPOTHYROIDISM: ICD-10-CM

## 2024-05-07 DIAGNOSIS — R53.1 GENERALIZED WEAKNESS: ICD-10-CM

## 2024-05-07 DIAGNOSIS — I65.21 CAROTID STENOSIS, RIGHT: ICD-10-CM

## 2024-05-07 DIAGNOSIS — N18.30 TYPE 2 DIABETES MELLITUS WITH STAGE 3 CHRONIC KIDNEY DISEASE, WITHOUT LONG-TERM CURRENT USE OF INSULIN, UNSPECIFIED WHETHER STAGE 3A OR 3B CKD (HCC): ICD-10-CM

## 2024-05-07 NOTE — TELEPHONE ENCOUNTER
[facility] to schedule.  Scheduled pt with Sandra Awad 7/29/24.  [facility] at [facility] and then will be going home.  Please contact [facility] or pt if this appt is not acceptable.

## 2024-05-08 ENCOUNTER — OUTSIDE SERVICES (OUTPATIENT)
Dept: PRIMARY CARE CLINIC | Age: 89
End: 2024-05-08
Payer: MEDICARE

## 2024-05-08 DIAGNOSIS — I63.9 ACUTE CEREBROVASCULAR ACCIDENT (CVA) (HCC): Primary | ICD-10-CM

## 2024-05-08 DIAGNOSIS — I10 PRIMARY HYPERTENSION: ICD-10-CM

## 2024-05-08 DIAGNOSIS — M54.50 ACUTE LOW BACK PAIN, UNSPECIFIED BACK PAIN LATERALITY, UNSPECIFIED WHETHER SCIATICA PRESENT: ICD-10-CM

## 2024-05-08 LAB
ALBUMIN SERPL-MCNC: 3.7 G/DL (ref 3.5–5.2)
ALP SERPL-CCNC: 80 U/L (ref 35–104)
ALT SERPL-CCNC: 9 U/L (ref 0–32)
ANION GAP SERPL CALCULATED.3IONS-SCNC: 16 MMOL/L (ref 7–16)
AST SERPL-CCNC: 16 U/L (ref 0–31)
BILIRUB SERPL-MCNC: 0.4 MG/DL (ref 0–1.2)
BUN SERPL-MCNC: 21 MG/DL (ref 6–23)
CALCIUM SERPL-MCNC: 9.3 MG/DL (ref 8.6–10.2)
CHLORIDE SERPL-SCNC: 108 MMOL/L (ref 98–107)
CO2 SERPL-SCNC: 18 MMOL/L (ref 22–29)
CREAT SERPL-MCNC: 1 MG/DL (ref 0.5–1)
ERYTHROCYTE [DISTWIDTH] IN BLOOD BY AUTOMATED COUNT: 13 % (ref 11.5–15)
GFR, ESTIMATED: 57 ML/MIN/1.73M2
GLUCOSE SERPL-MCNC: 108 MG/DL (ref 74–99)
HCT VFR BLD AUTO: 41.7 % (ref 34–48)
HGB BLD-MCNC: 13.4 G/DL (ref 11.5–15.5)
MCH RBC QN AUTO: 30.2 PG (ref 26–35)
MCHC RBC AUTO-ENTMCNC: 32.1 G/DL (ref 32–34.5)
MCV RBC AUTO: 93.9 FL (ref 80–99.9)
PLATELET # BLD AUTO: 214 K/UL (ref 130–450)
PMV BLD AUTO: 12.3 FL (ref 7–12)
POTASSIUM SERPL-SCNC: 3.6 MMOL/L (ref 3.5–5)
PROT SERPL-MCNC: 5.7 G/DL (ref 6.4–8.3)
RBC # BLD AUTO: 4.44 M/UL (ref 3.5–5.5)
SODIUM SERPL-SCNC: 142 MMOL/L (ref 132–146)
WBC OTHER # BLD: 5.5 K/UL (ref 4.5–11.5)

## 2024-05-08 PROCEDURE — 99308 SBSQ NF CARE LOW MDM 20: CPT | Performed by: STUDENT IN AN ORGANIZED HEALTH CARE EDUCATION/TRAINING PROGRAM

## 2024-05-08 NOTE — PROGRESS NOTES
Piedad Kinsey (:  1934) is a 89 y.o. female is seen today for skilled assessment    Subjective   Patient states that she is having low back pain.  She denies any fever, chills, chest pain, or shortness of breath.    Location: [facility]  Nursing and progress notes reviewed.      Past Medical History:   Diagnosis Date    Dementia (HCC)     Diverticulosis     GIST (gastrointestinal stroma tumor), malignant, colon (HCC)     Hyperlipidemia     Hypertension     MI (myocardial infarction) (HCC)     Mitral regurgitation     per cardiology    PUD (peptic ulcer disease)        Allergies   Allergen Reactions    Carafate [Sucralfate] Other (See Comments)     Unable to explain    Cefdinir Other (See Comments)     Unable to explain    Cetirizine & Related Other (See Comments)     Makes her forgetful and sleepy    Gabapentin Dizziness or Vertigo     Significant cognitive impairment    Lisinopril Other (See Comments)     Difficulty swallowing, reflux, headache, dizziness, leg cramps    Memantine Other (See Comments)     Brain fog and hand numbness    Pravachol [Pravastatin Sodium] Other (See Comments)     Patient states \"it almost killed me.\" Spoke with Patient's Daughter and she stated \"body cramps and extreme weakness.\"    Protonix [Pantoprazole Sodium] Other (See Comments)     Unable to explain    Statins Other (See Comments)     Intolerable. Patient states \"it almost killed me.\" Spoke with Patient's Daughter and she stated \"body cramps and extreme weakness.\"           ROS: As above       Objective   Physical Exam  Vitals and nursing note reviewed.   Constitutional:       General: She is not in acute distress.     Appearance: Normal appearance. She is not ill-appearing.   HENT:      Head: Normocephalic and atraumatic.      Right Ear: External ear normal.      Left Ear: External ear normal.      Nose: Nose normal. No congestion or rhinorrhea.      Mouth/Throat:      Mouth: Mucous

## 2024-05-09 ENCOUNTER — OUTSIDE SERVICES (OUTPATIENT)
Dept: PRIMARY CARE CLINIC | Age: 89
End: 2024-05-09
Payer: MEDICARE

## 2024-05-09 DIAGNOSIS — E11.22 TYPE 2 DIABETES MELLITUS WITH STAGE 3 CHRONIC KIDNEY DISEASE, WITHOUT LONG-TERM CURRENT USE OF INSULIN, UNSPECIFIED WHETHER STAGE 3A OR 3B CKD (HCC): ICD-10-CM

## 2024-05-09 DIAGNOSIS — N18.30 STAGE 3 CHRONIC KIDNEY DISEASE, UNSPECIFIED WHETHER STAGE 3A OR 3B CKD (HCC): ICD-10-CM

## 2024-05-09 DIAGNOSIS — F01.50 VASCULAR DEMENTIA WITHOUT BEHAVIORAL DISTURBANCE (HCC): ICD-10-CM

## 2024-05-09 DIAGNOSIS — G31.84 MILD COGNITIVE IMPAIRMENT WITH MEMORY LOSS: ICD-10-CM

## 2024-05-09 DIAGNOSIS — I21.4 NSTEMI (NON-ST ELEVATED MYOCARDIAL INFARCTION) (HCC): ICD-10-CM

## 2024-05-09 DIAGNOSIS — R53.1 GENERALIZED WEAKNESS: ICD-10-CM

## 2024-05-09 DIAGNOSIS — I10 PRIMARY HYPERTENSION: ICD-10-CM

## 2024-05-09 DIAGNOSIS — N18.30 TYPE 2 DIABETES MELLITUS WITH STAGE 3 CHRONIC KIDNEY DISEASE, WITHOUT LONG-TERM CURRENT USE OF INSULIN, UNSPECIFIED WHETHER STAGE 3A OR 3B CKD (HCC): ICD-10-CM

## 2024-05-09 DIAGNOSIS — I16.1 HYPERTENSIVE EMERGENCY: ICD-10-CM

## 2024-05-09 DIAGNOSIS — I63.9 ACUTE CEREBROVASCULAR ACCIDENT (CVA) (HCC): Primary | ICD-10-CM

## 2024-05-09 DIAGNOSIS — E03.9 ACQUIRED HYPOTHYROIDISM: ICD-10-CM

## 2024-05-09 DIAGNOSIS — I65.21 CAROTID STENOSIS, RIGHT: ICD-10-CM

## 2024-05-09 DIAGNOSIS — G45.9 TIA (TRANSIENT ISCHEMIC ATTACK): ICD-10-CM

## 2024-05-09 PROCEDURE — 99309 SBSQ NF CARE MODERATE MDM 30: CPT | Performed by: NURSE PRACTITIONER

## 2024-05-14 ENCOUNTER — OUTSIDE SERVICES (OUTPATIENT)
Dept: PRIMARY CARE CLINIC | Age: 89
End: 2024-05-14
Payer: MEDICARE

## 2024-05-14 DIAGNOSIS — I10 PRIMARY HYPERTENSION: ICD-10-CM

## 2024-05-14 DIAGNOSIS — G31.84 MILD COGNITIVE IMPAIRMENT WITH MEMORY LOSS: ICD-10-CM

## 2024-05-14 DIAGNOSIS — E03.9 ACQUIRED HYPOTHYROIDISM: ICD-10-CM

## 2024-05-14 DIAGNOSIS — I63.9 ACUTE CEREBROVASCULAR ACCIDENT (CVA) (HCC): Primary | ICD-10-CM

## 2024-05-14 DIAGNOSIS — F01.50 VASCULAR DEMENTIA WITHOUT BEHAVIORAL DISTURBANCE (HCC): ICD-10-CM

## 2024-05-14 DIAGNOSIS — I16.1 HYPERTENSIVE EMERGENCY: ICD-10-CM

## 2024-05-14 DIAGNOSIS — G45.9 TIA (TRANSIENT ISCHEMIC ATTACK): ICD-10-CM

## 2024-05-14 DIAGNOSIS — I65.21 CAROTID STENOSIS, RIGHT: ICD-10-CM

## 2024-05-14 DIAGNOSIS — N18.30 STAGE 3 CHRONIC KIDNEY DISEASE, UNSPECIFIED WHETHER STAGE 3A OR 3B CKD (HCC): ICD-10-CM

## 2024-05-14 DIAGNOSIS — I21.4 NSTEMI (NON-ST ELEVATED MYOCARDIAL INFARCTION) (HCC): ICD-10-CM

## 2024-05-14 DIAGNOSIS — N18.30 TYPE 2 DIABETES MELLITUS WITH STAGE 3 CHRONIC KIDNEY DISEASE, WITHOUT LONG-TERM CURRENT USE OF INSULIN, UNSPECIFIED WHETHER STAGE 3A OR 3B CKD (HCC): ICD-10-CM

## 2024-05-14 DIAGNOSIS — R53.1 GENERALIZED WEAKNESS: ICD-10-CM

## 2024-05-14 DIAGNOSIS — E11.22 TYPE 2 DIABETES MELLITUS WITH STAGE 3 CHRONIC KIDNEY DISEASE, WITHOUT LONG-TERM CURRENT USE OF INSULIN, UNSPECIFIED WHETHER STAGE 3A OR 3B CKD (HCC): ICD-10-CM

## 2024-05-14 PROCEDURE — 99309 SBSQ NF CARE MODERATE MDM 30: CPT | Performed by: NURSE PRACTITIONER

## 2024-05-15 ENCOUNTER — OUTSIDE SERVICES (OUTPATIENT)
Dept: PRIMARY CARE CLINIC | Age: 89
End: 2024-05-15
Payer: MEDICARE

## 2024-05-15 DIAGNOSIS — I10 PRIMARY HYPERTENSION: ICD-10-CM

## 2024-05-15 DIAGNOSIS — I63.9 ACUTE CEREBROVASCULAR ACCIDENT (CVA) (HCC): Primary | ICD-10-CM

## 2024-05-15 LAB
ANION GAP SERPL CALCULATED.3IONS-SCNC: 14 MMOL/L (ref 7–16)
BUN SERPL-MCNC: 14 MG/DL (ref 6–23)
CALCIUM SERPL-MCNC: 9.7 MG/DL (ref 8.6–10.2)
CHLORIDE SERPL-SCNC: 108 MMOL/L (ref 98–107)
CO2 SERPL-SCNC: 21 MMOL/L (ref 22–29)
CREAT SERPL-MCNC: 0.9 MG/DL (ref 0.5–1)
ERYTHROCYTE [DISTWIDTH] IN BLOOD BY AUTOMATED COUNT: 12.7 % (ref 11.5–15)
GFR, ESTIMATED: 64 ML/MIN/1.73M2
GLUCOSE SERPL-MCNC: 124 MG/DL (ref 74–99)
HCT VFR BLD AUTO: 44.6 % (ref 34–48)
HGB BLD-MCNC: 14.7 G/DL (ref 11.5–15.5)
MCH RBC QN AUTO: 30.4 PG (ref 26–35)
MCHC RBC AUTO-ENTMCNC: 33 G/DL (ref 32–34.5)
MCV RBC AUTO: 92.1 FL (ref 80–99.9)
PLATELET # BLD AUTO: 179 K/UL (ref 130–450)
PMV BLD AUTO: 11.9 FL (ref 7–12)
POTASSIUM SERPL-SCNC: 3.8 MMOL/L (ref 3.5–5)
RBC # BLD AUTO: 4.84 M/UL (ref 3.5–5.5)
SODIUM SERPL-SCNC: 143 MMOL/L (ref 132–146)
WBC OTHER # BLD: 5.6 K/UL (ref 4.5–11.5)

## 2024-05-15 PROCEDURE — 99308 SBSQ NF CARE LOW MDM 20: CPT | Performed by: STUDENT IN AN ORGANIZED HEALTH CARE EDUCATION/TRAINING PROGRAM

## 2024-05-16 NOTE — PROGRESS NOTES
Piedad Kinsey (:  1934) is a 89 y.o. female is seen today for skilled assessment    Subjective   Patient has no specific complaints today.  She is doing she is feeling well overall.  She denies any fever, chills, chest pain, or shortness of breath.    Location: [place] [facility]  Nursing and progress notes reviewed.      Past Medical History:   Diagnosis Date    Dementia (HCC)     Diverticulosis     GIST (gastrointestinal stroma tumor), malignant, colon (HCC)     Hyperlipidemia     Hypertension     MI (myocardial infarction) (HCC)     Mitral regurgitation     per cardiology    PUD (peptic ulcer disease)        Allergies   Allergen Reactions    Carafate [Sucralfate] Other (See Comments)     Unable to explain    Cefdinir Other (See Comments)     Unable to explain    Cetirizine & Related Other (See Comments)     Makes her forgetful and sleepy    Gabapentin Dizziness or Vertigo     Significant cognitive impairment    Lisinopril Other (See Comments)     Difficulty swallowing, reflux, headache, dizziness, leg cramps    Memantine Other (See Comments)     Brain fog and hand numbness    Pravachol [Pravastatin Sodium] Other (See Comments)     Patient states \"it almost killed me.\" Spoke with Patient's Daughter and she stated \"body cramps and extreme weakness.\"    Protonix [Pantoprazole Sodium] Other (See Comments)     Unable to explain    Statins Other (See Comments)     Intolerable. Patient states \"it almost killed me.\" Spoke with Patient's Daughter and she stated \"body cramps and extreme weakness.\"           ROS: As above       Objective   Physical Exam  Vitals and nursing note reviewed.   Constitutional:       General: She is not in acute distress.     Appearance: Normal appearance. She is not ill-appearing.   HENT:      Head: Normocephalic and atraumatic.      Right Ear: External ear normal.      Left Ear: External ear normal.      Nose: Nose normal. No congestion or rhinorrhea.

## 2024-05-21 ENCOUNTER — OFFICE VISIT (OUTPATIENT)
Dept: PRIMARY CARE CLINIC | Age: 89
End: 2024-05-21

## 2024-05-21 VITALS
DIASTOLIC BLOOD PRESSURE: 70 MMHG | SYSTOLIC BLOOD PRESSURE: 132 MMHG | OXYGEN SATURATION: 96 % | TEMPERATURE: 97.5 F | HEART RATE: 87 BPM | BODY MASS INDEX: 22.5 KG/M2 | WEIGHT: 140 LBS | HEIGHT: 66 IN

## 2024-05-21 DIAGNOSIS — F01.50 VASCULAR DEMENTIA WITHOUT BEHAVIORAL DISTURBANCE (HCC): ICD-10-CM

## 2024-05-21 DIAGNOSIS — H70.91 MASTOIDITIS OF RIGHT SIDE: Primary | ICD-10-CM

## 2024-05-21 DIAGNOSIS — Z09 HOSPITAL DISCHARGE FOLLOW-UP: ICD-10-CM

## 2024-05-21 DIAGNOSIS — I63.9 ACUTE CEREBROVASCULAR ACCIDENT (CVA) (HCC): ICD-10-CM

## 2024-05-21 DIAGNOSIS — I10 PRIMARY HYPERTENSION: ICD-10-CM

## 2024-05-21 RX ORDER — LEVOTHYROXINE SODIUM 0.05 MG/1
TABLET ORAL
COMMUNITY
Start: 2024-05-20

## 2024-05-21 RX ORDER — LEVOFLOXACIN 250 MG/1
250 TABLET, FILM COATED ORAL DAILY
Qty: 10 TABLET | Refills: 0 | Status: SHIPPED | OUTPATIENT
Start: 2024-05-21 | End: 2024-05-31

## 2024-05-21 ASSESSMENT — ENCOUNTER SYMPTOMS
COUGH: 0
SORE THROAT: 0
APNEA: 0
SORE THROAT: 0
DIARRHEA: 0
EYE REDNESS: 0
DIARRHEA: 0
EYE ITCHING: 0
EYE ITCHING: 0
WHEEZING: 0
BLOOD IN STOOL: 0
SINUS PRESSURE: 0
PHOTOPHOBIA: 0
EYE PAIN: 0
WHEEZING: 0
ABDOMINAL DISTENTION: 0
EYE DISCHARGE: 0
ABDOMINAL PAIN: 0
SORE THROAT: 0
DIARRHEA: 0
SINUS PRESSURE: 0
EYE PAIN: 0
VOMITING: 0
FACIAL SWELLING: 0
EYE DISCHARGE: 0
CHEST TIGHTNESS: 0
EYE REDNESS: 0
SHORTNESS OF BREATH: 0
ABDOMINAL DISTENTION: 0
ALLERGIC/IMMUNOLOGIC NEGATIVE: 1
NAUSEA: 0
COLOR CHANGE: 0
SORE THROAT: 0
CONSTIPATION: 0
EYE REDNESS: 0
SINUS PRESSURE: 0
VOMITING: 0
CONSTIPATION: 0
WHEEZING: 0
DIARRHEA: 0
COUGH: 0
COUGH: 0
NAUSEA: 0
CONSTIPATION: 0
ABDOMINAL DISTENTION: 0
SINUS PRESSURE: 0
SHORTNESS OF BREATH: 0
CHEST TIGHTNESS: 0
VOMITING: 0
WHEEZING: 0
NAUSEA: 0
VOMITING: 0
BACK PAIN: 0
SHORTNESS OF BREATH: 0
CHEST TIGHTNESS: 0
EYE ITCHING: 0
NAUSEA: 0
EYE PAIN: 0
EYE DISCHARGE: 0
COUGH: 0
CHEST TIGHTNESS: 0
SHORTNESS OF BREATH: 0

## 2024-05-21 NOTE — PROGRESS NOTES
urinating, frequency and urgency.   Musculoskeletal:  Negative for arthralgias, back pain, joint swelling and myalgias.   Skin:  Negative for color change and rash.   Allergic/Immunologic: Negative.    Neurological:  Positive for weakness. Negative for light-headedness and headaches.   Hematological: Negative.    Psychiatric/Behavioral:  Negative for agitation, behavioral problems, confusion and self-injury. The patient is not nervous/anxious.    All other systems reviewed and are negative.      Objective:    /70 (Site: Right Upper Arm, Position: Sitting, Cuff Size: Medium Adult)   Pulse 87   Temp 97.5 °F (36.4 °C) (Temporal)   Ht 1.676 m (5' 5.98\")   Wt 63.5 kg (140 lb)   SpO2 96%   BMI 22.61 kg/m²   Physical Exam  Vitals and nursing note reviewed.   Constitutional:       General: She is not in acute distress.     Appearance: She is well-developed.   HENT:      Head: Normocephalic and atraumatic.      Nose: Nose normal.   Eyes:      Conjunctiva/sclera: Conjunctivae normal.      Pupils: Pupils are equal, round, and reactive to light.   Neck:      Thyroid: No thyromegaly.      Vascular: No JVD.   Cardiovascular:      Rate and Rhythm: Normal rate and regular rhythm.      Heart sounds: Normal heart sounds. No murmur heard.     No friction rub. No gallop.   Pulmonary:      Effort: Pulmonary effort is normal. No respiratory distress.      Breath sounds: Normal breath sounds. No wheezing.   Abdominal:      General: Bowel sounds are normal. There is no distension.      Palpations: Abdomen is soft.      Tenderness: There is no abdominal tenderness. There is no guarding or rebound.   Musculoskeletal:         General: Normal range of motion.      Cervical back: Normal range of motion and neck supple.   Lymphadenopathy:      Cervical: No cervical adenopathy.   Skin:     General: Skin is warm and dry.      Findings: No erythema or rash.   Neurological:      Mental Status: She is alert and oriented to person, place,

## 2024-05-22 NOTE — PROGRESS NOTES
Piedad Kinsey (:  1934) is a 89 y.o. female is seen today for skilled assessment    Subjective   Piedad is awake alert with mild confusion.  She is sitting up in wheelchair and eating her breakfast.  She d has no complaints of pain or discomfort  when asked.  She continues to work in therapies as tolerated. Nursing has no concerns and will let Dr. GUZMAN or PA know of any changes.    Location: [facility]  All nursing and progress notes reviewed.      Past Medical History:   Diagnosis Date    Dementia (HCC)     Diverticulosis     GIST (gastrointestinal stroma tumor), malignant, colon (HCC)     Hyperlipidemia     Hypertension     MI (myocardial infarction) (HCC)     Mitral regurgitation     per cardiology    PUD (peptic ulcer disease)        Allergies   Allergen Reactions    Carafate [Sucralfate] Other (See Comments)     Unable to explain    Cefdinir Other (See Comments)     Unable to explain    Cetirizine & Related Other (See Comments)     Makes her forgetful and sleepy    Gabapentin Dizziness or Vertigo     Significant cognitive impairment    Lisinopril Other (See Comments)     Difficulty swallowing, reflux, headache, dizziness, leg cramps    Memantine Other (See Comments)     Brain fog and hand numbness    Pravachol [Pravastatin Sodium] Other (See Comments)     Patient states \"it almost killed me.\" Spoke with Patient's Daughter and she stated \"body cramps and extreme weakness.\"    Protonix [Pantoprazole Sodium] Other (See Comments)     Unable to explain    Statins Other (See Comments)     Intolerable. Patient states \"it almost killed me.\" Spoke with Patient's Daughter and she stated \"body cramps and extreme weakness.\"      Current Outpatient Medications   Medication Sig Dispense Refill    levothyroxine (SYNTHROID) 50 MCG tablet       levoFLOXacin (LEVAQUIN) 250 MG tablet Take 1 tablet by mouth daily for 10 days 10 tablet 0    acetaminophen (TYLENOL) 325 MG tablet Take 2

## 2024-05-22 NOTE — PROGRESS NOTES
TRIG 165 (H) 04/26/2024    TRIG 170 04/22/2023    TRIG 217 (H) 09/19/2021     Lab Results   Component Value Date    HDL 80 04/26/2024    HDL 72 04/22/2023    HDL 63 09/19/2021     No components found for: \"LDLCHOLESTEROL\", \"LDLCALC\"  Lab Results   Component Value Date    VLDL 33 04/26/2024    VLDL 43 09/19/2021    VLDL 57 01/13/2021     No results found for: \"CHOLHDLRATIO\"  Lab Results   Component Value Date    TSH 4.73 (H) 05/01/2024            Assessment & Plan     1. Acute cerebrovascular accident (CVA) (HCC)  2. Vascular dementia without behavioral disturbance (HCC)  3. Type 2 diabetes mellitus with stage 3 chronic kidney disease, without long-term current use of insulin, unspecified whether stage 3a or 3b CKD (HCC)  4. NSTEMI (non-ST elevated myocardial infarction) (HCC)  5. Stage 3 chronic kidney disease, unspecified whether stage 3a or 3b CKD (HCC)  6. Primary hypertension  7. Hypertensive emergency  8. TIA (transient ischemic attack)  9. Mild cognitive impairment with memory loss  10. Carotid stenosis, right  11. Acquired hypothyroidism  12. Generalized weakness           Seen today for weekly skilled assessment  Chart reviewed: Continue with orders per chart in [facility]  Labs: Collect weekly CBC and CMP x3 weeks from admission then monthly.  Continue with therapies as tolerated  Continue with weekly skilled assessment  Continue with discharge planning  Acute medical concerns provider on-call     review all medications and diagnosis  review and continue Zetia 10 mg once a day for hyperlipidemia  review and continue levothyroxine 25 mcg once a day for hypothyroidism       Over 30 min was spent between patient care, chart review, discussing patient management with nursing staff and interpreting diagnostics      An electronic signature was used to authenticate this note.    --Pema Puga, NOHELIA - CNP   This office note has been dictated.

## 2024-05-22 NOTE — PROGRESS NOTES
325 MG tablet Take 2 tablets by mouth every 6 hours as needed for Pain 120 tablet 3    aspirin (ASPIRIN 81) 81 MG EC tablet Take 1 tablet by mouth daily 30 tablet 0    ezetimibe (ZETIA) 10 MG tablet Take 1 tablet by mouth nightly 30 tablet 0    clopidogrel (PLAVIX) 75 MG tablet Take 1 tablet by mouth daily for 87 doses 30 tablet 0    levothyroxine (SYNTHROID) 25 MCG tablet Take 1 tablet by mouth daily (Patient not taking: Reported on 5/21/2024) 90 tablet 0    amLODIPine (NORVASC) 2.5 MG tablet Take 1 tablet by mouth daily       No current facility-administered medications for this visit.        Review of Systems   Constitutional:  Negative for appetite change, chills, diaphoresis, fatigue and fever.   HENT:  Negative for congestion, ear pain, postnasal drip, sinus pressure, sneezing and sore throat.    Eyes:  Negative for pain, discharge, redness and itching.   Respiratory:  Negative for cough, chest tightness, shortness of breath and wheezing.    Cardiovascular:  Negative for chest pain and palpitations.   Gastrointestinal:  Negative for abdominal distention, constipation, diarrhea, nausea and vomiting.   Musculoskeletal:  Positive for gait problem.   Skin:  Negative for rash.   Neurological:  Positive for weakness. Negative for dizziness, seizures, syncope, light-headedness and headaches.        Objective   Physical Exam  Vitals and nursing note reviewed.   Constitutional:       General: She is not in acute distress.     Appearance: Normal appearance. She is not ill-appearing.   HENT:      Head: Normocephalic and atraumatic.      Right Ear: External ear normal.      Left Ear: External ear normal.      Nose: Nose normal. No congestion or rhinorrhea.      Mouth/Throat:      Mouth: Mucous membranes are moist.      Pharynx: Oropharynx is clear. No oropharyngeal exudate or posterior oropharyngeal erythema.   Eyes:      General:         Right eye: No discharge.         Left eye: No discharge.      Conjunctiva/sclera:

## 2024-06-03 ENCOUNTER — APPOINTMENT (OUTPATIENT)
Dept: GENERAL RADIOLOGY | Age: 89
End: 2024-06-03
Payer: MEDICARE

## 2024-06-03 ENCOUNTER — APPOINTMENT (OUTPATIENT)
Dept: CT IMAGING | Age: 89
End: 2024-06-03
Payer: MEDICARE

## 2024-06-03 ENCOUNTER — HOSPITAL ENCOUNTER (INPATIENT)
Age: 89
LOS: 4 days | Discharge: HOME HEALTH | End: 2024-06-07
Attending: EMERGENCY MEDICINE | Admitting: INTERNAL MEDICINE
Payer: MEDICARE

## 2024-06-03 DIAGNOSIS — R62.7 FAILURE TO THRIVE IN ADULT: Primary | ICD-10-CM

## 2024-06-03 DIAGNOSIS — R41.0 CONFUSION: ICD-10-CM

## 2024-06-03 PROBLEM — E86.0 DEHYDRATION: Status: ACTIVE | Noted: 2024-06-03

## 2024-06-03 PROBLEM — I95.1 ORTHOSTATIC HYPOTENSION: Status: ACTIVE | Noted: 2024-06-03

## 2024-06-03 PROBLEM — R41.82 AMS (ALTERED MENTAL STATUS): Status: ACTIVE | Noted: 2024-06-03

## 2024-06-03 PROBLEM — F03.90 DEMENTIA (HCC): Status: ACTIVE | Noted: 2024-06-03

## 2024-06-03 LAB
ALBUMIN SERPL-MCNC: 3.8 G/DL (ref 3.5–5.2)
ALP SERPL-CCNC: 97 U/L (ref 35–104)
ALT SERPL-CCNC: 8 U/L (ref 0–32)
ANION GAP SERPL CALCULATED.3IONS-SCNC: 9 MMOL/L (ref 7–16)
AST SERPL-CCNC: 13 U/L (ref 0–31)
BASOPHILS # BLD: 0.04 K/UL (ref 0–0.2)
BASOPHILS NFR BLD: 1 % (ref 0–2)
BILIRUB SERPL-MCNC: 0.3 MG/DL (ref 0–1.2)
BILIRUB UR QL STRIP: NEGATIVE
BNP SERPL-MCNC: 613 PG/ML (ref 0–450)
BUN SERPL-MCNC: 15 MG/DL (ref 6–23)
CALCIUM SERPL-MCNC: 9.3 MG/DL (ref 8.6–10.2)
CHLORIDE SERPL-SCNC: 107 MMOL/L (ref 98–107)
CLARITY UR: CLEAR
CO2 SERPL-SCNC: 24 MMOL/L (ref 22–29)
COLOR UR: YELLOW
CREAT SERPL-MCNC: 0.8 MG/DL (ref 0.5–1)
EKG ATRIAL RATE: 83 BPM
EKG P AXIS: 27 DEGREES
EKG P-R INTERVAL: 136 MS
EKG Q-T INTERVAL: 366 MS
EKG QRS DURATION: 88 MS
EKG QTC CALCULATION (BAZETT): 430 MS
EKG R AXIS: -36 DEGREES
EKG T AXIS: 86 DEGREES
EKG VENTRICULAR RATE: 83 BPM
EOSINOPHIL # BLD: 0.19 K/UL (ref 0.05–0.5)
EOSINOPHILS RELATIVE PERCENT: 3 % (ref 0–6)
ERYTHROCYTE [DISTWIDTH] IN BLOOD BY AUTOMATED COUNT: 13 % (ref 11.5–15)
GFR, ESTIMATED: 74 ML/MIN/1.73M2
GLUCOSE SERPL-MCNC: 118 MG/DL (ref 74–99)
GLUCOSE UR STRIP-MCNC: NEGATIVE MG/DL
HCT VFR BLD AUTO: 43.5 % (ref 34–48)
HGB BLD-MCNC: 14 G/DL (ref 11.5–15.5)
HGB UR QL STRIP.AUTO: NEGATIVE
IMM GRANULOCYTES # BLD AUTO: <0.03 K/UL (ref 0–0.58)
IMM GRANULOCYTES NFR BLD: 0 % (ref 0–5)
KETONES UR STRIP-MCNC: 15 MG/DL
LEUKOCYTE ESTERASE UR QL STRIP: NEGATIVE
LYMPHOCYTES NFR BLD: 1.05 K/UL (ref 1.5–4)
LYMPHOCYTES RELATIVE PERCENT: 15 % (ref 20–42)
MAGNESIUM SERPL-MCNC: 2.4 MG/DL (ref 1.6–2.6)
MCH RBC QN AUTO: 29.5 PG (ref 26–35)
MCHC RBC AUTO-ENTMCNC: 32.2 G/DL (ref 32–34.5)
MCV RBC AUTO: 91.8 FL (ref 80–99.9)
MONOCYTES NFR BLD: 0.56 K/UL (ref 0.1–0.95)
MONOCYTES NFR BLD: 8 % (ref 2–12)
NEUTROPHILS NFR BLD: 74 % (ref 43–80)
NEUTS SEG NFR BLD: 5.32 K/UL (ref 1.8–7.3)
NITRITE UR QL STRIP: NEGATIVE
PH UR STRIP: 7 [PH] (ref 5–9)
PLATELET # BLD AUTO: 202 K/UL (ref 130–450)
PMV BLD AUTO: 11.2 FL (ref 7–12)
POTASSIUM SERPL-SCNC: 4.1 MMOL/L (ref 3.5–5)
PROT SERPL-MCNC: 6.2 G/DL (ref 6.4–8.3)
PROT UR STRIP-MCNC: NEGATIVE MG/DL
RBC # BLD AUTO: 4.74 M/UL (ref 3.5–5.5)
RBC #/AREA URNS HPF: ABNORMAL /HPF
SODIUM SERPL-SCNC: 140 MMOL/L (ref 132–146)
SP GR UR STRIP: 1.01 (ref 1–1.03)
T4 SERPL-MCNC: 7.9 UG/DL (ref 4.5–11.7)
TROPONIN I SERPL HS-MCNC: 18 NG/L (ref 0–9)
TROPONIN I SERPL HS-MCNC: 19 NG/L (ref 0–9)
TSH SERPL DL<=0.05 MIU/L-ACNC: 1.51 UIU/ML (ref 0.27–4.2)
UROBILINOGEN UR STRIP-ACNC: 1 EU/DL (ref 0–1)
WBC #/AREA URNS HPF: ABNORMAL /HPF
WBC OTHER # BLD: 7.2 K/UL (ref 4.5–11.5)

## 2024-06-03 PROCEDURE — 2580000003 HC RX 258: Performed by: INTERNAL MEDICINE

## 2024-06-03 PROCEDURE — 93010 ELECTROCARDIOGRAM REPORT: CPT | Performed by: INTERNAL MEDICINE

## 2024-06-03 PROCEDURE — 83735 ASSAY OF MAGNESIUM: CPT

## 2024-06-03 PROCEDURE — 6370000000 HC RX 637 (ALT 250 FOR IP): Performed by: INTERNAL MEDICINE

## 2024-06-03 PROCEDURE — 99223 1ST HOSP IP/OBS HIGH 75: CPT | Performed by: INTERNAL MEDICINE

## 2024-06-03 PROCEDURE — 84436 ASSAY OF TOTAL THYROXINE: CPT

## 2024-06-03 PROCEDURE — 80053 COMPREHEN METABOLIC PANEL: CPT

## 2024-06-03 PROCEDURE — 71045 X-RAY EXAM CHEST 1 VIEW: CPT

## 2024-06-03 PROCEDURE — 99285 EMERGENCY DEPT VISIT HI MDM: CPT

## 2024-06-03 PROCEDURE — 36415 COLL VENOUS BLD VENIPUNCTURE: CPT

## 2024-06-03 PROCEDURE — 85025 COMPLETE CBC W/AUTO DIFF WBC: CPT

## 2024-06-03 PROCEDURE — 84484 ASSAY OF TROPONIN QUANT: CPT

## 2024-06-03 PROCEDURE — 93005 ELECTROCARDIOGRAM TRACING: CPT | Performed by: STUDENT IN AN ORGANIZED HEALTH CARE EDUCATION/TRAINING PROGRAM

## 2024-06-03 PROCEDURE — 70450 CT HEAD/BRAIN W/O DYE: CPT

## 2024-06-03 PROCEDURE — 6360000002 HC RX W HCPCS: Performed by: INTERNAL MEDICINE

## 2024-06-03 PROCEDURE — 1200000000 HC SEMI PRIVATE

## 2024-06-03 PROCEDURE — 83880 ASSAY OF NATRIURETIC PEPTIDE: CPT

## 2024-06-03 PROCEDURE — 84443 ASSAY THYROID STIM HORMONE: CPT

## 2024-06-03 PROCEDURE — APPSS45 APP SPLIT SHARED TIME 31-45 MINUTES

## 2024-06-03 PROCEDURE — 81001 URINALYSIS AUTO W/SCOPE: CPT

## 2024-06-03 PROCEDURE — 6370000000 HC RX 637 (ALT 250 FOR IP): Performed by: STUDENT IN AN ORGANIZED HEALTH CARE EDUCATION/TRAINING PROGRAM

## 2024-06-03 RX ORDER — SODIUM CHLORIDE 9 MG/ML
INJECTION, SOLUTION INTRAVENOUS PRN
Status: DISCONTINUED | OUTPATIENT
Start: 2024-06-03 | End: 2024-06-07 | Stop reason: HOSPADM

## 2024-06-03 RX ORDER — ASPIRIN 81 MG/1
81 TABLET ORAL DAILY
Status: DISCONTINUED | OUTPATIENT
Start: 2024-06-03 | End: 2024-06-07 | Stop reason: HOSPADM

## 2024-06-03 RX ORDER — SODIUM CHLORIDE 0.9 % (FLUSH) 0.9 %
5-40 SYRINGE (ML) INJECTION EVERY 12 HOURS SCHEDULED
Status: DISCONTINUED | OUTPATIENT
Start: 2024-06-03 | End: 2024-06-07 | Stop reason: HOSPADM

## 2024-06-03 RX ORDER — EZETIMIBE 10 MG/1
10 TABLET ORAL NIGHTLY
Status: DISCONTINUED | OUTPATIENT
Start: 2024-06-03 | End: 2024-06-07 | Stop reason: HOSPADM

## 2024-06-03 RX ORDER — AMLODIPINE BESYLATE 2.5 MG/1
2.5 TABLET ORAL DAILY
Status: DISCONTINUED | OUTPATIENT
Start: 2024-06-03 | End: 2024-06-07 | Stop reason: HOSPADM

## 2024-06-03 RX ORDER — ACETAMINOPHEN 325 MG/1
650 TABLET ORAL EVERY 6 HOURS PRN
Status: DISCONTINUED | OUTPATIENT
Start: 2024-06-03 | End: 2024-06-07 | Stop reason: HOSPADM

## 2024-06-03 RX ORDER — CLOPIDOGREL BISULFATE 75 MG/1
75 TABLET ORAL DAILY
Status: DISCONTINUED | OUTPATIENT
Start: 2024-06-03 | End: 2024-06-07 | Stop reason: HOSPADM

## 2024-06-03 RX ORDER — ONDANSETRON 2 MG/ML
4 INJECTION INTRAMUSCULAR; INTRAVENOUS EVERY 6 HOURS PRN
Status: DISCONTINUED | OUTPATIENT
Start: 2024-06-03 | End: 2024-06-07 | Stop reason: HOSPADM

## 2024-06-03 RX ORDER — SODIUM CHLORIDE 0.9 % (FLUSH) 0.9 %
5-40 SYRINGE (ML) INJECTION PRN
Status: DISCONTINUED | OUTPATIENT
Start: 2024-06-03 | End: 2024-06-07 | Stop reason: HOSPADM

## 2024-06-03 RX ORDER — POLYETHYLENE GLYCOL 3350 17 G/17G
17 POWDER, FOR SOLUTION ORAL DAILY PRN
Status: DISCONTINUED | OUTPATIENT
Start: 2024-06-03 | End: 2024-06-07 | Stop reason: HOSPADM

## 2024-06-03 RX ORDER — ONDANSETRON 4 MG/1
4 TABLET, ORALLY DISINTEGRATING ORAL EVERY 8 HOURS PRN
Status: DISCONTINUED | OUTPATIENT
Start: 2024-06-03 | End: 2024-06-07 | Stop reason: HOSPADM

## 2024-06-03 RX ORDER — DEXTROSE MONOHYDRATE AND SODIUM CHLORIDE 5; .45 G/100ML; G/100ML
INJECTION, SOLUTION INTRAVENOUS CONTINUOUS
Status: ACTIVE | OUTPATIENT
Start: 2024-06-03 | End: 2024-06-05

## 2024-06-03 RX ORDER — ACETAMINOPHEN 650 MG/1
650 SUPPOSITORY RECTAL EVERY 6 HOURS PRN
Status: DISCONTINUED | OUTPATIENT
Start: 2024-06-03 | End: 2024-06-07 | Stop reason: HOSPADM

## 2024-06-03 RX ORDER — ACETAMINOPHEN 325 MG/1
650 TABLET ORAL ONCE
Status: COMPLETED | OUTPATIENT
Start: 2024-06-03 | End: 2024-06-03

## 2024-06-03 RX ORDER — ENOXAPARIN SODIUM 100 MG/ML
40 INJECTION SUBCUTANEOUS DAILY
Status: DISCONTINUED | OUTPATIENT
Start: 2024-06-03 | End: 2024-06-07 | Stop reason: HOSPADM

## 2024-06-03 RX ORDER — LEVOTHYROXINE SODIUM 0.03 MG/1
25 TABLET ORAL DAILY
Status: DISCONTINUED | OUTPATIENT
Start: 2024-06-03 | End: 2024-06-07 | Stop reason: HOSPADM

## 2024-06-03 RX ADMIN — ENOXAPARIN SODIUM 40 MG: 100 INJECTION SUBCUTANEOUS at 18:37

## 2024-06-03 RX ADMIN — EZETIMIBE 10 MG: 10 TABLET ORAL at 19:54

## 2024-06-03 RX ADMIN — DEXTROSE AND SODIUM CHLORIDE: 5; 450 INJECTION, SOLUTION INTRAVENOUS at 18:40

## 2024-06-03 RX ADMIN — ACETAMINOPHEN 650 MG: 325 TABLET ORAL at 12:03

## 2024-06-03 ASSESSMENT — PAIN SCALES - WONG BAKER: WONGBAKER_NUMERICALRESPONSE: NO HURT

## 2024-06-03 ASSESSMENT — PAIN SCALES - GENERAL
PAINLEVEL_OUTOF10: 0
PAINLEVEL_OUTOF10: 0

## 2024-06-03 NOTE — PROGRESS NOTES
at bedside with patient, patient and  not fully aware of home medications except patient taking 6 medications. Medications were confirmed with home pharmacy [facility] in [facility].

## 2024-06-03 NOTE — H&P
[facility]   History and Physical      CHIEF COMPLAINT:  headache and fatigue    History of Present Illness:  89 y.o. female with a history of dementia, MI in 2015, mitral regurgitation, HTN, HLD, colon ca, diverticulosis, gastrointestinal stroma tumor, peptic ulcer disease, s/p carotid endarterectomy in 2019, presents with headache and fatigue over the past week. Per patient's , patient had stroke 6 weeks ago (D/c 4/30) and was in a rehab facility following. Patient returned home one week ago. Patient has periods of AMS and called the police this morning searching for her  who was in the house. She reports a left-sided HA this morning. No other neuro focal deficits. CT of head showed atrophy and chronic changes with no acute intracranial abnormality.   Patient had a recent admission from 4/24 to 4/30 for confusion and left sided hemiplegia with aphasia. Patient was found to have hypertensive crisis and TIA. D/c to a skilled nursing facility.     Informant(s) for H&P:patient, , and EMR.    REVIEW OF SYSTEMS:  no fevers, chills, cp, sob, n/v, ha, vision/hearing changes, wt changes, hot/cold flashes, other open skin lesions, diarrhea, constipation, dysuria/hematuria unless noted in HPI. Complete ROS performed with the patient and is otherwise negative.      PMH:  Past Medical History:   Diagnosis Date    Dementia (HCC)     Diverticulosis     GIST (gastrointestinal stroma tumor), malignant, colon (HCC)     Hyperlipidemia     Hypertension     MI (myocardial infarction) (HCC) 2015    Mitral regurgitation     per cardiology    PUD (peptic ulcer disease)        Surgical History:  Past Surgical History:   Procedure Laterality Date    CAROTID ENDARTERECTOMY Right 04/2019    Dr Nazario    CATARACT REMOVAL WITH IMPLANT Bilateral 2015    COLONOSCOPY  12/2014    DILATATION, ESOPHAGUS      ENDOSCOPY, COLON, DIAGNOSTIC  09/02/2016    marking of gastric tumor     extremity edema  Skin:  Warm and dry  Vascular: 2/4 Dorsalis Pedis pulses bilaterally.  Neuro:  limited due to confusion        I agree with the assessment and plan of NOHELIA Ledezma - CNP    Decision to admit    Orthostatic hypotension  Dehydration   Dementia  Elevated trop  Hyperglycemia  Htn  hyperlipidemia        Electronically signed by Johnathon Medrano D.O.  Hospitalist  [facility]

## 2024-06-03 NOTE — ED NOTES
ED to Inpatient Handoff Report    Notified [address] that electronic handoff available and patient ready for transport to room 545.    Safety Risks: Home safety issues, Difficulty with daily activities, and Risk of falls    Patient in Restraints: no    Constant Observer or Patient : no    Telemetry Monitoring Ordered :Yes           Order to transfer to unit without monitor:YES    Last MEWS: 1 Time completed: 1748    Deterioration Index Score:   Predictive Model Details          33 (Caution)  Factor Value    Calculated 6/3/2024 17:51 41% Age 89 years old    Deterioration Index Model 28% Neurological exam X     20% Jeniffer coma scale 14     5% Pulse oximetry 92 %     3% Systolic 128     2% Potassium 4.1 mmol/L     1% Pulse 86     0% Hematocrit 43.5 %     0% Sodium 140 mmol/L     0% Respiratory rate 16     0% WBC count 7.2 k/uL     0% Temperature 98.3 °F (36.8 °C)        Vitals:    06/03/24 1009 06/03/24 1310 06/03/24 1748   BP: (!) 183/80 (!) 144/91 128/64   Pulse: 81 81 86   Resp: 16 16 16   Temp: 98.5 °F (36.9 °C)  98.3 °F (36.8 °C)   TempSrc: Oral  Oral   SpO2: 98%  92%   Weight: 62 kg (136 lb 9.6 oz)     Height: 1.626 m (5' 4\")           Opportunity for questions and clarification was provided.

## 2024-06-03 NOTE — PROGRESS NOTES
Database initiated. Patient is alert to self comes in from home with . States she uses a walker and is RA at baseline. She is hard of hearing.

## 2024-06-03 NOTE — ED PROVIDER NOTES
Name: Piedad Kinsey    MRN: [id number]     Date / Time Roomed:  6/3/2024  9:58 AM  ED Bed Assignment:  0545/0545-A    ------------------ History of Present Illness --------------------  6/3/24, Time: 10:00 AM EDT   Chief Complaint   Patient presents with    Headache     Intermittent L posterior headache    Fatigue      HPI    Piedad Kinsey is a 89 y.o. female, with hx of dementia, MI, mitral regurg, PVD, hyperlipidemia, colon cancer, hypertension,, who presents to the ED today for headache, fatigue, generalized weakness over the past week.  Patient here with her  whom lives with the patient.  He states that she had a stroke about 6 weeks ago, was in a rehab facility for several weeks and just got home about a week ago.  He states that her mental status has been wavering having good and bad days.  He states he was doing some exercise this morning down in the basement and she apparently called 911 without his knowledge of doing so.  He states that she did this earlier in the week as well.  Patient does relate having had a left-sided headache this morning.  She denies any unilateral weakness however complains of generalized weakness and fatigue.  Symptoms are constant, moderate severity, no exacerbating relieving factors.  She denies any fever.   states that she has not been complaining of anything in particular over the past week.  She has had rehab come to their home several times this week to work on exercises.  No known fevers.  No cough congestion.  Denies chest pain or shortness of breath.  Denies abdominal pain.  No vomiting at home.  Denies any urinary or bowel complaints. No known falls.  Patient is alert however poor historian and somewhat confused on evaluation, review of systems felt somewhat unreliable.  The pt denies other ROS at this time.     PCP: Dave Li DO.    -------------------- PMH --------------------    Past Medical History:   has a past medical history of Dementia (HCC),  significant interval change.           No results found.    No results found.    ------------------- NURSING NOTES & VITALS -------------------    The nursing notes within the ED encounter and vital signs were reviewed.     Vitals:    06/04/24 0717   BP: (!) 180/89   Pulse: 91   Resp: 16   Temp: 97.7 °F (36.5 °C)   SpO2: 95%        Patient Vitals for the past 8 hrs:   BP Temp Temp src Pulse Resp SpO2 Weight   06/04/24 0717 (!) 180/89 97.7 °F (36.5 °C) Oral 91 16 95 % --   06/04/24 0654 -- -- -- -- -- -- 59.6 kg (131 lb 6.3 oz)         ------------- Final Impression, Disposition & Plan ---------------    Final Impression:   1. Failure to thrive in adult    2. Confusion        Disposition:  Admitted 06/03/2024 05:34:07 PM    PATIENT REFERRED TO:  No follow-up provider specified.    DISCHARGE MEDICATIONS:  Current Discharge Medication List               Please note that portions of this note were completed with a voice recognition program.    Efforts were made to edit the dictations but occasionally words are mis-transcribed.    Keon Olson DO (electronically signed)

## 2024-06-03 NOTE — PROGRESS NOTES
4 Eyes Skin Assessment     NAME:  Piedad Kinsey  YOB: 1934  MEDICAL RECORD NUMBER:  [id number]    The patient is being assessed for  Admission    I agree that at least one RN has performed a thorough Head to Toe Skin Assessment on the patient. ALL assessment sites listed below have been assessed.      Areas assessed by both nurses:    Head, Face, Ears, Shoulders, Back, Chest, Arms, Elbows, Hands, Sacrum. Buttock, Coccyx, Ischium, and Legs. Feet and Heels        Does the Patient have a Wound? No noted wound(s)       Vitor Prevention initiated by RN: Yes  Wound Care Orders initiated by RN: No    Pressure Injury (Stage 3,4, Unstageable, DTI, NWPT, and Complex wounds) if present, place Wound referral order by RN under : No    New Ostomies, if present place, Ostomy referral order under : No     Nurse 1 eSignature: Electronically signed by Donna Rooney RN on 6/3/24 at 6:56 PM EDT    **SHARE this note so that the co-signing nurse can place an eSignature**    Nurse 2 eSignature: {Esignature:[id number]}

## 2024-06-04 LAB
ALBUMIN SERPL-MCNC: 3.9 G/DL (ref 3.5–5.2)
ALP SERPL-CCNC: 101 U/L (ref 35–104)
ALT SERPL-CCNC: 9 U/L (ref 0–32)
ANION GAP SERPL CALCULATED.3IONS-SCNC: 9 MMOL/L (ref 7–16)
AST SERPL-CCNC: 13 U/L (ref 0–31)
BASOPHILS # BLD: 0.07 K/UL (ref 0–0.2)
BASOPHILS NFR BLD: 1 % (ref 0–2)
BILIRUB SERPL-MCNC: 0.4 MG/DL (ref 0–1.2)
BUN SERPL-MCNC: 12 MG/DL (ref 6–23)
CALCIUM SERPL-MCNC: 9.5 MG/DL (ref 8.6–10.2)
CHLORIDE SERPL-SCNC: 106 MMOL/L (ref 98–107)
CO2 SERPL-SCNC: 23 MMOL/L (ref 22–29)
CREAT SERPL-MCNC: 0.8 MG/DL (ref 0.5–1)
EOSINOPHIL # BLD: 0.35 K/UL (ref 0.05–0.5)
EOSINOPHILS RELATIVE PERCENT: 6 % (ref 0–6)
ERYTHROCYTE [DISTWIDTH] IN BLOOD BY AUTOMATED COUNT: 12.8 % (ref 11.5–15)
FOLATE SERPL-MCNC: 10.6 NG/ML (ref 4.8–24.2)
GFR, ESTIMATED: 66 ML/MIN/1.73M2
GLUCOSE SERPL-MCNC: 134 MG/DL (ref 74–99)
HCT VFR BLD AUTO: 44 % (ref 34–48)
HGB BLD-MCNC: 14.1 G/DL (ref 11.5–15.5)
IMM GRANULOCYTES # BLD AUTO: <0.03 K/UL (ref 0–0.58)
IMM GRANULOCYTES NFR BLD: 0 % (ref 0–5)
LACTATE BLDV-SCNC: 1.3 MMOL/L (ref 0.5–2.2)
LYMPHOCYTES NFR BLD: 1.7 K/UL (ref 1.5–4)
LYMPHOCYTES RELATIVE PERCENT: 30 % (ref 20–42)
MAGNESIUM SERPL-MCNC: 2.3 MG/DL (ref 1.6–2.6)
MCH RBC QN AUTO: 29.1 PG (ref 26–35)
MCHC RBC AUTO-ENTMCNC: 32 G/DL (ref 32–34.5)
MCV RBC AUTO: 90.9 FL (ref 80–99.9)
MONOCYTES NFR BLD: 0.59 K/UL (ref 0.1–0.95)
MONOCYTES NFR BLD: 10 % (ref 2–12)
NEUTROPHILS NFR BLD: 53 % (ref 43–80)
NEUTS SEG NFR BLD: 3.04 K/UL (ref 1.8–7.3)
PHOSPHATE SERPL-MCNC: 2.9 MG/DL (ref 2.5–4.5)
PLATELET # BLD AUTO: 210 K/UL (ref 130–450)
PMV BLD AUTO: 11.1 FL (ref 7–12)
POTASSIUM SERPL-SCNC: 3.9 MMOL/L (ref 3.5–5)
PROT SERPL-MCNC: 6.5 G/DL (ref 6.4–8.3)
RBC # BLD AUTO: 4.84 M/UL (ref 3.5–5.5)
SODIUM SERPL-SCNC: 138 MMOL/L (ref 132–146)
VIT B12 SERPL-MCNC: 284 PG/ML (ref 211–946)
WBC OTHER # BLD: 5.8 K/UL (ref 4.5–11.5)

## 2024-06-04 PROCEDURE — 82607 VITAMIN B-12: CPT

## 2024-06-04 PROCEDURE — 99232 SBSQ HOSP IP/OBS MODERATE 35: CPT | Performed by: INTERNAL MEDICINE

## 2024-06-04 PROCEDURE — 84100 ASSAY OF PHOSPHORUS: CPT

## 2024-06-04 PROCEDURE — 36415 COLL VENOUS BLD VENIPUNCTURE: CPT

## 2024-06-04 PROCEDURE — 1200000000 HC SEMI PRIVATE

## 2024-06-04 PROCEDURE — 97165 OT EVAL LOW COMPLEX 30 MIN: CPT

## 2024-06-04 PROCEDURE — 97161 PT EVAL LOW COMPLEX 20 MIN: CPT

## 2024-06-04 PROCEDURE — 80053 COMPREHEN METABOLIC PANEL: CPT

## 2024-06-04 PROCEDURE — 85025 COMPLETE CBC W/AUTO DIFF WBC: CPT

## 2024-06-04 PROCEDURE — 6370000000 HC RX 637 (ALT 250 FOR IP): Performed by: INTERNAL MEDICINE

## 2024-06-04 PROCEDURE — 2580000003 HC RX 258: Performed by: INTERNAL MEDICINE

## 2024-06-04 PROCEDURE — 82746 ASSAY OF FOLIC ACID SERUM: CPT

## 2024-06-04 PROCEDURE — 83735 ASSAY OF MAGNESIUM: CPT

## 2024-06-04 PROCEDURE — 83605 ASSAY OF LACTIC ACID: CPT

## 2024-06-04 PROCEDURE — 6360000002 HC RX W HCPCS: Performed by: INTERNAL MEDICINE

## 2024-06-04 RX ADMIN — ASPIRIN 81 MG: 81 TABLET, COATED ORAL at 08:48

## 2024-06-04 RX ADMIN — LEVOTHYROXINE SODIUM 25 MCG: 25 TABLET ORAL at 08:48

## 2024-06-04 RX ADMIN — ENOXAPARIN SODIUM 40 MG: 100 INJECTION SUBCUTANEOUS at 08:48

## 2024-06-04 RX ADMIN — AMLODIPINE BESYLATE 2.5 MG: 2.5 TABLET ORAL at 08:48

## 2024-06-04 RX ADMIN — EZETIMIBE 10 MG: 10 TABLET ORAL at 20:11

## 2024-06-04 RX ADMIN — DEXTROSE AND SODIUM CHLORIDE: 5; 450 INJECTION, SOLUTION INTRAVENOUS at 07:50

## 2024-06-04 RX ADMIN — CLOPIDOGREL BISULFATE 75 MG: 75 TABLET ORAL at 08:48

## 2024-06-04 ASSESSMENT — PAIN SCALES - WONG BAKER: WONGBAKER_NUMERICALRESPONSE: NO HURT

## 2024-06-04 ASSESSMENT — PAIN SCALES - GENERAL
PAINLEVEL_OUTOF10: 0
PAINLEVEL_OUTOF10: 0

## 2024-06-04 NOTE — PROGRESS NOTES
[facility] Hospitalist   Progress Note    Admitting Date and Time: 6/3/2024  9:58 AM  Admit Dx: Confusion [R41.0]  Failure to thrive in adult [R62.7]  AMS (altered mental status) [R41.82]    Subjective:    Patient was admitted with Confusion [R41.0]  Failure to thrive in adult [R62.7]  AMS (altered mental status) [R41.82]. Patient denies fever, chills, cp, sob, n/v.     clopidogrel  75 mg Oral Daily    ezetimibe  10 mg Oral Nightly    aspirin  81 mg Oral Daily    amLODIPine  2.5 mg Oral Daily    levothyroxine  25 mcg Oral Daily    sodium chloride flush  5-40 mL IntraVENous 2 times per day    enoxaparin  40 mg SubCUTAneous Daily     sodium chloride flush, 5-40 mL, PRN  sodium chloride, , PRN  ondansetron, 4 mg, Q8H PRN   Or  ondansetron, 4 mg, Q6H PRN  polyethylene glycol, 17 g, Daily PRN  acetaminophen, 650 mg, Q6H PRN   Or  acetaminophen, 650 mg, Q6H PRN         Objective:    BP (!) 180/89   Pulse 91   Temp 97.7 °F (36.5 °C) (Oral)   Resp 16   Ht 1.626 m (5' 4\")   Wt 60.6 kg (133 lb 9.6 oz)   SpO2 95%   BMI 22.93 kg/m²   Skin: warm and dry, no rash or erythema  Pulmonary/Chest: clear to auscultation bilaterally- no wheezes, rales or rhonchi, normal air movement, no respiratory distress  Cardiovascular: rhythm reg at rate of 90  Abdomen: soft, non-tender, non-distended, normal bowel sounds, no masses or organomegaly  Extremities: no cyanosis, no clubbing, and no edema      Recent Labs     06/03/24  1124 06/04/24  0516    138   K 4.1 3.9    106   CO2 24 23   BUN 15 12   CREATININE 0.8 0.8   GLUCOSE 118* 134*   CALCIUM 9.3 9.5       Recent Labs     06/03/24  1124 06/04/24  0516   WBC 7.2 5.8   RBC 4.74 4.84   HGB 14.0 14.1   HCT 43.5 44.0   MCV 91.8 90.9   MCH 29.5 29.1   MCHC 32.2 32.0   RDW 13.0 12.8    210   MPV 11.2 11.1       CBC with Differential:    Lab Results   Component Value Date/Time    WBC 5.8 06/04/2024 05:16 AM    RBC 4.84 06/04/2024 05:16 AM    HGB 14.1

## 2024-06-04 NOTE — PLAN OF CARE
Problem: ABCDS Injury Assessment  Goal: Absence of physical injury  Outcome: Progressing     Problem: Skin/Tissue Integrity  Goal: Absence of new skin breakdown  Description: 1.  Monitor for areas of redness and/or skin breakdown  2.  Assess vascular access sites hourly  3.  Every 4-6 hours minimum:  Change oxygen saturation probe site  4.  Every 4-6 hours:  If on nasal continuous positive airway pressure, respiratory therapy assess nares and determine need for appliance change or resting period.  Outcome: Progressing     Problem: Discharge Planning  Goal: Discharge to home or other facility with appropriate resources  Outcome: Progressing     Problem: Safety - Adult  Goal: Free from fall injury  6/4/2024 1138 by Donna Rooney, RN  Outcome: Progressing  6/4/2024 0155 by Kiesha Bergeron, RN  Outcome: Progressing

## 2024-06-04 NOTE — CARE COORDINATION
6/4/2024 Social Work Discharge Planning:[facility]. This worker met with Pt and her spouse to discuss  role and transition of care/discharge planning. Pt is from home with her spouse and has a ww and cane. Awaiting therapy orders and evals for discharge planning. Pt has been to [facility] in the past. Pharmacy is [facility] and PCP is Dr.A. Li. Electronically signed by ED Burnett on 6/4/2024 at 8:50 AM    6/4/2024  Social Work Discharge Planning:[facility] is 16/24. SW left a voicemail with Pts spouse regarding discharge planning and requested a return call. Electronically signed by ED Burnett on 6/4/2024 at 12:56 PM    6/4/2024  Social Work Discharge Planning:SW was given [facility] choice for [facility]. Referral was made. Waiting reply. Electronically signed by ED Burnett on 6/4/2024 at 1:32 PM    6/4/2024  Social Work Discharge Planning:[facility] and will start precert. Spouse agreeable. MUNA,7000 and transport form are in chart. Electronically signed by ED Burnett on 6/5/2024 at 12:46 PM

## 2024-06-04 NOTE — ACP (ADVANCE CARE PLANNING)
6/4/2024  Advance Care Planning   Healthcare Decision Maker:    Primary Decision Maker: Flavio Kinsey - Spouse - [phone]    Secondary Decision Maker: Milana Pierce - Child - [phone]    Secondary Decision Maker: Vonda Rod - Child - [phone]    Click here to complete Healthcare Decision Makers including selection of the Healthcare Decision Maker Relationship (ie \"Primary\").

## 2024-06-04 NOTE — PROGRESS NOTES
Physical Therapy  Facility/Department: [address] MED SURG/TELE  Physical Therapy Initial Assessment    Name: Peidad Kinsey  : 1934  MRN: [id number]  Date of Service: 2024    Patient Diagnosis(es): The primary encounter diagnosis was Failure to thrive in adult. A diagnosis of Confusion was also pertinent to this visit.  Past Medical History:  has a past medical history of Dementia (HCC), Diverticulosis, GIST (gastrointestinal stroma tumor), malignant, colon (HCC), Hyperlipidemia, Hypertension, MI (myocardial infarction) (HCC), Mitral regurgitation, and PUD (peptic ulcer disease).  Past Surgical History:  has a past surgical history that includes Hysterectomy; Upper gastrointestinal endoscopy (2014); Colonoscopy (2014); Upper gastrointestinal endoscopy (2016); shoulder surgery (Left); Endoscopy, colon, diagnostic (2016); Dilatation, esophagus; other surgical history (2016); Carotid endarterectomy (Right, 2019); Tonsillectomy and adenoidectomy; and Cataract removal with implant (Bilateral, ).      Referring provider:  Johnathon Medrano DO    PT Order:  PT eval and treat     Evaluating PT:  Adrianne Mcallister, PT, DPT PT [id number]    Room #:  0545/0545-A  Diagnosis:  Confusion [R41.0]  Failure to thrive in adult [R62.7]  AMS (altered mental status) [R41.82]  Precautions:  fall risk, Hx stroke with left sided weakness, decreased vision  Equipment Needs:  none.  Pt reported owing a walker.    SUBJECTIVE:    Pt lives with her  in a 1 story home with 5 stairs to enter and 1 rail.  Bed and bath is on first floor.  Pt ambulated with walker PTA.  Pt with some confusion when asked social history questions.    OBJECTIVE:   Initial Evaluation  Date:  Treatment Short Term/ Long Term   Goals   Was pt agreeable to Eval/treatment? yes     Does pt have pain? None reported     Bed Mobility  Rolling: SBA  Supine to sit: SBA  Sit to supine: SBA  Scooting: SBA to sitting EOB  supervision   Transfers Sit  to stand: Min A  Stand to sit: Min A  Stand pivot: NT  Supervision   Ambulation    15 feet and 30 feet with w/w Min A.  One LOB when backing up with Min A to correct.    100+ feet with w/w supervision    Stair negotiation: ascended and descended  NT   5 steps with 1 rail SBA   ROM BLE:  WFL     Strength Right LE:  grossly 4+/5  Left LE:  grossly 3+ to 4-/5  Increase Le strength by 1/2 mm grade   Balance Sitting EOB:  SBA  Dynamic Standing:  Min A with w/w  Sitting EOB:  supervision  Dynamic Standing:  Supervision with w/w   AM-PAC 6 Clicks 16/24       Pt is A & O to person and month.  Pt unsure of place and year.  Pt unsure of social history questions.  Sensation:  Pt denies numbness and tingling to extremities    Patient education  Pt educated on PT objectives during eval and while in the hospital, hand placement during transfers, calling for assistance.    Patient response to education:   Pt verbalized understanding Pt demonstrated skill Pt requires further education in this area   yes With cueing yes     ASSESSMENT:    Conditions Requiring Skilled Therapeutic Intervention:    [x]Decreased strength     []Decreased ROM  [x]Decreased functional mobility  [x]Decreased balance   [x]Decreased endurance   [x]Decreased posture  []Decreased sensation  []Decreased coordination   [x]Decreased vision  []Decreased safety awareness   []Increased pain       Comments:  Pt found in bed.  No report of dizziness during functional mobility.  Cueing require for hand placement during transfers.  Pt ambulates with slow gait speed.  One LOB when backing up from the sink with Min A to correct balance.  At end of eval, pt left in bed per pt request with call light in reach and bed alarm on.        Pt's/ family goals   1. None stated    Prognosis is good for reaching above PT goals.    Patient and or family understand(s) diagnosis, prognosis, and plan of care.  yes    PHYSICAL THERAPY PLAN OF CARE:    PT POC is established based on

## 2024-06-04 NOTE — PROGRESS NOTES
OCCUPATIONAL THERAPY INITIAL EVALUATION    [facility]   [address]        Date:2024                                                  Patient Name: Piedad Kinsey    MRN: [id number]    : 1934    Room: [address] [place] [address]    Evaluating OT: Gwendolyn Valenzuela OTR/L #[id number]    Referring Provider:  Johnathon Medrano DO     Specific Provider Orders/Date:  OT Eval and Treat , 2024     Diagnosis:   1. Failure to thrive in adult    2. Confusion         Surgery: None        Pertinent Medical History: Dementia, HTN, MI, R rotator cuff surgery, hx of CVA with L sided weakness      Precautions:  Fall Risk, alarm      Assessment of current deficits    [x] Functional mobility  [x]ADLs  [x] Strength               [x]Cognition    [x] Functional transfers   [x] IADLs         [x] Safety Awareness   [x]Endurance    [] Fine Coordination              [x] Balance      [] Vision/perception   []Sensation     []Gross Motor Coordination  [] ROM  [] Delirium                   [] Motor Control     OT PLAN OF CARE   OT POC based on physician orders, patient diagnosis and results of clinical assessment    Frequency/Duration 2-5 days/wk for 2-4 weeks PRN     Specific OT Treatment Interventions to include:   * Instruction/training on adapted ADL techniques and AE recommendations to increase functional independence within precautions       * Training on energy conservation strategies, correct breathing pattern and techniques to improve independence/tolerance for self-care routine  * Functional transfer/mobility training/DME recommendations for increased independence, safety, and fall prevention  * Patient/Family education to increase follow through with safety techniques and functional independence  * Recommendation of environmental modifications for increased safety with functional transfers/mobility and ADLs  * Therapeutic exercise to improve motor endurance, ROM, and

## 2024-06-05 PROCEDURE — 2580000003 HC RX 258: Performed by: NURSE PRACTITIONER

## 2024-06-05 PROCEDURE — 2580000003 HC RX 258: Performed by: INTERNAL MEDICINE

## 2024-06-05 PROCEDURE — 6360000002 HC RX W HCPCS: Performed by: INTERNAL MEDICINE

## 2024-06-05 PROCEDURE — 6370000000 HC RX 637 (ALT 250 FOR IP): Performed by: INTERNAL MEDICINE

## 2024-06-05 PROCEDURE — 99232 SBSQ HOSP IP/OBS MODERATE 35: CPT | Performed by: INTERNAL MEDICINE

## 2024-06-05 PROCEDURE — 1200000000 HC SEMI PRIVATE

## 2024-06-05 RX ORDER — DEXTROSE MONOHYDRATE AND SODIUM CHLORIDE 5; .45 G/100ML; G/100ML
INJECTION, SOLUTION INTRAVENOUS CONTINUOUS
Status: DISCONTINUED | OUTPATIENT
Start: 2024-06-06 | End: 2024-06-07 | Stop reason: HOSPADM

## 2024-06-05 RX ADMIN — EZETIMIBE 10 MG: 10 TABLET ORAL at 21:08

## 2024-06-05 RX ADMIN — AMLODIPINE BESYLATE 2.5 MG: 2.5 TABLET ORAL at 08:58

## 2024-06-05 RX ADMIN — DEXTROSE AND SODIUM CHLORIDE: 5; 450 INJECTION, SOLUTION INTRAVENOUS at 23:56

## 2024-06-05 RX ADMIN — ASPIRIN 81 MG: 81 TABLET, COATED ORAL at 08:58

## 2024-06-05 RX ADMIN — SODIUM CHLORIDE, PRESERVATIVE FREE 10 ML: 5 INJECTION INTRAVENOUS at 10:02

## 2024-06-05 RX ADMIN — DEXTROSE AND SODIUM CHLORIDE: 5; 450 INJECTION, SOLUTION INTRAVENOUS at 08:38

## 2024-06-05 RX ADMIN — LEVOTHYROXINE SODIUM 25 MCG: 25 TABLET ORAL at 08:58

## 2024-06-05 RX ADMIN — CLOPIDOGREL BISULFATE 75 MG: 75 TABLET ORAL at 08:58

## 2024-06-05 RX ADMIN — ENOXAPARIN SODIUM 40 MG: 100 INJECTION SUBCUTANEOUS at 08:58

## 2024-06-05 ASSESSMENT — PAIN SCALES - WONG BAKER: WONGBAKER_NUMERICALRESPONSE: NO HURT

## 2024-06-05 ASSESSMENT — PAIN SCALES - GENERAL: PAINLEVEL_OUTOF10: 0

## 2024-06-05 NOTE — DISCHARGE INSTR - COC
Continuity of Care Form    Patient Name: Piedad Kinsey   :  1934  MRN:  [id number]    Admit date:  6/3/2024  Discharge date:  ***    Code Status Order: Full Code   Advance Directives:     Admitting Physician:  Johnathon Medrano DO  PCP: Dave Li DO    Discharging Nurse: ***  Discharging Hospital Unit/Room#: 0545/0545-A  Discharging Unit Phone Number: ***    Emergency Contact:   Extended Emergency Contact Information  Primary Emergency Contact: Milana Pierce  Home Phone: [phone]  Mobile Phone: [phone]  Relation: Child  Secondary Emergency Contact: Flavio Kinsey  Address: [address] [place] [address]  Home Phone: [phone]  Mobile Phone: [phone]  Relation: Spouse    Past Surgical History:  Past Surgical History:   Procedure Laterality Date    CAROTID ENDARTERECTOMY Right 2019    Dr Nazario    CATARACT REMOVAL WITH IMPLANT Bilateral     COLONOSCOPY  2014    DILATATION, ESOPHAGUS      ENDOSCOPY, COLON, DIAGNOSTIC  2016    marking of gastric tumor    HYSTERECTOMY (CERVIX STATUS UNKNOWN)      YUMIKO/BSO    OTHER SURGICAL HISTORY  2016    Laparoscopic Robotic Assisted Partial Gastrectomy    SHOULDER SURGERY Left     rotator cuff right    TONSILLECTOMY AND ADENOIDECTOMY      UPPER GASTROINTESTINAL ENDOSCOPY  2014    UPPER GASTROINTESTINAL ENDOSCOPY  2016       Immunization History:   Immunization History   Administered Date(s) Administered    COVID-19, PFIZER Bivalent, DO NOT Dilute, (age 12y+), IM, 30 mcg/0.3 mL 2022    COVID-19, PFIZER PURPLE top, DILUTE for use, (age 12 y+), 30mcg/0.3mL 2021, 2021, 10/04/2021    COVID-19, PFIZER, ( formula), (age 12y+), IM, 30mcg/0.3mL 2023    Influenza Virus Vaccine 2017    Influenza, FLUAD, (age 65 y+), Adjuvanted, 0.5mL 2023    Influenza, FLUZONE (age 65 y+), High Dose, 0.7mL 10/10/2022    Influenza, High Dose (Fluzone 65 yrs and

## 2024-06-05 NOTE — PROGRESS NOTES
[facility] Hospitalist   Progress Note    Admitting Date and Time: 6/3/2024  9:58 AM  Admit Dx: Confusion [R41.0]  Failure to thrive in adult [R62.7]  AMS (altered mental status) [R41.82]    Subjective:    Patient was admitted with Confusion [R41.0]  Failure to thrive in adult [R62.7]  AMS (altered mental status) [R41.82]. Patient denies fever, chills, cp, sob, n/v.     clopidogrel  75 mg Oral Daily    ezetimibe  10 mg Oral Nightly    aspirin  81 mg Oral Daily    amLODIPine  2.5 mg Oral Daily    levothyroxine  25 mcg Oral Daily    sodium chloride flush  5-40 mL IntraVENous 2 times per day    enoxaparin  40 mg SubCUTAneous Daily     sodium chloride flush, 5-40 mL, PRN  sodium chloride, , PRN  ondansetron, 4 mg, Q8H PRN   Or  ondansetron, 4 mg, Q6H PRN  polyethylene glycol, 17 g, Daily PRN  acetaminophen, 650 mg, Q6H PRN   Or  acetaminophen, 650 mg, Q6H PRN         Objective:    BP (!) 144/88   Pulse 92   Temp 98.6 °F (37 °C) (Oral)   Resp 18   Ht 1.626 m (5' 4\")   Wt 58 kg (127 lb 13.9 oz)   SpO2 95%   BMI 21.95 kg/m²   Skin: warm and dry, no rash or erythema  Pulmonary/Chest: clear to auscultation bilaterally- no wheezes, rales or rhonchi, normal air movement, no respiratory distress  Cardiovascular: rhythm reg at rate of 90  Abdomen: soft, non-tender, non-distended, normal bowel sounds, no masses or organomegaly  Extremities: no cyanosis, no clubbing, and no edema      Recent Labs     06/03/24  1124 06/04/24  0516    138   K 4.1 3.9    106   CO2 24 23   BUN 15 12   CREATININE 0.8 0.8   GLUCOSE 118* 134*   CALCIUM 9.3 9.5       Recent Labs     06/03/24  1124 06/04/24  0516   WBC 7.2 5.8   RBC 4.74 4.84   HGB 14.0 14.1   HCT 43.5 44.0   MCV 91.8 90.9   MCH 29.5 29.1   MCHC 32.2 32.0   RDW 13.0 12.8    210   MPV 11.2 11.1            Radiology:   CT HEAD WO CONTRAST   Final Result   Atrophy and chronic changes seen within the brain with no acute intracranial   abnormality.

## 2024-06-05 NOTE — PROGRESS NOTES
Notified Alicia Chase NP, that patient had 6 beats of v tach.  Patient not sustaining.    No new orders.

## 2024-06-05 NOTE — CARE COORDINATION
6/5/2024  Social Work Discharge Planning:Waiting for spouse to tour [facility]. [facility] have accepted and need to start precert auth. SW left a voicemail with spouse informing we need a decision by noon today. Electronically signed by ED Burnett on 6/5/2024 at 11:29 AM      6/5/2024  Social Work Discharge Planning:[facility] accepts and will start precert. Verified with spouse. MUNA,7000 and transpott form are in chart. Electronically signed by ED Burnett on 6/5/2024 at 12:45 PM

## 2024-06-06 PROCEDURE — 6370000000 HC RX 637 (ALT 250 FOR IP): Performed by: INTERNAL MEDICINE

## 2024-06-06 PROCEDURE — 97535 SELF CARE MNGMENT TRAINING: CPT

## 2024-06-06 PROCEDURE — 1200000000 HC SEMI PRIVATE

## 2024-06-06 PROCEDURE — 6360000002 HC RX W HCPCS: Performed by: INTERNAL MEDICINE

## 2024-06-06 PROCEDURE — 97530 THERAPEUTIC ACTIVITIES: CPT

## 2024-06-06 PROCEDURE — 99232 SBSQ HOSP IP/OBS MODERATE 35: CPT | Performed by: STUDENT IN AN ORGANIZED HEALTH CARE EDUCATION/TRAINING PROGRAM

## 2024-06-06 PROCEDURE — 2580000003 HC RX 258: Performed by: INTERNAL MEDICINE

## 2024-06-06 RX ADMIN — SODIUM CHLORIDE, PRESERVATIVE FREE 10 ML: 5 INJECTION INTRAVENOUS at 08:35

## 2024-06-06 RX ADMIN — LEVOTHYROXINE SODIUM 25 MCG: 25 TABLET ORAL at 08:34

## 2024-06-06 RX ADMIN — ENOXAPARIN SODIUM 40 MG: 100 INJECTION SUBCUTANEOUS at 08:34

## 2024-06-06 RX ADMIN — CLOPIDOGREL BISULFATE 75 MG: 75 TABLET ORAL at 08:34

## 2024-06-06 RX ADMIN — EZETIMIBE 10 MG: 10 TABLET ORAL at 20:09

## 2024-06-06 RX ADMIN — ASPIRIN 81 MG: 81 TABLET, COATED ORAL at 08:34

## 2024-06-06 RX ADMIN — AMLODIPINE BESYLATE 2.5 MG: 2.5 TABLET ORAL at 08:34

## 2024-06-06 ASSESSMENT — PAIN SCALES - WONG BAKER: WONGBAKER_NUMERICALRESPONSE: NO HURT

## 2024-06-06 ASSESSMENT — PAIN SCALES - GENERAL: PAINLEVEL_OUTOF10: 0

## 2024-06-06 NOTE — CARE COORDINATION
6/6/2024  Social Work Discharge Planning:Per liaison, still waiting for auth for Pt to go to  [facility] (started yestrday). MUNA,7000 and transpott form are in chart.Electronically signed by ED Burnett on 6/6/2024 at 10:40 AM

## 2024-06-06 NOTE — PROGRESS NOTES
strength for ADLs/functional transfers  * Therapeutic activities to facilitate/challenge dynamic balance, stand tolerance for increased safety and independence with ADLs  * Therapeutic activities to facilitate gross/fine motor skills for increased independence with ADLs  * Positioning to improve skin integrity, interaction with environment and functional independence     Recommended Adaptive Equipment: TBD       Home Living: Lives with spouse, single family home, 1 story, 5 steps to enter with rail. Bedroom and bathroom on 1st floor.      Equipment owned: wheeled walker , cane      Prior Level of Function: Pt somewhat of a questionable historian. Unable to report if she has assistance with ADLs. Ambulated with fww PTA.      Pain Level: No c/o pain              Cognition: A&O: alert, pleasant, cooperative. Confusion present; Follows simple step directions              Memory: impaired               Sequencing: fair-              Problem solving: fair-              Judgement/safety: fair-                Functional Assessment: AM-PAC Daily Activity Raw Score: 16/24    Initial Eval Status  Date: 6/4/24    Treatment Status  Date: 6/6/24 STGs = LTGs  Time frame: 10-14 days   Feeding Supervision       Independent    Grooming Minimal Assist     CGA standing at sink to brush teeth. Min cues for sequencing/problem solving.   Set-up for grooming tasks seated in chair.    Supervision    UB Dressing Minimal Assist     Min A for gown mgmt  Supervision    LB Dressing Moderate Assist      Mod A   Simulated    Supervision    Bathing Moderate Assist      Minimal/Moderate Assist for UB/LB sponge bath seated in chair.   Increased assist for distal LEs.    Supervision    Toileting Minimal Assist       Supervision    Bed Mobility  Supine to sit: Stand by Assist   Sit to supine: Stand by Assist          Supine to sit: Independent   Sit to supine: Independent    Functional Transfers Sit to stand: Minimal Assist   Stand to sit: Minimal  provided this date includes:  Instructions/training on safety, sequencing, and adapted techniques for completion of ADLs.  Facilitated bed mobility with cues for proper body mechanics and sequencing to prepare for ADL completion.  Instruction/training on safe functional mobility/transfer techniques including hand and feet placement     Pt has made fair progress towards set goals.  Continue with current plan of care       Treatment time includes thorough review of current medical information, gathering information on past medical history/social history and prior level of function, completion of standardized testing/informal observation of tasks, assessment of data, and development of POC/Goals.    Time In: 10:09 AM    Time Out: 10:34 AM                  Min Units   OT Eval Low 32684     OT Eval Medium 13530     OT Eval High 90856     OT Re-Eval 44411          ADL/Self Care 10607     Therapeutic Activities 42398 15 1   Therapeutic Ex 92252 10 1   Orthotic Management 51501     Neuro Re-Ed 08038     Non-Billable Time     TOTAL TIMED TREATMENT 25 2     Gwendolyn Valenzuela OTR/L #[id number]

## 2024-06-06 NOTE — PROGRESS NOTES
Physician Progress Note      PATIENT:               MICK BOURGEOIS  CSN #:                  [id number]  :                       1934  ADMIT DATE:       6/3/2024 9:58 AM  DISCH DATE:  RESPONDING  PROVIDER #:        Kory Dey MD          QUERY TEXT:    Pt admitted with dehydration. Pt noted to have elevated troponin. If possible,   please document in the progress notes and discharge summary if you are   evaluating and/or treating any of the following:    The medical record reflects the following:  Risk Factors: recent stroke, dehydration, HTN, HLD  Clinical Indicators: H&P- Elevated troponin. Monitor.  24 IM PN- Elevated trop monitor, trend not c/w ACS.  6/3/24 troponins 18, 19  6/3/24 EKG- Normal sinus rhythm. Left axis deviation. Abnormal ECG.  Treatment: EKG, telemetry, laboratory studies, aspirin, Plavix, Zetia,   amlodipine    Thank you,  Miryam Stroud, MSN, RN  Clinical Documentation Integrity  [phone]  Options provided:  -- Non-ischemic myocardial injury due to dehydration  -- Non-ischemic myocardial injury due to other, Please specify cause.  -- Non-ischemic myocardial injury, unspecified cause  -- Elevated troponin without myocardial injury  -- Other - I will add my own diagnosis  -- Disagree - Not applicable / Not valid  -- Disagree - Clinically unable to determine / Unknown  -- Refer to Clinical Documentation Reviewer    PROVIDER RESPONSE TEXT:    Provider is clinically unable to determine a response to this query.    Query created by: Miryam Stroud on 2024 3:16 PM      Electronically signed by:  Kory Dey MD 2024 5:19 PM

## 2024-06-06 NOTE — PROGRESS NOTES
[facility] Hospitalist Progress Note    Admitting Date and Time: 6/3/2024  9:58 AM  Admit Dx: Confusion [R41.0]  Failure to thrive in adult [R62.7]  AMS (altered mental status) [R41.82]    Subjective:  Patient is being followed for Confusion [R41.0]  Failure to thrive in adult [R62.7]  AMS (altered mental status) [R41.82]   Pt feels okay  Per RN: no additional concerns    ROS: denies fever, chills, cp, sob, n/v, HA unless otherwise noted above     clopidogrel  75 mg Oral Daily    ezetimibe  10 mg Oral Nightly    aspirin  81 mg Oral Daily    amLODIPine  2.5 mg Oral Daily    levothyroxine  25 mcg Oral Daily    sodium chloride flush  5-40 mL IntraVENous 2 times per day    enoxaparin  40 mg SubCUTAneous Daily     sodium chloride flush, 5-40 mL, PRN  sodium chloride, , PRN  ondansetron, 4 mg, Q8H PRN   Or  ondansetron, 4 mg, Q6H PRN  polyethylene glycol, 17 g, Daily PRN  acetaminophen, 650 mg, Q6H PRN   Or  acetaminophen, 650 mg, Q6H PRN         Objective:  /66   Pulse 65   Temp 97.5 °F (36.4 °C) (Oral)   Resp 16   Ht 1.626 m (5' 4\")   Wt 58 kg (127 lb 13.9 oz)   SpO2 92%   BMI 21.95 kg/m²     General Appearance: alert and oriented to person, place and time and in NAD, sitting in bed eating lunch  Skin: warm and dry  Head: normocephalic and atraumatic  Eyes: PERRL, EOMI, conjunctivae normal  Neck: neck supple, trachea midline   Pulmonary/Chest: CTAB, no w/r/r, normal air movement, no respiratory distress, RA  Cardiovascular: RRR, no murmurs  Abdomen: soft, non-tender, non-distended, normal bowel sounds, no masses or organomegaly  Extremities: no cyanosis, no clubbing and no edema  Neurologic: no cranial nerve deficit and speech normal      Recent Labs     06/04/24  0516      K 3.9      CO2 23   BUN 12   CREATININE 0.8   GLUCOSE 134*   CALCIUM 9.5       Recent Labs     06/04/24  0516   WBC 5.8   RBC 4.84   HGB 14.1   HCT 44.0   MCV 90.9   MCH 29.1   MCHC 32.0   RDW 12.8      MPV

## 2024-06-07 VITALS
SYSTOLIC BLOOD PRESSURE: 163 MMHG | OXYGEN SATURATION: 97 % | TEMPERATURE: 98.1 F | RESPIRATION RATE: 16 BRPM | BODY MASS INDEX: 23.3 KG/M2 | DIASTOLIC BLOOD PRESSURE: 93 MMHG | HEIGHT: 64 IN | HEART RATE: 87 BPM | WEIGHT: 136.47 LBS

## 2024-06-07 LAB
ANION GAP SERPL CALCULATED.3IONS-SCNC: 8 MMOL/L (ref 7–16)
BASOPHILS # BLD: 0.07 K/UL (ref 0–0.2)
BASOPHILS NFR BLD: 1 % (ref 0–2)
BUN SERPL-MCNC: 11 MG/DL (ref 6–23)
CALCIUM SERPL-MCNC: 9 MG/DL (ref 8.6–10.2)
CHLORIDE SERPL-SCNC: 109 MMOL/L (ref 98–107)
CO2 SERPL-SCNC: 23 MMOL/L (ref 22–29)
CREAT SERPL-MCNC: 0.9 MG/DL (ref 0.5–1)
EOSINOPHIL # BLD: 0.37 K/UL (ref 0.05–0.5)
EOSINOPHILS RELATIVE PERCENT: 7 % (ref 0–6)
ERYTHROCYTE [DISTWIDTH] IN BLOOD BY AUTOMATED COUNT: 12.9 % (ref 11.5–15)
GFR, ESTIMATED: 62 ML/MIN/1.73M2
GLUCOSE SERPL-MCNC: 137 MG/DL (ref 74–99)
HCT VFR BLD AUTO: 41.6 % (ref 34–48)
HGB BLD-MCNC: 13.5 G/DL (ref 11.5–15.5)
IMM GRANULOCYTES # BLD AUTO: <0.03 K/UL (ref 0–0.58)
IMM GRANULOCYTES NFR BLD: 0 % (ref 0–5)
LYMPHOCYTES NFR BLD: 1.59 K/UL (ref 1.5–4)
LYMPHOCYTES RELATIVE PERCENT: 29 % (ref 20–42)
MCH RBC QN AUTO: 29.5 PG (ref 26–35)
MCHC RBC AUTO-ENTMCNC: 32.5 G/DL (ref 32–34.5)
MCV RBC AUTO: 91 FL (ref 80–99.9)
MONOCYTES NFR BLD: 0.66 K/UL (ref 0.1–0.95)
MONOCYTES NFR BLD: 12 % (ref 2–12)
NEUTROPHILS NFR BLD: 51 % (ref 43–80)
NEUTS SEG NFR BLD: 2.78 K/UL (ref 1.8–7.3)
PLATELET # BLD AUTO: 198 K/UL (ref 130–450)
PMV BLD AUTO: 11.7 FL (ref 7–12)
POTASSIUM SERPL-SCNC: 3.3 MMOL/L (ref 3.5–5)
RBC # BLD AUTO: 4.57 M/UL (ref 3.5–5.5)
SODIUM SERPL-SCNC: 140 MMOL/L (ref 132–146)
WBC OTHER # BLD: 5.5 K/UL (ref 4.5–11.5)

## 2024-06-07 PROCEDURE — 85025 COMPLETE CBC W/AUTO DIFF WBC: CPT

## 2024-06-07 PROCEDURE — 99239 HOSP IP/OBS DSCHRG MGMT >30: CPT | Performed by: STUDENT IN AN ORGANIZED HEALTH CARE EDUCATION/TRAINING PROGRAM

## 2024-06-07 PROCEDURE — 80048 BASIC METABOLIC PNL TOTAL CA: CPT

## 2024-06-07 PROCEDURE — 6370000000 HC RX 637 (ALT 250 FOR IP): Performed by: INTERNAL MEDICINE

## 2024-06-07 PROCEDURE — 6360000002 HC RX W HCPCS: Performed by: INTERNAL MEDICINE

## 2024-06-07 RX ADMIN — CLOPIDOGREL BISULFATE 75 MG: 75 TABLET ORAL at 08:57

## 2024-06-07 RX ADMIN — ENOXAPARIN SODIUM 40 MG: 100 INJECTION SUBCUTANEOUS at 08:57

## 2024-06-07 RX ADMIN — ASPIRIN 81 MG: 81 TABLET, COATED ORAL at 08:57

## 2024-06-07 RX ADMIN — LEVOTHYROXINE SODIUM 25 MCG: 25 TABLET ORAL at 08:57

## 2024-06-07 RX ADMIN — AMLODIPINE BESYLATE 2.5 MG: 2.5 TABLET ORAL at 08:57

## 2024-06-07 ASSESSMENT — PAIN SCALES - GENERAL: PAINLEVEL_OUTOF10: 0

## 2024-06-07 ASSESSMENT — PAIN SCALES - WONG BAKER: WONGBAKER_NUMERICALRESPONSE: NO HURT

## 2024-06-07 NOTE — PROGRESS NOTES
SPIRITUAL HEALTH SERVICES - SARAH [facility] Encounter    Name: Piedad Kinsey                  Referral: Routine Visit    Sacraments  Anointed (Last Rites): Yes  Apostolic [place]: Yes  Confession: No  Communion: Yes     Assessment:  Patient receptive to  visit.      Intervention:   provided spiritual support and sacramental ministry for patient.     Outcome:  Patient expressed gratitude for visit.    Plan:  Chaplains will remain available to offer spiritual and emotional support as needed.      Electronically signed by Chaplain Michele, on 6/7/2024 at 4:15 PM.  Spiritual Care Department  [facility]  [phone]

## 2024-06-07 NOTE — PROGRESS NOTES
Physical Therapy  Facility/Department: [address] MED SURG/TELE  Physical Therapy Treatment Note    Name: Piedad Kinsey  : 1934  MRN: [id number]  Date of Service: 2024    Patient Diagnosis(es): The primary encounter diagnosis was Failure to thrive in adult. A diagnosis of Confusion was also pertinent to this visit.  Past Medical History:  has a past medical history of Dementia (HCC), Diverticulosis, GIST (gastrointestinal stroma tumor), malignant, colon (HCC), Hyperlipidemia, Hypertension, MI (myocardial infarction) (HCC), Mitral regurgitation, and PUD (peptic ulcer disease).  Past Surgical History:  has a past surgical history that includes Hysterectomy; Upper gastrointestinal endoscopy (2014); Colonoscopy (2014); Upper gastrointestinal endoscopy (2016); shoulder surgery (Left); Endoscopy, colon, diagnostic (2016); Dilatation, esophagus; other surgical history (2016); Carotid endarterectomy (Right, 2019); Tonsillectomy and adenoidectomy; and Cataract removal with implant (Bilateral, ).      Referring provider:  Kory Khoury MD    PT Order:  PT eval and treat     Evaluating PT:  Adrianne Mcallister, PT, DPT PT [id number]    Room #:  0545/0545-A  Diagnosis:  Confusion [R41.0]  Failure to thrive in adult [R62.7]  AMS (altered mental status) [R41.82]  Precautions:  fall risk, Hx stroke with left sided weakness, decreased vision  Equipment Needs:  none.  Pt reported owing a walker.    SUBJECTIVE:    Pt lives with her  in a 1 story home with 5 stairs to enter and 1 rail.  Bed and bath is on first floor.  Pt ambulated with walker PTA.  Pt with some confusion when asked social history questions.    OBJECTIVE:   Initial Evaluation  Date:  Treatment  2024 Short Term/ Long Term   Goals   Was pt agreeable to Eval/treatment? yes yes    Does pt have pain? None reported No complaints    Bed Mobility  Rolling: SBA  Supine to sit: SBA  Sit to supine: SBA  Scooting: SBA to sitting EOB Rolling:

## 2024-06-07 NOTE — CARE COORDINATION
Transition of Care-Intent to deny from insurance company. Attending physician need to please call Peer to Peer details below: 1- [phone]. Option #4, ref # [id number]  Piedad Kinsey- 1934  Call must be done no later than 4pm. Charge nurse aware.    Addendum-PATRIOT [facility] SET UP-ORDERS IN. Made two calls to  and daughter Milana to update that patient was denied by insurance for SNF stay.  Physician was unable to do peer to peer, patient will have to go home with [facility].     Debbie ROMANN, RN  [facility]

## 2024-06-07 NOTE — PROGRESS NOTES
Spoke with patients spouse Jose regarding the intent to deny for nursing facility, agreeable to [place] as a backup. Community resource information as well as Adult  options provided as requested prior to DC.     Electronically signed by Avani Herrera RN on 6/7/2024 at 4:37 PM

## 2024-06-07 NOTE — PLAN OF CARE
Attempted to call [id number] [id number], was unable to reach a representative to discuss details of this case by the 1600 deadline. Notified that family is at this time agreeable to discharge / [facility] services and will therefore proceed with discharge today.    Kory Khoury MD   6/7/24,  5:00 PM EDT

## 2024-06-07 NOTE — PLAN OF CARE
Problem: ABCDS Injury Assessment  Goal: Absence of physical injury  6/7/2024 1455 by Donna Rooney, RN  Outcome: Progressing  6/7/2024 0108 by Janae Jaquez RN  Outcome: Progressing     Problem: Skin/Tissue Integrity  Goal: Absence of new skin breakdown  Description: 1.  Monitor for areas of redness and/or skin breakdown  2.  Assess vascular access sites hourly  3.  Every 4-6 hours minimum:  Change oxygen saturation probe site  4.  Every 4-6 hours:  If on nasal continuous positive airway pressure, respiratory therapy assess nares and determine need for appliance change or resting period.  6/7/2024 1455 by Donna Rooney, RN  Outcome: Progressing  6/7/2024 0108 by Janae Jaquez RN  Outcome: Progressing     Problem: Discharge Planning  Goal: Discharge to home or other facility with appropriate resources  6/7/2024 1455 by Donna Rooney, RN  Outcome: Progressing  6/7/2024 0108 by Janae Jaquez RN  Outcome: Progressing     Problem: Safety - Adult  Goal: Free from fall injury  6/7/2024 1455 by Donna Rooney, RN  Outcome: Progressing  6/7/2024 0108 by Janae Jaquez RN  Outcome: Progressing

## 2024-06-07 NOTE — DISCHARGE SUMMARY
[facility] Hospitalist Physician Discharge Summary       [facility]  [address] [place] [address]  [phone]          Activity level: as tolerated    Dispo: [facility]      Condition on discharge: stable    Patient ID:  Piedad Kinsey  [id number]  89 y.o.  2/22/1934    Admit date: 6/3/2024    Discharge date and time:  6/7/2024  5:10 PM    Admission Diagnoses: Principal Problem:    AMS (altered mental status)  Active Problems:    Dementia (HCC)    Dehydration    Orthostatic hypotension  Resolved Problems:    * No resolved hospital problems. *      Discharge Diagnoses: Principal Problem:    AMS (altered mental status)  Active Problems:    Dementia (HCC)    Dehydration    Orthostatic hypotension  Resolved Problems:    * No resolved hospital problems. *      Consults:  IP CONSULT TO IV TEAM  IP CONSULT TO HOME CARE NEEDS    Procedures: None    Hospital Course:   Patient Piedad Kinsey is a 89 y.o. presented with Confusion [R41.0]  Failure to thrive in adult [R62.7]  AMS (altered mental status) [R41.82]    Brief summary:  Patient presented with orthostatic hypotension 2/2 dehydration in setting of advanced dementia. Orthostatic vitals normalized with rehydration. Patient with persistent borderline AMPAC score 16/24 and some baseline imbalance, denied rehab by insurance, given unsuccessful [id number] family is agreeable to [facility] at this time and will dispo with [facility] support. Patient otherwise remained stable during admission.     Other issues addressed as below. Patient is being discharged home with [facility] in stable condition.    **Most recent hospitalist plan**  Plan:  Orthostatic hypotension cont IVF  Dehydration IVF as above  Dementia monitor  Elevated trop monitor, trend not c/w ACS, no CP  Hyperglycemia monitor and adjust meds as indicated  HTN continue med  HLD continue med  _________________________  **Most recent hospitalist plan**    Discharge Exam:  General Appearance: alert and

## 2024-06-10 ENCOUNTER — CARE COORDINATION (OUTPATIENT)
Dept: CARE COORDINATION | Age: 89
End: 2024-06-10

## 2024-06-10 DIAGNOSIS — F03.90 DEMENTIA, UNSPECIFIED DEMENTIA SEVERITY, UNSPECIFIED DEMENTIA TYPE, UNSPECIFIED WHETHER BEHAVIORAL, PSYCHOTIC, OR MOOD DISTURBANCE OR ANXIETY (HCC): Primary | ICD-10-CM

## 2024-06-10 PROCEDURE — 1111F DSCHRG MED/CURRENT MED MERGE: CPT | Performed by: FAMILY MEDICINE

## 2024-06-10 RX ORDER — LEVOTHYROXINE SODIUM 0.03 MG/1
25 TABLET ORAL DAILY
Qty: 90 TABLET | Refills: 3 | Status: SHIPPED | OUTPATIENT
Start: 2024-06-10

## 2024-06-10 NOTE — CARE COORDINATION
Care Transitions Note  Initial Call - Call within 2 business days of discharge: Yes    Patient Current Location:  Home: [address] [place] [id number]    Care Transition Nurse contacted the spouse/partner  by telephone to perform post hospital discharge assessment, verified name and  as identifiers. Provided introduction to self, and explanation of the Care Transition Nurse role.     Patient: Piedad Kinsey      Patient : 1934   MRN: [id number]      Reason for Admission: 6/3/2024 - 2024 [facility] IP. AMS, Orthostatic hypotension 2/2 dehydration in setting of advanced dementia.  Discharge Date: 24    RURS: Readmission Risk Score: 14.2  NR  CT  RN reviewed and at this time patient deemed appropriate for LPN Care Coordinator delegation.    [place]    Ask your medical team for guidance about these existing prescriptions.  levothyroxine 25 MCG tablet (SYNTHROID)  levothyroxine 50 MCG tablet (SYNTHROID)    Last Discharge Facility       Date Complaint Diagnosis Description Type Department Provider    6/3/24 Headache; Fatigue Failure to thrive in adult ... ED to Hosp-Admission (Discharged) (ADMITTED) [facility] Kory Khoury MD; Antoni Nix...            Was this an external facility discharge? No    Additional needs identified to be addressed with provider   Staff message rout to PCP: Pt needs medication clarification for Levothyroxine. Spouse understands her dose to be 25 mcg daily and states she needs refill sent [facility]. Noting she also has a 50 mcg dose on her medication list. (Not taking). Please advise.     She has appt w/ you  1:15. Spouse feels she may need to go to [facility] because her confusion has advanced.              Method of communication with provider: chart routing.    Patients top risk factors for readmission: caregiver stress and functional cognitive ability    Interventions to address risk factors:   SW referral to

## 2024-06-11 ENCOUNTER — OFFICE VISIT (OUTPATIENT)
Dept: PRIMARY CARE CLINIC | Age: 89
End: 2024-06-11

## 2024-06-11 VITALS
WEIGHT: 132 LBS | SYSTOLIC BLOOD PRESSURE: 110 MMHG | HEIGHT: 64 IN | DIASTOLIC BLOOD PRESSURE: 78 MMHG | HEART RATE: 74 BPM | OXYGEN SATURATION: 97 % | BODY MASS INDEX: 22.53 KG/M2 | TEMPERATURE: 97.6 F

## 2024-06-11 DIAGNOSIS — I95.1 ORTHOSTATIC HYPOTENSION: ICD-10-CM

## 2024-06-11 DIAGNOSIS — R30.0 DYSURIA: Primary | ICD-10-CM

## 2024-06-11 DIAGNOSIS — Z09 HOSPITAL DISCHARGE FOLLOW-UP: ICD-10-CM

## 2024-06-11 DIAGNOSIS — F01.50 VASCULAR DEMENTIA WITHOUT BEHAVIORAL DISTURBANCE (HCC): ICD-10-CM

## 2024-06-11 RX ORDER — EZETIMIBE 10 MG/1
10 TABLET ORAL NIGHTLY
Qty: 90 TABLET | Refills: 3 | Status: SHIPPED | OUTPATIENT
Start: 2024-06-11

## 2024-06-11 RX ORDER — CLOPIDOGREL BISULFATE 75 MG/1
75 TABLET ORAL DAILY
Qty: 30 TABLET | Refills: 0 | Status: SHIPPED
Start: 2024-06-11 | End: 2024-06-11 | Stop reason: SDUPTHER

## 2024-06-11 RX ORDER — CLOPIDOGREL BISULFATE 75 MG/1
75 TABLET ORAL DAILY
Qty: 90 TABLET | Refills: 3 | Status: SHIPPED | OUTPATIENT
Start: 2024-06-11 | End: 2024-09-06

## 2024-06-11 RX ORDER — EZETIMIBE 10 MG/1
10 TABLET ORAL NIGHTLY
Qty: 30 TABLET | Refills: 0 | Status: SHIPPED
Start: 2024-06-11 | End: 2024-06-11 | Stop reason: SDUPTHER

## 2024-06-11 ASSESSMENT — ENCOUNTER SYMPTOMS
ALLERGIC/IMMUNOLOGIC NEGATIVE: 1
ABDOMINAL PAIN: 0
APNEA: 0
NAUSEA: 0
WHEEZING: 0
BLOOD IN STOOL: 0
COLOR CHANGE: 0
VOMITING: 0
COUGH: 0
SORE THROAT: 0
CHEST TIGHTNESS: 0
BACK PAIN: 0
DIARRHEA: 0
SINUS PRESSURE: 0
PHOTOPHOBIA: 0
FACIAL SWELLING: 0
SHORTNESS OF BREATH: 0

## 2024-06-11 NOTE — PROGRESS NOTES
Post-Discharge Transitional Care Follow Up      Piedad Kinsey   YOB: 1934    Date of Office Visit:  6/11/2024  Date of Hospital Admission: 6/3/24  Date of Hospital Discharge: 6/7/24  Readmission Risk Score (high >=14%. Medium >=10%):Readmission Risk Score: 14.2      Care management risk score Rising risk (score 2-5) and Complex Care (Scores >=6): No Risk Score On File     Non face to face  following discharge, date last encounter closed (first attempt may have been earlier): 06/10/2024     Call initiated 2 business days of discharge: Yes     Dysuria  Hospital discharge follow-up  -     HI DISCHARGE MEDS RECONCILED W/ CURRENT OUTPATIENT MED LIST  Vascular dementia without behavioral disturbance (HCC)  Orthostatic hypotension      Medical Decision Making: moderate complexity  Return in 3 months (on 9/11/2024).     Stop amlodipine    Subjective:   Dysuria   This is a new problem. The current episode started in the past 7 days. The problem occurs every urination. There has been no fever. Pertinent negatives include no frequency, nausea, urgency or vomiting. She has tried nothing for the symptoms.   Dizziness  This is a new problem. The current episode started in the past 7 days. The problem has been unchanged. Pertinent negatives include no abdominal pain, arthralgias, chest pain, congestion, coughing, headaches, joint swelling, myalgias, nausea, rash, sore throat, vomiting or weakness.       Inpatient course: Discharge summary reviewed- see chart.    Interval history/Current status: improved    Patient Active Problem List   Diagnosis    Hypertension    PUD (peptic ulcer disease)    GIST (gastrointestinal stromal tumor), malignant (HCC)    NSTEMI (non-ST elevated myocardial infarction) ([facility])    Onychomycosis    Difficulty walking    Hyperlipidemia    Disorder of thyroid gland    Carotid stenosis, right    Acquired hypothyroidism    Mild cognitive impairment    Acute cerebrovascular accident (CVA) ([facility])

## 2024-06-12 ENCOUNTER — CARE COORDINATION (OUTPATIENT)
Dept: CARE COORDINATION | Age: 89
End: 2024-06-12

## 2024-06-12 DIAGNOSIS — R30.0 DYSURIA: ICD-10-CM

## 2024-06-12 LAB
BILIRUBIN, URINE: NEGATIVE
COLOR: YELLOW
COMMENT: ABNORMAL
GLUCOSE URINE: NEGATIVE MG/DL
KETONES, URINE: NEGATIVE MG/DL
LEUKOCYTE ESTERASE, URINE: NEGATIVE
NITRITE, URINE: NEGATIVE
PH, URINE: 6 (ref 5–9)
PROTEIN UA: NEGATIVE MG/DL
SPECIFIC GRAVITY UA: >1.03 (ref 1–1.03)
TURBIDITY: CLEAR
URINE HGB: NEGATIVE
UROBILINOGEN, URINE: 1 EU/DL (ref 0–1)

## 2024-06-12 NOTE — CARE COORDINATION
Attempt to initiate SW referral for placement support. Unable to reach spouse. Pt reported that he was not home. Will make another attempt.

## 2024-06-13 ENCOUNTER — CARE COORDINATION (OUTPATIENT)
Dept: CARE COORDINATION | Age: 89
End: 2024-06-13

## 2024-06-13 LAB
CULTURE: ABNORMAL
SPECIMEN DESCRIPTION: ABNORMAL

## 2024-06-13 NOTE — CARE COORDINATION
1121  Attempted to initiate SW referral. Pt reported that spouse was still sleep. SW will make another attempt later.     1415  Spoke with spouse. He reported that [facility] has initiated services and he would like to remain with them and no longer interested in LTC/[facility].     He stated that he was trying to reschedule pt appt however was unable to do so on MyChart. SW attempted to connect pt to PCP office however he disconnected. Contacted spouse with  on the line, however there was no answer.

## 2024-06-17 ENCOUNTER — OFFICE VISIT (OUTPATIENT)
Dept: FAMILY MEDICINE CLINIC | Age: 89
End: 2024-06-17
Payer: MEDICARE

## 2024-06-17 VITALS
OXYGEN SATURATION: 97 % | WEIGHT: 130 LBS | DIASTOLIC BLOOD PRESSURE: 84 MMHG | HEIGHT: 64 IN | BODY MASS INDEX: 22.2 KG/M2 | TEMPERATURE: 97.8 F | SYSTOLIC BLOOD PRESSURE: 140 MMHG | HEART RATE: 57 BPM | RESPIRATION RATE: 18 BRPM

## 2024-06-17 DIAGNOSIS — E03.9 ACQUIRED HYPOTHYROIDISM: ICD-10-CM

## 2024-06-17 DIAGNOSIS — E11.22 TYPE 2 DIABETES MELLITUS WITH STAGE 3A CHRONIC KIDNEY DISEASE, WITHOUT LONG-TERM CURRENT USE OF INSULIN (HCC): ICD-10-CM

## 2024-06-17 DIAGNOSIS — N18.31 TYPE 2 DIABETES MELLITUS WITH STAGE 3A CHRONIC KIDNEY DISEASE, WITHOUT LONG-TERM CURRENT USE OF INSULIN (HCC): ICD-10-CM

## 2024-06-17 DIAGNOSIS — R19.7 DIARRHEA, UNSPECIFIED TYPE: ICD-10-CM

## 2024-06-17 DIAGNOSIS — G31.84 MILD COGNITIVE IMPAIRMENT WITH MEMORY LOSS: Primary | ICD-10-CM

## 2024-06-17 DIAGNOSIS — G31.84 MILD COGNITIVE IMPAIRMENT WITH MEMORY LOSS: ICD-10-CM

## 2024-06-17 LAB
ALBUMIN: 4 G/DL (ref 3.5–5.2)
ALP BLD-CCNC: 91 U/L (ref 35–104)
ALT SERPL-CCNC: 9 U/L (ref 0–32)
ANION GAP SERPL CALCULATED.3IONS-SCNC: 13 MMOL/L (ref 7–16)
AST SERPL-CCNC: 17 U/L (ref 0–31)
BASOPHILS ABSOLUTE: 0.08 K/UL (ref 0–0.2)
BASOPHILS RELATIVE PERCENT: 2 % (ref 0–2)
BILIRUB SERPL-MCNC: 0.4 MG/DL (ref 0–1.2)
BUN BLDV-MCNC: 13 MG/DL (ref 6–23)
CALCIUM SERPL-MCNC: 9.5 MG/DL (ref 8.6–10.2)
CHLORIDE BLD-SCNC: 104 MMOL/L (ref 98–107)
CO2: 22 MMOL/L (ref 22–29)
CREAT SERPL-MCNC: 1 MG/DL (ref 0.5–1)
EOSINOPHILS ABSOLUTE: 0.17 K/UL (ref 0.05–0.5)
EOSINOPHILS RELATIVE PERCENT: 3 % (ref 0–6)
GFR, ESTIMATED: 56 ML/MIN/1.73M2
GLUCOSE BLD-MCNC: 111 MG/DL (ref 74–99)
HCT VFR BLD CALC: 45.4 % (ref 34–48)
HEMOGLOBIN: 14.4 G/DL (ref 11.5–15.5)
IMMATURE GRANULOCYTES %: 0 % (ref 0–5)
IMMATURE GRANULOCYTES ABSOLUTE: <0.03 K/UL (ref 0–0.58)
LYMPHOCYTES ABSOLUTE: 1.1 K/UL (ref 1.5–4)
LYMPHOCYTES RELATIVE PERCENT: 21 % (ref 20–42)
MCH RBC QN AUTO: 30.7 PG (ref 26–35)
MCHC RBC AUTO-ENTMCNC: 31.7 G/DL (ref 32–34.5)
MCV RBC AUTO: 96.8 FL (ref 80–99.9)
MONOCYTES ABSOLUTE: 0.45 K/UL (ref 0.1–0.95)
MONOCYTES RELATIVE PERCENT: 8 % (ref 2–12)
NEUTROPHILS ABSOLUTE: 3.53 K/UL (ref 1.8–7.3)
NEUTROPHILS RELATIVE PERCENT: 66 % (ref 43–80)
PDW BLD-RTO: 13.2 % (ref 11.5–15)
PLATELET # BLD: 225 K/UL (ref 130–450)
PMV BLD AUTO: 12.2 FL (ref 7–12)
POTASSIUM SERPL-SCNC: 4.5 MMOL/L (ref 3.5–5)
RBC # BLD: 4.69 M/UL (ref 3.5–5.5)
SODIUM BLD-SCNC: 139 MMOL/L (ref 132–146)
TOTAL PROTEIN: 6.6 G/DL (ref 6.4–8.3)
TSH SERPL DL<=0.05 MIU/L-ACNC: 2.8 UIU/ML (ref 0.27–4.2)
WBC # BLD: 5.3 K/UL (ref 4.5–11.5)

## 2024-06-17 PROCEDURE — 3044F HG A1C LEVEL LT 7.0%: CPT | Performed by: FAMILY MEDICINE

## 2024-06-17 PROCEDURE — 1123F ACP DISCUSS/DSCN MKR DOCD: CPT | Performed by: FAMILY MEDICINE

## 2024-06-17 PROCEDURE — 99214 OFFICE O/P EST MOD 30 MIN: CPT | Performed by: FAMILY MEDICINE

## 2024-06-17 ASSESSMENT — ENCOUNTER SYMPTOMS
DIARRHEA: 1
ALLERGIC/IMMUNOLOGIC NEGATIVE: 1
WHEEZING: 0
APNEA: 0
ABDOMINAL PAIN: 0
COUGH: 0
SINUS PRESSURE: 0
SORE THROAT: 0
NAUSEA: 0
VOMITING: 0
PHOTOPHOBIA: 0
FACIAL SWELLING: 0
CHEST TIGHTNESS: 0
COLOR CHANGE: 0
BACK PAIN: 0
BLOOD IN STOOL: 0
SHORTNESS OF BREATH: 0

## 2024-06-18 ENCOUNTER — OFFICE VISIT (OUTPATIENT)
Dept: ENT CLINIC | Age: 89
End: 2024-06-18

## 2024-06-18 VITALS — HEIGHT: 64 IN | WEIGHT: 130 LBS | BODY MASS INDEX: 22.2 KG/M2

## 2024-06-18 DIAGNOSIS — H69.93 ETD (EUSTACHIAN TUBE DYSFUNCTION), BILATERAL: ICD-10-CM

## 2024-06-18 DIAGNOSIS — H70.90 MASTOIDITIS, UNSPECIFIED LATERALITY: Primary | ICD-10-CM

## 2024-06-18 DIAGNOSIS — G31.84 MILD COGNITIVE IMPAIRMENT WITH MEMORY LOSS: ICD-10-CM

## 2024-06-18 DIAGNOSIS — N18.31 TYPE 2 DIABETES MELLITUS WITH STAGE 3A CHRONIC KIDNEY DISEASE, WITHOUT LONG-TERM CURRENT USE OF INSULIN (HCC): ICD-10-CM

## 2024-06-18 DIAGNOSIS — E11.22 TYPE 2 DIABETES MELLITUS WITH STAGE 3A CHRONIC KIDNEY DISEASE, WITHOUT LONG-TERM CURRENT USE OF INSULIN (HCC): ICD-10-CM

## 2024-06-18 DIAGNOSIS — E03.9 ACQUIRED HYPOTHYROIDISM: ICD-10-CM

## 2024-06-18 DIAGNOSIS — R19.7 DIARRHEA, UNSPECIFIED TYPE: ICD-10-CM

## 2024-06-18 LAB
BACTERIA: ABNORMAL
BILIRUBIN, URINE: NEGATIVE
COLOR: YELLOW
GLUCOSE URINE: NEGATIVE MG/DL
KETONES, URINE: NEGATIVE MG/DL
LEUKOCYTE ESTERASE, URINE: ABNORMAL
NITRITE, URINE: NEGATIVE
PH, URINE: 6 (ref 5–9)
PROTEIN UA: NEGATIVE MG/DL
RBC UA: ABNORMAL /HPF
SPECIFIC GRAVITY UA: <1.005 (ref 1–1.03)
TURBIDITY: CLEAR
URINE HGB: NEGATIVE
UROBILINOGEN, URINE: 0.2 EU/DL (ref 0–1)
WBC UA: ABNORMAL /HPF

## 2024-06-18 NOTE — PROGRESS NOTES
Activity   • Alcohol use: Yes     Comment: glass of wine with dinner   • Drug use: No   • Sexual activity: Not Currently     Partners: Male     Comment:      Social Determinants of Health     Financial Resource Strain: Patient Declined (2/28/2024)    Overall Financial Resource Strain (CARDIA)    • Difficulty of Paying Living Expenses: Patient declined   Food Insecurity: No Food Insecurity (6/3/2024)    Hunger Vital Sign    • Worried About Running Out of Food in the Last Year: Never true    • Ran Out of Food in the Last Year: Never true   Transportation Needs: No Transportation Needs (6/3/2024)    PRAPARE - Transportation    • Lack of Transportation (Medical): No    • Lack of Transportation (Non-Medical): No   Physical Activity: Insufficiently Active (2/28/2024)    Exercise Vital Sign    • Days of Exercise per Week: 3 days    • Minutes of Exercise per Session: 30 min   Housing Stability: Low Risk  (6/3/2024)    Housing Stability Vital Sign    • Unable to Pay for Housing in the Last Year: No    • Number of Places Lived in the Last Year: 1    • Unstable Housing in the Last Year: No     Allergies   Allergen Reactions   • Carafate [Sucralfate] Other (See Comments)     Unable to explain   • Cefdinir Other (See Comments)     Unable to explain   • Cetirizine & Related Other (See Comments)     Makes her forgetful and sleepy   • Gabapentin Dizziness or Vertigo     Significant cognitive impairment   • Lisinopril Other (See Comments)     Difficulty swallowing, reflux, headache, dizziness, leg cramps   • Memantine Other (See Comments)     Brain fog and hand numbness   • Pravachol [Pravastatin Sodium] Other (See Comments)     Patient states \"it almost killed me.\" Spoke with Patient's Daughter and she stated \"body cramps and extreme weakness.\"   • Protonix [Pantoprazole Sodium] Other (See Comments)     Unable to explain   • Statins Other (See Comments)     Intolerable. Patient states \"it almost killed me.\" Spoke with

## 2024-06-20 ENCOUNTER — CARE COORDINATION (OUTPATIENT)
Dept: CARE COORDINATION | Age: 89
End: 2024-06-20

## 2024-06-20 NOTE — CARE COORDINATION
Care Transitions Note    Follow Up Call     Patient Current Location:  Home: [address] [address]    Care Transition Nurse contacted the patient by telephone. Verified name and  as identifiers.    Additional needs identified to be addressed with provider   No needs identified                 Method of communication with provider: none.    Care Summary Note:     CTN placed call to Patient for Subsequent Care Transition call. Spoke with Pt as  was not available. Pt very pleasant in conversation. Stated she is not doing so good. Pt c/o fatigue (always tired, exhausted) and occasionally gets dizzy. Pt reports increased urination, denies dysuria, hematuria. Denies diarrhea.     Noted in chart that Pt spouse declined assistance with SNF placement and would like to remain with [facility]. Pt stated that HC came out 1 x and would like them to come out more. CTN placed call to [facility] confirmed that Pt is followed by SN 3 x wk and PT/OT 3 x wk. Next visit with SN is scheduled today and PT/OT scheduled again for tomorrow.     Pt stated she is using the ww when ambulating from chair-kitchen-bathroom. Denies recent falls.    Pt stated she eats well when she remembers to eat. Encouraged Pt to eat small snack foods and to keep water on a table next to her to remind her to drink fluids.    Pt denies any needs, questions, or concerns at this time.    Plan of care updates since last contact:  Home Health: [facility] - Confirmed soc: SN 3 x wk, PT/OT 3 x wk   DME: using [facility]       Advance Care Planning:   Does patient have an Advance Directive: reviewed during previous call, see note. .    Medication Review:  No changes since last call.     Remote Patient Monitoring:  Offered patient enrollment in the Remote Patient Monitoring (RPM) program for in-home monitoring: Patient is not eligible for RPM program because: reviewed during previous call, see note. .    Assessments:  Care Transitions

## 2024-06-21 LAB
CULTURE: ABNORMAL
SPECIMEN DESCRIPTION: ABNORMAL

## 2024-06-21 RX ORDER — DOXYCYCLINE HYCLATE 100 MG
100 TABLET ORAL 2 TIMES DAILY
Qty: 20 TABLET | Refills: 0 | Status: SHIPPED | OUTPATIENT
Start: 2024-06-21 | End: 2024-07-01

## 2024-06-24 ASSESSMENT — VISUAL ACUITY: OU: 1

## 2024-06-24 ASSESSMENT — ENCOUNTER SYMPTOMS
WHEEZING: 0
TROUBLE SWALLOWING: 0
RHINORRHEA: 0
SINUS PRESSURE: 0
CHOKING: 0
COLOR CHANGE: 0
VOICE CHANGE: 0
STRIDOR: 0
GASTROINTESTINAL NEGATIVE: 1
COUGH: 0
SORE THROAT: 0
SINUS PAIN: 0

## 2024-06-27 ENCOUNTER — CARE COORDINATION (OUTPATIENT)
Dept: CARE COORDINATION | Age: 89
End: 2024-06-27

## 2024-06-27 NOTE — CARE COORDINATION
Care Transitions Note    Follow Up Call     Reason for Ad[facility]: [facility]20[facility] AMS, Orthostatic hypotension 2/2 dehydration in setting of advanced dementia.     Patient Current Location:  Home: [address] [address]    Care Transition Nurse contacted the patient by telephone. Verified name and  as identifiers.    Additional needs identified to be addressed with provider   No needs identified                 Method of communication with provider: none.    Care Summary Note: CTN spoke briefly w/ Piedad. CTN left VM to spouse/Flavio's contact.    Pt states she is \"sleeping a lot\". She admits to fatigue and being \"tired often\". She states \"I feel a little brighter today\". She informs she does get frontal/facial headaches but denies any nasal congetion or fevers. Pt has right mastoid effusion and saw ENT 24. She is on Doxycycline 10 day therapy. Pt reports no diarrhea. She shares she is incontinent of B&B and wakes from her sleep often wet w/ urine. She states her spouse helps and directs her. Pt states she is mostly sedentary and does not have the concentration to perform tasks. She shares she has no interest in reading.     Noted pt attended PCP appts on  and . Pt has advancing dementia. Spouse informed SW no desire for SNF placement at this time. Current [facility] services.     Plan of care updates since last contact:  Advised to see PCP for any nasal congestion/blood in nasal congestion, worsening HA/facial discomfort, fevers, chills, or flu-like symptoms.      Assessments:  Care Transitions Subsequent and Final Call    Subsequent and Final Calls  Do you have any ongoing symptoms?: Yes  Patient-reported symptoms: Fatigue  Have your medications changed?: No  Do you have any questions related to your medications?: No  Do you currently have any active services?: Yes  Are you currently active with any services?: Home Health  Do you

## 2024-06-28 ENCOUNTER — TELEPHONE (OUTPATIENT)
Dept: ADMINISTRATIVE | Age: 89
End: 2024-06-28

## 2024-06-28 NOTE — TELEPHONE ENCOUNTER
Pt's  called and said pt is scheduled 7/29 for a hosp f/u for a stroke pt had in April.  She is sleeping 18 hours a day and is cold all the time.  She is also very confused.  He requested a sooner appt. Added pt to the waiting list.  Please contact pt's .

## 2024-07-03 ENCOUNTER — OFFICE VISIT (OUTPATIENT)
Dept: NEUROLOGY | Age: 89
End: 2024-07-03

## 2024-07-03 VITALS
WEIGHT: 120 LBS | SYSTOLIC BLOOD PRESSURE: 147 MMHG | BODY MASS INDEX: 20.6 KG/M2 | HEART RATE: 93 BPM | TEMPERATURE: 97.8 F | OXYGEN SATURATION: 96 % | DIASTOLIC BLOOD PRESSURE: 84 MMHG

## 2024-07-03 DIAGNOSIS — I63.9 ACUTE CEREBROVASCULAR ACCIDENT (CVA) (HCC): Primary | ICD-10-CM

## 2024-07-03 PROBLEM — E86.0 DEHYDRATION: Status: RESOLVED | Noted: 2024-06-03 | Resolved: 2024-07-03

## 2024-07-03 NOTE — PROGRESS NOTES
for the last couple of weeks.  She is using a walker for ambulation.  She requires assistance in ADLs but is able to feed herself and use the restroom independently.    We reviewed MRI images in detail, discussed cause of stroke, risk factors, what to expect and what to look for moving forward.  All questions were answered.    Physical Examination  Vitals   Vitals:    07/03/24 0901   BP: (!) 147/84   Site: Left Upper Arm   Position: Sitting   Pulse: 93   Temp: 97.8 °F (36.6 °C)   SpO2: 96%   Weight: 54.4 kg (120 lb)        General: Patient appears in no acute distress.   HEENT: Normocephalic, atraumatic  Chest: no dyspnea  Heart: RRR  Extremities/Peripheral vascular: No edema/swelling noted.     Neurologic Examination    Mental Status  Alert, and oriented to person, knows this is a \"clinic\" but unable to tell me what for. Able to tell me state, but not city. Unable to tell me year or season. Speech is fluent with intact comprehension.  evidence of memory impairment. Attention and concentration appeared fair.    Cranial Nerves  II. Visual fields - L HH   III, IV, VI: Pupils equally round and reactive to light, 3 to 2 mm bilaterally.   EOMs: looks around the room  V. Facial sensation intact to light touch bilaterally  VII: Facial movements symmetric and strong  VIII: Hearing intact to voice  IX,X: Palate elevates symmetrically. No dysarthria  XI: Sternocleidomastoid and trapezius 5/5 bilaterally   XII: Tongue is midline    Motor  Mild L hemiparesis 5-/5   Normal bulk  Increased tone in the legs     Sensory  LT intact throughout     Gait  Slow, cautious with walker      Labs  Lab Results   Component Value Date    WBC 5.3 06/17/2024    HGB 14.4 06/17/2024    HCT 45.4 06/17/2024    MCV 96.8 06/17/2024     06/17/2024     Lab Results   Component Value Date     06/17/2024    K 4.5 06/17/2024     06/17/2024    CO2 22 06/17/2024    BUN 13 06/17/2024    CREATININE 1.0 06/17/2024    GLUCOSE 111 (H) 06/17/2024

## 2024-07-11 ENCOUNTER — OFFICE VISIT (OUTPATIENT)
Dept: FAMILY MEDICINE CLINIC | Age: 89
End: 2024-07-11

## 2024-07-11 ENCOUNTER — CARE COORDINATION (OUTPATIENT)
Dept: CARE COORDINATION | Age: 89
End: 2024-07-11

## 2024-07-11 VITALS
TEMPERATURE: 97.8 F | WEIGHT: 120 LBS | HEART RATE: 76 BPM | OXYGEN SATURATION: 97 % | BODY MASS INDEX: 20.49 KG/M2 | SYSTOLIC BLOOD PRESSURE: 118 MMHG | DIASTOLIC BLOOD PRESSURE: 64 MMHG | HEIGHT: 64 IN

## 2024-07-11 DIAGNOSIS — K59.00 CONSTIPATION, UNSPECIFIED CONSTIPATION TYPE: Primary | ICD-10-CM

## 2024-07-11 DIAGNOSIS — R53.83 FATIGUE, UNSPECIFIED TYPE: ICD-10-CM

## 2024-07-11 LAB
BILIRUBIN, POC: NEGATIVE
BLOOD URINE, POC: NEGATIVE
CLARITY, POC: CLEAR
COLOR, POC: YELLOW
GLUCOSE URINE, POC: NEGATIVE
KETONES, POC: NORMAL
LEUKOCYTE EST, POC: NEGATIVE
NITRITE, POC: NEGATIVE
PH, POC: 7
PROTEIN, POC: NEGATIVE
SPECIFIC GRAVITY, POC: 1.01
UROBILINOGEN, POC: 0.2

## 2024-07-11 RX ORDER — LANOLIN ALCOHOL/MO/W.PET/CERES
3 CREAM (GRAM) TOPICAL NIGHTLY PRN
Qty: 30 TABLET | Refills: 0 | Status: SHIPPED | OUTPATIENT
Start: 2024-07-11

## 2024-07-11 NOTE — CARE COORDINATION
Care Transitions Note    Subsequent/Final Follow Up Call     CTN left HIPAA VM, purpose of call, my contact info.     Reason for Admission: 6/3/2024 - 6/7/2024 [facility] IP. AMS, Orthostatic hypotension 2/2 dehydration in setting of advanced dementia.      Follow Up Appointment:     Future Appointments         Provider Specialty Dept Phone    9/18/2024 1:45 PM Dave Li DO Primary Care [phone]    10/9/2024 11:20 AM Sandra Awad PA Neurology [phone]            Shahla Rome RN

## 2024-07-11 NOTE — PROGRESS NOTES
Urine; Future    Other orders  -     melatonin (RA MELATONIN) 3 MG TABS tablet; Take 1 tablet by mouth nightly as needed (sleep)        The patient is to call for any concerns or return if any of the signs or symptoms worsen. The patient is to follow-up with PCP in the next 2-3 days for repeat evaluation repeat assessment or go directly to the emergency department.     SIGNATURE: Juancho Griffin III, PA-C    Greater than 45 minutes spent evaluating the patient, assessing the patient, discussing results, need for follow-up, further testing, discussing x-ray results, urinalysis, urine culture, chart completion

## 2024-07-12 ENCOUNTER — CARE COORDINATION (OUTPATIENT)
Dept: CARE COORDINATION | Age: 89
End: 2024-07-12

## 2024-07-12 NOTE — CARE COORDINATION
Care Transitions Note    Final Follow Up Call     CTN left HIPAA VM, purpose of call, and my contact for second subsequent outreach attempt. CTN s/o.    Reason for Admission: 6/3/2024 - 6/7/2024 [facility] IP. AMS, Orthostatic hypotension 2/2 dehydration in setting of advanced dementia.     Follow Up Appointment:     Future Appointments         Provider Specialty Dept Phone    9/18/2024 1:45 PM Dave Li DO Primary Care [phone]    10/9/2024 11:20 AM Sandra Awad PA Neurology [phone]            Shahla Rome RN

## 2024-08-08 ENCOUNTER — TELEPHONE (OUTPATIENT)
Dept: PRIMARY CARE CLINIC | Age: 89
End: 2024-08-08

## 2024-08-08 DIAGNOSIS — R32 URINARY INCONTINENCE, UNSPECIFIED TYPE: Primary | ICD-10-CM

## 2024-08-08 NOTE — TELEPHONE ENCOUNTER
The pt's daughter is calling for the pt because she would like a referral to see a urologist for urinary incontinence, they don't have a preference just whoever you think is best

## 2024-08-19 ENCOUNTER — TELEPHONE (OUTPATIENT)
Dept: CARDIOLOGY CLINIC | Age: 89
End: 2024-08-19

## 2024-08-19 RX ORDER — AMLODIPINE BESYLATE 2.5 MG/1
2.5 TABLET ORAL DAILY
Qty: 90 TABLET | Refills: 3 | Status: SHIPPED | OUTPATIENT
Start: 2024-08-19

## 2024-08-19 RX ORDER — AMLODIPINE BESYLATE 2.5 MG/1
2.5 TABLET ORAL DAILY
COMMUNITY
End: 2024-08-19 | Stop reason: SDUPTHER

## 2024-08-19 NOTE — TELEPHONE ENCOUNTER
Patient's  called for refill of Norvasc 2.5 mg daily.  It was on recent discharge medications but not on Dr. Li's recent notes.

## 2024-09-09 ENCOUNTER — OFFICE VISIT (OUTPATIENT)
Dept: FAMILY MEDICINE CLINIC | Age: 89
End: 2024-09-09
Payer: MEDICARE

## 2024-09-09 VITALS
WEIGHT: 120 LBS | TEMPERATURE: 97.9 F | DIASTOLIC BLOOD PRESSURE: 82 MMHG | SYSTOLIC BLOOD PRESSURE: 136 MMHG | OXYGEN SATURATION: 97 % | HEART RATE: 89 BPM | BODY MASS INDEX: 20.59 KG/M2

## 2024-09-09 DIAGNOSIS — R41.89 COGNITIVE IMPAIRMENT: ICD-10-CM

## 2024-09-09 DIAGNOSIS — N39.0 URINARY TRACT INFECTION WITHOUT HEMATURIA, SITE UNSPECIFIED: Primary | ICD-10-CM

## 2024-09-09 DIAGNOSIS — R39.9 UTI SYMPTOMS: ICD-10-CM

## 2024-09-09 LAB
APPEARANCE FLUID: NORMAL
BILIRUBIN, POC: NORMAL
BLOOD URINE, POC: NORMAL
CLARITY, POC: CLEAR
COLOR, POC: YELLOW
GLUCOSE URINE, POC: NORMAL MG/DL
KETONES, POC: NORMAL MG/DL
LEUKOCYTE EST, POC: NORMAL
NITRITE, POC: NORMAL
PH, POC: 6.5
PROTEIN, POC: NORMAL MG/DL
SPECIFIC GRAVITY, POC: 1.02
UROBILINOGEN, POC: 1 MG/DL

## 2024-09-09 PROCEDURE — 81002 URINALYSIS NONAUTO W/O SCOPE: CPT

## 2024-09-09 PROCEDURE — 1123F ACP DISCUSS/DSCN MKR DOCD: CPT

## 2024-09-09 PROCEDURE — 99215 OFFICE O/P EST HI 40 MIN: CPT

## 2024-09-09 RX ORDER — SULFAMETHOXAZOLE/TRIMETHOPRIM 800-160 MG
1 TABLET ORAL 2 TIMES DAILY
Qty: 14 TABLET | Refills: 0 | Status: SHIPPED
Start: 2024-09-09 | End: 2024-09-10 | Stop reason: SINTOL

## 2024-09-10 ENCOUNTER — TELEPHONE (OUTPATIENT)
Dept: FAMILY MEDICINE CLINIC | Age: 89
End: 2024-09-10

## 2024-09-10 RX ORDER — CIPROFLOXACIN 500 MG/1
500 TABLET, FILM COATED ORAL 2 TIMES DAILY
Qty: 14 TABLET | Refills: 0 | Status: SHIPPED | OUTPATIENT
Start: 2024-09-10 | End: 2024-09-17

## 2024-09-11 LAB
CULTURE: ABNORMAL
CULTURE: ABNORMAL
SPECIMEN DESCRIPTION: ABNORMAL

## 2024-09-18 ENCOUNTER — OFFICE VISIT (OUTPATIENT)
Dept: PRIMARY CARE CLINIC | Age: 89
End: 2024-09-18

## 2024-09-18 VITALS
BODY MASS INDEX: 20.52 KG/M2 | HEIGHT: 64 IN | WEIGHT: 120.2 LBS | OXYGEN SATURATION: 98 % | HEART RATE: 68 BPM | TEMPERATURE: 96.8 F | DIASTOLIC BLOOD PRESSURE: 88 MMHG | SYSTOLIC BLOOD PRESSURE: 124 MMHG

## 2024-09-18 DIAGNOSIS — G31.84 MILD COGNITIVE IMPAIRMENT WITH MEMORY LOSS: ICD-10-CM

## 2024-09-18 DIAGNOSIS — N18.30 TYPE 2 DIABETES MELLITUS WITH STAGE 3 CHRONIC KIDNEY DISEASE, WITHOUT LONG-TERM CURRENT USE OF INSULIN, UNSPECIFIED WHETHER STAGE 3A OR 3B CKD (HCC): ICD-10-CM

## 2024-09-18 DIAGNOSIS — I10 PRIMARY HYPERTENSION: ICD-10-CM

## 2024-09-18 DIAGNOSIS — E11.22 TYPE 2 DIABETES MELLITUS WITH STAGE 3 CHRONIC KIDNEY DISEASE, WITHOUT LONG-TERM CURRENT USE OF INSULIN, UNSPECIFIED WHETHER STAGE 3A OR 3B CKD (HCC): ICD-10-CM

## 2024-09-18 DIAGNOSIS — G62.9 NEUROPATHY: ICD-10-CM

## 2024-09-18 DIAGNOSIS — F01.50 VASCULAR DEMENTIA WITHOUT BEHAVIORAL DISTURBANCE (HCC): Primary | ICD-10-CM

## 2024-09-18 RX ORDER — GABAPENTIN 100 MG/1
100 CAPSULE ORAL DAILY
COMMUNITY
End: 2024-09-18 | Stop reason: SDUPTHER

## 2024-09-18 RX ORDER — GABAPENTIN 100 MG/1
100 CAPSULE ORAL DAILY
Qty: 90 CAPSULE | Refills: 1 | Status: SHIPPED | OUTPATIENT
Start: 2024-09-18 | End: 2024-12-17

## 2024-09-18 ASSESSMENT — ENCOUNTER SYMPTOMS
SHORTNESS OF BREATH: 0
SINUS PRESSURE: 0
PHOTOPHOBIA: 0
VOMITING: 0
DIARRHEA: 0
ALLERGIC/IMMUNOLOGIC NEGATIVE: 1
NAUSEA: 0
BLOOD IN STOOL: 0
CHEST TIGHTNESS: 0
ABDOMINAL PAIN: 0
BACK PAIN: 0
COLOR CHANGE: 0
SORE THROAT: 0
COUGH: 0
APNEA: 0
WHEEZING: 0
FACIAL SWELLING: 0

## 2024-09-23 ENCOUNTER — OFFICE VISIT (OUTPATIENT)
Dept: FAMILY MEDICINE CLINIC | Age: 89
End: 2024-09-23
Payer: MEDICARE

## 2024-09-23 VITALS
HEART RATE: 76 BPM | BODY MASS INDEX: 20.67 KG/M2 | SYSTOLIC BLOOD PRESSURE: 122 MMHG | TEMPERATURE: 98 F | WEIGHT: 120.4 LBS | OXYGEN SATURATION: 97 % | DIASTOLIC BLOOD PRESSURE: 80 MMHG

## 2024-09-23 DIAGNOSIS — R39.9 UTI SYMPTOMS: ICD-10-CM

## 2024-09-23 DIAGNOSIS — R41.82 ALTERED MENTAL STATUS, UNSPECIFIED ALTERED MENTAL STATUS TYPE: Primary | ICD-10-CM

## 2024-09-23 LAB
BILIRUBIN, POC: NORMAL
BLOOD URINE, POC: NORMAL
CLARITY, POC: NORMAL
COLOR, POC: YELLOW
GLUCOSE URINE, POC: NORMAL MG/DL
KETONES, POC: NORMAL MG/DL
LEUKOCYTE EST, POC: NORMAL
NITRITE, POC: NORMAL
PH, POC: 5.5
PROTEIN, POC: NORMAL MG/DL
SPECIFIC GRAVITY, POC: >=1.03
UROBILINOGEN, POC: 0.2 MG/DL

## 2024-09-23 PROCEDURE — 81002 URINALYSIS NONAUTO W/O SCOPE: CPT | Performed by: FAMILY MEDICINE

## 2024-09-23 PROCEDURE — 99214 OFFICE O/P EST MOD 30 MIN: CPT | Performed by: FAMILY MEDICINE

## 2024-09-23 PROCEDURE — 1123F ACP DISCUSS/DSCN MKR DOCD: CPT | Performed by: FAMILY MEDICINE

## 2024-09-23 RX ORDER — LEVOFLOXACIN 250 MG/1
250 TABLET, FILM COATED ORAL DAILY
Qty: 10 TABLET | Refills: 0 | Status: SHIPPED | OUTPATIENT
Start: 2024-09-23 | End: 2024-10-03

## 2024-09-23 ASSESSMENT — ENCOUNTER SYMPTOMS
FACIAL SWELLING: 0
VOMITING: 0
DIARRHEA: 0
PHOTOPHOBIA: 0
COUGH: 0
BLOOD IN STOOL: 0
ABDOMINAL PAIN: 0
SHORTNESS OF BREATH: 0
SORE THROAT: 0
ALLERGIC/IMMUNOLOGIC NEGATIVE: 1
APNEA: 0
CHEST TIGHTNESS: 0
SINUS PRESSURE: 0
BACK PAIN: 0
NAUSEA: 0
COLOR CHANGE: 0
WHEEZING: 0

## 2024-09-25 LAB
CULTURE: NORMAL
CULTURE: NORMAL
SPECIMEN DESCRIPTION: NORMAL

## 2024-10-04 ENCOUNTER — OFFICE VISIT (OUTPATIENT)
Dept: PRIMARY CARE CLINIC | Age: 89
End: 2024-10-04

## 2024-10-04 ENCOUNTER — HOSPITAL ENCOUNTER (OUTPATIENT)
Dept: CT IMAGING | Age: 89
Discharge: HOME | End: 2024-10-06
Attending: FAMILY MEDICINE
Payer: MEDICARE

## 2024-10-04 VITALS
OXYGEN SATURATION: 99 % | BODY MASS INDEX: 20.14 KG/M2 | HEART RATE: 72 BPM | DIASTOLIC BLOOD PRESSURE: 74 MMHG | RESPIRATION RATE: 18 BRPM | WEIGHT: 118 LBS | HEIGHT: 64 IN | TEMPERATURE: 97.3 F | SYSTOLIC BLOOD PRESSURE: 122 MMHG

## 2024-10-04 DIAGNOSIS — E03.9 HYPOTHYROIDISM, UNSPECIFIED TYPE: ICD-10-CM

## 2024-10-04 DIAGNOSIS — K59.00 CONSTIPATION, UNSPECIFIED CONSTIPATION TYPE: ICD-10-CM

## 2024-10-04 DIAGNOSIS — N18.30 TYPE 2 DIABETES MELLITUS WITH STAGE 3 CHRONIC KIDNEY DISEASE, WITHOUT LONG-TERM CURRENT USE OF INSULIN, UNSPECIFIED WHETHER STAGE 3A OR 3B CKD (HCC): ICD-10-CM

## 2024-10-04 DIAGNOSIS — G31.84 MILD COGNITIVE IMPAIRMENT WITH MEMORY LOSS: ICD-10-CM

## 2024-10-04 DIAGNOSIS — R10.30 LOWER ABDOMINAL PAIN: ICD-10-CM

## 2024-10-04 DIAGNOSIS — E11.22 TYPE 2 DIABETES MELLITUS WITH STAGE 3 CHRONIC KIDNEY DISEASE, WITHOUT LONG-TERM CURRENT USE OF INSULIN, UNSPECIFIED WHETHER STAGE 3A OR 3B CKD (HCC): ICD-10-CM

## 2024-10-04 DIAGNOSIS — I10 PRIMARY HYPERTENSION: Primary | ICD-10-CM

## 2024-10-04 DIAGNOSIS — I10 PRIMARY HYPERTENSION: ICD-10-CM

## 2024-10-04 LAB
ALBUMIN: 4.3 G/DL (ref 3.5–5.2)
ALP BLD-CCNC: 90 U/L (ref 35–104)
ALT SERPL-CCNC: 13 U/L (ref 0–32)
ANION GAP SERPL CALCULATED.3IONS-SCNC: 13 MMOL/L (ref 7–16)
AST SERPL-CCNC: 27 U/L (ref 0–31)
BASOPHILS ABSOLUTE: 0.06 K/UL (ref 0–0.2)
BASOPHILS RELATIVE PERCENT: 1 % (ref 0–2)
BILIRUB SERPL-MCNC: 0.4 MG/DL (ref 0–1.2)
BUN BLDV-MCNC: 14 MG/DL (ref 6–23)
CALCIUM SERPL-MCNC: 10.1 MG/DL (ref 8.6–10.2)
CHLORIDE BLD-SCNC: 106 MMOL/L (ref 98–107)
CHOLESTEROL, TOTAL: 299 MG/DL
CO2: 23 MMOL/L (ref 22–29)
CREAT SERPL-MCNC: 1 MG/DL (ref 0.5–1)
EOSINOPHILS ABSOLUTE: 0.06 K/UL (ref 0.05–0.5)
EOSINOPHILS RELATIVE PERCENT: 1 % (ref 0–6)
GFR, ESTIMATED: 57 ML/MIN/1.73M2
GLUCOSE BLD-MCNC: 90 MG/DL (ref 74–99)
HCT VFR BLD CALC: 49.8 % (ref 34–48)
HDLC SERPL-MCNC: 78 MG/DL
HEMOGLOBIN: 15.2 G/DL (ref 11.5–15.5)
IMMATURE GRANULOCYTES %: 0 % (ref 0–5)
IMMATURE GRANULOCYTES ABSOLUTE: <0.03 K/UL (ref 0–0.58)
LDL CHOLESTEROL: 184 MG/DL
LYMPHOCYTES ABSOLUTE: 1.58 K/UL (ref 1.5–4)
LYMPHOCYTES RELATIVE PERCENT: 31 % (ref 20–42)
MCH RBC QN AUTO: 29.9 PG (ref 26–35)
MCHC RBC AUTO-ENTMCNC: 30.5 G/DL (ref 32–34.5)
MCV RBC AUTO: 98 FL (ref 80–99.9)
MONOCYTES ABSOLUTE: 0.4 K/UL (ref 0.1–0.95)
MONOCYTES RELATIVE PERCENT: 8 % (ref 2–12)
NEUTROPHILS ABSOLUTE: 3.04 K/UL (ref 1.8–7.3)
NEUTROPHILS RELATIVE PERCENT: 59 % (ref 43–80)
PDW BLD-RTO: 14 % (ref 11.5–15)
PLATELET, FLUORESCENCE: 145 K/UL (ref 130–450)
PMV BLD AUTO: 13.5 FL (ref 7–12)
POTASSIUM SERPL-SCNC: 4.9 MMOL/L (ref 3.5–5)
RBC # BLD: 5.08 M/UL (ref 3.5–5.5)
SODIUM BLD-SCNC: 142 MMOL/L (ref 132–146)
TOTAL PROTEIN: 7 G/DL (ref 6.4–8.3)
TRIGL SERPL-MCNC: 183 MG/DL
TSH SERPL DL<=0.05 MIU/L-ACNC: 2.07 UIU/ML (ref 0.27–4.2)
VLDLC SERPL CALC-MCNC: 37 MG/DL
WBC # BLD: 5.2 K/UL (ref 4.5–11.5)

## 2024-10-04 PROCEDURE — 74176 CT ABD & PELVIS W/O CONTRAST: CPT

## 2024-10-04 ASSESSMENT — ENCOUNTER SYMPTOMS
PHOTOPHOBIA: 0
APNEA: 0
DIARRHEA: 0
SHORTNESS OF BREATH: 0
ALLERGIC/IMMUNOLOGIC NEGATIVE: 1
FACIAL SWELLING: 0
COUGH: 0
NAUSEA: 0
VOMITING: 0
SINUS PRESSURE: 0
CONSTIPATION: 1
BLOOD IN STOOL: 0
ABDOMINAL PAIN: 1
WHEEZING: 0
SORE THROAT: 0
BACK PAIN: 0
CHEST TIGHTNESS: 0
COLOR CHANGE: 0

## 2024-10-04 NOTE — PROGRESS NOTES
Chief Complaint:   Chief Complaint   Patient presents with    Abdominal Pain     Started 4 months ago. Comes and goes. Right lower side pain.        Abdominal Pain  This is a new problem. The current episode started 1 to 4 weeks ago. The onset quality is gradual. The problem occurs daily. The pain is located in the RLQ. The pain is at a severity of 3/10. The pain is mild. The quality of the pain is aching. Associated symptoms include constipation. Pertinent negatives include no arthralgias, diarrhea, frequency, headaches, myalgias, nausea or vomiting. Treatments tried: laxatives.       Patient Active Problem List   Diagnosis    Hypertension    PUD (peptic ulcer disease)    GIST (gastrointestinal stromal tumor), malignant (HCC)    NSTEMI (non-ST elevated myocardial infarction) (HCC)    Onychomycosis    Difficulty walking    Hyperlipidemia    Disorder of thyroid gland    Carotid stenosis, right    Acquired hypothyroidism    Mild cognitive impairment    Acute cerebrovascular accident (CVA) (HCC)    Pulmonary nodules    Statin intolerance    Type 2 diabetes mellitus    Chronic renal disease, stage III (HCC) [233178]    Type 2 diabetes mellitus with chronic kidney disease    Mild cognitive impairment with memory loss    Chronic right-sided low back pain with right-sided sciatica    Sacroiliac joint disease    Nonrheumatic mitral valve stenosis    Vascular dementia without behavioral disturbance (HCC)    TIA (transient ischemic attack)    AMS (altered mental status)    Dementia (HCC)    Orthostatic hypotension       Past Medical History:   Diagnosis Date    Dementia (HCC)     Diverticulosis     GIST (gastrointestinal stroma tumor), malignant, colon (HCC)     Hyperlipidemia     Hypertension     MI (myocardial infarction) (HCC) 2015    Mitral regurgitation     per cardiology    PUD (peptic ulcer disease)        Past Surgical History:   Procedure Laterality Date    CAROTID ENDARTERECTOMY Right 04/2019    Dr Nazario

## 2024-10-09 ENCOUNTER — OFFICE VISIT (OUTPATIENT)
Dept: NEUROLOGY | Age: 89
End: 2024-10-09
Payer: MEDICARE

## 2024-10-09 VITALS
SYSTOLIC BLOOD PRESSURE: 120 MMHG | WEIGHT: 119 LBS | HEIGHT: 64 IN | BODY MASS INDEX: 20.32 KG/M2 | DIASTOLIC BLOOD PRESSURE: 80 MMHG

## 2024-10-09 DIAGNOSIS — I63.9 ACUTE CEREBROVASCULAR ACCIDENT (CVA) (HCC): Primary | ICD-10-CM

## 2024-10-09 PROCEDURE — 1123F ACP DISCUSS/DSCN MKR DOCD: CPT | Performed by: PHYSICIAN ASSISTANT

## 2024-10-09 PROCEDURE — 99214 OFFICE O/P EST MOD 30 MIN: CPT | Performed by: PHYSICIAN ASSISTANT

## 2024-10-09 RX ORDER — OMEGA-3 FATTY ACIDS/FISH OIL 300-1000MG
1 CAPSULE ORAL EVERY 6 HOURS PRN
COMMUNITY

## 2024-10-09 NOTE — PROGRESS NOTES
[facility] Neurology - office visit- stroke follow up     Date:  10/9/2024  Patient Name:  Piedad Kinsey  YOB: 1934  MRN: [id number]     PCP:  Dave Li DO   Referring:  No ref. provider found    Chief Complaint: stroke like symptoms     History obtained from: patient, chart     Assessment  stroke right basal ganglia in 4/2024 2/2 HTN (228/113) with other uncontrolled vascular risk facts of  HLD (), borderline DM (A1C ranging from 6.1-6.9 over the last couple years) , advanced age  Vessel imaging did show significant intracranial atherosclerosis.     Plan  Continue Plavix   Intolerance to statins- would recommend non statin agent if possible (on zetia)   New order for PT given worsening since stopping   B12 replacement with goal > 400   Risk factor control   RTO 6 months or sooner prn       History of Present Illness:  Piedad Kinsey is a 89 y.o.  female presenting for hospital follow up of stroke that occurred in 4/2024.     PMH significant for dementia, HTN, HLD, diabetes, MI, prior stroke in 9/2021, PUD, CKD    Hospital course  She presented with L side weakness, confusion and facial droop. BP on arrival was 228/113. Stroke alert was called. NIH was 10, but rapidly improving down to a 1 therefore TNK was no longer indicated. She was loaded with ASA and Plavix. Has intracranial stenosis, but no LVO.     MRI brain shows an acute RBG stroke. Was on ASA 81 mg daily PTA.     Echo was unrevealing.     She does have a prior stroke in 2021 that had presented with L hemiparesis and balance impairment- but these symptoms appear to have resolved.     7/2024 OV    reports that she has been very sleepy since the stroke occurred as well as more confused.  She also recently had a flulike illness last week which has made her more tired and generally weak.    She has been home with home therapy for the last couple of weeks.  She is using a walker for ambulation.  She requires assistance in ADLs but

## 2024-11-17 ENCOUNTER — APPOINTMENT (OUTPATIENT)
Dept: CT IMAGING | Age: 89
End: 2024-11-17
Payer: MEDICARE

## 2024-11-17 ENCOUNTER — HOSPITAL ENCOUNTER (OUTPATIENT)
Age: 89
Setting detail: OBSERVATION
Discharge: NURSING, REHAB OR LONG-TERM CARE | End: 2024-11-20
Attending: EMERGENCY MEDICINE | Admitting: STUDENT IN AN ORGANIZED HEALTH CARE EDUCATION/TRAINING PROGRAM
Payer: MEDICARE

## 2024-11-17 DIAGNOSIS — R53.1 GENERAL WEAKNESS: Primary | ICD-10-CM

## 2024-11-17 DIAGNOSIS — M87.9 BILATERAL HIP OSTEONECROSIS: ICD-10-CM

## 2024-11-17 DIAGNOSIS — R26.9 GAIT DISTURBANCE: ICD-10-CM

## 2024-11-17 DIAGNOSIS — I70.209 FEMORAL ARTERY OCCLUSION (HCC): ICD-10-CM

## 2024-11-17 DIAGNOSIS — R62.7 FAILURE TO THRIVE IN ADULT: ICD-10-CM

## 2024-11-17 LAB
ALBUMIN SERPL-MCNC: 4.3 G/DL (ref 3.5–5.2)
ALP SERPL-CCNC: 92 U/L (ref 35–104)
ALT SERPL-CCNC: 11 U/L (ref 0–32)
AMMONIA PLAS-SCNC: 17 UMOL/L (ref 11–51)
ANION GAP SERPL CALCULATED.3IONS-SCNC: 9 MMOL/L (ref 7–16)
AST SERPL-CCNC: 17 U/L (ref 0–31)
BASOPHILS # BLD: 0.05 K/UL (ref 0–0.2)
BASOPHILS NFR BLD: 1 % (ref 0–2)
BILIRUB DIRECT SERPL-MCNC: <0.2 MG/DL (ref 0–0.3)
BILIRUB INDIRECT SERPL-MCNC: NORMAL MG/DL (ref 0–1)
BILIRUB SERPL-MCNC: 0.4 MG/DL (ref 0–1.2)
BILIRUB UR QL STRIP: NEGATIVE
BUN SERPL-MCNC: 18 MG/DL (ref 6–23)
CALCIUM SERPL-MCNC: 10 MG/DL (ref 8.6–10.2)
CHLORIDE SERPL-SCNC: 105 MMOL/L (ref 98–107)
CK SERPL-CCNC: 58 U/L (ref 20–180)
CLARITY UR: CLEAR
CO2 SERPL-SCNC: 26 MMOL/L (ref 22–29)
COLOR UR: YELLOW
CREAT SERPL-MCNC: 0.9 MG/DL (ref 0.5–1)
EOSINOPHIL # BLD: 0.11 K/UL (ref 0.05–0.5)
EOSINOPHILS RELATIVE PERCENT: 2 % (ref 0–6)
ERYTHROCYTE [DISTWIDTH] IN BLOOD BY AUTOMATED COUNT: 13 % (ref 11.5–15)
ETHANOLAMINE SERPL-MCNC: <10 MG/DL (ref 0–0.08)
GFR, ESTIMATED: 59 ML/MIN/1.73M2
GLUCOSE SERPL-MCNC: 99 MG/DL (ref 74–99)
GLUCOSE UR STRIP-MCNC: NEGATIVE MG/DL
HCT VFR BLD AUTO: 43 % (ref 34–48)
HGB BLD-MCNC: 14 G/DL (ref 11.5–15.5)
HGB UR QL STRIP.AUTO: NEGATIVE
IMM GRANULOCYTES # BLD AUTO: <0.03 K/UL (ref 0–0.58)
IMM GRANULOCYTES NFR BLD: 0 % (ref 0–5)
INR PPP: 1
KETONES UR STRIP-MCNC: ABNORMAL MG/DL
LACTATE BLDV-SCNC: 0.7 MMOL/L (ref 0.5–2.2)
LEUKOCYTE ESTERASE UR QL STRIP: NEGATIVE
LIPASE SERPL-CCNC: 31 U/L (ref 13–60)
LYMPHOCYTES NFR BLD: 1.53 K/UL (ref 1.5–4)
LYMPHOCYTES RELATIVE PERCENT: 24 % (ref 20–42)
MAGNESIUM SERPL-MCNC: 2.4 MG/DL (ref 1.6–2.6)
MCH RBC QN AUTO: 30.2 PG (ref 26–35)
MCHC RBC AUTO-ENTMCNC: 32.6 G/DL (ref 32–34.5)
MCV RBC AUTO: 92.7 FL (ref 80–99.9)
MONOCYTES NFR BLD: 0.42 K/UL (ref 0.1–0.95)
MONOCYTES NFR BLD: 7 % (ref 2–12)
NEUTROPHILS NFR BLD: 67 % (ref 43–80)
NEUTS SEG NFR BLD: 4.25 K/UL (ref 1.8–7.3)
NITRITE UR QL STRIP: NEGATIVE
PH UR STRIP: 7 [PH] (ref 5–9)
PLATELET # BLD AUTO: 195 K/UL (ref 130–450)
PMV BLD AUTO: 11.4 FL (ref 7–12)
POTASSIUM SERPL-SCNC: 4.3 MMOL/L (ref 3.5–5)
PROT SERPL-MCNC: 6.6 G/DL (ref 6.4–8.3)
PROT UR STRIP-MCNC: NEGATIVE MG/DL
PROTHROMBIN TIME: 11 SEC (ref 9.3–12.4)
RBC # BLD AUTO: 4.64 M/UL (ref 3.5–5.5)
RBC #/AREA URNS HPF: ABNORMAL /HPF
SARS-COV-2 RDRP RESP QL NAA+PROBE: NOT DETECTED
SODIUM SERPL-SCNC: 140 MMOL/L (ref 132–146)
SP GR UR STRIP: <1.005 (ref 1–1.03)
SPECIMEN DESCRIPTION: NORMAL
TROPONIN I SERPL HS-MCNC: 32 NG/L (ref 0–9)
TROPONIN I SERPL HS-MCNC: 33 NG/L (ref 0–9)
TSH SERPL DL<=0.05 MIU/L-ACNC: 1.98 UIU/ML (ref 0.27–4.2)
UROBILINOGEN UR STRIP-ACNC: 0.2 EU/DL (ref 0–1)
WBC #/AREA URNS HPF: ABNORMAL /HPF
WBC OTHER # BLD: 6.4 K/UL (ref 4.5–11.5)

## 2024-11-17 PROCEDURE — G0378 HOSPITAL OBSERVATION PER HR: HCPCS

## 2024-11-17 PROCEDURE — 93005 ELECTROCARDIOGRAM TRACING: CPT | Performed by: EMERGENCY MEDICINE

## 2024-11-17 PROCEDURE — 71275 CT ANGIOGRAPHY CHEST: CPT

## 2024-11-17 PROCEDURE — 83605 ASSAY OF LACTIC ACID: CPT

## 2024-11-17 PROCEDURE — 70450 CT HEAD/BRAIN W/O DYE: CPT

## 2024-11-17 PROCEDURE — 99285 EMERGENCY DEPT VISIT HI MDM: CPT

## 2024-11-17 PROCEDURE — 87086 URINE CULTURE/COLONY COUNT: CPT

## 2024-11-17 PROCEDURE — 85025 COMPLETE CBC W/AUTO DIFF WBC: CPT

## 2024-11-17 PROCEDURE — 82140 ASSAY OF AMMONIA: CPT

## 2024-11-17 PROCEDURE — 87040 BLOOD CULTURE FOR BACTERIA: CPT

## 2024-11-17 PROCEDURE — 83690 ASSAY OF LIPASE: CPT

## 2024-11-17 PROCEDURE — 82248 BILIRUBIN DIRECT: CPT

## 2024-11-17 PROCEDURE — 6360000004 HC RX CONTRAST MEDICATION: Performed by: RADIOLOGY

## 2024-11-17 PROCEDURE — 80053 COMPREHEN METABOLIC PANEL: CPT

## 2024-11-17 PROCEDURE — 84443 ASSAY THYROID STIM HORMONE: CPT

## 2024-11-17 PROCEDURE — 74177 CT ABD & PELVIS W/CONTRAST: CPT

## 2024-11-17 PROCEDURE — 96375 TX/PRO/DX INJ NEW DRUG ADDON: CPT

## 2024-11-17 PROCEDURE — 85610 PROTHROMBIN TIME: CPT

## 2024-11-17 PROCEDURE — 87635 SARS-COV-2 COVID-19 AMP PRB: CPT

## 2024-11-17 PROCEDURE — 99222 1ST HOSP IP/OBS MODERATE 55: CPT | Performed by: STUDENT IN AN ORGANIZED HEALTH CARE EDUCATION/TRAINING PROGRAM

## 2024-11-17 PROCEDURE — 96374 THER/PROPH/DIAG INJ IV PUSH: CPT

## 2024-11-17 PROCEDURE — 84484 ASSAY OF TROPONIN QUANT: CPT

## 2024-11-17 PROCEDURE — G0480 DRUG TEST DEF 1-7 CLASSES: HCPCS

## 2024-11-17 PROCEDURE — 81001 URINALYSIS AUTO W/SCOPE: CPT

## 2024-11-17 PROCEDURE — 6360000002 HC RX W HCPCS: Performed by: EMERGENCY MEDICINE

## 2024-11-17 PROCEDURE — 82550 ASSAY OF CK (CPK): CPT

## 2024-11-17 PROCEDURE — 83735 ASSAY OF MAGNESIUM: CPT

## 2024-11-17 RX ORDER — EZETIMIBE 10 MG/1
10 TABLET ORAL NIGHTLY
Status: DISCONTINUED | OUTPATIENT
Start: 2024-11-18 | End: 2024-11-20 | Stop reason: HOSPADM

## 2024-11-17 RX ORDER — POTASSIUM CHLORIDE 7.45 MG/ML
10 INJECTION INTRAVENOUS PRN
Status: DISCONTINUED | OUTPATIENT
Start: 2024-11-17 | End: 2024-11-17 | Stop reason: RX

## 2024-11-17 RX ORDER — POLYETHYLENE GLYCOL 3350 17 G/17G
17 POWDER, FOR SOLUTION ORAL DAILY PRN
Status: DISCONTINUED | OUTPATIENT
Start: 2024-11-17 | End: 2024-11-18

## 2024-11-17 RX ORDER — GABAPENTIN 100 MG/1
100 CAPSULE ORAL DAILY
Status: DISCONTINUED | OUTPATIENT
Start: 2024-11-18 | End: 2024-11-20 | Stop reason: HOSPADM

## 2024-11-17 RX ORDER — SODIUM CHLORIDE 0.9 % (FLUSH) 0.9 %
5-40 SYRINGE (ML) INJECTION EVERY 12 HOURS SCHEDULED
Status: DISCONTINUED | OUTPATIENT
Start: 2024-11-17 | End: 2024-11-20 | Stop reason: HOSPADM

## 2024-11-17 RX ORDER — SODIUM CHLORIDE 0.9 % (FLUSH) 0.9 %
5-40 SYRINGE (ML) INJECTION PRN
Status: DISCONTINUED | OUTPATIENT
Start: 2024-11-17 | End: 2024-11-20 | Stop reason: HOSPADM

## 2024-11-17 RX ORDER — IOPAMIDOL 755 MG/ML
75 INJECTION, SOLUTION INTRAVASCULAR
Status: COMPLETED | OUTPATIENT
Start: 2024-11-17 | End: 2024-11-17

## 2024-11-17 RX ORDER — ACETAMINOPHEN 325 MG/1
650 TABLET ORAL EVERY 6 HOURS PRN
Status: DISCONTINUED | OUTPATIENT
Start: 2024-11-17 | End: 2024-11-20 | Stop reason: HOSPADM

## 2024-11-17 RX ORDER — MAGNESIUM SULFATE IN WATER 40 MG/ML
2000 INJECTION, SOLUTION INTRAVENOUS PRN
Status: DISCONTINUED | OUTPATIENT
Start: 2024-11-17 | End: 2024-11-20 | Stop reason: HOSPADM

## 2024-11-17 RX ORDER — ONDANSETRON 4 MG/1
4 TABLET, ORALLY DISINTEGRATING ORAL EVERY 8 HOURS PRN
Status: DISCONTINUED | OUTPATIENT
Start: 2024-11-17 | End: 2024-11-20 | Stop reason: HOSPADM

## 2024-11-17 RX ORDER — LEVOTHYROXINE SODIUM 25 UG/1
25 TABLET ORAL DAILY
Status: DISCONTINUED | OUTPATIENT
Start: 2024-11-18 | End: 2024-11-20 | Stop reason: HOSPADM

## 2024-11-17 RX ORDER — FENTANYL CITRATE 50 UG/ML
25 INJECTION, SOLUTION INTRAMUSCULAR; INTRAVENOUS ONCE
Status: COMPLETED | OUTPATIENT
Start: 2024-11-17 | End: 2024-11-17

## 2024-11-17 RX ORDER — HYDRALAZINE HYDROCHLORIDE 20 MG/ML
10 INJECTION INTRAMUSCULAR; INTRAVENOUS ONCE
Status: COMPLETED | OUTPATIENT
Start: 2024-11-17 | End: 2024-11-17

## 2024-11-17 RX ORDER — SODIUM CHLORIDE 9 MG/ML
INJECTION, SOLUTION INTRAVENOUS PRN
Status: DISCONTINUED | OUTPATIENT
Start: 2024-11-17 | End: 2024-11-20 | Stop reason: HOSPADM

## 2024-11-17 RX ORDER — CLOPIDOGREL BISULFATE 75 MG/1
75 TABLET ORAL DAILY
Status: DISCONTINUED | OUTPATIENT
Start: 2024-11-18 | End: 2024-11-20 | Stop reason: HOSPADM

## 2024-11-17 RX ORDER — FAMOTIDINE 20 MG/1
20 TABLET, FILM COATED ORAL DAILY
Status: DISCONTINUED | OUTPATIENT
Start: 2024-11-18 | End: 2024-11-20 | Stop reason: HOSPADM

## 2024-11-17 RX ORDER — ONDANSETRON 2 MG/ML
4 INJECTION INTRAMUSCULAR; INTRAVENOUS EVERY 6 HOURS PRN
Status: DISCONTINUED | OUTPATIENT
Start: 2024-11-17 | End: 2024-11-20 | Stop reason: HOSPADM

## 2024-11-17 RX ORDER — ACETAMINOPHEN 650 MG/1
650 SUPPOSITORY RECTAL EVERY 6 HOURS PRN
Status: DISCONTINUED | OUTPATIENT
Start: 2024-11-17 | End: 2024-11-20 | Stop reason: HOSPADM

## 2024-11-17 RX ORDER — ENOXAPARIN SODIUM 100 MG/ML
40 INJECTION SUBCUTANEOUS DAILY
Status: DISCONTINUED | OUTPATIENT
Start: 2024-11-18 | End: 2024-11-20 | Stop reason: HOSPADM

## 2024-11-17 RX ORDER — POTASSIUM CHLORIDE 1500 MG/1
40 TABLET, EXTENDED RELEASE ORAL PRN
Status: DISCONTINUED | OUTPATIENT
Start: 2024-11-17 | End: 2024-11-20 | Stop reason: HOSPADM

## 2024-11-17 RX ADMIN — IOPAMIDOL 75 ML: 755 INJECTION, SOLUTION INTRAVENOUS at 18:47

## 2024-11-17 RX ADMIN — FENTANYL CITRATE 25 MCG: 50 INJECTION INTRAMUSCULAR; INTRAVENOUS at 19:56

## 2024-11-17 RX ADMIN — HYDRALAZINE HYDROCHLORIDE 10 MG: 20 INJECTION INTRAMUSCULAR; INTRAVENOUS at 18:09

## 2024-11-17 ASSESSMENT — PAIN - FUNCTIONAL ASSESSMENT
PAIN_FUNCTIONAL_ASSESSMENT: ACTIVITIES ARE NOT PREVENTED
PAIN_FUNCTIONAL_ASSESSMENT: NONE - DENIES PAIN
PAIN_FUNCTIONAL_ASSESSMENT: NONE - DENIES PAIN

## 2024-11-17 ASSESSMENT — PAIN SCALES - GENERAL
PAINLEVEL_OUTOF10: 0
PAINLEVEL_OUTOF10: 10

## 2024-11-17 ASSESSMENT — PAIN DESCRIPTION - LOCATION: LOCATION: ABDOMEN

## 2024-11-17 NOTE — ED NOTES
Blood work obtained and sent to lab at this time. All blood work was sent on d/t rec d/t inabiltiy to print stickers.  Lav and maldonado top sent on ice  4 culture bottles  Blue, red,lav, and green PST   COVID swab     All send with labels and with down time rec form

## 2024-11-17 NOTE — ED PROVIDER NOTES
5. Gait disturbance          DISPOSITION/PLAN     DISPOSITION Decision To Admit 11/17/2024 08:43:36 PM           PATIENT REFERRED TO:  No follow-up provider specified.    DISCHARGE MEDICATIONS:  New Prescriptions    No medications on file       DISCONTINUED MEDICATIONS:  Discontinued Medications    No medications on file              (Please note that portions of this note were completed with a voice recognition program.  Efforts were made to edit the dictations but occasionally words are mis-transcribed.)    Pankaj Nix DO (electronically signed)           Pankaj Nix DO  11/17/24 9463

## 2024-11-18 LAB
ALBUMIN SERPL-MCNC: 4.2 G/DL (ref 3.5–5.2)
ALP SERPL-CCNC: 93 U/L (ref 35–104)
ALT SERPL-CCNC: 11 U/L (ref 0–32)
ANION GAP SERPL CALCULATED.3IONS-SCNC: 13 MMOL/L (ref 7–16)
AST SERPL-CCNC: 20 U/L (ref 0–31)
BASOPHILS # BLD: 0.06 K/UL (ref 0–0.2)
BASOPHILS NFR BLD: 1 % (ref 0–2)
BILIRUB SERPL-MCNC: 0.5 MG/DL (ref 0–1.2)
BUN SERPL-MCNC: 16 MG/DL (ref 6–23)
CALCIUM SERPL-MCNC: 9.8 MG/DL (ref 8.6–10.2)
CHLORIDE SERPL-SCNC: 104 MMOL/L (ref 98–107)
CO2 SERPL-SCNC: 23 MMOL/L (ref 22–29)
CREAT SERPL-MCNC: 0.8 MG/DL (ref 0.5–1)
EKG ATRIAL RATE: 66 BPM
EKG P AXIS: 28 DEGREES
EKG P-R INTERVAL: 148 MS
EKG Q-T INTERVAL: 404 MS
EKG QRS DURATION: 88 MS
EKG QTC CALCULATION (BAZETT): 423 MS
EKG R AXIS: -41 DEGREES
EKG T AXIS: 91 DEGREES
EKG VENTRICULAR RATE: 66 BPM
EOSINOPHIL # BLD: 0.06 K/UL (ref 0.05–0.5)
EOSINOPHILS RELATIVE PERCENT: 1 % (ref 0–6)
ERYTHROCYTE [DISTWIDTH] IN BLOOD BY AUTOMATED COUNT: 12.9 % (ref 11.5–15)
GFR, ESTIMATED: 69 ML/MIN/1.73M2
GLUCOSE SERPL-MCNC: 110 MG/DL (ref 74–99)
HCT VFR BLD AUTO: 45.5 % (ref 34–48)
HGB BLD-MCNC: 15 G/DL (ref 11.5–15.5)
IMM GRANULOCYTES # BLD AUTO: <0.03 K/UL (ref 0–0.58)
IMM GRANULOCYTES NFR BLD: 0 % (ref 0–5)
LYMPHOCYTES NFR BLD: 1.34 K/UL (ref 1.5–4)
LYMPHOCYTES RELATIVE PERCENT: 22 % (ref 20–42)
MCH RBC QN AUTO: 30.2 PG (ref 26–35)
MCHC RBC AUTO-ENTMCNC: 33 G/DL (ref 32–34.5)
MCV RBC AUTO: 91.7 FL (ref 80–99.9)
MONOCYTES NFR BLD: 0.58 K/UL (ref 0.1–0.95)
MONOCYTES NFR BLD: 10 % (ref 2–12)
NEUTROPHILS NFR BLD: 66 % (ref 43–80)
NEUTS SEG NFR BLD: 3.97 K/UL (ref 1.8–7.3)
PLATELET # BLD AUTO: 195 K/UL (ref 130–450)
PMV BLD AUTO: 11.3 FL (ref 7–12)
POTASSIUM SERPL-SCNC: 4.1 MMOL/L (ref 3.5–5)
PROT SERPL-MCNC: 6.5 G/DL (ref 6.4–8.3)
RBC # BLD AUTO: 4.96 M/UL (ref 3.5–5.5)
SODIUM SERPL-SCNC: 140 MMOL/L (ref 132–146)
WBC OTHER # BLD: 6 K/UL (ref 4.5–11.5)

## 2024-11-18 PROCEDURE — 97161 PT EVAL LOW COMPLEX 20 MIN: CPT

## 2024-11-18 PROCEDURE — 6360000002 HC RX W HCPCS: Performed by: STUDENT IN AN ORGANIZED HEALTH CARE EDUCATION/TRAINING PROGRAM

## 2024-11-18 PROCEDURE — 99231 SBSQ HOSP IP/OBS SF/LOW 25: CPT | Performed by: INTERNAL MEDICINE

## 2024-11-18 PROCEDURE — 80053 COMPREHEN METABOLIC PANEL: CPT

## 2024-11-18 PROCEDURE — G0378 HOSPITAL OBSERVATION PER HR: HCPCS

## 2024-11-18 PROCEDURE — 96372 THER/PROPH/DIAG INJ SC/IM: CPT

## 2024-11-18 PROCEDURE — 97165 OT EVAL LOW COMPLEX 30 MIN: CPT

## 2024-11-18 PROCEDURE — 2580000003 HC RX 258: Performed by: STUDENT IN AN ORGANIZED HEALTH CARE EDUCATION/TRAINING PROGRAM

## 2024-11-18 PROCEDURE — 85025 COMPLETE CBC W/AUTO DIFF WBC: CPT

## 2024-11-18 PROCEDURE — 93010 ELECTROCARDIOGRAM REPORT: CPT | Performed by: INTERNAL MEDICINE

## 2024-11-18 PROCEDURE — 6370000000 HC RX 637 (ALT 250 FOR IP): Performed by: STUDENT IN AN ORGANIZED HEALTH CARE EDUCATION/TRAINING PROGRAM

## 2024-11-18 PROCEDURE — 97530 THERAPEUTIC ACTIVITIES: CPT

## 2024-11-18 RX ORDER — POLYETHYLENE GLYCOL 3350 17 G/17G
17 POWDER, FOR SOLUTION ORAL DAILY
Status: DISCONTINUED | OUTPATIENT
Start: 2024-11-18 | End: 2024-11-20 | Stop reason: HOSPADM

## 2024-11-18 RX ADMIN — CLOPIDOGREL BISULFATE 75 MG: 75 TABLET ORAL at 10:26

## 2024-11-18 RX ADMIN — SODIUM CHLORIDE, PRESERVATIVE FREE 10 ML: 5 INJECTION INTRAVENOUS at 02:32

## 2024-11-18 RX ADMIN — SODIUM CHLORIDE, PRESERVATIVE FREE 10 ML: 5 INJECTION INTRAVENOUS at 20:11

## 2024-11-18 RX ADMIN — FAMOTIDINE 20 MG: 20 TABLET ORAL at 10:27

## 2024-11-18 RX ADMIN — SODIUM CHLORIDE, PRESERVATIVE FREE 10 ML: 5 INJECTION INTRAVENOUS at 10:27

## 2024-11-18 RX ADMIN — ENOXAPARIN SODIUM 40 MG: 100 INJECTION SUBCUTANEOUS at 10:26

## 2024-11-18 RX ADMIN — LEVOTHYROXINE SODIUM 25 MCG: 25 TABLET ORAL at 10:27

## 2024-11-18 NOTE — ED NOTES
ED to Inpatient Handoff Report    Notified Allyssa that electronic handoff available and patient ready for transport to room 0519.    Safety Risks: Risk of falls    Patient in Restraints: no    Constant Observer or Patient : no    Telemetry Monitoring Ordered: Yes          Order to transfer to unit without monitor: NA    Last MEWS: 1 Time completed: 2221    Deterioration Index: 28.32    Vitals:    11/17/24 1800 11/17/24 1901 11/17/24 2031 11/17/24 2221   BP: (!) 187/73 (!) 175/78 (!) 177/92 (!) 152/80   Pulse: 64 78 86 81   Resp: 21 16 20 16   Temp:  98.2 °F (36.8 °C)  98.2 °F (36.8 °C)   TempSrc:  Oral  Oral   SpO2: 98% 100% 100% 98%   Weight:       Height:           Opportunity for questions and clarification was provided.

## 2024-11-18 NOTE — PATIENT CARE CONFERENCE
[facility] Quality Flow/Interdisciplinary Rounds Progress Note        Quality Flow Rounds held on November 18, 2024    Disciplines Attending:  Bedside Nurse, , , and Nursing Unit Leadership    Piedad Kinsey was admitted on 11/17/2024  2:06 PM    Anticipated Discharge Date:       Disposition:    Vitor Score:  Vitor Scale Score: 19    Readmission Risk              Risk of Unplanned Readmission:  0           Discussed patient goal for the day, patient clinical progression, and barriers to discharge.  The following Goal(s) of the Day/Commitment(s) have been identified:  Diagnostics - Report Results, Labs - Report Results, Occupational Therapy - Obtain Consult Order, and Physical Therapy - Obtain Consult Order      Salina Gonzales RN  November 18, 2024

## 2024-11-18 NOTE — H&P
Date End Date Taking? Authorizing Provider   ibuprofen (ADVIL) 200 MG CAPS capsule Take 1 capsule by mouth every 6 hours as needed for Fever    Provider, MD Nabeel   Wheat Dextrin (BENEFIBER PO) Take by mouth    Provider, MD Nabeel   cyanocobalamin ([employer] VITAMIN B12) 1000 MCG tablet Take 1 tablet by mouth daily 10/9/24 10/9/25  Sandra Moran PA   gabapentin (NEURONTIN) 100 MG capsule Take 1 capsule by mouth daily for 90 days. 9/18/24 12/17/24  Dave Li DO   melatonin (RA MELATONIN) 3 MG TABS tablet Take 1 tablet by mouth nightly as needed (sleep) 7/11/24   Juancho Griffin III, PA   ezetimibe (ZETIA) 10 MG tablet Take 1 tablet by mouth nightly 6/11/24   Dave Li DO   clopidogrel (PLAVIX) 75 MG tablet Take 1 tablet by mouth daily for 87 doses 6/11/24 10/9/24  Dave Li DO   levothyroxine (SYNTHROID) 25 MCG tablet Take 1 tablet by mouth daily 6/10/24   Dave Li DO       Allergies:    Bactrim [sulfamethoxazole-trimethoprim], Carafate [sucralfate], Cefdinir, Cetirizine & related, Gabapentin, Lisinopril, Memantine, Pravachol [pravastatin sodium], Protonix [pantoprazole sodium], and Statins    Social History:    reports that she quit smoking about 55 years ago. Her smoking use included cigarettes. She started smoking about 73 years ago. She has a 18 pack-year smoking history. She has never used smokeless tobacco. She reports current alcohol use. She reports that she does not use drugs.    Family History:   family history includes COPD in her mother; Cancer in her sister; Colon Cancer in her father.       PHYSICAL EXAM:  Vitals:  BP (!) 177/92   Pulse 86   Temp 98.2 °F (36.8 °C) (Oral)   Resp 20   Ht 1.626 m (5' 4\")   Wt 54.4 kg (120 lb)   SpO2 100%   BMI 20.60 kg/m²     General Appearance: alert and oriented to person, place but not to time and in no acute distress  Skin: warm and dry  Head: normocephalic and atraumatic  Eyes: pupils equal, round, and reactive to light,

## 2024-11-18 NOTE — ACP (ADVANCE CARE PLANNING)
Advance Care Planning   Healthcare Decision Maker:    Primary Decision Maker: Flavio Kinsey - Spouse - [phone]    Secondary Decision Maker: Milana Pierce - Child - [phone]    Secondary Decision Maker: Vonda Rod - Child - [phone]    Click here to complete Healthcare Decision Makers including selection of the Healthcare Decision Maker Relationship (ie \"Primary\").

## 2024-11-18 NOTE — CARE COORDINATION
Social Work discharge planning  SW attempted to meet pt. She is working with PT. No family present. Left snf list in room. Called  but unable to leave message. Called luiz Cortés and lvm to call SW back re: planning.  Electronically signed by REBECCA Dinh on 11/18/2024 at 11:11 AM     Addendum  Spoke to luiz Cortés. They are hoping for short term rehab under pt's insurance if qualifies. Gave them snf list and asked for 3 choices today. She stated understanding. Await choices this afternoon.  Electronically signed by REBECCA Dinh on 11/18/2024 at 11:27 AM     Addendum  Pt choices are 1. [facility] 2. [facility] 3. [facility]. Referrals called to all 3.   Electronically signed by REBECCA Dinh on 11/18/2024 at 1:40 PM     Addendum  No beds at [facility] nor [facility]. [facility] able to accept and will start precert today.  Electronically signed by REBECCA Dinh on 11/18/2024 at 3:30 PM

## 2024-11-19 LAB
MICROORGANISM SPEC CULT: NO GROWTH
SERVICE CMNT-IMP: NORMAL
SPECIMEN DESCRIPTION: NORMAL

## 2024-11-19 PROCEDURE — 96372 THER/PROPH/DIAG INJ SC/IM: CPT

## 2024-11-19 PROCEDURE — 6360000002 HC RX W HCPCS: Performed by: STUDENT IN AN ORGANIZED HEALTH CARE EDUCATION/TRAINING PROGRAM

## 2024-11-19 PROCEDURE — G0378 HOSPITAL OBSERVATION PER HR: HCPCS

## 2024-11-19 PROCEDURE — 99231 SBSQ HOSP IP/OBS SF/LOW 25: CPT | Performed by: INTERNAL MEDICINE

## 2024-11-19 PROCEDURE — 6370000000 HC RX 637 (ALT 250 FOR IP): Performed by: INTERNAL MEDICINE

## 2024-11-19 PROCEDURE — 6370000000 HC RX 637 (ALT 250 FOR IP): Performed by: STUDENT IN AN ORGANIZED HEALTH CARE EDUCATION/TRAINING PROGRAM

## 2024-11-19 RX ORDER — HALOPERIDOL 5 MG/ML
1 INJECTION INTRAMUSCULAR
Status: ACTIVE | OUTPATIENT
Start: 2024-11-19 | End: 2024-11-20

## 2024-11-19 RX ADMIN — POLYETHYLENE GLYCOL 3350 17 G: 17 POWDER, FOR SOLUTION ORAL at 10:05

## 2024-11-19 RX ADMIN — FAMOTIDINE 20 MG: 20 TABLET ORAL at 10:04

## 2024-11-19 RX ADMIN — EZETIMIBE 10 MG: 10 TABLET ORAL at 20:21

## 2024-11-19 RX ADMIN — LEVOTHYROXINE SODIUM 25 MCG: 25 TABLET ORAL at 10:04

## 2024-11-19 RX ADMIN — Medication 3 MG: at 20:21

## 2024-11-19 RX ADMIN — ENOXAPARIN SODIUM 40 MG: 100 INJECTION SUBCUTANEOUS at 10:05

## 2024-11-19 RX ADMIN — CLOPIDOGREL BISULFATE 75 MG: 75 TABLET ORAL at 10:04

## 2024-11-19 NOTE — DISCHARGE INSTR - COC
Continuity of Care Form    Patient Name: Piedad Kinsey   :  1934  MRN:  [id number]    Admit date:  2024  Discharge date:  24    Code Status Order: Full Code   Advance Directives:   Advance Care Flowsheet Documentation             Admitting Physician:  Jose Ashley MD  PCP: Dave Li DO    Discharging Nurse: Tish Blevins RN  Discharging Hospital Unit/Room#: 0519/0519-A  Discharging Unit Phone Number: [phone]    Emergency Contact:   Extended Emergency Contact Information  Primary Emergency Contact: Flavio Kinsey  Address: [address] [place] [address] of [facility]  Home Phone: [phone]  Mobile Phone: [phone]  Relation: Spouse  Secondary Emergency Contact: Milana Pierce  Home Phone: [phone]  Mobile Phone: [phone]  Relation: Child    Past Surgical History:  Past Surgical History:   Procedure Laterality Date    CAROTID ENDARTERECTOMY Right 2019    Dr Nazario    CATARACT REMOVAL WITH IMPLANT Bilateral     COLONOSCOPY  2014    DILATATION, ESOPHAGUS      ENDOSCOPY, COLON, DIAGNOSTIC  2016    marking of gastric tumor    HYSTERECTOMY (CERVIX STATUS UNKNOWN)      YUMIKO/BSO    OTHER SURGICAL HISTORY  2016    Laparoscopic Robotic Assisted Partial Gastrectomy    SHOULDER SURGERY Left     rotator cuff right    TONSILLECTOMY AND ADENOIDECTOMY      UPPER GASTROINTESTINAL ENDOSCOPY  2014    UPPER GASTROINTESTINAL ENDOSCOPY  2016       Immunization History:   Immunization History   Administered Date(s) Administered    COVID-19, PFIZER Bivalent, DO NOT Dilute, (age 12y+), IM, 30 mcg/0.3 mL 2022    COVID-19, PFIZER PURPLE top, DILUTE for use, (age 12 y+), 30mcg/0.3mL 2021, 2021, 10/04/2021    COVID-19, PFIZER, (age 12y+), IM, 30mcg/0.3mL 2023    Influenza Virus Vaccine 2017    Influenza, FLUAD, (age 65 y+), IM, Quadv, 0.5mL 2023    Influenza, FLUZONE High Dose (age 65 y+), IM,

## 2024-11-19 NOTE — PATIENT CARE CONFERENCE
[facility] Quality Flow/Interdisciplinary Rounds Progress Note        Quality Flow Rounds held on November 19, 2024    Disciplines Attending:  Bedside Nurse, , , and Nursing Unit Leadership    Piedad Kinsey was admitted on 11/17/2024  2:06 PM    Anticipated Discharge Date:       Disposition:    Vitor Score:  Vitor Scale Score: 20    Readmission Risk              Risk of Unplanned Readmission:  0           Discussed patient goal for the day, patient clinical progression, and barriers to discharge.  The following Goal(s) of the Day/Commitment(s) have been identified:  Occupational Therapy - Obtain Consult Order and Physical Therapy - Obtain Consult Order      Salina Gonzales RN  November 19, 2024

## 2024-11-19 NOTE — CARE COORDINATION
Social Work discharge planning  Per Salo [facility], precert remains pending.  Electronically signed by REBECCA Dinh on 11/19/2024 at 10:49 AM     Addendum  Met with pt and daughter Vonad. Updated them of pending precert. Also educated about possible insurance snf denial, private pay, home with [facility].  PASRR done.  Electronically signed by REBECCA Dinh on 11/19/2024 at 11:49 AM

## 2024-11-20 VITALS
OXYGEN SATURATION: 97 % | RESPIRATION RATE: 18 BRPM | SYSTOLIC BLOOD PRESSURE: 142 MMHG | HEIGHT: 64 IN | HEART RATE: 91 BPM | WEIGHT: 127.87 LBS | DIASTOLIC BLOOD PRESSURE: 67 MMHG | TEMPERATURE: 97.7 F | BODY MASS INDEX: 21.83 KG/M2

## 2024-11-20 LAB
ALBUMIN SERPL-MCNC: 3.8 G/DL (ref 3.5–5.2)
ALP SERPL-CCNC: 80 U/L (ref 35–104)
ALT SERPL-CCNC: 13 U/L (ref 0–32)
ANION GAP SERPL CALCULATED.3IONS-SCNC: 12 MMOL/L (ref 7–16)
AST SERPL-CCNC: 45 U/L (ref 0–31)
BASOPHILS # BLD: 0.05 K/UL (ref 0–0.2)
BASOPHILS NFR BLD: 1 % (ref 0–2)
BILIRUB SERPL-MCNC: 0.5 MG/DL (ref 0–1.2)
BUN SERPL-MCNC: 20 MG/DL (ref 6–23)
CALCIUM SERPL-MCNC: 9.6 MG/DL (ref 8.6–10.2)
CHLORIDE SERPL-SCNC: 102 MMOL/L (ref 98–107)
CO2 SERPL-SCNC: 21 MMOL/L (ref 22–29)
CREAT SERPL-MCNC: 0.9 MG/DL (ref 0.5–1)
EOSINOPHIL # BLD: 0.03 K/UL (ref 0.05–0.5)
EOSINOPHILS RELATIVE PERCENT: 0 % (ref 0–6)
ERYTHROCYTE [DISTWIDTH] IN BLOOD BY AUTOMATED COUNT: 13.2 % (ref 11.5–15)
GFR, ESTIMATED: 62 ML/MIN/1.73M2
GLUCOSE SERPL-MCNC: 157 MG/DL (ref 74–99)
HCT VFR BLD AUTO: 41.5 % (ref 34–48)
HGB BLD-MCNC: 13.7 G/DL (ref 11.5–15.5)
IMM GRANULOCYTES # BLD AUTO: 0.05 K/UL (ref 0–0.58)
IMM GRANULOCYTES NFR BLD: 1 % (ref 0–5)
LYMPHOCYTES NFR BLD: 1.45 K/UL (ref 1.5–4)
LYMPHOCYTES RELATIVE PERCENT: 13 % (ref 20–42)
MCH RBC QN AUTO: 29.8 PG (ref 26–35)
MCHC RBC AUTO-ENTMCNC: 33 G/DL (ref 32–34.5)
MCV RBC AUTO: 90.4 FL (ref 80–99.9)
MONOCYTES NFR BLD: 1.1 K/UL (ref 0.1–0.95)
MONOCYTES NFR BLD: 10 % (ref 2–12)
NEUTROPHILS NFR BLD: 76 % (ref 43–80)
NEUTS SEG NFR BLD: 8.27 K/UL (ref 1.8–7.3)
PLATELET # BLD AUTO: 215 K/UL (ref 130–450)
PMV BLD AUTO: 12 FL (ref 7–12)
POTASSIUM SERPL-SCNC: 4 MMOL/L (ref 3.5–5)
PROT SERPL-MCNC: 5.9 G/DL (ref 6.4–8.3)
RBC # BLD AUTO: 4.59 M/UL (ref 3.5–5.5)
SODIUM SERPL-SCNC: 135 MMOL/L (ref 132–146)
WBC OTHER # BLD: 11 K/UL (ref 4.5–11.5)

## 2024-11-20 PROCEDURE — 99239 HOSP IP/OBS DSCHRG MGMT >30: CPT | Performed by: INTERNAL MEDICINE

## 2024-11-20 PROCEDURE — 6360000002 HC RX W HCPCS: Performed by: STUDENT IN AN ORGANIZED HEALTH CARE EDUCATION/TRAINING PROGRAM

## 2024-11-20 PROCEDURE — 96372 THER/PROPH/DIAG INJ SC/IM: CPT

## 2024-11-20 PROCEDURE — 6370000000 HC RX 637 (ALT 250 FOR IP): Performed by: STUDENT IN AN ORGANIZED HEALTH CARE EDUCATION/TRAINING PROGRAM

## 2024-11-20 PROCEDURE — 36415 COLL VENOUS BLD VENIPUNCTURE: CPT

## 2024-11-20 PROCEDURE — 85025 COMPLETE CBC W/AUTO DIFF WBC: CPT

## 2024-11-20 PROCEDURE — G0378 HOSPITAL OBSERVATION PER HR: HCPCS

## 2024-11-20 PROCEDURE — 6370000000 HC RX 637 (ALT 250 FOR IP): Performed by: INTERNAL MEDICINE

## 2024-11-20 PROCEDURE — 80053 COMPREHEN METABOLIC PANEL: CPT

## 2024-11-20 RX ORDER — POLYETHYLENE GLYCOL 3350 17 G/17G
17 POWDER, FOR SOLUTION ORAL DAILY
DISCHARGE
Start: 2024-11-21 | End: 2024-12-21

## 2024-11-20 RX ORDER — FAMOTIDINE 20 MG/1
20 TABLET, FILM COATED ORAL DAILY
DISCHARGE
Start: 2024-11-21

## 2024-11-20 RX ADMIN — POLYETHYLENE GLYCOL 3350 17 G: 17 POWDER, FOR SOLUTION ORAL at 08:47

## 2024-11-20 RX ADMIN — LEVOTHYROXINE SODIUM 25 MCG: 25 TABLET ORAL at 08:45

## 2024-11-20 RX ADMIN — CLOPIDOGREL BISULFATE 75 MG: 75 TABLET ORAL at 08:46

## 2024-11-20 RX ADMIN — FAMOTIDINE 20 MG: 20 TABLET ORAL at 08:46

## 2024-11-20 RX ADMIN — ENOXAPARIN SODIUM 40 MG: 100 INJECTION SUBCUTANEOUS at 08:47

## 2024-11-20 RX ADMIN — ACETAMINOPHEN 650 MG: 325 TABLET ORAL at 09:16

## 2024-11-20 ASSESSMENT — PAIN DESCRIPTION - DESCRIPTORS: DESCRIPTORS: SHARP

## 2024-11-20 ASSESSMENT — PAIN DESCRIPTION - LOCATION: LOCATION: BREAST;CHEST

## 2024-11-20 ASSESSMENT — PAIN SCALES - GENERAL: PAINLEVEL_OUTOF10: 6

## 2024-11-20 ASSESSMENT — PAIN DESCRIPTION - ORIENTATION: ORIENTATION: LEFT

## 2024-11-20 NOTE — CARE COORDINATION
Social Work discharge planning  Precert approved for [facility]. Dr and charge Rn aware. Pt transport time set for 1130-130 today. Salo with snf. Called , went right to voicemail. Called dorothy Cortés and left message about discharge today between 1130a and 130p.  Electronically signed by REBECCA Dinh on 11/20/2024 at 9:09 AM

## 2024-11-20 NOTE — PLAN OF CARE
Problem: Chronic Conditions and Co-morbidities  Goal: Patient's chronic conditions and co-morbidity symptoms are monitored and maintained or improved  11/20/2024 1237 by Krish Blevins RN  Outcome: Adequate for Discharge  11/20/2024 0931 by Krish Blevins RN  Outcome: Progressing  11/19/2024 2249 by Viola Camp RN  Outcome: Progressing     Problem: ABCDS Injury Assessment  Goal: Absence of physical injury  11/20/2024 1237 by Krish Blevins RN  Outcome: Adequate for Discharge  11/20/2024 0931 by Krish Blevins RN  Outcome: Progressing     Problem: Safety - Adult  Goal: Free from fall injury  11/20/2024 1237 by Krish Blevins RN  Outcome: Adequate for Discharge  11/20/2024 0931 by Krish Blevins RN  Outcome: Progressing  11/19/2024 2249 by Viola Camp RN  Outcome: Progressing

## 2024-11-20 NOTE — PROGRESS NOTES
Progress  Note  Chief Complaint   Patient presents with    Constipation    Fatigue    Abdominal Pain     Historical Issues:  Current Facility-Administered Medications   Medication Dose Route Frequency Provider Last Rate Last Admin    haloperidol lactate (HALDOL) injection 1 mg  1 mg IntraMUSCular Once PRN Comfort Martinez, APRN - CNP        polyethylene glycol (GLYCOLAX) packet 17 g  17 g Oral Daily Francisco Javier Ca MD   17 g at 11/19/24 1005    sodium chloride flush 0.9 % injection 5-40 mL  5-40 mL IntraVENous 2 times per day Jose Ashley MD   10 mL at 11/18/24 2011    sodium chloride flush 0.9 % injection 5-40 mL  5-40 mL IntraVENous PRN Jose Ashley MD        0.9 % sodium chloride infusion   IntraVENous PRN Jose Ashley MD        potassium chloride (KLOR-CON M) extended release tablet 40 mEq  40 mEq Oral PRN Jose Ashley MD        Or    potassium bicarb-citric acid (EFFER-K) effervescent tablet 40 mEq  40 mEq Oral PRN Jose Ashley MD        magnesium sulfate 2000 mg in 50 mL IVPB premix  2,000 mg IntraVENous PRN Jose Ashley MD        enoxaparin (LOVENOX) injection 40 mg  40 mg SubCUTAneous Daily Jose Ashley MD   40 mg at 11/19/24 1005    ondansetron (ZOFRAN-ODT) disintegrating tablet 4 mg  4 mg Oral Q8H PRN Jose Ashley MD        Or    ondansetron (ZOFRAN) injection 4 mg  4 mg IntraVENous Q6H PRN Jose Ashley MD        acetaminophen (TYLENOL) tablet 650 mg  650 mg Oral Q6H PRN Jose Ashley MD        Or    acetaminophen (TYLENOL) suppository 650 mg  650 mg Rectal Q6H PRN Jose Ashley MD        famotidine (PEPCID) tablet 20 mg  20 mg Oral Daily Jose Ashley MD   20 mg at 11/19/24 1004    clopidogrel (PLAVIX) tablet 75 mg  75 mg Oral Daily Jose Ashley MD   75 mg at 11/19/24 1004    ezetimibe (ZETIA) tablet 10 mg  10 mg Oral Nightly Jose Ashley MD        gabapentin (NEURONTIN) capsule 100 mg  100 mg Oral Daily Jose Ashley MD        levothyroxine (SYNTHROID) tablet 25 mcg  25 mcg 
  [facility] Quality Flow/Interdisciplinary Rounds Progress Note        Quality Flow Rounds held on November 20, 2024    Disciplines Attending:  Bedside Nurse, , , and Nursing Unit Leadership    Piedad Kinsey was admitted on 11/17/2024  2:06 PM    Anticipated Discharge Date:       Disposition:    Vitor Score:  Vitor Scale Score: 19    Readmission Risk              Risk of Unplanned Readmission:  0           Discussed patient goal for the day, patient clinical progression, and barriers to discharge.  The following Goal(s) of the Day/Commitment(s) have been identified:  Diagnostics - Report Results and Labs - Report Results      Lisa Isidro RN  November 20, 2024        
4 Eyes Skin Assessment     NAME:  Piedad Kinsey  YOB: 1934  MEDICAL RECORD NUMBER:  [id number]    The patient is being assessed for  Admission    I agree that at least one RN has performed a thorough Head to Toe Skin Assessment on the patient. ALL assessment sites listed below have been assessed.      Areas assessed by both nurses:    Head, Face, Ears, Shoulders, Back, Chest, Arms, Elbows, Hands, Sacrum. Buttock, Coccyx, Ischium, Legs. Feet and Heels, and Under Medical Devices         Does the Patient have a Wound? No noted wound(s)       Vitor Prevention initiated by RN: No  Wound Care Orders initiated by RN: No    Pressure Injury (Stage 3,4, Unstageable, DTI, NWPT, and Complex wounds) if present, place Wound referral order by RN under : No    New Ostomies, if present place, Ostomy referral order under : No     Nurse 1 eSignature: Electronically signed by Allyssa Eaton RN on 11/18/24 at 2:59 AM EST    **SHARE this note so that the co-signing nurse can place an eSignature**    Nurse 2 eSignature: Electronically signed by Brooklynn Baltazar RN on 11/18/24 at 3:03 AM EST    
Patient confused this AM and getting agitated when staff tries to reorient her.   Stating she want's to leave.   Message sent to on call NP for prn medication if needed.  Patient now ambulating in hallway with staff member to see if this will decrease her agitation.   
Patient not allowing for am lab draw at this time.   
Physical Therapy  Facility/Department: [address] MED SURG  Physical Therapy Initial Assessment    Name: Piedad Kinsey  : 1934  MRN: [id number]  Date of Service: 2024    Attending Provider:  Francisco Javier Ca MD    Evaluating PT:  Johnathon Johntson Jr., P.T.    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 Pt ambulated with a ww PTA.    OBJECTIVE:   Initial Evaluation  Date: 24 Treatment Short Term/ Long Term   Goals   Was pt agreeable to Eval/treatment? yes     Does pt have pain? No c/o pain or apparent pain     Bed Mobility  Rolling: SBA  Supine to sit: SBA  Sit to supine: NA  Scooting: SBA  supervision   Transfers Sit to stand: MOD A  Stand to sit: MIN A  Stand pivot: MIN A  SBA   Ambulation   15 feet x2 reps with ww MIN A  100 feet with ww SBA   Stair negotiation: ascended and descended NA, pt fatigued and weak the limited amb distance.  4 steps with 1-2 rails SBA   AM-PAC 6 Clicks        BLE ROM is WFL.   BLE strength is grossly 4-/5 to 4/5.   Sensation:  Pt denies numbness and tingling to extremities  Balance: sitting is supervision and standing with ww is MIN A  Endurance: fair    Patient education  Pt educated on transfers.    Patient response to education:   Pt verbalized understanding Pt demonstrated skill Pt requires further education in this area   yes Yes with VCs yes     ASSESSMENT:    Conditions Requiring Skilled Therapeutic Intervention:    [x]Decreased strength     []Decreased ROM  [x]Decreased functional mobility  [x]Decreased balance   [x]Decreased endurance   []Decreased posture  []Decreased sensation  []Decreased coordination 
Pt refused Gabapentin stating, \"does not want to take the gabapentin as it caused her significant vertigo and dizziness last time she took it. \" Paged Dr. Ca via perfect serve  
Spiritual Health History and Assessment/Progress Note  [facility]     Encounter, Rituals, Rites and Sacraments,  ,  ,      Name: Piedad Kinsey MRN: [id number]    Age: 89 y.o.     Sex: female   Language: English   Faith: Yarsanism   Generalized weakness     Date: 11/18/2024                           Spiritual Assessment began in [facility] MED SURG        Referral/Consult From: Rounding   Encounter Overview/Reason:  Encounter, Rituals, Rites and Sacraments  Service Provided For: Patient    Vivian, Belief, Meaning:   Patient is connected with a vivian tradition or spiritual practice  Family/Friends are connected with a vivian tradition or spiritual practice      Importance and Influence:  Patient has no beliefs influential to healthcare decision-making identified during this visit  Family/Friends have no beliefs influential to healthcare decision-making identified during this visit    Community:  Patient feels well-supported. Support system includes: Children  Family/Friends feel well-supported. Support system includes: Spouse/Partner and Children    Assessment and Plan of Care:     Patient Interventions include: Provided sacramental/Oriental orthodox ritual  Family/Friends Interventions include: Affirmed coping skills/support systems    Patient Plan of Care: Spiritual Care available upon further referral  Family/Friends Plan of Care: Spiritual Care available upon further referral    Electronically signed by Chaplain Michele on 11/18/2024 at 3:37 PM   
Spiritual Health History and Assessment/Progress Note  [facility]    Spiritual/Emotional Needs,  ,  ,      Name: Piedad Kinsey MRN: [id number]    Age: 89 y.o.     Sex: female   Language: English   Alevism: Orthodoxy   Generalized weakness     Date: 11/19/2024                           Spiritual Assessment began in [facility] 5[facility] MED SURG        Referral/Consult From: Rounding   Encounter Overview/Reason: Spiritual/Emotional Needs  Service Provided For: Patient    Vivian, Belief, Meaning:   Patient identifies as spiritual, is connected with a vivian tradition or spiritual practice, and has beliefs or practices that help with coping during difficult times  Family/Friends No family/friends present      Importance and Influence:  Patient has spiritual/personal beliefs that influence decisions regarding their health  Family/Friends No family/friends present    Community:  Patient feels well-supported. Support system includes: Spouse/Partner  Family/Friends No family/friends present    Assessment and Plan of Care:     Patient Interventions include: Facilitated expression of thoughts and feelings and Provided sacramental/Voodoo ritual  Family/Friends Interventions include: No family/friends present    Patient Plan of Care: Spiritual Care available upon further referral  Family/Friends Plan of Care: No family/friends present    Electronically signed by JESSICA Augustin on 11/19/2024 at 3:46 PM   
  BP: 138/62   Pulse: 78   Resp: 18   Temp: 98.2 °F (36.8 °C)   SpO2: 99%     Physical Exam  Cardiovascular:      Rate and Rhythm: Normal rate and regular rhythm.   Pulmonary:      Effort: Pulmonary effort is normal.      Breath sounds: Normal breath sounds.   Abdominal:      General: There is no distension.      Tenderness: There is no abdominal tenderness.   Neurological:      Mental Status: She is alert.         Recent Labs     11/17/24  1526 11/18/24  0340    140   K 4.3 4.1    104   CO2 26 23   BUN 18 16   CREATININE 0.9 0.8   GLUCOSE 99 110*   CALCIUM 10.0 9.8     Recent Labs     11/17/24  1526 11/18/24  0340   WBC 6.4 6.0   RBC 4.64 4.96   HGB 14.0 15.0   HCT 43.0 45.5   MCV 92.7 91.7   MCH 30.2 30.2   MCHC 32.6 33.0   RDW 13.0 12.9    195   MPV 11.4 11.3           Principal Problem:    Generalized weakness  Resolved Problems:    * No resolved hospital problems. *      Plan:        Case Discussed with:      Electronically signed by Francisco Javier Ca MD on 11/18/2024 at 3:07 PM  
Transfers Sit-to-Stand: MOD A   from EOB, MIN A from armed chair  SBA   Functional Mobility MIN A (FWW) within room  SBA with functional mobility (with device, as needed/appropriate) in order to maximize independence with ADLs and functional tasks.   Balance Sitting: SBA  Standing: MIN A  SBA dynamic standing balance in order to increase safety during self care and functional tasks   Activity Tolerance No noted SOB, no reported dizziness  Patient will demonstrate Good understanding of energy conservation techniques in order to increase functional independence   Visual/  Perceptual  No glasses     N/A   B UE Strength 4-/5  Patient will demonstrate 4/5 B UE strength in order to maximize independence with ADLs and functional tasks.     Additional Long-Term Goal: Patient will increase functional independence to PLOF in order to allow patient to live in least restrictive environment.      ROM: Additional Information:    R UE  WFL WFL  and FMC/dexterity noted during ADL tasks   L UE WFL WFL  and FMC/dexterity noted during ADL tasks     Hearing: needs increased volume  Sensation: Patient did not report numbness/tingling  Tone: WFL  Edema: none noted    Comments: Upon arrival, patient attempting to get out of bed. Patient was pleasant, agreed to participation in OT evaluation. Patient participated in bed mobility, functional transfers, self care, functional mobility with cues and assistance as needed. Patient requesting to sit in chair, daughter present. Patient was receptive to provided cues and education, all questions answered. At end of session, patient seated in armed chair with call light and phone within reach, daughter present and all lines and tubes intact. Alarm on. Patient would benefit from continued skilled OT to increase safety and independence with completion of ADL/IADL tasks for functional independence and quality of life.     Patient education provided regardin) OT role/purpose and plan of care

## 2024-11-20 NOTE — PLAN OF CARE
Problem: Chronic Conditions and Co-morbidities  Goal: Patient's chronic conditions and co-morbidity symptoms are monitored and maintained or improved  11/19/2024 2249 by Viola Camp RN  Outcome: Progressing     Problem: Safety - Adult  Goal: Free from fall injury  11/19/2024 2249 by Viola Camp, RN  Outcome: Progressing

## 2024-11-20 NOTE — DISCHARGE SUMMARY
Discharge Summary  Patient ID:  Piedad Kinsey  [id number]  89 y.o. 2/22/1934 female  Dave Li DO        Admit date: 11/17/2024    Discharge date and time:  11/20/2024  9:45 AM      Activity level: as tolerated  Diet:Regular  Labs:per primary  Disposition:SNF  Condition on Discharge:Stable  DME:    Admit Diagnoses:   Patient Active Problem List   Diagnosis    Hypertension    PUD (peptic ulcer disease)    GIST (gastrointestinal stromal tumor), malignant (HCC)    NSTEMI (non-ST elevated myocardial infarction) (HCC)    Onychomycosis    Difficulty walking    Hyperlipidemia    Disorder of thyroid gland    Carotid stenosis, right    Acquired hypothyroidism    Mild cognitive impairment    Acute cerebrovascular accident (CVA) (HCC)    Pulmonary nodules    Statin intolerance    Type 2 diabetes mellitus    Chronic renal disease, stage III (HCC) [537463]    Type 2 diabetes mellitus with chronic kidney disease    Mild cognitive impairment with memory loss    Chronic right-sided low back pain with right-sided sciatica    Sacroiliac joint disease    Nonrheumatic mitral valve stenosis    Vascular dementia without behavioral disturbance (HCC)    TIA (transient ischemic attack)    AMS (altered mental status)    Dementia (HCC)    Orthostatic hypotension    Generalized weakness       Discharge Diagnoses: Principal Problem:    Generalized weakness  Resolved Problems:    * No resolved hospital problems. *      Consults:  IP CONSULT TO INTERNAL MEDICINE  IP CONSULT TO VASCULAR SURGERY  IP CONSULT TO SOCIAL WORK    Procedures:     Hospital Course:   Piedad, a 89 year old female, is unable to live alone due to safety reasons. She has severe vascular disease that has caused her to be unable to walk, be safely transferred by her  who is older. She complained of abdominal pain. She has apparent impaction that eventually passed.    Discharge Exam:  Physical Exam  Cardiovascular:      Rate and Rhythm: Normal rate.   Pulmonary:

## 2024-11-20 NOTE — PLAN OF CARE
Problem: Chronic Conditions and Co-morbidities  Goal: Patient's chronic conditions and co-morbidity symptoms are monitored and maintained or improved  11/20/2024 0931 by Krish Blevins RN  Outcome: Progressing  11/19/2024 2249 by Viola Camp RN  Outcome: Progressing     Problem: ABCDS Injury Assessment  Goal: Absence of physical injury  Outcome: Progressing     Problem: Safety - Adult  Goal: Free from fall injury  11/20/2024 0931 by Krish Blevins RN  Outcome: Progressing  11/19/2024 2249 by Viola Camp RN  Outcome: Progressing

## 2024-11-22 LAB
MICROORGANISM SPEC CULT: NORMAL
MICROORGANISM SPEC CULT: NORMAL
SERVICE CMNT-IMP: NORMAL
SERVICE CMNT-IMP: NORMAL
SPECIMEN DESCRIPTION: NORMAL
SPECIMEN DESCRIPTION: NORMAL

## 2024-12-02 ENCOUNTER — CARE COORDINATION (OUTPATIENT)
Dept: CASE MANAGEMENT | Age: 88
End: 2024-12-02

## 2024-12-02 NOTE — CARE COORDINATION
Care Transitions Note    Initial Call - Call within 2 business days of discharge: Yes    Attempted to reach patient for transitions of care follow up. Unable to reach patient.    Outreach Attempts:   HIPAA compliant voicemail left for spouse/partner . Attempted to Call [facility] DON for d/c disposition as Bamboo just states d/c but not to where.     Patient: Piedad Kinsey    Patient : 1934   MRN: [id number]    Reason for Admission: unable to care for self/ constipation  Discharge Date: 24  RURS: Readmission Risk Score: 14.2    Last Discharge Facility       Date Complaint Diagnosis Description Type Department Provider    24 Constipation; Fatigue; Abdominal Pain General weakness ... ED to Hosp-Admission (Discharged) (ADMITTED) Francisco Javier Cardenas MD; [facility]...            Was this an external facility discharge? Yes. Discharge Date: . Facility Name:   [facility]     Follow Up Appointment:   Patient does not have a follow up appointment scheduled at time of call.  Unable to reach  Future Appointments         Provider Specialty Dept Phone    2024 10:00 AM Dave Li DO Primary Care [phone]            Plan for follow-up call in 2-5 days     ARLIN Hayes, RN   [facility] Post Acute Care Coordinator  [phone]

## 2024-12-03 ENCOUNTER — CARE COORDINATION (OUTPATIENT)
Dept: CASE MANAGEMENT | Age: 88
End: 2024-12-03

## 2024-12-03 DIAGNOSIS — R53.1 GENERALIZED WEAKNESS: Primary | ICD-10-CM

## 2024-12-03 PROCEDURE — 1111F DSCHRG MED/CURRENT MED MERGE: CPT | Performed by: FAMILY MEDICINE

## 2024-12-03 NOTE — CARE COORDINATION
Care Transitions Note    Initial Call - Call within 2 business days of discharge: Yes    Patient Current Location:  Home: [address] [place] [id number]    Care Transition Nurse contacted the patient, spouse/partner  by telephone to perform post hospital discharge assessment, verified name and  as identifiers. Provided introduction to self, and explanation of the Care Transition Nurse role.     Patient: Piedad Kinsey    Patient : 1934   MRN: [id number]    Reason for Admission: Generalized Weakness  Discharge Date: 24  RURS: Readmission Risk Score: 14.2      Last Discharge Facility       Date Complaint Diagnosis Description Type Department Provider    24 Constipation; Fatigue; Abdominal Pain General weakness ... ED to Hosp-Admission (Discharged) (ADMITTED) Francisco Javier Cardenas MD; [facility]...            Was this an external facility discharge? Yes. Discharge Date: 24. Facility Name: [facility]    Additional needs identified to be addressed with provider   PCP f/u appt and confirmation of HHC. Completed             Method of communication with provider: none.    Patients top risk factors for readmission: medical condition-Generalized Weakness    Interventions to address risk factors:   Education: bowel aids/increase water intake  Review of patient management of conditions/medications: with patient and spouse    Care Summary Note: Spoke with spouse Jose berry. He states patient is a little weak. He said they just took a walk around the house, nursing home down the driveway and back. He reviewed medications. Patient takes Benefiber 2 tsp and apple juice as needed for constipation instead of Glycolax. He gave the phone to the patient. She states she thinks she is still a little weak. She denies sob, pain, fever, chills, n/v. Appetite good. She struggles with constipation, but is regular currently. CTN suggested stool softeners and increase water, She

## 2024-12-06 ENCOUNTER — TELEPHONE (OUTPATIENT)
Dept: PRIMARY CARE CLINIC | Age: 88
End: 2024-12-06

## 2024-12-06 ENCOUNTER — OFFICE VISIT (OUTPATIENT)
Dept: PRIMARY CARE CLINIC | Age: 88
End: 2024-12-06

## 2024-12-06 VITALS
HEART RATE: 98 BPM | SYSTOLIC BLOOD PRESSURE: 118 MMHG | BODY MASS INDEX: 20.66 KG/M2 | HEIGHT: 64 IN | DIASTOLIC BLOOD PRESSURE: 76 MMHG | OXYGEN SATURATION: 99 % | WEIGHT: 121 LBS | TEMPERATURE: 97.1 F

## 2024-12-06 DIAGNOSIS — I70.209 FEMORAL ARTERY OCCLUSION (HCC): ICD-10-CM

## 2024-12-06 DIAGNOSIS — Z09 HOSPITAL DISCHARGE FOLLOW-UP: ICD-10-CM

## 2024-12-06 DIAGNOSIS — R53.83 OTHER FATIGUE: ICD-10-CM

## 2024-12-06 DIAGNOSIS — E11.22 TYPE 2 DIABETES MELLITUS WITH STAGE 3 CHRONIC KIDNEY DISEASE, WITHOUT LONG-TERM CURRENT USE OF INSULIN, UNSPECIFIED WHETHER STAGE 3A OR 3B CKD (HCC): ICD-10-CM

## 2024-12-06 DIAGNOSIS — G31.84 MILD COGNITIVE IMPAIRMENT WITH MEMORY LOSS: ICD-10-CM

## 2024-12-06 DIAGNOSIS — I10 PRIMARY HYPERTENSION: Primary | ICD-10-CM

## 2024-12-06 DIAGNOSIS — N18.30 TYPE 2 DIABETES MELLITUS WITH STAGE 3 CHRONIC KIDNEY DISEASE, WITHOUT LONG-TERM CURRENT USE OF INSULIN, UNSPECIFIED WHETHER STAGE 3A OR 3B CKD (HCC): ICD-10-CM

## 2024-12-06 DIAGNOSIS — F51.01 PRIMARY INSOMNIA: ICD-10-CM

## 2024-12-06 DIAGNOSIS — E03.9 HYPOTHYROIDISM, UNSPECIFIED TYPE: ICD-10-CM

## 2024-12-06 RX ORDER — CYANOCOBALAMIN 1000 UG/ML
1000 INJECTION, SOLUTION INTRAMUSCULAR; SUBCUTANEOUS ONCE
Status: COMPLETED | OUTPATIENT
Start: 2024-12-06 | End: 2024-12-06

## 2024-12-06 RX ADMIN — CYANOCOBALAMIN 1000 MCG: 1000 INJECTION, SOLUTION INTRAMUSCULAR; SUBCUTANEOUS at 14:07

## 2024-12-06 ASSESSMENT — ENCOUNTER SYMPTOMS
FACIAL SWELLING: 0
NAUSEA: 0
APNEA: 0
SORE THROAT: 0
VOMITING: 0
SHORTNESS OF BREATH: 0
COUGH: 0
ALLERGIC/IMMUNOLOGIC NEGATIVE: 1
BACK PAIN: 0
ABDOMINAL PAIN: 1
COLOR CHANGE: 0
PHOTOPHOBIA: 0
DIARRHEA: 0
SINUS PRESSURE: 0
CHEST TIGHTNESS: 0
BLOOD IN STOOL: 0
WHEEZING: 0

## 2024-12-06 NOTE — PROGRESS NOTES
Post-Discharge Transitional Care Follow Up      Piedad Kinsey   YOB: 1934    Date of Office Visit:  12/6/2024  Date of Hospital Admission: 11/17/24  Date of Hospital Discharge: 11/20/24  Readmission Risk Score (high >=14%. Medium >=10%):Readmission Risk Score: 14.2      Care management risk score Rising risk (score 2-5) and Complex Care (Scores >=6): No Risk Score On File     Non face to face  following discharge, date last encounter closed (first attempt may have been earlier): 12/03/2024     Call initiated 2 business days of discharge: Yes     Primary hypertension  Mild cognitive impairment with memory loss  Hypothyroidism, unspecified type  Type 2 diabetes mellitus with stage 3 chronic kidney disease, without long-term current use of insulin, unspecified whether stage 3a or 3b CKD (HCC)  Femoral artery occlusion (HCC)  -     Rafael Wolf MD, Vascular Surgery, [facility]  Primary insomnia  -     melatonin 5 MG TABS tablet; Take 1 tablet by mouth daily, Disp-30 tablet, R-4Adjust Sig  Other fatigue  -     cyanocobalamin injection 1,000 mcg; 1,000 mcg, IntraMUSCular, ONCE, 1 dose, On Fri 12/6/24 at 1430      Medical Decision Making: moderate complexity  No follow-ups on file.           Subjective:   Fatigue  This is a new problem. The current episode started 1 to 4 weeks ago. The problem occurs daily. The problem has been gradually worsening. Associated symptoms include abdominal pain and fatigue. Pertinent negatives include no arthralgias, chest pain, congestion, coughing, headaches, joint swelling, myalgias, nausea, rash, sore throat, vomiting or weakness.   Abdominal Pain  This is a recurrent problem. The current episode started 1 to 4 weeks ago. The pain is located in the generalized abdominal region. Pertinent negatives include no arthralgias, diarrhea, frequency, headaches, myalgias, nausea or vomiting.       Inpatient course: Discharge summary reviewed- see chart.    Interval

## 2024-12-06 NOTE — TELEPHONE ENCOUNTER
The pt is calling in to tell you she miss understood the question you asked her earlier about chest pain and she does have chest pains, she said for me to tell you that and you would know what she is talking about

## 2024-12-09 ENCOUNTER — TELEPHONE (OUTPATIENT)
Dept: PRIMARY CARE CLINIC | Age: 88
End: 2024-12-09

## 2024-12-09 NOTE — TELEPHONE ENCOUNTER
Called and spoke with patient as she left voicemail.  States that after she saw you, later that night she noted breast pain.  States that she has soreness, and stabbing pain in breasts, denies swelling, lumps and discharge.  She states that she does notice some bruises.  I did ask if they did CPR on her when she went to hospital by ambulance and she stated she was not awake for the transport so she is not sure.  Please advise, I did let her know you may need to see her for an appointment since this is a separate issue than what you saw her for on Friday.  Patient can be reached at [phone], she is asking that we make sure to ask to speak directly to her when calling back.

## 2024-12-10 ENCOUNTER — TELEPHONE (OUTPATIENT)
Dept: VASCULAR SURGERY | Age: 88
End: 2024-12-10

## 2024-12-16 ENCOUNTER — OFFICE VISIT (OUTPATIENT)
Dept: FAMILY MEDICINE CLINIC | Age: 88
End: 2024-12-16
Payer: MEDICARE

## 2024-12-16 ENCOUNTER — HOSPITAL ENCOUNTER (INPATIENT)
Age: 88
LOS: 4 days | Discharge: HOME | End: 2024-12-20
Attending: EMERGENCY MEDICINE | Admitting: INTERNAL MEDICINE
Payer: MEDICARE

## 2024-12-16 ENCOUNTER — APPOINTMENT (OUTPATIENT)
Dept: GENERAL RADIOLOGY | Age: 88
End: 2024-12-16
Payer: MEDICARE

## 2024-12-16 VITALS
SYSTOLIC BLOOD PRESSURE: 134 MMHG | OXYGEN SATURATION: 99 % | DIASTOLIC BLOOD PRESSURE: 76 MMHG | HEART RATE: 97 BPM | TEMPERATURE: 97.5 F | WEIGHT: 119 LBS | BODY MASS INDEX: 20.43 KG/M2

## 2024-12-16 DIAGNOSIS — I24.9 ACS (ACUTE CORONARY SYNDROME) (HCC): Primary | ICD-10-CM

## 2024-12-16 DIAGNOSIS — R94.31 ST SEGMENT ELEVATION: Primary | ICD-10-CM

## 2024-12-16 DIAGNOSIS — R07.9 CHEST PAIN, UNSPECIFIED TYPE: ICD-10-CM

## 2024-12-16 LAB
ALBUMIN SERPL-MCNC: 3.6 G/DL (ref 3.5–5.2)
ALP SERPL-CCNC: 80 U/L (ref 35–104)
ALT SERPL-CCNC: 8 U/L (ref 0–32)
ANION GAP SERPL CALCULATED.3IONS-SCNC: 9 MMOL/L (ref 7–16)
AST SERPL-CCNC: 13 U/L (ref 0–31)
BASOPHILS # BLD: 0.05 K/UL (ref 0–0.2)
BASOPHILS NFR BLD: 1 % (ref 0–2)
BILIRUB SERPL-MCNC: 0.4 MG/DL (ref 0–1.2)
BNP SERPL-MCNC: ABNORMAL PG/ML (ref 0–450)
BUN SERPL-MCNC: 18 MG/DL (ref 6–23)
CALCIUM SERPL-MCNC: 9.2 MG/DL (ref 8.6–10.2)
CHLORIDE SERPL-SCNC: 109 MMOL/L (ref 98–107)
CO2 SERPL-SCNC: 22 MMOL/L (ref 22–29)
CREAT SERPL-MCNC: 0.9 MG/DL (ref 0.5–1)
EOSINOPHIL # BLD: 0.27 K/UL (ref 0.05–0.5)
EOSINOPHILS RELATIVE PERCENT: 4 % (ref 0–6)
ERYTHROCYTE [DISTWIDTH] IN BLOOD BY AUTOMATED COUNT: 13.9 % (ref 11.5–15)
GFR, ESTIMATED: 60 ML/MIN/1.73M2
GLUCOSE SERPL-MCNC: 112 MG/DL (ref 74–99)
HCT VFR BLD AUTO: 39.1 % (ref 34–48)
HGB BLD-MCNC: 12.3 G/DL (ref 11.5–15.5)
IMM GRANULOCYTES # BLD AUTO: <0.03 K/UL (ref 0–0.58)
IMM GRANULOCYTES NFR BLD: 0 % (ref 0–5)
INR PPP: 1.1
LYMPHOCYTES NFR BLD: 1.17 K/UL (ref 1.5–4)
LYMPHOCYTES RELATIVE PERCENT: 18 % (ref 20–42)
MCH RBC QN AUTO: 30 PG (ref 26–35)
MCHC RBC AUTO-ENTMCNC: 31.5 G/DL (ref 32–34.5)
MCV RBC AUTO: 95.4 FL (ref 80–99.9)
MONOCYTES NFR BLD: 0.59 K/UL (ref 0.1–0.95)
MONOCYTES NFR BLD: 9 % (ref 2–12)
NEUTROPHILS NFR BLD: 68 % (ref 43–80)
NEUTS SEG NFR BLD: 4.4 K/UL (ref 1.8–7.3)
PARTIAL THROMBOPLASTIN TIME: 27.9 SEC (ref 24.5–35.1)
PARTIAL THROMBOPLASTIN TIME: 50.9 SEC (ref 24.5–35.1)
PLATELET # BLD AUTO: 151 K/UL (ref 130–450)
PMV BLD AUTO: 12.7 FL (ref 7–12)
POTASSIUM SERPL-SCNC: 4.1 MMOL/L (ref 3.5–5)
PROT SERPL-MCNC: 6 G/DL (ref 6.4–8.3)
PROTHROMBIN TIME: 12.1 SEC (ref 9.3–12.4)
RBC # BLD AUTO: 4.1 M/UL (ref 3.5–5.5)
SODIUM SERPL-SCNC: 140 MMOL/L (ref 132–146)
TROPONIN I SERPL HS-MCNC: 108 NG/L (ref 0–9)
TROPONIN I SERPL HS-MCNC: 119 NG/L (ref 0–9)
WBC OTHER # BLD: 6.5 K/UL (ref 4.5–11.5)

## 2024-12-16 PROCEDURE — 6360000002 HC RX W HCPCS: Performed by: NURSE PRACTITIONER

## 2024-12-16 PROCEDURE — 6360000002 HC RX W HCPCS

## 2024-12-16 PROCEDURE — 71045 X-RAY EXAM CHEST 1 VIEW: CPT

## 2024-12-16 PROCEDURE — 85610 PROTHROMBIN TIME: CPT

## 2024-12-16 PROCEDURE — 6370000000 HC RX 637 (ALT 250 FOR IP): Performed by: INTERNAL MEDICINE

## 2024-12-16 PROCEDURE — 83880 ASSAY OF NATRIURETIC PEPTIDE: CPT

## 2024-12-16 PROCEDURE — 36415 COLL VENOUS BLD VENIPUNCTURE: CPT

## 2024-12-16 PROCEDURE — 99223 1ST HOSP IP/OBS HIGH 75: CPT | Performed by: INTERNAL MEDICINE

## 2024-12-16 PROCEDURE — 99285 EMERGENCY DEPT VISIT HI MDM: CPT

## 2024-12-16 PROCEDURE — 85025 COMPLETE CBC W/AUTO DIFF WBC: CPT

## 2024-12-16 PROCEDURE — 93005 ELECTROCARDIOGRAM TRACING: CPT | Performed by: INTERNAL MEDICINE

## 2024-12-16 PROCEDURE — APPSS180 APP SPLIT SHARED TIME > 60 MINUTES: Performed by: NURSE PRACTITIONER

## 2024-12-16 PROCEDURE — 2060000000 HC ICU INTERMEDIATE R&B

## 2024-12-16 PROCEDURE — 96374 THER/PROPH/DIAG INJ IV PUSH: CPT

## 2024-12-16 PROCEDURE — 80053 COMPREHEN METABOLIC PANEL: CPT

## 2024-12-16 PROCEDURE — 93005 ELECTROCARDIOGRAM TRACING: CPT

## 2024-12-16 PROCEDURE — 1159F MED LIST DOCD IN RCRD: CPT | Performed by: PHYSICIAN ASSISTANT

## 2024-12-16 PROCEDURE — 84484 ASSAY OF TROPONIN QUANT: CPT

## 2024-12-16 PROCEDURE — 1123F ACP DISCUSS/DSCN MKR DOCD: CPT | Performed by: PHYSICIAN ASSISTANT

## 2024-12-16 PROCEDURE — 93000 ELECTROCARDIOGRAM COMPLETE: CPT | Performed by: PHYSICIAN ASSISTANT

## 2024-12-16 PROCEDURE — 85730 THROMBOPLASTIN TIME PARTIAL: CPT

## 2024-12-16 PROCEDURE — 1160F RVW MEDS BY RX/DR IN RCRD: CPT | Performed by: PHYSICIAN ASSISTANT

## 2024-12-16 PROCEDURE — 99215 OFFICE O/P EST HI 40 MIN: CPT | Performed by: PHYSICIAN ASSISTANT

## 2024-12-16 RX ORDER — CLOPIDOGREL BISULFATE 75 MG/1
75 TABLET ORAL DAILY
Status: DISCONTINUED | OUTPATIENT
Start: 2024-12-16 | End: 2024-12-20 | Stop reason: HOSPADM

## 2024-12-16 RX ORDER — ACETAMINOPHEN 650 MG/1
650 SUPPOSITORY RECTAL EVERY 6 HOURS PRN
Status: DISCONTINUED | OUTPATIENT
Start: 2024-12-16 | End: 2024-12-20 | Stop reason: HOSPADM

## 2024-12-16 RX ORDER — CLOPIDOGREL BISULFATE 75 MG/1
75 TABLET ORAL DAILY
COMMUNITY

## 2024-12-16 RX ORDER — HEPARIN SODIUM 10000 [USP'U]/100ML
5-30 INJECTION, SOLUTION INTRAVENOUS CONTINUOUS
Status: DISCONTINUED | OUTPATIENT
Start: 2024-12-16 | End: 2024-12-18

## 2024-12-16 RX ORDER — MAGNESIUM SULFATE IN WATER 40 MG/ML
2000 INJECTION, SOLUTION INTRAVENOUS PRN
Status: DISCONTINUED | OUTPATIENT
Start: 2024-12-16 | End: 2024-12-20 | Stop reason: HOSPADM

## 2024-12-16 RX ORDER — HEPARIN SODIUM 1000 [USP'U]/ML
30 INJECTION, SOLUTION INTRAVENOUS; SUBCUTANEOUS PRN
Status: DISCONTINUED | OUTPATIENT
Start: 2024-12-16 | End: 2024-12-18

## 2024-12-16 RX ORDER — SODIUM CHLORIDE 0.9 % (FLUSH) 0.9 %
5-40 SYRINGE (ML) INJECTION EVERY 12 HOURS SCHEDULED
Status: DISCONTINUED | OUTPATIENT
Start: 2024-12-16 | End: 2024-12-20 | Stop reason: HOSPADM

## 2024-12-16 RX ORDER — HEPARIN SODIUM 1000 [USP'U]/ML
60 INJECTION, SOLUTION INTRAVENOUS; SUBCUTANEOUS ONCE
Status: COMPLETED | OUTPATIENT
Start: 2024-12-16 | End: 2024-12-16

## 2024-12-16 RX ORDER — ISOSORBIDE MONONITRATE 30 MG/1
30 TABLET, EXTENDED RELEASE ORAL DAILY
Status: DISCONTINUED | OUTPATIENT
Start: 2024-12-16 | End: 2024-12-20

## 2024-12-16 RX ORDER — MECOBALAMIN 5000 MCG
5 TABLET,DISINTEGRATING ORAL NIGHTLY PRN
Status: DISCONTINUED | OUTPATIENT
Start: 2024-12-16 | End: 2024-12-20 | Stop reason: HOSPADM

## 2024-12-16 RX ORDER — FUROSEMIDE 10 MG/ML
20 INJECTION INTRAMUSCULAR; INTRAVENOUS ONCE
Status: COMPLETED | OUTPATIENT
Start: 2024-12-16 | End: 2024-12-16

## 2024-12-16 RX ORDER — ASPIRIN 81 MG/1
324 TABLET, CHEWABLE ORAL DAILY
Status: CANCELLED | OUTPATIENT
Start: 2024-12-17

## 2024-12-16 RX ORDER — FAMOTIDINE 20 MG/1
20 TABLET, FILM COATED ORAL DAILY
Status: DISCONTINUED | OUTPATIENT
Start: 2024-12-17 | End: 2024-12-20 | Stop reason: HOSPADM

## 2024-12-16 RX ORDER — ONDANSETRON 2 MG/ML
4 INJECTION INTRAMUSCULAR; INTRAVENOUS EVERY 6 HOURS PRN
Status: DISCONTINUED | OUTPATIENT
Start: 2024-12-16 | End: 2024-12-20 | Stop reason: HOSPADM

## 2024-12-16 RX ORDER — EZETIMIBE 10 MG/1
10 TABLET ORAL NIGHTLY
Status: DISCONTINUED | OUTPATIENT
Start: 2024-12-16 | End: 2024-12-20 | Stop reason: HOSPADM

## 2024-12-16 RX ORDER — POTASSIUM CHLORIDE 1500 MG/1
40 TABLET, EXTENDED RELEASE ORAL PRN
Status: DISCONTINUED | OUTPATIENT
Start: 2024-12-16 | End: 2024-12-17

## 2024-12-16 RX ORDER — ASPIRIN 81 MG/1
81 TABLET, CHEWABLE ORAL DAILY
Status: DISCONTINUED | OUTPATIENT
Start: 2024-12-16 | End: 2024-12-20 | Stop reason: HOSPADM

## 2024-12-16 RX ORDER — GABAPENTIN 100 MG/1
100 CAPSULE ORAL DAILY
Status: ON HOLD | COMMUNITY
End: 2024-12-20 | Stop reason: HOSPADM

## 2024-12-16 RX ORDER — ONDANSETRON 4 MG/1
4 TABLET, ORALLY DISINTEGRATING ORAL EVERY 8 HOURS PRN
Status: DISCONTINUED | OUTPATIENT
Start: 2024-12-16 | End: 2024-12-20 | Stop reason: HOSPADM

## 2024-12-16 RX ORDER — LEVOTHYROXINE SODIUM 25 UG/1
25 TABLET ORAL DAILY
Status: DISCONTINUED | OUTPATIENT
Start: 2024-12-17 | End: 2024-12-20 | Stop reason: HOSPADM

## 2024-12-16 RX ORDER — POLYETHYLENE GLYCOL 3350 17 G/17G
17 POWDER, FOR SOLUTION ORAL DAILY PRN
Status: DISCONTINUED | OUTPATIENT
Start: 2024-12-16 | End: 2024-12-20 | Stop reason: HOSPADM

## 2024-12-16 RX ORDER — ACETAMINOPHEN 325 MG/1
650 TABLET ORAL EVERY 6 HOURS PRN
Status: DISCONTINUED | OUTPATIENT
Start: 2024-12-16 | End: 2024-12-20 | Stop reason: HOSPADM

## 2024-12-16 RX ORDER — HEPARIN SODIUM 1000 [USP'U]/ML
60 INJECTION, SOLUTION INTRAVENOUS; SUBCUTANEOUS PRN
Status: DISCONTINUED | OUTPATIENT
Start: 2024-12-16 | End: 2024-12-18

## 2024-12-16 RX ORDER — SODIUM CHLORIDE 9 MG/ML
INJECTION, SOLUTION INTRAVENOUS PRN
Status: DISCONTINUED | OUTPATIENT
Start: 2024-12-16 | End: 2024-12-20 | Stop reason: HOSPADM

## 2024-12-16 RX ORDER — ASPIRIN 81 MG/1
324 TABLET, CHEWABLE ORAL DAILY
Status: DISCONTINUED | OUTPATIENT
Start: 2024-12-16 | End: 2024-12-20 | Stop reason: HOSPADM

## 2024-12-16 RX ORDER — SODIUM CHLORIDE 0.9 % (FLUSH) 0.9 %
5-40 SYRINGE (ML) INJECTION PRN
Status: DISCONTINUED | OUTPATIENT
Start: 2024-12-16 | End: 2024-12-20 | Stop reason: HOSPADM

## 2024-12-16 RX ORDER — METOPROLOL SUCCINATE 25 MG/1
25 TABLET, EXTENDED RELEASE ORAL DAILY
Status: DISCONTINUED | OUTPATIENT
Start: 2024-12-16 | End: 2024-12-19

## 2024-12-16 RX ORDER — POTASSIUM CHLORIDE 7.45 MG/ML
10 INJECTION INTRAVENOUS PRN
Status: DISCONTINUED | OUTPATIENT
Start: 2024-12-16 | End: 2024-12-17

## 2024-12-16 RX ADMIN — EZETIMIBE 10 MG: 10 TABLET ORAL at 21:34

## 2024-12-16 RX ADMIN — HEPARIN SODIUM 12 UNITS/KG/HR: 10000 INJECTION, SOLUTION INTRAVENOUS at 11:43

## 2024-12-16 RX ADMIN — HEPARIN SODIUM 3300 UNITS: 1000 INJECTION INTRAVENOUS; SUBCUTANEOUS at 11:39

## 2024-12-16 RX ADMIN — FUROSEMIDE 20 MG: 10 INJECTION, SOLUTION INTRAMUSCULAR; INTRAVENOUS at 15:44

## 2024-12-16 RX ADMIN — ACETAMINOPHEN 650 MG: 325 TABLET ORAL at 22:43

## 2024-12-16 RX ADMIN — ASPIRIN 324 MG: 81 TABLET, CHEWABLE ORAL at 10:34

## 2024-12-16 ASSESSMENT — PAIN DESCRIPTION - DESCRIPTORS: DESCRIPTORS: CRAMPING;DISCOMFORT;DULL

## 2024-12-16 ASSESSMENT — PAIN DESCRIPTION - LOCATION: LOCATION: ABDOMEN

## 2024-12-16 ASSESSMENT — PAIN - FUNCTIONAL ASSESSMENT: PAIN_FUNCTIONAL_ASSESSMENT: NONE - DENIES PAIN

## 2024-12-16 ASSESSMENT — LIFESTYLE VARIABLES: HOW OFTEN DO YOU HAVE A DRINK CONTAINING ALCOHOL: NEVER

## 2024-12-16 NOTE — PROGRESS NOTES
4 Eyes Skin Assessment     NAME:  Piedad Kinsey  YOB: 1934  MEDICAL RECORD NUMBER:  [id number]    The patient is being assessed for  Admission    I agree that at least one RN has performed a thorough Head to Toe Skin Assessment on the patient. ALL assessment sites listed below have been assessed.      Areas assessed by both nurses:    Head, Face, Ears, Shoulders, Back, Chest, Arms, Elbows, Hands, Sacrum. Buttock, Coccyx, Ischium, Legs. Feet and Heels, and Under Medical Devices         Does the Patient have a Wound? No noted wound(s)       Vitor Prevention initiated by RN: Yes  Wound Care Orders initiated by RN: No    Pressure Injury (Stage 3,4, Unstageable, DTI, NWPT, and Complex wounds) if present, place Wound referral order by RN under : No    New Ostomies, if present place, Ostomy referral order under : No     Nurse 1 eSignature: Electronically signed by Tara M Wilms, RN on 12/16/24 at 5:22 PM EST    **SHARE this note so that the co-signing nurse can place an eSignature**    Nurse 2 eSignature: {Esignature:[id number]}

## 2024-12-16 NOTE — PROGRESS NOTES
No results found for this visit on 12/16/24.        Medical Decision Making:     Vital signs reviewed    Past medical history reviewed.    Allergies reviewed.    Medications reviewed.    Patient on arrival does not appear to be in any apparent distress or discomfort.  The patient has been seen and evaluated.  The patient does not appear to be toxic or lethargic.     The patient does not appear to be in any apparent distress or discomfort    Previous EKG was reviewed    EKG today shows sinus rhythm with a rate of 83 with ST segment elevation in 2, 3, aVF as well as V1 through V3 with T wave inversion in 1, aVL, and V4 through V6 which appears to be new from previous EKG on 11/17/2024    The patient is not having any pain    The patient was given 4 baby aspirin    The patient had EMS called and will be transported to the emergency department for further evaluation and assessment      Clinical Impression:   Piedad was seen today for constipation and chest pain.    Diagnoses and all orders for this visit:    ST segment elevation    Chest pain, unspecified type  -     EKG 12 Lead  -     aspirin chewable tablet 324 mg        Go directly to the ED    SIGNATURE: Juancho Griffin III, PA-C    This document may have been prepared at least partially through the use of voice recognition software. Although effort is taken to assure the accuracy ofthis document, it is possible that grammatical, syntax, or spelling errors may occur.

## 2024-12-16 NOTE — CONSULTS
Inpatient Cardiology Consultation      Reason for Consult:  Chest pain, abnormla ekg     Consulting Physician: Dr. Esquivel    Requesting Physician:  Dr. Chaney     Date of Consultation: 12/16/2024    HISTORY OF PRESENT ILLNESS: 89 y.o.  female, known to Dr. Hayes, last seen 7/21/2023 as outpatient for follow-up for CAD, VHD with moderate mitral regurgitation, occasional orthostatic symptoms, HTN, and HLD, advised to follow-up with PCP to manage cholesterol, decreased amlodipine to 2.5 mg daily.    12/16/2024: Presented to the ED, from PCP's office, via EMS for evaluation of right-sided chest pain and chest pressure, that radiated to her right lower abdomen, on Saturday. Noted to have EKg changes    Patient laying in bed, alert and oriented to person and place, no obvious signs of distress, her  is at the bedside the entire time, during assessment and interview. They report, on Saturday, she had a \"funny feeling in her breast\", that progressed to an aching sensation and pressure, with associates mild SOB, lasting \"only a short while\", which she said for several minutes, then subsided with relaxation and deep breathing, with no reoccurrence. They went to PCP's office today for evaluation. EKG completed, sinus rhythm with a rate of 83 with ST segment elevation in 2, 3, aVF as well as V1 through V3 with T wave inversion in 1, aVL, and V4 through V6 which appears to be new from previous EKG on 11/17/2024. Denies any other associated symptoms, including lightheadedness, dizziness, palpitations, left arm pain, numbness, tingling, abdominal pain, nausea, vomiting, fever, or chills.  Denies any recent sickness, or being around anyone sick, dietary changes, recent travels, foreign, or domestic.    She does admit that she does not take her medications all the time as prescribed.  Denies any recent medication changes.    She does use a walker to help with ambulation, and does have a cane which she uses as  12/16/2024 Borderline cardiomegaly with mild interstitial edema. Some increased markings at the left lung base in which infiltrate cannot be excluded. Probable trace left pleural effusion.     Labs:   CBC:   Recent Labs     12/16/24  1131   WBC 6.5   HGB 12.3   HCT 39.1        TFT:   Lab Results   Component Value Date    TSH 1.98 11/17/2024    TSH 2.07 10/04/2024    T4FREE 1.04 03/16/2024    T4FREE 1.3 01/31/2024      HgA1c:   Lab Results   Component Value Date/Time    LABA1C 6.4 05/01/2024 06:05 AM    LABA1C 6.5 04/26/2024 10:31 AM    LABA1C 6.5 11/24/2023 01:45 PM     proBNP:   Lab Results   Component Value Date/Time    PROBNP 13,467 12/16/2024 11:45 AM    PROBNP 613 06/03/2024 11:24 AM    PROBNP 4,729 06/03/2016 11:20 AM     PT/INR:   Recent Labs     12/16/24  1131   PROTIME 12.1   INR 1.1     APTT:  Recent Labs     12/16/24  1131   APTT 27.9     TROPONIN:  Lab Results   Component Value Date/Time    TROPHS 108 12/16/2024 01:00 PM    TROPHS 119 12/16/2024 11:31 AM    TROPHS 32 11/17/2024 04:57 PM    TROPHS 33 11/17/2024 03:26 PM    TROPHS 19 06/03/2024 01:42 PM     CK:  Lab Results   Component Value Date/Time    CKTOTAL 58 11/17/2024 03:26 PM    CKTOTAL 51 01/28/2024 10:00 AM    CKTOTAL 273 06/04/2016 04:30 PM     FASTING LIPID PANEL:  Lab Results   Component Value Date/Time    CHOL 299 10/04/2024 12:04 PM    CHOL 319 04/26/2024 10:31 AM    CHOL 340 04/22/2023 05:06 AM    CHOL 335 09/19/2021 08:38 AM    CHOL 308 01/13/2021 12:57 PM    HDL 78 10/04/2024 12:04 PM    HDL 80 04/26/2024 10:31 AM    HDL 72 04/22/2023 05:06 AM     10/04/2024 12:04 PM     04/26/2024 10:31 AM     04/22/2023 05:06 AM     09/19/2021 08:38 AM     01/13/2021 12:57 PM     01/31/2020 12:45 PM    TRIG 183 10/04/2024 12:04 PM    TRIG 165 04/26/2024 10:31 AM    TRIG 170 04/22/2023 05:06 AM     Assessment & Plan per Dr. Esquivel    Electronically signed by NOHELIA Rosales - CNP on 12/16/2024 at

## 2024-12-16 NOTE — ED PROVIDER NOTES
[facility] EMERGENCY DEPARTMENT  EMERGENCY DEPARTMENT ENCOUNTER        Pt Name: Piedad Kinsey  MRN: [id number]  Birthdate 2/22/1934  Date of evaluation: 12/16/2024  Provider: Rohini Begum DO  PCP: Dave Li DO  Note Started: 11:32 AM EST 12/16/24    CHIEF COMPLAINT       Chief Complaint   Patient presents with    Chest Pain     EMS gave 4 Baby Aspirin 81mg and 1 Nitro SL in route       HISTORY OF PRESENT ILLNESS: 1 or more Elements   Piedad Kinsey is a 89 y.o. female who presents to the emergency department with chief complaint of right sided chest pain and pressure beginning just prior to arrival.  Was seen at urgent care and EMS was called.  Pt given 324mg aspirin and 1 nitro en route by EMS.  She is currently pain free in the emergency department.  States that when she had the pressure-like chest wall pain does radiate to her right lower quadrant.  She does have a history of heart disease but denies receiving stents in the past for this.  Patient has a history of type 2 diabetes, CKD, chronic stenosis, NSTEMI, hypertension, and vascular dementia.  Patient is otherwise without shortness of breath, fevers, chills, syncope abdominal pain, nausea, jaw or arm radiation, or headaches.     Nursing Notes were all reviewed and agreed with or any disagreements were addressed in the HPI.    REVIEW OF SYSTEMS :    Positives and Pertinent negatives as per HPI.    PAST MEDICAL HISTORY/Chronic Conditions Affecting Care    has a past medical history of Dementia (HCC), Diverticulosis, GIST (gastrointestinal stroma tumor), malignant, colon (HCC), Hyperlipidemia, Hypertension, MI (myocardial infarction) (HCC) (2015), Mitral regurgitation, and PUD (peptic ulcer disease).     SURGICAL HISTORY     Past Surgical History:   Procedure Laterality Date    CAROTID ENDARTERECTOMY Right 04/2019    Dr Nazario    CATARACT REMOVAL WITH IMPLANT Bilateral 2015    COLONOSCOPY  12/2014     normal limits   TROPONIN - Abnormal; Notable for the following components:    Troponin, High Sensitivity 108 (*)     All other components within normal limits   PROTIME-INR   APTT   APTT   APTT       As interpreted by me, the above displayed labs are abnormal. All other labs obtained during this visit were within normal range or not returned as of this dictation.    EKG Interpretation  Interpreted by emergency department resident physician, Rohini Begum,   *Please see ED Course for EKG interpretation*    RADIOLOGY:   Non-plain film images such as CT, Ultrasound and MRI are read by the radiologist. Plain radiographic images are visualized and preliminarily interpreted by the ED Provider with the below findings:    CXR as interpreted by myself shows no evidence of pleural effusions, pneumothorax, or consolidations    Interpretation per the Radiologist below, if available at the time of this note:    XR CHEST PORTABLE   Final Result   Borderline cardiomegaly with mild interstitial edema. Some increased markings   at the left lung base in which infiltrate cannot be excluded. Probable trace   left pleural effusion.             PROCEDURES   Unless otherwise noted below, none      Medical Decision Making/Differential Diagnosis:   CC/HPI Summary, DDx, ED Course, Reassessment, Tests Considered, Patient expectation:   89 y.o. female who presents to the emergency department with chief complaint of sudden onset of pressure-like chest pain beginning prior to arrival.  Patient was medicated with full dose aspirin and a nitroglycerin en route by EMS.  On initial evaluation patient is afebrile and nontoxic-appearing with stable vital signs.  Initial concerns include but not limited to ACS, pneumonia, NSTEMI, STEMI, Wellens, pneumothorax, among others.  Initial EKG with ST elevations noted in leads V 2-V5.  Patient started on heparin for concern for STEMI.  CBC without leukocytosis or leukopenia.  Hemoglobin is stable at

## 2024-12-17 ENCOUNTER — APPOINTMENT (OUTPATIENT)
Age: 88
End: 2024-12-17
Payer: MEDICARE

## 2024-12-17 LAB
ANION GAP SERPL CALCULATED.3IONS-SCNC: 12 MMOL/L (ref 7–16)
BNP SERPL-MCNC: ABNORMAL PG/ML (ref 0–450)
BUN SERPL-MCNC: 19 MG/DL (ref 6–23)
CALCIUM SERPL-MCNC: 9.2 MG/DL (ref 8.6–10.2)
CHLORIDE SERPL-SCNC: 107 MMOL/L (ref 98–107)
CO2 SERPL-SCNC: 24 MMOL/L (ref 22–29)
CREAT SERPL-MCNC: 1 MG/DL (ref 0.5–1)
ECHO AO ASC DIAM: 2.8 CM
ECHO AO ASCENDING AORTA INDEX: 1.76 CM/M2
ECHO AV AREA PEAK VELOCITY: 2.2 CM2
ECHO AV AREA VTI: 2.4 CM2
ECHO AV AREA/BSA PEAK VELOCITY: 1.4 CM2/M2
ECHO AV AREA/BSA VTI: 1.5 CM2/M2
ECHO AV CUSP MM: 1 CM
ECHO AV MEAN GRADIENT: 2 MMHG
ECHO AV MEAN VELOCITY: 0.8 M/S
ECHO AV PEAK GRADIENT: 5 MMHG
ECHO AV PEAK VELOCITY: 1.1 M/S
ECHO AV VELOCITY RATIO: 0.82
ECHO AV VTI: 17.7 CM
ECHO BSA: 1.57 M2
ECHO EST RA PRESSURE: 3 MMHG
ECHO LA DIAMETER INDEX: 2.2 CM/M2
ECHO LA DIAMETER: 3.5 CM
ECHO LA VOL A-L A2C: 34 ML (ref 22–52)
ECHO LA VOL A-L A4C: 36 ML (ref 22–52)
ECHO LA VOL MOD A2C: 30 ML (ref 22–52)
ECHO LA VOL MOD A4C: 34 ML (ref 22–52)
ECHO LA VOLUME AREA LENGTH: 36 ML
ECHO LA VOLUME INDEX A-L A2C: 21 ML/M2 (ref 16–34)
ECHO LA VOLUME INDEX A-L A4C: 23 ML/M2 (ref 16–34)
ECHO LA VOLUME INDEX AREA LENGTH: 23 ML/M2 (ref 16–34)
ECHO LA VOLUME INDEX MOD A2C: 19 ML/M2 (ref 16–34)
ECHO LA VOLUME INDEX MOD A4C: 21 ML/M2 (ref 16–34)
ECHO LV EDV A2C: 130 ML
ECHO LV EDV A4C: 130 ML
ECHO LV EDV BP: 130 ML (ref 56–104)
ECHO LV EDV INDEX A4C: 82 ML/M2
ECHO LV EDV INDEX BP: 82 ML/M2
ECHO LV EDV NDEX A2C: 82 ML/M2
ECHO LV EF PHYSICIAN: 25 %
ECHO LV EJECTION FRACTION A2C: 33 %
ECHO LV EJECTION FRACTION A4C: 29 %
ECHO LV EJECTION FRACTION BIPLANE: 31 % (ref 55–100)
ECHO LV ESV A2C: 87 ML
ECHO LV ESV A4C: 92 ML
ECHO LV ESV BP: 89 ML (ref 19–49)
ECHO LV ESV INDEX A2C: 55 ML/M2
ECHO LV ESV INDEX A4C: 58 ML/M2
ECHO LV ESV INDEX BP: 56 ML/M2
ECHO LV FRACTIONAL SHORTENING: 16 % (ref 28–44)
ECHO LV INTERNAL DIMENSION DIASTOLE INDEX: 2.39 CM/M2
ECHO LV INTERNAL DIMENSION DIASTOLIC: 3.8 CM (ref 3.9–5.3)
ECHO LV INTERNAL DIMENSION SYSTOLIC INDEX: 2.01 CM/M2
ECHO LV INTERNAL DIMENSION SYSTOLIC: 3.2 CM
ECHO LV IVSD: 1.1 CM (ref 0.6–0.9)
ECHO LV IVSS: 1.6 CM
ECHO LV MASS 2D: 153.2 G (ref 67–162)
ECHO LV MASS INDEX 2D: 96.4 G/M2 (ref 43–95)
ECHO LV POSTERIOR WALL DIASTOLIC: 1.3 CM (ref 0.6–0.9)
ECHO LV POSTERIOR WALL SYSTOLIC: 1.6 CM
ECHO LV RELATIVE WALL THICKNESS RATIO: 0.68
ECHO LVOT AREA: 2.5 CM2
ECHO LVOT AV VTI INDEX: 0.95
ECHO LVOT DIAM: 1.8 CM
ECHO LVOT MEAN GRADIENT: 2 MMHG
ECHO LVOT PEAK GRADIENT: 3 MMHG
ECHO LVOT PEAK VELOCITY: 0.9 M/S
ECHO LVOT STROKE VOLUME INDEX: 27 ML/M2
ECHO LVOT SV: 43 ML
ECHO LVOT VTI: 16.9 CM
ECHO MV A VELOCITY: 1.45 M/S
ECHO MV AREA PHT: 2.7 CM2
ECHO MV AREA VTI: 1.7 CM2
ECHO MV E DECELERATION TIME (DT): 237.9 MS
ECHO MV E VELOCITY: 0.55 M/S
ECHO MV E/A RATIO: 0.38
ECHO MV LVOT VTI INDEX: 1.49
ECHO MV MAX VELOCITY: 1.7 M/S
ECHO MV MEAN GRADIENT: 3 MMHG
ECHO MV MEAN VELOCITY: 0.8 M/S
ECHO MV PEAK GRADIENT: 11 MMHG
ECHO MV PRESSURE HALF TIME (PHT): 80.8 MS
ECHO MV VTI: 25.2 CM
ECHO PV MAX VELOCITY: 1 M/S
ECHO PV MEAN GRADIENT: 2 MMHG
ECHO PV MEAN VELOCITY: 0.8 M/S
ECHO PV PEAK GRADIENT: 4 MMHG
ECHO PV VTI: 15.4 CM
ECHO RV TAPSE: 1.5 CM (ref 1.7–?)
GFR, ESTIMATED: 51 ML/MIN/1.73M2
GLUCOSE SERPL-MCNC: 113 MG/DL (ref 74–99)
PARTIAL THROMBOPLASTIN TIME: 22.8 SEC (ref 24.5–35.1)
PARTIAL THROMBOPLASTIN TIME: 28.4 SEC (ref 24.5–35.1)
PARTIAL THROMBOPLASTIN TIME: 71.3 SEC (ref 24.5–35.1)
POTASSIUM SERPL-SCNC: 5 MMOL/L (ref 3.5–5)
SODIUM SERPL-SCNC: 143 MMOL/L (ref 132–146)

## 2024-12-17 PROCEDURE — 36415 COLL VENOUS BLD VENIPUNCTURE: CPT

## 2024-12-17 PROCEDURE — 85730 THROMBOPLASTIN TIME PARTIAL: CPT

## 2024-12-17 PROCEDURE — 6370000000 HC RX 637 (ALT 250 FOR IP): Performed by: NURSE PRACTITIONER

## 2024-12-17 PROCEDURE — 93306 TTE W/DOPPLER COMPLETE: CPT | Performed by: INTERNAL MEDICINE

## 2024-12-17 PROCEDURE — 83880 ASSAY OF NATRIURETIC PEPTIDE: CPT

## 2024-12-17 PROCEDURE — 6360000002 HC RX W HCPCS

## 2024-12-17 PROCEDURE — 6370000000 HC RX 637 (ALT 250 FOR IP): Performed by: INTERNAL MEDICINE

## 2024-12-17 PROCEDURE — 80048 BASIC METABOLIC PNL TOTAL CA: CPT

## 2024-12-17 PROCEDURE — C8929 TTE W OR WO FOL WCON,DOPPLER: HCPCS

## 2024-12-17 PROCEDURE — 99233 SBSQ HOSP IP/OBS HIGH 50: CPT | Performed by: INTERNAL MEDICINE

## 2024-12-17 PROCEDURE — 2580000003 HC RX 258: Performed by: INTERNAL MEDICINE

## 2024-12-17 PROCEDURE — 6360000002 HC RX W HCPCS: Performed by: INTERNAL MEDICINE

## 2024-12-17 PROCEDURE — 2060000000 HC ICU INTERMEDIATE R&B

## 2024-12-17 RX ORDER — HYDRALAZINE HYDROCHLORIDE 25 MG/1
25 TABLET, FILM COATED ORAL EVERY 8 HOURS SCHEDULED
Status: DISCONTINUED | OUTPATIENT
Start: 2024-12-17 | End: 2024-12-20

## 2024-12-17 RX ORDER — FUROSEMIDE 20 MG/1
20 TABLET ORAL DAILY
Status: DISCONTINUED | OUTPATIENT
Start: 2024-12-17 | End: 2024-12-20 | Stop reason: HOSPADM

## 2024-12-17 RX ORDER — LORAZEPAM 2 MG/ML
0.25 INJECTION INTRAMUSCULAR ONCE
Status: COMPLETED | OUTPATIENT
Start: 2024-12-17 | End: 2024-12-17

## 2024-12-17 RX ORDER — SPIRONOLACTONE 25 MG/1
25 TABLET ORAL DAILY
Status: DISCONTINUED | OUTPATIENT
Start: 2024-12-17 | End: 2024-12-20 | Stop reason: HOSPADM

## 2024-12-17 RX ORDER — HYDRALAZINE HYDROCHLORIDE 20 MG/ML
20 INJECTION INTRAMUSCULAR; INTRAVENOUS ONCE
Status: COMPLETED | OUTPATIENT
Start: 2024-12-18 | End: 2024-12-18

## 2024-12-17 RX ADMIN — SODIUM CHLORIDE, PRESERVATIVE FREE 10 ML: 5 INJECTION INTRAVENOUS at 08:53

## 2024-12-17 RX ADMIN — SPIRONOLACTONE 25 MG: 25 TABLET ORAL at 18:46

## 2024-12-17 RX ADMIN — HEPARIN SODIUM 3300 UNITS: 1000 INJECTION INTRAVENOUS; SUBCUTANEOUS at 16:36

## 2024-12-17 RX ADMIN — FUROSEMIDE 20 MG: 40 TABLET ORAL at 18:46

## 2024-12-17 RX ADMIN — METOPROLOL SUCCINATE 25 MG: 25 TABLET, EXTENDED RELEASE ORAL at 08:52

## 2024-12-17 RX ADMIN — EZETIMIBE 10 MG: 10 TABLET ORAL at 20:02

## 2024-12-17 RX ADMIN — POLYETHYLENE GLYCOL 3350 17 G: 17 POWDER, FOR SOLUTION ORAL at 06:14

## 2024-12-17 RX ADMIN — HEPARIN SODIUM 3300 UNITS: 1000 INJECTION INTRAVENOUS; SUBCUTANEOUS at 00:41

## 2024-12-17 RX ADMIN — CLOPIDOGREL BISULFATE 75 MG: 75 TABLET ORAL at 08:51

## 2024-12-17 RX ADMIN — LORAZEPAM 0.26 MG: 2 INJECTION INTRAMUSCULAR; INTRAVENOUS at 23:24

## 2024-12-17 RX ADMIN — FAMOTIDINE 20 MG: 20 TABLET, FILM COATED ORAL at 08:51

## 2024-12-17 RX ADMIN — EMPAGLIFLOZIN 10 MG: 10 TABLET, FILM COATED ORAL at 18:43

## 2024-12-17 RX ADMIN — ASPIRIN 81 MG CHEWABLE TABLET 81 MG: 81 TABLET CHEWABLE at 08:52

## 2024-12-17 RX ADMIN — ISOSORBIDE MONONITRATE 30 MG: 30 TABLET, EXTENDED RELEASE ORAL at 08:52

## 2024-12-17 RX ADMIN — LEVOTHYROXINE SODIUM 25 MCG: 0.03 TABLET ORAL at 08:51

## 2024-12-17 NOTE — PROGRESS NOTES
When asking pt orientation questions, pt states that she came into hospital because she was having chest pain. She then told RN that she just sat alone in her house for hours because her  \"wouldn't let me call 911\". She stated that he drove her to hospital the next day after symptoms persisted.

## 2024-12-17 NOTE — H&P
[facility]              [address] [place] [address]                           HISTORY & PHYSICAL      PATIENT NAME: MICK BOURGEOIS               : 1934  MED REC NO: [id number]                        ROOM: [facility]  ACCOUNT NO: [id number]                       ADMIT DATE: 2024  PROVIDER: Raad Maciel DO      PRIMARY CARE PHYSICIAN:  Dave Li DO    CHIEF COMPLAINT:  Chest pain.    HISTORY OF PRESENT ILLNESS:  The patient is a 89-year-old  female who presented to the emergency room with complaints of chest pain times several days.  She was seen in the emergency room.  EKG markedly abnormal.  She was admitted for further evaluation and treatment.  Cardiology was consulted in the emergency room.    MEDICATIONS:  Prior to admission, as per admission med rec, which include Plavix, vitamin B12, Zetia, Pepcid, Neurontin, Synthroid, and melatonin.    PAST MEDICAL HISTORY:  Dementia, diverticulosis, gastrointestinal stroma tumor, hyperlipidemia, hypertension, MI, mitral regurgitation, and peptic ulcer disease.    PAST SURGICAL HISTORY:  Right carotid endarterectomy, bilateral eye cataract surgery, esophageal dilatation, hysterectomy with BSO, laparoscopic robotic-assisted partial gastrectomy, shoulder rotator cuff surgery, tonsillectomy, adenoidectomy.    REVIEW OF SYSTEMS:  Remarkable for above-stated chief complaint.    ALLERGIES:  BACTRIM, CARAFATE, CEFDINIR, CETIRIZINE, GABAPENTIN, LISINOPRIL, MEMANTINE, PRAVACHOL, PROTONIX, STATINS.      SOCIAL HISTORY:  The patient quit tobacco.  Occasional alcohol.    PHYSICAL EXAMINATION:  GENERAL APPEARANCE:  Reveals a 89-year-old  female who is alert, cooperative, and a poor historian.  VITAL SIGNS:  On admission, temperature 98.9, pulse 88, respirations 16, blood pressure 156/91.  HEENT:  Head, normocephalic, atraumatic.  Eyes, pupils equal and reactive to light.  Extraocular muscles

## 2024-12-17 NOTE — CARE COORDINATION
Pt lives at home with spouse who drives but pt doesn't. They are both retired with 2 grown daughters that are local. I have left  for spouse to call met back to get name of the [facility] agency pt is active with. Pt said she requires assistance with bathing and dressing, spouse cooks and helps her with medications. Pt has a ranch home with 4 garage steps to enter. Pt has and uses a fww and cane and shower chair. Her pcp is Dr. Dave Li and she thinks she saw him about 1-2 weeks ago. PT and OT to [facility]. On 11/20 pt was transferred to [facility] for rehab from seb. Echo is pending. Cardio is following. Pt is on iv heparin. ED Marques  12/17/2024    Case Management Assessment  Initial Evaluation    Date/Time of Evaluation: 12/17/2024 10:26 AM  Assessment Completed by: ED Marques    If patient is discharged prior to next notation, then this note serves as note for discharge by case management.    Patient Name: Piedad Kinsey                   YOB: 1934  Diagnosis: NSTEMI (non-ST elevated myocardial infarction) ([facility]) [I21.4]  Chest pain, unspecified type [R07.9]                   Date / Time: 12/16/2024 11:19 AM    Patient Admission Status: Inpatient   Readmission Risk (Low < 19, Mod (19-27), High > 27): Readmission Risk Score: 14    Current PCP: Dave Li, DO  PCP verified by CM? Yes    Chart Reviewed: Yes      History Provided by: Patient  Patient Orientation: Alert and Oriented    Patient Cognition: Alert    Hospitalization in the last 30 days (Readmission):  Yes    If yes, Readmission Assessment in CM Navigator will be completed.    Advance Directives:      Code Status: Full Code   Patient's Primary Decision Maker is: Legal Next of Kin (provided documents.)    Primary Decision Maker: RuthtrentFlavio - Spouse - [phone]    Secondary Decision Maker: Milana Piecre - Child - [phone]    Secondary Decision Maker: Vonda Rod - Child - [phone]    Discharge Planning:    Patient  discharge plan. Freedom of choice list with basic dialogue that supports the patient's individualized plan of care/goals and shares the quality data associated with the providers was provided to:     Patient Representative Name:       The Patient and/or Patient Representative Agree with the Discharge Plan?      ED Marques  Case Management Department  Ph: [phone] Fax: [phone]

## 2024-12-17 NOTE — ACP (ADVANCE CARE PLANNING)
Advance Care Planning   Healthcare Decision Maker:    Primary Decision Maker: Flavio Kinsey - Spouse - [phone]    Secondary Decision Maker: KariMilana - Child - [phone]    Secondary Decision Maker: Vonda Rod - Child - [phone]    Click here to complete Healthcare Decision Makers including selection of the Healthcare Decision Maker Relationship (ie \"Primary\").          provided paper work to pt..ED Marques  12/17/2024

## 2024-12-17 NOTE — PROGRESS NOTES
Inpatient Cardiology Progress note     PATIENT IS BEING FOLLOWED FOR: CP. Abnormal EKG    Piedad Kinsey is a 89 y.o.  female known to Dr Hayes last seen in office 2023.    PMH: VHD, HTN, HLD (reported intolerance to statins), CKD, T2DM, vascular dementia, CVA in 2024, hypothyroidism on HRT, ex smoker quit in , R CEA in , GERD,  gastrointestinal stromal tumor of fundus - Laparoscopic robotic-assisted partial gastrectomy, GERD, allergy to lisinopril,       [facility] 2024 sent in from PCP's office for R sided CP radiating to her R abdomen along with EKG changes. Troponin 119 >108, p-BNP 13,467, K+ 4.1, BUN/CR . Late presentation anterolateral/apical wall myocardial infarction     2024 Lasix 20 mg IV x 1 dose. I&O incomplete    2024 NT p-BNP 35581       SUBJECTIVE: Denies chest pain or shortness of breath  OBJECTIVE: Sitting at the edge of the bed in no distress having a conversation with her sister who was at bedside    ROS:  Consist: Denies fevers, chills or night sweats  Heart: Denies chest pain, palpitations, lightheadedness, dizziness or syncope  Lungs: Denies SOB, cough, wheezing, orthopnea or PND  GI: Denies abdominal pain, vomiting or diarrhea    PHYSICAL EXAM:   BP (!) 143/82   Pulse 96   Temp 97.5 °F (36.4 °C)   Resp 14   Ht 1.65 m (5' 4.96\")   Wt 54 kg (119 lb 0.8 oz)   SpO2 97%   BMI 19.83 kg/m²    B/P Range last 24 hours: Systolic (24hrs), Av , Min:132 , Max:166    Diastolic (24hrs), Av, Min:67, Max:99    CONST: Well developed, well nourished elderly  female who appears of stated age. Awake, alert and cooperative. No apparent distress  HEENT:   Head- Normocephalic, atraumatic   Eyes- Conjunctivae pink, anicteric  Throat- Oral mucosa pink and moist  Neck-  No stridor, trachea midline, no jugular venous distention. No carotid bruit  CHEST: Chest symmetrical and non-tender to palpation. No accessory muscle use or intercostal  intolerance to statins  T2DM, non-insulin-requiring  Hypothyroidism on HRT  History of gastrointestinal stromal tumor of fundus status post laparoscopic robotic assisted partial gastrectomy.  History of peptic ulcer.  History of GERD.  Diverticulosis with history of constipation.  Allergy to lisinopril        PLAN:  Start Lasix 20 mg daily   Start spironolactone 25 mg daily   Add hydralazine 25 mg 3 times daily   Start Jardiance 10 mg daily   Continue aspirin and Plavix   Continue heparin for another 24 hours then discontinue   Monitor blood pressure and heart rate  Consider increasing Imdur to 60 mg daily  Consider increasing Toprol-XL to 50 mg daily  Ambulate/increase activity   ?  Discharge from cardiac standpoint in 24 to 48 hours       I spent 50 minutes completing this encounter. Total time included the following:  Independently interviewing the patient (HPI, ROS, PMH, PSH, FMH, SH, allergies and medications).  Independently performing a medical appropriate examination  Ordering medications, tests and/or procedures  Formulating the assessment/plan and reviewing the rationale for the above recommendations  Reviewing available records, results of all previously ordered testing/procedures and current problem list  Counseling/educating the patient  Coordinating care with other healthcare professionals  Communicating results to the patient's family/caregiver  Documenting clinical information in the patient's electronic health records    Electronically signed by Ellen Esquivel MD on 12/17/2024 at 12:49 PM

## 2024-12-17 NOTE — PROGRESS NOTES
Pt IV continues to alarm occluded any time pt moves her arm. IV has been flushed and wrapped, RN attempted placing new IV. Aptt dropped from 50 to 22, heparin bolus given, rate increased. Call placed to SICU to attempt new IV. SICU RN attempted to place IV using US guidance, was unsuccessful.

## 2024-12-18 LAB
ANION GAP SERPL CALCULATED.3IONS-SCNC: 17 MMOL/L (ref 7–16)
BUN SERPL-MCNC: 20 MG/DL (ref 6–23)
CALCIUM SERPL-MCNC: 9.6 MG/DL (ref 8.6–10.2)
CHLORIDE SERPL-SCNC: 104 MMOL/L (ref 98–107)
CO2 SERPL-SCNC: 21 MMOL/L (ref 22–29)
CREAT SERPL-MCNC: 1.1 MG/DL (ref 0.5–1)
EKG ATRIAL RATE: 76 BPM
EKG ATRIAL RATE: 85 BPM
EKG P AXIS: 26 DEGREES
EKG P AXIS: 28 DEGREES
EKG P-R INTERVAL: 132 MS
EKG P-R INTERVAL: 148 MS
EKG Q-T INTERVAL: 408 MS
EKG Q-T INTERVAL: 414 MS
EKG QRS DURATION: 86 MS
EKG QRS DURATION: 88 MS
EKG QTC CALCULATION (BAZETT): 459 MS
EKG QTC CALCULATION (BAZETT): 492 MS
EKG R AXIS: -36 DEGREES
EKG R AXIS: -37 DEGREES
EKG T AXIS: 115 DEGREES
EKG T AXIS: 132 DEGREES
EKG VENTRICULAR RATE: 76 BPM
EKG VENTRICULAR RATE: 85 BPM
ERYTHROCYTE [DISTWIDTH] IN BLOOD BY AUTOMATED COUNT: 13.5 % (ref 11.5–15)
GFR, ESTIMATED: 49 ML/MIN/1.73M2
GLUCOSE SERPL-MCNC: 119 MG/DL (ref 74–99)
HCT VFR BLD AUTO: 38.5 % (ref 34–48)
HGB BLD-MCNC: 12.5 G/DL (ref 11.5–15.5)
MCH RBC QN AUTO: 30.1 PG (ref 26–35)
MCHC RBC AUTO-ENTMCNC: 32.5 G/DL (ref 32–34.5)
MCV RBC AUTO: 92.8 FL (ref 80–99.9)
PLATELET # BLD AUTO: 179 K/UL (ref 130–450)
PMV BLD AUTO: 12.8 FL (ref 7–12)
POTASSIUM SERPL-SCNC: 3.5 MMOL/L (ref 3.5–5)
RBC # BLD AUTO: 4.15 M/UL (ref 3.5–5.5)
SODIUM SERPL-SCNC: 142 MMOL/L (ref 132–146)
WBC OTHER # BLD: 6.7 K/UL (ref 4.5–11.5)

## 2024-12-18 PROCEDURE — 2500000003 HC RX 250 WO HCPCS: Performed by: INTERNAL MEDICINE

## 2024-12-18 PROCEDURE — 85027 COMPLETE CBC AUTOMATED: CPT

## 2024-12-18 PROCEDURE — 99232 SBSQ HOSP IP/OBS MODERATE 35: CPT | Performed by: INTERNAL MEDICINE

## 2024-12-18 PROCEDURE — 93010 ELECTROCARDIOGRAM REPORT: CPT | Performed by: INTERNAL MEDICINE

## 2024-12-18 PROCEDURE — 6360000002 HC RX W HCPCS: Performed by: INTERNAL MEDICINE

## 2024-12-18 PROCEDURE — 2060000000 HC ICU INTERMEDIATE R&B

## 2024-12-18 PROCEDURE — 36415 COLL VENOUS BLD VENIPUNCTURE: CPT

## 2024-12-18 PROCEDURE — 80048 BASIC METABOLIC PNL TOTAL CA: CPT

## 2024-12-18 PROCEDURE — 6360000002 HC RX W HCPCS

## 2024-12-18 PROCEDURE — 6370000000 HC RX 637 (ALT 250 FOR IP): Performed by: INTERNAL MEDICINE

## 2024-12-18 RX ADMIN — HYDRALAZINE HYDROCHLORIDE 20 MG: 20 INJECTION INTRAMUSCULAR; INTRAVENOUS at 00:26

## 2024-12-18 RX ADMIN — EZETIMIBE 10 MG: 10 TABLET ORAL at 20:59

## 2024-12-18 RX ADMIN — HEPARIN SODIUM 20 UNITS/KG/HR: 10000 INJECTION, SOLUTION INTRAVENOUS at 02:18

## 2024-12-18 RX ADMIN — SODIUM CHLORIDE, PRESERVATIVE FREE 10 ML: 5 INJECTION INTRAVENOUS at 09:42

## 2024-12-18 RX ADMIN — SODIUM CHLORIDE, PRESERVATIVE FREE 10 ML: 5 INJECTION INTRAVENOUS at 21:00

## 2024-12-18 ASSESSMENT — PAIN SCALES - GENERAL: PAINLEVEL_OUTOF10: 0

## 2024-12-18 NOTE — PROGRESS NOTES
Inpatient Cardiology Progress note     PATIENT IS BEING FOLLOWED FOR: CP. Abnormal EKG    Piedad Kinsey is a 89 y.o.  female known to Dr Hayes last seen in office 2023.    PMH: VHD, HTN, HLD (reported intolerance to statins), CKD, T2DM, vascular dementia, CVA in 2024, hypothyroidism on HRT, ex smoker quit in , R CEA in , GERD,  gastrointestinal stromal tumor of fundus - Laparoscopic robotic-assisted partial gastrectomy, GERD, allergy to lisinopril,       [facility]-ED 2024 sent in from PCP's office for R sided CP radiating to her R abdomen along with EKG changes. Troponin 119 >108, p-BNP 13,467, K+ 4.1, BUN/CR . Late presentation anterolateral/apical wall myocardial infarction     2024 Lasix 20 mg IV x 1 dose. I&O incomplete    2024 NT p-BNP 53334    2024 very confused last p.m. Lethargy this am, unable to take PO medications.  Hypotensive after receiving IV Ativan on 2024 (for agitation)       SUBJECTIVE: Denies chest pain or shortness of breath  OBJECTIVE: Sitting in bed eating dinner in no distress.  Confused.  Sitter at bedside.      ROS:  Consist: Denies fevers, chills or night sweats  Heart: Denies chest pain, palpitations, lightheadedness, dizziness or syncope  Lungs: Denies SOB, cough, wheezing, orthopnea or PND  GI: Denies abdominal pain, vomiting or diarrhea    PHYSICAL EXAM:   BP (!) 85/52   Pulse 95   Temp 97.5 °F (36.4 °C) (Temporal)   Resp 14   Ht 1.65 m (5' 4.96\")   Wt 54 kg (119 lb 0.8 oz)   SpO2 100%   BMI 19.83 kg/m²    B/P Range last 24 hours: Systolic (24hrs), Av , Min:85 , Max:167    Diastolic (24hrs), Av, Min:52, Max:97    CONST: Well developed, well nourished elderly  female who appears of stated age. Awake, alert and cooperative.  Confused.  No apparent distress  HEENT:   Head- Normocephalic, atraumatic   Eyes- Conjunctivae pink, anicteric  Throat- Oral mucosa pink and moist  Neck-  No stridor, trachea    Recent Labs     12/16/24  1131   PROTIME 12.1   INR 1.1     APTT:  Recent Labs     12/17/24  0731 12/17/24  1450   APTT 71.3* 28.4     CARDIAC ENZYMES:  Recent Labs     12/16/24  1131 12/16/24  1300   TROPHS 119* 108*     FASTING LIPID PANEL:  Lab Results   Component Value Date/Time    CHOL 299 10/04/2024 12:04 PM    HDL 78 10/04/2024 12:04 PM    TRIG 183 10/04/2024 12:04 PM     LIVER PROFILE:  Recent Labs     12/16/24  1131   AST 13   ALT 8       CXR 12/16/2024:  Borderline cardiomegaly with mild interstitial edema. Some increased markings at the left lung base in which infiltrate cannot be excluded. Probable trace left pleural effusion.     ECG 12/16/2024, reviewed by me: Sinus rhythm with a heart rate of 85 bpm.  Left axis deviation.  Poor R wave progression V1-V4 with Q waves V3-V6 and inferior leads, ST elevations V3-V5 and Q waves in 3 and aVF: Findings consistent with recent anterolateral wall/apical wall myocardial infarction     TTE 12/17/2024:  Left Ventricle: Severely reduced left ventricular systolic function with a visually estimated EF of 20 - 25%. Left ventricle is dilated. Moderate basal septal thickening. There is akinesis and thinning of the apical two thirds of the left ventricle.  The left ventricular apex appears somewhat aneurysmal. Right Ventricle: Right ventricle size is normal. Mildly reduced systolic function.  Mitral Valve: Severe annular calcification at the anterior and posterior leaflets.  There is focal calcification of the anterior mitral valve leaflet. Trace regurgitation. Mild stenosis noted. Image quality is adequate. Contrast used: Lumason.      Telemetry: SR      ASSESSMENT:   Late presentation acute ST elevation anterolateral/apical wall myocardial infarction  Severe LV systolic dysfunction with a large area of apical akinesis/aneurysmal formation  Severely calcified mitral annulus with mild mitral stenosis  Elevated proBNP.  Denies shortness of breath  AMS after receiving

## 2024-12-18 NOTE — PROGRESS NOTES
[facility] Medicine    Subjective:  pt agitated overnite      Current Facility-Administered Medications:     sulfur hexafluoride microspheres (LUMASON) 60.7-25 MG injection 2 mL, 2 mL, IntraVENous, ONCE PRN, Alfredo Marie APRN - CNP    hydrALAZINE (APRESOLINE) tablet 25 mg, 25 mg, Oral, 3 times per day, Ellen Esquivel MD    furosemide (LASIX) tablet 20 mg, 20 mg, Oral, Daily, Ellen Esquivel MD, 20 mg at 12/17/24 1846    spironolactone (ALDACTONE) tablet 25 mg, 25 mg, Oral, Daily, Ellen Esquivel MD, 25 mg at 12/17/24 1846    empagliflozin (JARDIANCE) tablet 10 mg, 10 mg, Oral, Daily, Ellen Esquivel MD, 10 mg at 12/17/24 1843    ezetimibe (ZETIA) tablet 10 mg, 10 mg, Oral, Nightly, Raad Maciel DO, 10 mg at 12/17/24 2002    famotidine (PEPCID) tablet 20 mg, 20 mg, Oral, Daily, Raad Maciel DO, 20 mg at 12/17/24 0851    levothyroxine (SYNTHROID) tablet 25 mcg, 25 mcg, Oral, Daily, Raad Maciel DO, 25 mcg at 12/17/24 0851    melatonin disintegrating tablet 5 mg, 5 mg, Oral, Nightly PRN, Raad Maciel DO    sodium chloride flush 0.9 % injection 5-40 mL, 5-40 mL, IntraVENous, 2 times per day, Raad Maciel DO, 10 mL at 12/17/24 0853    sodium chloride flush 0.9 % injection 5-40 mL, 5-40 mL, IntraVENous, PRN, Raad Maciel DO    0.9 % sodium chloride infusion, , IntraVENous, PRN, Raad Maciel DO    magnesium sulfate 2000 mg in 50 mL IVPB premix, 2,000 mg, IntraVENous, PRN, Raad Maciel DO    ondansetron (ZOFRAN-ODT) disintegrating tablet 4 mg, 4 mg, Oral, Q8H PRN **OR** ondansetron (ZOFRAN) injection 4 mg, 4 mg, IntraVENous, Q6H PRN, Raad Maciel,     polyethylene glycol (GLYCOLAX) packet 17 g, 17 g, Oral, Daily PRN, Raad Maciel DO, 17 g at 12/17/24 0614    acetaminophen (TYLENOL) tablet 650 mg, 650 mg, Oral, Q6H PRN, 650 mg at 12/16/24 2243 **OR** acetaminophen (TYLENOL) suppository 650 mg, 650 mg, Rectal, Q6H PRN, Raad Maciel DO    aspirin  chewable tablet 81 mg, 81 mg, Oral, Daily, Alfredo Marie APRN - CNP, 81 mg at 12/17/24 0852    clopidogrel (PLAVIX) tablet 75 mg, 75 mg, Oral, Daily, Alfredo Marie APRN - CNP, 75 mg at 12/17/24 0851    metoprolol succinate (TOPROL XL) extended release tablet 25 mg, 25 mg, Oral, Daily, Alfredo Marie APRN - CNP, 25 mg at 12/17/24 0852    isosorbide mononitrate (IMDUR) extended release tablet 30 mg, 30 mg, Oral, Daily, Alfredo Marie APRN - CNP, 30 mg at 12/17/24 0852    Objective:    /70   Pulse (!) 107   Temp 98 °F (36.7 °C)   Resp (!) 116   Ht 1.65 m (5' 4.96\")   Wt 54 kg (119 lb 0.8 oz)   SpO2 95%   BMI 19.83 kg/m²     Heart:  reg tachy  Lungs:  ctab  Abd: + bs soft nontender  Extrem:  w/o edema    CBC with Differential:    Lab Results   Component Value Date/Time    WBC 6.5 12/16/2024 11:31 AM    RBC 4.10 12/16/2024 11:31 AM    HGB 12.3 12/16/2024 11:31 AM    HCT 39.1 12/16/2024 11:31 AM     12/16/2024 11:31 AM    MCV 95.4 12/16/2024 11:31 AM    MCH 30.0 12/16/2024 11:31 AM    MCHC 31.5 12/16/2024 11:31 AM    RDW 13.9 12/16/2024 11:31 AM    LYMPHOPCT 18 12/16/2024 11:31 AM    MONOPCT 9 12/16/2024 11:31 AM    EOSPCT 4 12/16/2024 11:31 AM    BASOPCT 1 12/16/2024 11:31 AM    MONOSABS 0.59 12/16/2024 11:31 AM    LYMPHSABS 1.17 12/16/2024 11:31 AM    EOSABS 0.27 12/16/2024 11:31 AM    BASOSABS 0.05 12/16/2024 11:31 AM     CMP:    Lab Results   Component Value Date/Time     12/17/2024 02:50 PM    K 5.0 12/17/2024 02:50 PM    K 4.3 09/20/2021 06:23 AM     12/17/2024 02:50 PM    CO2 24 12/17/2024 02:50 PM    BUN 19 12/17/2024 02:50 PM    CREATININE 1.0 12/17/2024 02:50 PM    GFRAA >60 08/16/2022 08:43 AM    AGRATIO 1.6 03/16/2024 11:18 AM    LABGLOM 51 12/17/2024 02:50 PM    LABGLOM 64 04/29/2024 08:32 AM    GLUCOSE 113 12/17/2024 02:50 PM    GLUCOSE 97 12/27/2011 09:24 AM    CALCIUM 9.2 12/17/2024 02:50 PM    BILITOT 0.4 12/16/2024 11:31 AM    ALKPHOS 80 12/16/2024 11:31

## 2024-12-18 NOTE — PROGRESS NOTES
Madelyn Browning, with Dr. Esquivel, notified of pt.'s B/P-95/59, 85/52, heart rate- 80-90's; pt. Not waking up enough to take meds; need parameters

## 2024-12-18 NOTE — CARE COORDINATION
SOCIAL WORK/CASEMANAGEMENT TRANSITION OF CARE PLANNING( DANELLE JOHNSON, [phone]): pt is on iv heparin and cardio is following. Pt has hx of vascular dementia. PT and OT to eval. I have a call out to her spouse to call me back to talk about assessment and transition of care needs/discharge. Danelle Johnson, ROGERW  12/18/2024    I spoke with spouse and he said pt is active with [facility]. Will need [facility]annah orders if goes home. The spouse is not sure he is taking her home vs les pending therapies vs [facility] with dementia unit. I provided a list of [facility] with dementia units and [facility] with dementia unit. He is going to check them out today. ED Marques  .12/18/2024  I met with spouse this afternoon and he said the plan Is home with private duty to assist at home. Danelle Johnson, ED  12/18/2024      The Plan for Transition of Care is related to the following treatment goals: les and [facility]   The Patient and/or patient representative  was provided with a choice of provider and agrees   with the discharge plan. [x] Yes [] No  Freedom of choice list was provided with basic dialogue that supports the patient's individualized plan of care/goals, treatment preferences and shares the quality data associated with the providers. [x] Yes [] No

## 2024-12-18 NOTE — PLAN OF CARE
Problem: Chronic Conditions and Co-morbidities  Goal: Patient's chronic conditions and co-morbidity symptoms are monitored and maintained or improved  12/18/2024 0413 by Raad Luevano RN  Outcome: Progressing  12/18/2024 0243 by Raad Luevano RN  Outcome: Progressing     Problem: Discharge Planning  Goal: Discharge to home or other facility with appropriate resources  12/18/2024 0413 by Raad Luevano RN  Outcome: Progressing  12/18/2024 0243 by Raad Luevano RN  Outcome: Progressing     Problem: Skin/Tissue Integrity  Goal: Absence of new skin breakdown  Description: 1.  Monitor for areas of redness and/or skin breakdown  2.  Assess vascular access sites hourly  3.  Every 4-6 hours minimum:  Change oxygen saturation probe site  4.  Every 4-6 hours:  If on nasal continuous positive airway pressure, respiratory therapy assess nares and determine need for appliance change or resting period.  12/18/2024 0413 by Raad Luevano RN  Outcome: Progressing  12/18/2024 0243 by Raad Luevano RN  Outcome: Progressing     Problem: Safety - Adult  Goal: Free from fall injury  12/18/2024 0413 by Raad Luevano RN  Outcome: Progressing  12/18/2024 0243 by Raad Luevano RN  Outcome: Progressing     Problem: ABCDS Injury Assessment  Goal: Absence of physical injury  12/18/2024 0413 by Raad Luevano RN  Outcome: Progressing  12/18/2024 0243 by Raad Luevano RN  Outcome: Progressing

## 2024-12-18 NOTE — PROGRESS NOTES
Dr. Maciel notified that pt refuses bloodwork for aptt. States to keep drip running at current rate and \"will try to check aptt again during daylight hours.\"

## 2024-12-19 PROCEDURE — 6370000000 HC RX 637 (ALT 250 FOR IP): Performed by: INTERNAL MEDICINE

## 2024-12-19 PROCEDURE — 97161 PT EVAL LOW COMPLEX 20 MIN: CPT

## 2024-12-19 PROCEDURE — 2580000003 HC RX 258: Performed by: INTERNAL MEDICINE

## 2024-12-19 PROCEDURE — 97166 OT EVAL MOD COMPLEX 45 MIN: CPT

## 2024-12-19 PROCEDURE — 97535 SELF CARE MNGMENT TRAINING: CPT

## 2024-12-19 PROCEDURE — 2060000000 HC ICU INTERMEDIATE R&B

## 2024-12-19 PROCEDURE — 6370000000 HC RX 637 (ALT 250 FOR IP): Performed by: NURSE PRACTITIONER

## 2024-12-19 PROCEDURE — 2500000003 HC RX 250 WO HCPCS: Performed by: INTERNAL MEDICINE

## 2024-12-19 PROCEDURE — 97530 THERAPEUTIC ACTIVITIES: CPT

## 2024-12-19 RX ORDER — METOPROLOL SUCCINATE 25 MG/1
12.5 TABLET, EXTENDED RELEASE ORAL DAILY
Status: DISCONTINUED | OUTPATIENT
Start: 2024-12-20 | End: 2024-12-20 | Stop reason: HOSPADM

## 2024-12-19 RX ORDER — 0.9 % SODIUM CHLORIDE 0.9 %
250 INTRAVENOUS SOLUTION INTRAVENOUS ONCE
Status: COMPLETED | OUTPATIENT
Start: 2024-12-19 | End: 2024-12-19

## 2024-12-19 RX ADMIN — FAMOTIDINE 20 MG: 20 TABLET, FILM COATED ORAL at 09:00

## 2024-12-19 RX ADMIN — EMPAGLIFLOZIN 10 MG: 10 TABLET, FILM COATED ORAL at 09:00

## 2024-12-19 RX ADMIN — FUROSEMIDE 20 MG: 40 TABLET ORAL at 09:00

## 2024-12-19 RX ADMIN — ISOSORBIDE MONONITRATE 30 MG: 30 TABLET, EXTENDED RELEASE ORAL at 09:00

## 2024-12-19 RX ADMIN — ASPIRIN 81 MG CHEWABLE TABLET 81 MG: 81 TABLET CHEWABLE at 09:00

## 2024-12-19 RX ADMIN — SODIUM CHLORIDE, PRESERVATIVE FREE 10 ML: 5 INJECTION INTRAVENOUS at 09:03

## 2024-12-19 RX ADMIN — SODIUM CHLORIDE, PRESERVATIVE FREE 10 ML: 5 INJECTION INTRAVENOUS at 19:50

## 2024-12-19 RX ADMIN — SODIUM CHLORIDE 250 ML: 9 INJECTION, SOLUTION INTRAVENOUS at 18:21

## 2024-12-19 RX ADMIN — CLOPIDOGREL BISULFATE 75 MG: 75 TABLET ORAL at 09:00

## 2024-12-19 RX ADMIN — SPIRONOLACTONE 25 MG: 25 TABLET ORAL at 09:00

## 2024-12-19 RX ADMIN — LEVOTHYROXINE SODIUM 25 MCG: 0.03 TABLET ORAL at 09:00

## 2024-12-19 ASSESSMENT — PAIN SCALES - GENERAL
PAINLEVEL_OUTOF10: 0

## 2024-12-19 ASSESSMENT — PAIN SCALES - WONG BAKER: WONGBAKER_NUMERICALRESPONSE: NO HURT

## 2024-12-19 NOTE — PROGRESS NOTES
Called by RN re hypotension BP 79/50 PT STARTED ON METOPROLOL AND IMDUR AND HYDRALAZINE AND LASIX AND ALDACTONE YESTERDAY plan will hold imdur and lasix and aldactone decrease metoprolol dose and give mini fluid bolus will need to resume imdur and hydralazine at lower doses once bp recovers and pt tolerates lower dose metoprolol

## 2024-12-19 NOTE — PROGRESS NOTES
Paged Alfredo Marie, NP with [facility] cardiology about possibly readjusting pt's cardiac meds d/t her SBPs being <100 at times

## 2024-12-19 NOTE — PROGRESS NOTES
Physical Therapy  Physical Therapy Initial Assessment     Name: Piedad Kinsey  : 1934  MRN: [id number]      Date of Service: 2024    Evaluating PT:  Katerina Yuan, PT, DPT, [id number]    Room #:  2332/8502-A  Diagnosis:  NSTEMI (non-ST elevated myocardial infarction) (HCC) [I21.4]  Chest pain, unspecified type [R07.9]  PMHx/PSHx:    Past Medical History:   Diagnosis Date    Dementia (HCC)     Diverticulosis     GIST (gastrointestinal stroma tumor), malignant, colon (HCC)     Hyperlipidemia     Hypertension     MI (myocardial infarction) (HCC)     Mitral regurgitation     per cardiology    PUD (peptic ulcer disease)       Past Surgical History:   Procedure Laterality Date    CAROTID ENDARTERECTOMY Right 2019    Dr Nazario    CATARACT REMOVAL WITH IMPLANT Bilateral     COLONOSCOPY  2014    DILATATION, ESOPHAGUS      ENDOSCOPY, COLON, DIAGNOSTIC  2016    marking of gastric tumor    HYSTERECTOMY (CERVIX STATUS UNKNOWN)      YUMIKO/BSO    OTHER SURGICAL HISTORY  2016    Laparoscopic Robotic Assisted Partial Gastrectomy    SHOULDER SURGERY Left     rotator cuff right    TONSILLECTOMY AND ADENOIDECTOMY      UPPER GASTROINTESTINAL ENDOSCOPY  2014    UPPER GASTROINTESTINAL ENDOSCOPY  2016      Procedure/Surgery:  none this admission   Precautions:  Fall Risk, + Alarms, Sitter, cognition - h/o alzheimer's  Equipment Needs:  TBD    SUBJECTIVE:    Pt lives with her  in a 1 story home with 4 stairs to enter and 2 rail.  Bed is on 1 floor and bath is on 1 floor.  Pt ambulated with FWW vs cane PTA. Owns: [employer]    OBJECTIVE:   Initial Evaluation  Date: 24 Treatment Short Term/ Long Term   Goals   AM-PAC 6 Clicks      Was pt agreeable to Eval/treatment? yes     Does pt have pain? No c/o pain      Bed Mobility  Rolling: SBA  Supine to sit: SBA  Sit to supine: SBA  Scooting: SBA  Rolling: Independent   Supine to sit: Independent   Sit to supine: Independent

## 2024-12-19 NOTE — CARE COORDINATION
SOCIAL WORK/CASEMANAGEMENT TRANSITION OF CARE PLANNING( AYAAN HAYES, [phone]):  pt seen by OT today and no needs. PT to eval. Plan is home with spouse and he intends on hiring private duty care for her. Rn to check with cardio regarding medications for tentative discharge today. ED Marques  .12/19/2024

## 2024-12-19 NOTE — PROGRESS NOTES
Location: n/a  OT provided: n/a    Cognition: A&O: disoriented to situation and year   Oriented to person, place and month    Follows single step directions: Good   Memory: Fair   Sequencing: Fair   Problem solving: Fair   Judgement/safety: Fair   Attention:  Fair     Functional Assessment: AM-PAC Inpatient Daily Activity Raw Score: 16 /24     Initial Eval Status  Date: 12/19/2024   Treatment Status  Date: STG=LTG  Time frame: 10-14 days   Feeding Set up    Mod I    Grooming Stand by assist   And cues for sequencing and thoroughness   Set up   UB Dressing Min A   For line management   Supervision    LB Dressing Max A  Socks   Min A    Bathing Mod A   Simulated   Min A    Toileting Min A   For clothing management and lior care   Stand by assist    Bed Mobility  Rolling L/R: NT   Supine to sit: Min A    Sit to supine: Min A    Supine to sit: Mod I   Sit to supine: Mod I    Functional Transfers Sit to stand: Min A    Stand to sit: Min A    Stand pivot: NT   Stand by assist    Functional Mobility Min A  With front wheeled walker  to and from the restroom   Stand by assist   with AAD    Balance Sitting: Stand by assist       Standing: Min A    Sitting: Mod I       Standing: Stand by assist    Activity Tolerance Fair +    Good   Visual/  Perceptual wears glasses              BUE  ROM/Strength/  Fine motor Coordination Hand dominance: right     RUE: ROM WFL      Strength: grossly 4/5      Strength: WFL      Coordination:  WFL     LUE: ROM WFL      Strength: grossly 4/5      Strength: WFL      Coordination: WFL      Safety   Fair  Good during functional activity      Hearing: impaired and needs increased volume   Sensation: no c/o numbness or tingling   Tone: WFL   Edema: unremarkable     Comments:   RN cleared patient for OT.  Upon arrival, patient was semi-supine in bed  and agreeable to OT session. no visitors present throughout session . At end of session, patient semi-supine in bed ; with call light and phone  and other factors as noted in the clinical evaluation and functional testing.     Time In: 0920  Time Out: 0943  Total Treatment Time: 8 minutes    Min Units   OT Eval Low 80489       OT Eval Moderate 97166  x 1   OT Eval High 33872       OT Re-Eval 37152       Therapeutic Ex 13985       Therapeutic Activities 70929      ADL/Self Care 68357  8 1   Orthotic Management 92307       Neuro Re-Ed 84459       Non-Billable Time          Evaluation Time includes thorough review of current medical information, gathering information on past medical history/social history and prior level of function, completion of standardized testing/informal observation of tasks, assessment of data and education on plan of care and goals.          Eduardo Leija OTR/L [id number]

## 2024-12-19 NOTE — PROGRESS NOTES
Hospital Medicine    Subjective:  pt alert conversive pt with borderline bp has not been getting cardiac meds as ordered      Current Facility-Administered Medications:     sulfur hexafluoride microspheres (LUMASON) 60.7-25 MG injection 2 mL, 2 mL, IntraVENous, ONCE PRN, Alfredo Marie, APRN - CNP    hydrALAZINE (APRESOLINE) tablet 25 mg, 25 mg, Oral, 3 times per day, Ellen Esquivel MD    furosemide (LASIX) tablet 20 mg, 20 mg, Oral, Daily, Ellen Esquivel MD, 20 mg at 12/17/24 1846    spironolactone (ALDACTONE) tablet 25 mg, 25 mg, Oral, Daily, Ellen Esquivel MD, 25 mg at 12/17/24 1846    empagliflozin (JARDIANCE) tablet 10 mg, 10 mg, Oral, Daily, Ellen Esquivel MD, 10 mg at 12/17/24 1843    ezetimibe (ZETIA) tablet 10 mg, 10 mg, Oral, Nightly, Raad Maciel DO, 10 mg at 12/18/24 2059    famotidine (PEPCID) tablet 20 mg, 20 mg, Oral, Daily, Raad Maciel DO, 20 mg at 12/17/24 0851    levothyroxine (SYNTHROID) tablet 25 mcg, 25 mcg, Oral, Daily, Raad Maciel DO, 25 mcg at 12/17/24 0851    melatonin disintegrating tablet 5 mg, 5 mg, Oral, Nightly PRN, Raad Maciel DO    sodium chloride flush 0.9 % injection 5-40 mL, 5-40 mL, IntraVENous, 2 times per day, Raad Maciel DO, 10 mL at 12/18/24 2100    sodium chloride flush 0.9 % injection 5-40 mL, 5-40 mL, IntraVENous, PRN, Raad Maciel DO    0.9 % sodium chloride infusion, , IntraVENous, PRN, Raad Maciel DO    magnesium sulfate 2000 mg in 50 mL IVPB premix, 2,000 mg, IntraVENous, PRN, Raad Maciel DO    ondansetron (ZOFRAN-ODT) disintegrating tablet 4 mg, 4 mg, Oral, Q8H PRN **OR** ondansetron (ZOFRAN) injection 4 mg, 4 mg, IntraVENous, Q6H PRN, Raad Maciel,     polyethylene glycol (GLYCOLAX) packet 17 g, 17 g, Oral, Daily PRN, Raad Maciel, , 17 g at 12/17/24 0614    acetaminophen (TYLENOL) tablet 650 mg, 650 mg, Oral, Q6H PRN, 650 mg at 12/16/24 7191 **OR** acetaminophen (TYLENOL) suppository 650 mg,

## 2024-12-20 VITALS
HEIGHT: 65 IN | DIASTOLIC BLOOD PRESSURE: 86 MMHG | RESPIRATION RATE: 16 BRPM | OXYGEN SATURATION: 98 % | TEMPERATURE: 97.5 F | BODY MASS INDEX: 19.83 KG/M2 | SYSTOLIC BLOOD PRESSURE: 166 MMHG | HEART RATE: 96 BPM | WEIGHT: 119.05 LBS

## 2024-12-20 LAB
ANION GAP SERPL CALCULATED.3IONS-SCNC: 14 MMOL/L (ref 7–16)
BUN SERPL-MCNC: 23 MG/DL (ref 6–23)
CALCIUM SERPL-MCNC: 9.7 MG/DL (ref 8.6–10.2)
CHLORIDE SERPL-SCNC: 106 MMOL/L (ref 98–107)
CO2 SERPL-SCNC: 22 MMOL/L (ref 22–29)
CREAT SERPL-MCNC: 1.1 MG/DL (ref 0.5–1)
ERYTHROCYTE [DISTWIDTH] IN BLOOD BY AUTOMATED COUNT: 13.4 % (ref 11.5–15)
GFR, ESTIMATED: 47 ML/MIN/1.73M2
GLUCOSE SERPL-MCNC: 109 MG/DL (ref 74–99)
HCT VFR BLD AUTO: 41.6 % (ref 34–48)
HGB BLD-MCNC: 13.5 G/DL (ref 11.5–15.5)
MAGNESIUM SERPL-MCNC: 2.5 MG/DL (ref 1.6–2.6)
MCH RBC QN AUTO: 29.9 PG (ref 26–35)
MCHC RBC AUTO-ENTMCNC: 32.5 G/DL (ref 32–34.5)
MCV RBC AUTO: 92.2 FL (ref 80–99.9)
PLATELET # BLD AUTO: 185 K/UL (ref 130–450)
PMV BLD AUTO: 12.3 FL (ref 7–12)
POTASSIUM SERPL-SCNC: 4.1 MMOL/L (ref 3.5–5)
RBC # BLD AUTO: 4.51 M/UL (ref 3.5–5.5)
SODIUM SERPL-SCNC: 142 MMOL/L (ref 132–146)
WBC OTHER # BLD: 4.9 K/UL (ref 4.5–11.5)

## 2024-12-20 PROCEDURE — 6370000000 HC RX 637 (ALT 250 FOR IP): Performed by: INTERNAL MEDICINE

## 2024-12-20 PROCEDURE — 2500000003 HC RX 250 WO HCPCS: Performed by: INTERNAL MEDICINE

## 2024-12-20 PROCEDURE — 80048 BASIC METABOLIC PNL TOTAL CA: CPT

## 2024-12-20 PROCEDURE — 36415 COLL VENOUS BLD VENIPUNCTURE: CPT

## 2024-12-20 PROCEDURE — 83735 ASSAY OF MAGNESIUM: CPT

## 2024-12-20 PROCEDURE — 6370000000 HC RX 637 (ALT 250 FOR IP): Performed by: NURSE PRACTITIONER

## 2024-12-20 PROCEDURE — 85027 COMPLETE CBC AUTOMATED: CPT

## 2024-12-20 RX ORDER — ISOSORBIDE MONONITRATE 30 MG/1
15 TABLET, EXTENDED RELEASE ORAL DAILY
Qty: 15 TABLET | Refills: 0 | Status: SHIPPED | OUTPATIENT
Start: 2024-12-20

## 2024-12-20 RX ORDER — HYDRALAZINE HYDROCHLORIDE 10 MG/1
10 TABLET, FILM COATED ORAL EVERY 12 HOURS SCHEDULED
Qty: 60 TABLET | Refills: 0 | Status: SHIPPED | OUTPATIENT
Start: 2024-12-20

## 2024-12-20 RX ORDER — HYDRALAZINE HYDROCHLORIDE 10 MG/1
10 TABLET, FILM COATED ORAL EVERY 12 HOURS SCHEDULED
Status: DISCONTINUED | OUTPATIENT
Start: 2024-12-20 | End: 2024-12-20 | Stop reason: HOSPADM

## 2024-12-20 RX ORDER — ISOSORBIDE MONONITRATE 30 MG/1
15 TABLET, EXTENDED RELEASE ORAL DAILY
Status: DISCONTINUED | OUTPATIENT
Start: 2024-12-20 | End: 2024-12-20 | Stop reason: HOSPADM

## 2024-12-20 RX ORDER — POTASSIUM CHLORIDE 1500 MG/1
20 TABLET, EXTENDED RELEASE ORAL ONCE
Status: DISCONTINUED | OUTPATIENT
Start: 2024-12-20 | End: 2024-12-20 | Stop reason: HOSPADM

## 2024-12-20 RX ORDER — METOPROLOL SUCCINATE 25 MG/1
12.5 TABLET, EXTENDED RELEASE ORAL DAILY
Qty: 15 TABLET | Refills: 0 | Status: SHIPPED | OUTPATIENT
Start: 2024-12-20

## 2024-12-20 RX ORDER — ASPIRIN 81 MG/1
81 TABLET, CHEWABLE ORAL DAILY
COMMUNITY
Start: 2024-12-20

## 2024-12-20 RX ADMIN — EMPAGLIFLOZIN 10 MG: 10 TABLET, FILM COATED ORAL at 09:36

## 2024-12-20 RX ADMIN — LEVOTHYROXINE SODIUM 25 MCG: 0.03 TABLET ORAL at 09:36

## 2024-12-20 RX ADMIN — SODIUM CHLORIDE, PRESERVATIVE FREE 10 ML: 5 INJECTION INTRAVENOUS at 09:36

## 2024-12-20 RX ADMIN — FAMOTIDINE 20 MG: 20 TABLET, FILM COATED ORAL at 09:36

## 2024-12-20 RX ADMIN — CLOPIDOGREL BISULFATE 75 MG: 75 TABLET ORAL at 09:36

## 2024-12-20 RX ADMIN — ASPIRIN 81 MG CHEWABLE TABLET 81 MG: 81 TABLET CHEWABLE at 09:36

## 2024-12-20 RX ADMIN — METOPROLOL SUCCINATE 12.5 MG: 25 TABLET, EXTENDED RELEASE ORAL at 09:36

## 2024-12-20 ASSESSMENT — PAIN SCALES - GENERAL: PAINLEVEL_OUTOF10: 0

## 2024-12-20 NOTE — PROGRESS NOTES
[facility] Medicine    Subjective:  pt alert conversive      Current Facility-Administered Medications:     [Held by provider] hydrALAZINE (APRESOLINE) tablet 10 mg, 10 mg, Oral, 2 times per day, Raad Maciel DO    [Held by provider] isosorbide mononitrate (IMDUR) extended release tablet 15 mg, 15 mg, Oral, Daily, Raad Maciel DO    metoprolol succinate (TOPROL XL) extended release tablet 12.5 mg, 12.5 mg, Oral, Daily, Raad Maciel DO    sulfur hexafluoride microspheres (LUMASON) 60.7-25 MG injection 2 mL, 2 mL, IntraVENous, ONCE PRN, Alfredo Marie, APRN - CNP    [Held by provider] furosemide (LASIX) tablet 20 mg, 20 mg, Oral, Daily, Ellen Esquivel MD, 20 mg at 12/19/24 0900    [Held by provider] spironolactone (ALDACTONE) tablet 25 mg, 25 mg, Oral, Daily, Raad Maciel DO, 25 mg at 12/19/24 0900    empagliflozin (JARDIANCE) tablet 10 mg, 10 mg, Oral, Daily, Ellen Esquivel MD, 10 mg at 12/19/24 0900    ezetimibe (ZETIA) tablet 10 mg, 10 mg, Oral, Nightly, Raad Maciel DO, 10 mg at 12/18/24 2059    famotidine (PEPCID) tablet 20 mg, 20 mg, Oral, Daily, Raad Maciel DO, 20 mg at 12/19/24 0900    levothyroxine (SYNTHROID) tablet 25 mcg, 25 mcg, Oral, Daily, Raad Maciel DO, 25 mcg at 12/19/24 0900    melatonin disintegrating tablet 5 mg, 5 mg, Oral, Nightly PRN, Raad Maciel DO    sodium chloride flush 0.9 % injection 5-40 mL, 5-40 mL, IntraVENous, 2 times per day, Raad Maciel DO, 10 mL at 12/19/24 1950    sodium chloride flush 0.9 % injection 5-40 mL, 5-40 mL, IntraVENous, PRN, Raad Maciel DO    0.9 % sodium chloride infusion, , IntraVENous, PRN, Malmer, Raad M, DO    magnesium sulfate 2000 mg in 50 mL IVPB premix, 2,000 mg, IntraVENous, PRN, Raad Maciel, DO    ondansetron (ZOFRAN-ODT) disintegrating tablet 4 mg, 4 mg, Oral, Q8H PRN **OR** ondansetron (ZOFRAN) injection 4 mg, 4 mg, IntraVENous, Q6H PRN, Raad Maciel, DO    polyethylene

## 2024-12-20 NOTE — CARE COORDINATION
SOCIAL WORK/CASEMANAGEMENT TRANSITION OF CARE PLANNING( ED STONER [phone]):  discharge home today. Pt is active with [facility] for [facility] and PT and OT. Requesting orders. Spouse will be in around 1 p.m  to take pt home, rn notified. ED Stoner  12/20/2024  Rn is in agreement to 1 p.m.  from spouse. Rn to place the [facility] orders. ED Stoner  12/20/2024

## 2024-12-23 ENCOUNTER — CARE COORDINATION (OUTPATIENT)
Dept: CARE COORDINATION | Age: 88
End: 2024-12-23

## 2024-12-23 DIAGNOSIS — I21.9 ACUTE MYOCARDIAL INFARCTION, UNSPECIFIED MI TYPE, UNSPECIFIED ARTERY (HCC): Primary | ICD-10-CM

## 2024-12-23 PROCEDURE — 1111F DSCHRG MED/CURRENT MED MERGE: CPT | Performed by: FAMILY MEDICINE

## 2024-12-23 NOTE — CARE COORDINATION
Care Transitions Note    Initial Call - Call within 2 business days of discharge: Yes    Patient Current Location:  Home: [address] [place] [id number]    Care Transition Nurse contacted the patient, spouse/partner  by telephone to perform post hospital discharge assessment, verified name and  as identifiers. Provided introduction to self, and explanation of the Care Transition Nurse role.     Patient: Piedad Kinsey    Patient : 1934   MRN: [id number]    Reason for Admission: Acute MI  Discharge Date: 24  RURS: Readmission Risk Score: 14.8      Last Discharge Facility       Date Complaint Diagnosis Description Type Department Provider    24 Chest Pain ACS (acute coronary syndrome) (HCC) ... ED to Hosp-Admission (Discharged) (ADMITTED) [facility] [place]iel, ; Chay Chaney...            Was this an external facility discharge? No    Additional needs identified to be addressed with provider   No needs identified             Method of communication with provider: none.    Patients top risk factors for readmission: functional cognitive ability, functional physical ability, medical condition-CRD, HTN, polypharmacy, and utilization of services    Interventions to address risk factors:   Education: Discussed s/s of heart attack with patient spouse (Jose) and knowing when to seek immediate medical attention.  Home Health: CTN confirmed with Jose that nurse from [facility] is scheduled today between 2 pm- 3 pm for DALILA visit.      Care Summary Note: Spoke with patient and her spouse (Jose) today 24 for initial ALEJANDRINA/hospital discharge follow up. Patient states she having ongoing weakness and uses waler when ambulating. Confirmed patient was recently discharged from [facility] 24 for Gen weakness and transferred to [facility] for rehab and subsequently discharged back home. Confirmed patient has underlying vascular dementia and Jose helps answer most questions.     Patient denies

## 2024-12-27 ENCOUNTER — OFFICE VISIT (OUTPATIENT)
Dept: PRIMARY CARE CLINIC | Age: 88
End: 2024-12-27

## 2024-12-27 VITALS
WEIGHT: 118 LBS | OXYGEN SATURATION: 98 % | HEIGHT: 65 IN | DIASTOLIC BLOOD PRESSURE: 82 MMHG | BODY MASS INDEX: 19.66 KG/M2 | SYSTOLIC BLOOD PRESSURE: 128 MMHG | TEMPERATURE: 97.6 F | RESPIRATION RATE: 19 BRPM | HEART RATE: 83 BPM

## 2024-12-27 DIAGNOSIS — I21.3 ST ELEVATION MYOCARDIAL INFARCTION (STEMI), UNSPECIFIED ARTERY (HCC): Primary | ICD-10-CM

## 2024-12-27 DIAGNOSIS — Z09 HOSPITAL DISCHARGE FOLLOW-UP: ICD-10-CM

## 2024-12-27 DIAGNOSIS — E03.9 HYPOTHYROIDISM, UNSPECIFIED TYPE: ICD-10-CM

## 2024-12-27 DIAGNOSIS — I10 PRIMARY HYPERTENSION: ICD-10-CM

## 2024-12-27 DIAGNOSIS — M79.89 PAIN AND SWELLING OF LEFT LOWER LEG: ICD-10-CM

## 2024-12-27 DIAGNOSIS — M79.662 PAIN AND SWELLING OF LEFT LOWER LEG: ICD-10-CM

## 2024-12-27 DIAGNOSIS — G31.84 MILD COGNITIVE IMPAIRMENT WITH MEMORY LOSS: ICD-10-CM

## 2024-12-27 ASSESSMENT — ENCOUNTER SYMPTOMS
COUGH: 0
APNEA: 0
BACK PAIN: 0
FACIAL SWELLING: 0
COLOR CHANGE: 0
PHOTOPHOBIA: 0
NAUSEA: 0
BLOOD IN STOOL: 0
VOMITING: 0
CHEST TIGHTNESS: 0
SINUS PRESSURE: 0
ALLERGIC/IMMUNOLOGIC NEGATIVE: 1
DIARRHEA: 0
ABDOMINAL PAIN: 0
SORE THROAT: 0
SHORTNESS OF BREATH: 1
WHEEZING: 0

## 2024-12-27 NOTE — PROGRESS NOTES
encounter (Office Visit) with Dave Li DO.        Medications patient taking as of now reconciled against medications ordered at time of hospital discharge: Yes    Review of Systems   Constitutional: Negative.    HENT:  Negative for congestion, facial swelling, hearing loss, nosebleeds, sinus pressure and sore throat.    Eyes:  Negative for photophobia and visual disturbance.   Respiratory:  Positive for shortness of breath. Negative for apnea, cough, chest tightness and wheezing.    Cardiovascular:  Positive for chest pain and leg swelling. Negative for palpitations.   Gastrointestinal:  Negative for abdominal pain, blood in stool, diarrhea, nausea and vomiting.   Genitourinary:  Negative for difficulty urinating, frequency and urgency.   Musculoskeletal:  Negative for arthralgias, back pain, joint swelling and myalgias.   Skin:  Negative for color change and rash.   Allergic/Immunologic: Negative.    Neurological:  Negative for syncope, weakness, light-headedness and headaches.   Hematological: Negative.    Psychiatric/Behavioral:  Negative for agitation, behavioral problems, confusion and self-injury. The patient is not nervous/anxious.    All other systems reviewed and are negative.      Objective:    /82   Pulse 83   Temp 97.6 °F (36.4 °C)   Resp 19   Ht 1.65 m (5' 4.96\")   Wt 53.5 kg (118 lb)   SpO2 98%   BMI 19.66 kg/m²   Physical Exam  Vitals and nursing note reviewed.   Constitutional:       General: She is not in acute distress.     Appearance: She is well-developed.   HENT:      Head: Normocephalic and atraumatic.      Nose: Nose normal.   Eyes:      Conjunctiva/sclera: Conjunctivae normal.      Pupils: Pupils are equal, round, and reactive to light.   Neck:      Thyroid: No thyromegaly.      Vascular: No JVD.   Cardiovascular:      Rate and Rhythm: Normal rate and regular rhythm.      Heart sounds: Normal heart sounds. No murmur heard.     No friction rub. No gallop.   Pulmonary:

## 2025-01-03 ENCOUNTER — CARE COORDINATION (OUTPATIENT)
Dept: CARE COORDINATION | Age: 89
End: 2025-01-03

## 2025-01-03 NOTE — CARE COORDINATION
Care Transitions Note    Follow Up Call     2024 - 2024 [facility] Ashley Thomas IP. Acute MI/STEMI    [facility]      Patient Current Location:  Home: [address] [address]    Care Transition Nurse contacted the patient by telephone. Verified name and  as identifiers.    Additional needs identified to be addressed with provider   No needs identified                 Method of communication with provider: none.    Care Summary Note: CTN spoke w/ Piedad. Pt has dementia. Spouse was sleeping during this call.    Pt is alert and answers questions appropriately. She admits when she is unsure of the answer. She denies any acute pain concerns. She does note LLE swelling w/ soreness. States she was able to manage her [facility] exercises today. Enc to elevate legs when at rest, v/u. Venous duplex negative for DVT. She states she has been dizzy \"a couple times\" but is unable to elaborate on when this has occurred and what she may have been doing at the time. Reminded to sit for safety when she feels dizzy or lightheaded. She states her spouse manages medication and assures she takes them as directed. She denies any chest pain or pressure events. States she does feel a little SOB w/ exertions. Denies any cough or chest congestion. She denies any appetite or elimination concerns. In cheerful spirits.     Assessments:  Care Transitions Subsequent and Final Call    Subsequent and Final Calls  Do you have any ongoing symptoms?: Yes  Patient-reported symptoms: Cough  Do you have any questions related to your medications?: No  Do you currently have any active services?: Yes  Are you currently active with any services?: Home Health  Do you have any needs or concerns that I can assist you with?: No  Identified Barriers: Lack of Education  Care Transitions Interventions  Other Interventions:              Follow Up Appointment:     Future Appointments         Provider Specialty

## 2025-01-08 ENCOUNTER — TELEPHONE (OUTPATIENT)
Dept: CARDIOLOGY CLINIC | Age: 89
End: 2025-01-08

## 2025-01-08 ENCOUNTER — TELEPHONE (OUTPATIENT)
Dept: PRIMARY CARE CLINIC | Age: 89
End: 2025-01-08

## 2025-01-10 ENCOUNTER — CARE COORDINATION (OUTPATIENT)
Dept: CARE COORDINATION | Age: 89
End: 2025-01-10

## 2025-01-10 NOTE — CARE COORDINATION
Care Transitions Note    Follow Up Call     Attempted to reach patient for transitions of care follow up.  Unable to reach patient.      Outreach Attempts:   Attempted to  on HIPAA form.     Care Summary Note: CTN left message for patient spouse (Jose) requesting a return call regarding ALEJANDRINA/hospital discharge follow up sub call for Acute MI. CTN attempted to call home number and patient answered phone appearing confused as she has underlying dementia. Patient states she has constipation and tried apple juice with no relief. At home with [facility] nurse (Oxana) got on phone and admits she just arrived and verbalizes spouse is at the store.     Oxana states she is just starting to check patient and advises may have to go to ED for constipation.  Noted ST was added to [facility] orders per PCP and patient is scheduled for follow up with Dr. Hayes (cardiology) on 1/20/25 at 11 am. CTN unable to complete call and will continue to follow for Care Transition.    Follow Up Appointment:   Future Appointments         Provider Specialty Dept Phone    1/14/2025 2:15 PM Jerardo Damon MD Vascular Surgery [phone]    1/20/2025 11:00 AM Dimas Hayes DO Cardiology [phone]            Plan for follow-up call in 6-10 days based on severity of symptoms and risk factors. Plan for next call:  Symptom check    NOHELIA Chowdary

## 2025-01-14 ENCOUNTER — OFFICE VISIT (OUTPATIENT)
Dept: VASCULAR SURGERY | Age: 89
End: 2025-01-14
Payer: MEDICARE

## 2025-01-14 DIAGNOSIS — I73.9 PVD (PERIPHERAL VASCULAR DISEASE) (HCC): Primary | ICD-10-CM

## 2025-01-14 PROCEDURE — 1123F ACP DISCUSS/DSCN MKR DOCD: CPT | Performed by: NURSE PRACTITIONER

## 2025-01-14 PROCEDURE — 1159F MED LIST DOCD IN RCRD: CPT | Performed by: NURSE PRACTITIONER

## 2025-01-14 PROCEDURE — 99213 OFFICE O/P EST LOW 20 MIN: CPT | Performed by: NURSE PRACTITIONER

## 2025-01-14 NOTE — PROGRESS NOTES
[]                     Intestinal bleeding: No [x]/Yes []  Musculoskeletal:             Leg pain:   No [x]/Yes []      Back pain:   No [x]/Yes []                    Weakness:   No [x]/Yes []  Neurologic:             Numbness:   No [x]/Yes []      Paralysis:   No [x]/Yes []                       Headaches:   No [x]/Yes []  Hematologic, lymphatic:   Anemia:   No [x]/Yes []              Bleeding or bruising:  No [x]/Yes []              Fevers or chills: No [x]/Yes []  Endocrine:             Temp intolerance:   No [x]/Yes []                       Polydipsia, polyuria:  No [x]/Yes []  Skin:              Rash:    No [x]/Yes []      Ulcers:   No [x]/Yes []              Abnorm pigment: No [x]/Yes []  :              Frequency/urgency:  No [x]/Yes []      Hematuria:    No [x]/Yes []                      Incontinence:    No [x]/Yes []    PHYSICAL EXAM:  There were no vitals filed for this visit.  General Appearance: alert and oriented to person, place and time, well developed and well- nourished, in no acute distress  Skin: warm and dry, no rash or erythema  Head: normocephalic and atraumatic  Eyes: extraocular eye movements intact, conjunctivae normal  ENT: external ear and ear canal normal bilaterally, nose without deformity  Pulmonary/Chest: clear to auscultation bilaterally- no wheezes, rales or rhonchi, normal air movement, no respiratory distress  Cardiovascular: normal rate, regular rhythm, normal S1 and S2  Abdomen: soft, non-tender, non-distended, normal bowel sounds, no masses or organomegaly  Musculoskeletal: normal range of motion, no joint swelling, deformity or tenderness  Neurologic: no cranial nerve deficit, gait, coordination and speech normal  Extremities: no leg edema bilaterally    PULSE EXAM      Right      Left   Brachial     Radial     Femoral     Popliteal     Dorsalis Pedis signal signal   Posterior Tibial signal signal   (3=normal, 2=diminished, 1=barely palpable, 4=widened)      Problem List Items

## 2025-01-17 ENCOUNTER — CARE COORDINATION (OUTPATIENT)
Dept: CARE COORDINATION | Age: 89
End: 2025-01-17

## 2025-01-17 ENCOUNTER — TELEPHONE (OUTPATIENT)
Dept: PRIMARY CARE CLINIC | Age: 89
End: 2025-01-17

## 2025-01-17 DIAGNOSIS — R25.2 LEG CRAMPS: ICD-10-CM

## 2025-01-17 DIAGNOSIS — I10 PRIMARY HYPERTENSION: Primary | ICD-10-CM

## 2025-01-17 RX ORDER — ISOSORBIDE MONONITRATE 30 MG/1
15 TABLET, EXTENDED RELEASE ORAL DAILY
Qty: 45 TABLET | Refills: 1 | Status: SHIPPED | OUTPATIENT
Start: 2025-01-17

## 2025-01-17 RX ORDER — METOPROLOL SUCCINATE 25 MG/1
12.5 TABLET, EXTENDED RELEASE ORAL DAILY
Qty: 45 TABLET | Refills: 1 | Status: SHIPPED | OUTPATIENT
Start: 2025-01-17

## 2025-01-17 RX ORDER — HYDRALAZINE HYDROCHLORIDE 10 MG/1
10 TABLET, FILM COATED ORAL 2 TIMES DAILY
Qty: 180 TABLET | Refills: 1 | Status: SHIPPED | OUTPATIENT
Start: 2025-01-17

## 2025-01-17 NOTE — TELEPHONE ENCOUNTER
Pt was prescribed these medications while in the hospital     Name of Medication(s) Requested:  Requested Prescriptions     Pending Prescriptions Disp Refills    hydrALAZINE (APRESOLINE) 10 MG tablet 180 tablet 1     Sig: Take 1 tablet by mouth 2 times daily    metoprolol succinate (TOPROL XL) 25 MG extended release tablet 45 tablet 1     Sig: Take 0.5 tablets by mouth daily    isosorbide mononitrate (IMDUR) 30 MG extended release tablet 45 tablet 1     Sig: Take 0.5 tablets by mouth daily       Medication is on current medication list Yes    Dosage and directions were verified? Yes    Quantity verified: 90 day supply     Pharmacy Verified?  Yes    Last Appointment:  12/27/2024    Future appts:  Future Appointments   Date Time Provider Department Center   1/20/2025 11:00 AM Dimas Hayes, DO [facility]        (If no appt send self scheduling link. .REFILLAPPT)  Scheduling request sent?     [] Yes  [x] No    Does patient need updated?  [] Yes  [x] No

## 2025-01-17 NOTE — TELEPHONE ENCOUNTER
C/o cramps with arms and legs lately. Do you want to order any labs?  Pt isnt drinking enough fluids either.  Denies chest pain/sob  Please advise

## 2025-01-17 NOTE — CARE COORDINATION
Care Transitions Note    Final Call     Attempted to reach patient for transitions of care follow up.  Unable to reach patient.      Outreach Attempts:   HIPAA compliant voicemail left for patient.     Patient graduated from the Care Transitions program on 1/17/25.    Care Summary Note: Attempted to contact patient/ today 1/17/25 for final ALEJANDRINA/hospital discharge follow up. Left message requesting a return call back to CTN and provided contact information. CTN signing off if no return call.       Upcoming Appointments:    Future Appointments         Provider Specialty Dept Phone    1/20/2025 11:00 AM Dimas Hayes, DO Cardiology [phone]            Kait Alexandra, APRN

## 2025-01-24 ENCOUNTER — PATIENT MESSAGE (OUTPATIENT)
Dept: PRIMARY CARE CLINIC | Age: 89
End: 2025-01-24

## 2025-01-27 ENCOUNTER — TELEPHONE (OUTPATIENT)
Dept: PRIMARY CARE CLINIC | Age: 89
End: 2025-01-27

## 2025-01-27 NOTE — TELEPHONE ENCOUNTER
Pt says she can't take the levothyroxine because she gets mean, weak and confused. She only took one pill one day, didn't take another one the next day but did the following and the same thing happened. Is there something else she can take?

## 2025-02-06 ENCOUNTER — TELEPHONE (OUTPATIENT)
Dept: PRIMARY CARE CLINIC | Age: 89
End: 2025-02-06

## 2025-02-06 DIAGNOSIS — L60.2 LONG TOENAIL: Primary | ICD-10-CM

## 2025-02-06 NOTE — TELEPHONE ENCOUNTER
The pt would like to get her toe nails clipped, her daughter is calling to see if the pt can get a referral to see a podiatrist, she doesn't have anyone in mind just whoever you recommend

## 2025-02-17 ENCOUNTER — OFFICE VISIT (OUTPATIENT)
Dept: PRIMARY CARE CLINIC | Age: 89
End: 2025-02-17

## 2025-02-17 VITALS
HEIGHT: 65 IN | OXYGEN SATURATION: 99 % | WEIGHT: 120 LBS | DIASTOLIC BLOOD PRESSURE: 70 MMHG | TEMPERATURE: 97.6 F | HEART RATE: 87 BPM | SYSTOLIC BLOOD PRESSURE: 116 MMHG | BODY MASS INDEX: 19.99 KG/M2 | RESPIRATION RATE: 18 BRPM

## 2025-02-17 DIAGNOSIS — N39.41 URGE INCONTINENCE OF URINE: ICD-10-CM

## 2025-02-17 DIAGNOSIS — C49.A2 MALIGNANT GASTROINTESTINAL STROMAL TUMOR (GIST) OF STOMACH (HCC): ICD-10-CM

## 2025-02-17 DIAGNOSIS — F01.50 VASCULAR DEMENTIA WITHOUT BEHAVIORAL DISTURBANCE (HCC): ICD-10-CM

## 2025-02-17 DIAGNOSIS — E11.22 TYPE 2 DIABETES MELLITUS WITH STAGE 3 CHRONIC KIDNEY DISEASE, WITHOUT LONG-TERM CURRENT USE OF INSULIN, UNSPECIFIED WHETHER STAGE 3A OR 3B CKD (HCC): ICD-10-CM

## 2025-02-17 DIAGNOSIS — N18.30 TYPE 2 DIABETES MELLITUS WITH STAGE 3 CHRONIC KIDNEY DISEASE, WITHOUT LONG-TERM CURRENT USE OF INSULIN, UNSPECIFIED WHETHER STAGE 3A OR 3B CKD (HCC): ICD-10-CM

## 2025-02-17 DIAGNOSIS — E03.9 ADULT HYPOTHYROIDISM: Primary | ICD-10-CM

## 2025-02-17 DIAGNOSIS — R35.0 URINARY FREQUENCY: Primary | ICD-10-CM

## 2025-02-17 DIAGNOSIS — I11.0 HYPERTENSIVE HEART DISEASE WITH HEART FAILURE (HCC): ICD-10-CM

## 2025-02-17 DIAGNOSIS — R35.0 URINARY FREQUENCY: ICD-10-CM

## 2025-02-17 LAB
ALBUMIN: 4.4 G/DL (ref 3.5–5.2)
ALP BLD-CCNC: 84 U/L (ref 35–104)
ALT SERPL-CCNC: 8 U/L (ref 0–32)
ANION GAP SERPL CALCULATED.3IONS-SCNC: 15 MMOL/L (ref 7–16)
AST SERPL-CCNC: 16 U/L (ref 0–31)
BASOPHILS ABSOLUTE: 0.06 K/UL (ref 0–0.2)
BASOPHILS RELATIVE PERCENT: 1 % (ref 0–2)
BILIRUB SERPL-MCNC: 0.3 MG/DL (ref 0–1.2)
BILIRUBIN, URINE: NEGATIVE
BUN BLDV-MCNC: 16 MG/DL (ref 6–23)
CALCIUM SERPL-MCNC: 9.6 MG/DL (ref 8.6–10.2)
CHLORIDE BLD-SCNC: 106 MMOL/L (ref 98–107)
CHOLESTEROL, TOTAL: 282 MG/DL
CO2: 21 MMOL/L (ref 22–29)
COLOR, UA: YELLOW
CREAT SERPL-MCNC: 1 MG/DL (ref 0.5–1)
EOSINOPHILS ABSOLUTE: 0.09 K/UL (ref 0.05–0.5)
EOSINOPHILS RELATIVE PERCENT: 2 % (ref 0–6)
GFR, ESTIMATED: 54 ML/MIN/1.73M2
GLUCOSE BLD-MCNC: 94 MG/DL (ref 74–99)
GLUCOSE URINE: NEGATIVE MG/DL
HCT VFR BLD CALC: 44.7 % (ref 34–48)
HDLC SERPL-MCNC: 89 MG/DL
HEMOGLOBIN: 13.8 G/DL (ref 11.5–15.5)
IMMATURE GRANULOCYTES %: 0 % (ref 0–5)
IMMATURE GRANULOCYTES ABSOLUTE: <0.03 K/UL (ref 0–0.58)
KETONES, URINE: NEGATIVE MG/DL
LDL CHOLESTEROL: 149 MG/DL
LEUKOCYTE ESTERASE, URINE: NEGATIVE
LYMPHOCYTES ABSOLUTE: 1.3 K/UL (ref 1.5–4)
LYMPHOCYTES RELATIVE PERCENT: 25 % (ref 20–42)
MCH RBC QN AUTO: 30.1 PG (ref 26–35)
MCHC RBC AUTO-ENTMCNC: 30.9 G/DL (ref 32–34.5)
MCV RBC AUTO: 97.4 FL (ref 80–99.9)
MONOCYTES ABSOLUTE: 0.57 K/UL (ref 0.1–0.95)
MONOCYTES RELATIVE PERCENT: 11 % (ref 2–12)
NEUTROPHILS ABSOLUTE: 3.19 K/UL (ref 1.8–7.3)
NEUTROPHILS RELATIVE PERCENT: 61 % (ref 43–80)
NITRITE, URINE: NEGATIVE
PDW BLD-RTO: 13.2 % (ref 11.5–15)
PH, URINE: 6 (ref 5–8)
PLATELET # BLD: 158 K/UL (ref 130–450)
PMV BLD AUTO: 12.4 FL (ref 7–12)
POTASSIUM SERPL-SCNC: 4.4 MMOL/L (ref 3.5–5)
PROTEIN UA: NEGATIVE MG/DL
RBC # BLD: 4.59 M/UL (ref 3.5–5.5)
RBC UA: ABNORMAL /HPF
SODIUM BLD-SCNC: 142 MMOL/L (ref 132–146)
SPECIFIC GRAVITY UA: <1.005 (ref 1–1.03)
TOTAL PROTEIN: 6.5 G/DL (ref 6.4–8.3)
TRIGL SERPL-MCNC: 221 MG/DL
TSH SERPL DL<=0.05 MIU/L-ACNC: 5.73 UIU/ML (ref 0.27–4.2)
TURBIDITY: CLEAR
URINE HGB: NEGATIVE
UROBILINOGEN, URINE: 0.2 EU/DL (ref 0–1)
VLDLC SERPL CALC-MCNC: 44 MG/DL
WBC # BLD: 5.2 K/UL (ref 4.5–11.5)
WBC UA: ABNORMAL /HPF

## 2025-02-17 RX ORDER — MIRABEGRON 25 MG/1
25 TABLET, FILM COATED, EXTENDED RELEASE ORAL DAILY
Qty: 30 TABLET | Refills: 3 | Status: SHIPPED
Start: 2025-02-17 | End: 2025-02-18

## 2025-02-17 ASSESSMENT — PATIENT HEALTH QUESTIONNAIRE - PHQ9
1. LITTLE INTEREST OR PLEASURE IN DOING THINGS: NOT AT ALL
SUM OF ALL RESPONSES TO PHQ QUESTIONS 1-9: 0
2. FEELING DOWN, DEPRESSED OR HOPELESS: NOT AT ALL
SUM OF ALL RESPONSES TO PHQ QUESTIONS 1-9: 0
SUM OF ALL RESPONSES TO PHQ9 QUESTIONS 1 & 2: 0

## 2025-02-17 ASSESSMENT — ENCOUNTER SYMPTOMS
WHEEZING: 0
BACK PAIN: 0
APNEA: 0
ABDOMINAL PAIN: 0
COLOR CHANGE: 0
PHOTOPHOBIA: 0
CHEST TIGHTNESS: 0
BLOOD IN STOOL: 0
COUGH: 0
FACIAL SWELLING: 0
VOMITING: 0
SINUS PRESSURE: 0
DIARRHEA: 0
NAUSEA: 0
SORE THROAT: 0
ALLERGIC/IMMUNOLOGIC NEGATIVE: 1
SHORTNESS OF BREATH: 0

## 2025-02-17 NOTE — PROGRESS NOTES
Chief Complaint:   Chief Complaint   Patient presents with    Incontinence     On going issue     Discuss Medications       Incontinence  This is a chronic problem. The current episode started more than 1 month ago. The problem has been gradually worsening since onset.  Aggravating factors include unknown.      Patient Active Problem List   Diagnosis    Hypertension    PUD (peptic ulcer disease)    GIST (gastrointestinal stromal tumor), malignant (HCC)    Chest pain    History of acute myocardial infarction    Onychomycosis    Difficulty walking    Hyperlipidemia    Disorder of thyroid gland    Carotid stenosis, right    Acquired hypothyroidism    Mild cognitive impairment    Acute cerebrovascular accident (CVA) (HCC)    Pulmonary nodules    Statin intolerance    Type 2 diabetes mellitus    Chronic renal disease, stage III (HCC) [390441]    Type 2 diabetes mellitus with chronic kidney disease    Mild cognitive impairment with memory loss    Chronic right-sided low back pain with right-sided sciatica    Sacroiliac joint disease    Nonrheumatic mitral valve stenosis    Vascular dementia without behavioral disturbance (HCC)    TIA (transient ischemic attack)    AMS (altered mental status)    Dementia (HCC)    Orthostatic hypotension    Generalized weakness    Hypertensive heart disease with heart failure (HCC)       Past Medical History:   Diagnosis Date    Acute MI (HCC) 06/03/2016    Dementia (HCC)     Diverticulosis     GIST (gastrointestinal stroma tumor), malignant, colon (HCC)     Hyperlipidemia     Hypertension     MI (myocardial infarction) (HCC) 2015    Mitral regurgitation     per cardiology    PUD (peptic ulcer disease)        Past Surgical History:   Procedure Laterality Date    CAROTID ENDARTERECTOMY Right 04/2019    Dr Nazario    CATARACT REMOVAL WITH IMPLANT Bilateral 2015    COLONOSCOPY  12/2014    DILATATION, ESOPHAGUS      ENDOSCOPY, COLON, DIAGNOSTIC  09/02/2016    marking of gastric tumor

## 2025-02-18 LAB — HBA1C MFR BLD: 5.6 % (ref 4–5.6)

## 2025-02-18 RX ORDER — OXYBUTYNIN CHLORIDE 5 MG/1
5 TABLET, EXTENDED RELEASE ORAL DAILY
Qty: 30 TABLET | Refills: 3 | Status: SHIPPED | OUTPATIENT
Start: 2025-02-18

## 2025-02-19 LAB
CULTURE: NORMAL
SPECIMEN DESCRIPTION: NORMAL

## 2025-02-26 ENCOUNTER — OFFICE VISIT (OUTPATIENT)
Dept: PODIATRY | Age: 89
End: 2025-02-26

## 2025-02-26 VITALS
BODY MASS INDEX: 19.97 KG/M2 | TEMPERATURE: 97.5 F | HEART RATE: 72 BPM | DIASTOLIC BLOOD PRESSURE: 85 MMHG | WEIGHT: 120 LBS | SYSTOLIC BLOOD PRESSURE: 138 MMHG

## 2025-02-26 DIAGNOSIS — I73.9 PERIPHERAL VASCULAR DISEASE, UNSPECIFIED: ICD-10-CM

## 2025-02-26 DIAGNOSIS — R26.2 DIFFICULTY WALKING: ICD-10-CM

## 2025-02-26 DIAGNOSIS — M79.674 PAIN IN RIGHT TOE(S): ICD-10-CM

## 2025-02-26 DIAGNOSIS — B35.1 TINEA UNGUIUM: Primary | ICD-10-CM

## 2025-02-26 DIAGNOSIS — M79.675 PAIN IN LEFT TOE(S): ICD-10-CM

## 2025-02-26 NOTE — PROGRESS NOTES
[facility]  New   Patient    Chief Complaint   Patient presents with    Toe Pain    New Patient    Nail Problem    Referral - General     Referred by Dave Li DO LOV 1/13/25  For nail care        Subjective: This Piedad Kinsey comes to office for foot and nail care.  Pt currently has complaint of thickened, painful, elongated nails that he/she cannot manage by themselves.  Pt. Relates pain to nails with shoe gear.  Pt's primary care physician is Dave Li DO.  Past Medical History:   Diagnosis Date    Acute MI (HCC) 06/03/2016    Dementia (HCC)     Diverticulosis     GIST (gastrointestinal stroma tumor), malignant, colon (HCC)     Hyperlipidemia     Hypertension     MI (myocardial infarction) (HCC) 2015    Mitral regurgitation     per cardiology    PUD (peptic ulcer disease)        Allergies   Allergen Reactions    Bactrim [Sulfamethoxazole-Trimethoprim]     Carafate [Sucralfate] Other (See Comments)     Unable to explain    Cefdinir Other (See Comments)     Unable to explain    Cetirizine & Related Other (See Comments)     Makes her forgetful and sleepy    Gabapentin Dizziness or Vertigo     Significant cognitive impairment    Lisinopril Other (See Comments)     Difficulty swallowing, reflux, headache, dizziness, leg cramps    Memantine Other (See Comments)     Brain fog and hand numbness    Pravachol [Pravastatin Sodium] Other (See Comments)     Patient states \"it almost killed me.\" Spoke with Patient's Daughter and she stated \"body cramps and extreme weakness.\"    Protonix [Pantoprazole Sodium] Other (See Comments)     Unable to explain    Statins Other (See Comments)     Intolerable. Patient states \"it almost killed me.\" Spoke with Patient's Daughter and she stated \"body cramps and extreme weakness.\"     Current Outpatient Medications on File Prior to Visit   Medication Sig Dispense Refill    oxyBUTYnin (DITROPAN XL) 5 MG extended release tablet Take 1 tablet by mouth daily 30 tablet 3     No

## 2025-02-28 ENCOUNTER — TELEPHONE (OUTPATIENT)
Dept: PRIMARY CARE CLINIC | Age: 89
End: 2025-02-28

## 2025-02-28 NOTE — TELEPHONE ENCOUNTER
Patient currently has services for physical therapy. They are wanting to add speech therapy to this. Okay to give verbal order.

## 2025-03-03 NOTE — PROGRESS NOTES
Medicare Annual Wellness Visit    Dion Isaac is here for Medicare AWV    Assessment & Plan   Medicare annual wellness visit, subsequent    Recommendations for Preventive Services Due: see orders and patient instructions/AVS.  Recommended screening schedule for the next 5-10 years is provided to the patient in written form: see Patient Instructions/AVS.     Return for Medicare Annual Wellness Visit in 1 year. Subjective   The following acute and/or chronic problems were also addressed today:  Screenings, right hip pain, recent falls, vaccines    Patient's complete Health Risk Assessment and screening values have been reviewed and are found in Flowsheets. The following problems were reviewed today and where indicated follow up appointments were made and/or referrals ordered. Positive Risk Factor Screenings with Interventions:    Fall Risk:  Do you feel unsteady or are you worried about falling? : (!) yes  2 or more falls in past year?: (!) yes  Fall with injury in past year?: (!) yes   Fall Risk Interventions:    Home safety tips provided  Home exercises provided to promote strength and balance  She is doing PT, referred for MRI and [ossible hip injections             Health Habits/Nutrition:  Physical Activity: Inactive    Days of Exercise per Week: 0 days    Minutes of Exercise per Session: 0 min     Have you lost any weight without trying in the past 3 months?: No  Body mass index: 23.38  Have you seen the dentist within the past year?: Yes  Health Habits/Nutrition Interventions:  Inadequate physical activity:  educational materials provided to promote increased physical activity, continue PT for exercise and refer to DR Mcfarland             Objective   Vitals:    11/29/22 1513   BP: 128/80   Pulse: 74   Temp: 98.2 °F (36.8 °C)   SpO2: 98%   Weight: 144 lb 13.5 oz (65.7 kg)   Height: 5' 6\" (1.676 m)      Body mass index is 23.38 kg/m².       General Appearance: alert and oriented to person, place and time, well developed and well- nourished, in no acute distress  Skin: warm and dry, no rash or erythema  Head: normocephalic and atraumatic  Eyes: pupils equal, round, and reactive to light, extraocular eye movements intact, conjunctivae normal  ENT: tympanic membrane, external ear and ear canal normal bilaterally, nose without deformity, nasal mucosa and turbinates normal without polyps  Neck: supple and non-tender without mass, no thyromegaly or thyroid nodules, no cervical lymphadenopathy  Pulmonary/Chest: clear to auscultation bilaterally- no wheezes, rales or rhonchi, normal air movement, no respiratory distress  Cardiovascular: normal rate, regular rhythm, normal S1 and S2, no murmurs, rubs, clicks, or gallops, distal pulses intact, no carotid bruits  Abdomen: soft, non-tender, non-distended, normal bowel sounds, no masses or organomegaly  Extremities: no cyanosis, clubbing or edema  Musculoskeletal: normal range of motion, no joint swelling, deformity or tenderness  Neurologic: reflexes normal and symmetric, no cranial nerve deficit, gait, coordination and speech normal       Allergies   Allergen Reactions    Carafate [Sucralfate] Other (See Comments)     Unable to explain    Cefdinir Other (See Comments)     Unable to explain    Cetirizine & Related Other (See Comments)     Makes her forgetful and sleepy    Lisinopril Other (See Comments)     Difficulty swallowing, reflux, headache, dizziness, leg cramps    Pravachol [Pravastatin Sodium] Other (See Comments)     Patient states \"it almost killed me. \" Spoke with Patient's Daughter and she stated \"body cramps and extreme weakness. \"    Protonix [Pantoprazole Sodium] Other (See Comments)     Unable to explain    Statins Other (See Comments)     Intolerable. Patient states \"it almost killed me. \" Spoke with Patient's Daughter and she stated \"body cramps and extreme weakness. \"     Prior to Visit Medications    Medication Sig Taking?  Authorizing Provider acetaminophen (TYLENOL) 325 MG tablet Take 650 mg by mouth every 6 hours as needed  Historical Provider, MD   amLODIPine (NORVASC) 2.5 MG tablet Take 1 tablet by mouth daily  Abigail Camara DO   triamcinolone (NASACORT) 55 MCG/ACT nasal inhaler 2 sprays by Each Nostril route daily  Historical Provider, MD   loratadine (CLARITIN) 10 MG tablet Take 10 mg by mouth daily as needed (ALLERGIES)   Historical Provider, MD   aspirin 81 MG tablet Take 81 mg by mouth daily   Historical Provider, MD [place] (Including outside providers/suppliers regularly involved in providing care):   Patient Care Team:  Mehdi Velasquez DO as PCP - General (Family Medicine)  Mehdi Velasquez DO as PCP - [facility] Empaneled Provider  Abigail Camara DO as Consulting Physician (Cardiology)  Jose Crenshaw MD as Surgeon (Cardiothoracic Surgery)     Reviewed and updated this visit:  Tobacco  Allergies  Meds  Problems  Med Hx  Surg Hx  Soc Hx  Fam Hx [Fever] : fever [Chills] : chills [Fatigue] : fatigue [Earache] : earache [Hoarseness] : hoarseness [Nasal Discharge] : nasal discharge [Sore Throat] : sore throat [Postnasal Drip] : postnasal drip [Shortness Of Breath] : no shortness of breath [Cough] : cough [Dyspnea on Exertion] : no dyspnea on exertion [Negative] : Integumentary

## 2025-03-04 ENCOUNTER — OFFICE VISIT (OUTPATIENT)
Dept: PRIMARY CARE CLINIC | Age: 89
End: 2025-03-04

## 2025-03-04 VITALS
RESPIRATION RATE: 19 BRPM | HEART RATE: 92 BPM | WEIGHT: 118 LBS | TEMPERATURE: 97.6 F | OXYGEN SATURATION: 99 % | DIASTOLIC BLOOD PRESSURE: 74 MMHG | BODY MASS INDEX: 19.66 KG/M2 | HEIGHT: 65 IN | SYSTOLIC BLOOD PRESSURE: 130 MMHG

## 2025-03-04 DIAGNOSIS — M25.562 CHRONIC PAIN OF LEFT KNEE: ICD-10-CM

## 2025-03-04 DIAGNOSIS — E03.9 HYPOTHYROIDISM, UNSPECIFIED TYPE: ICD-10-CM

## 2025-03-04 DIAGNOSIS — G89.29 CHRONIC PAIN OF LEFT KNEE: ICD-10-CM

## 2025-03-04 DIAGNOSIS — J30.89 NON-SEASONAL ALLERGIC RHINITIS, UNSPECIFIED TRIGGER: ICD-10-CM

## 2025-03-04 DIAGNOSIS — E11.22 TYPE 2 DIABETES MELLITUS WITH STAGE 3 CHRONIC KIDNEY DISEASE, WITHOUT LONG-TERM CURRENT USE OF INSULIN, UNSPECIFIED WHETHER STAGE 3A OR 3B CKD (HCC): ICD-10-CM

## 2025-03-04 DIAGNOSIS — Z00.00 MEDICARE ANNUAL WELLNESS VISIT, SUBSEQUENT: Primary | ICD-10-CM

## 2025-03-04 DIAGNOSIS — N39.41 URGE INCONTINENCE OF URINE: ICD-10-CM

## 2025-03-04 DIAGNOSIS — I10 PRIMARY HYPERTENSION: ICD-10-CM

## 2025-03-04 DIAGNOSIS — F01.50 VASCULAR DEMENTIA WITHOUT BEHAVIORAL DISTURBANCE (HCC): ICD-10-CM

## 2025-03-04 DIAGNOSIS — N18.30 TYPE 2 DIABETES MELLITUS WITH STAGE 3 CHRONIC KIDNEY DISEASE, WITHOUT LONG-TERM CURRENT USE OF INSULIN, UNSPECIFIED WHETHER STAGE 3A OR 3B CKD (HCC): ICD-10-CM

## 2025-03-04 RX ORDER — LEVOTHYROXINE SODIUM 25 UG/1
25 TABLET ORAL DAILY
Qty: 30 TABLET | Refills: 5 | Status: SHIPPED | OUTPATIENT
Start: 2025-03-04

## 2025-03-04 ASSESSMENT — LIFESTYLE VARIABLES
HOW MANY STANDARD DRINKS CONTAINING ALCOHOL DO YOU HAVE ON A TYPICAL DAY: PATIENT DOES NOT DRINK
HOW OFTEN DO YOU HAVE A DRINK CONTAINING ALCOHOL: NEVER

## 2025-03-04 ASSESSMENT — PATIENT HEALTH QUESTIONNAIRE - PHQ9
SUM OF ALL RESPONSES TO PHQ QUESTIONS 1-9: 2
SUM OF ALL RESPONSES TO PHQ QUESTIONS 1-9: 2
1. LITTLE INTEREST OR PLEASURE IN DOING THINGS: SEVERAL DAYS
SUM OF ALL RESPONSES TO PHQ QUESTIONS 1-9: 2
SUM OF ALL RESPONSES TO PHQ QUESTIONS 1-9: 2
2. FEELING DOWN, DEPRESSED OR HOPELESS: SEVERAL DAYS

## 2025-03-21 ENCOUNTER — OFFICE VISIT (OUTPATIENT)
Dept: CARDIOLOGY CLINIC | Age: 89
End: 2025-03-21

## 2025-03-21 ENCOUNTER — TELEPHONE (OUTPATIENT)
Dept: PRIMARY CARE CLINIC | Age: 89
End: 2025-03-21

## 2025-03-21 VITALS
SYSTOLIC BLOOD PRESSURE: 114 MMHG | RESPIRATION RATE: 14 BRPM | WEIGHT: 123.3 LBS | TEMPERATURE: 97.5 F | HEART RATE: 87 BPM | BODY MASS INDEX: 20.54 KG/M2 | OXYGEN SATURATION: 96 % | DIASTOLIC BLOOD PRESSURE: 62 MMHG | HEIGHT: 65 IN

## 2025-03-21 DIAGNOSIS — I25.5 ISCHEMIC CARDIOMYOPATHY: ICD-10-CM

## 2025-03-21 DIAGNOSIS — I25.10 CORONARY ARTERY DISEASE DUE TO LIPID RICH PLAQUE: ICD-10-CM

## 2025-03-21 DIAGNOSIS — I34.2 NONRHEUMATIC MITRAL VALVE STENOSIS: ICD-10-CM

## 2025-03-21 DIAGNOSIS — I21.4 NSTEMI (NON-ST ELEVATED MYOCARDIAL INFARCTION) (HCC): Primary | ICD-10-CM

## 2025-03-21 DIAGNOSIS — I25.83 CORONARY ARTERY DISEASE DUE TO LIPID RICH PLAQUE: ICD-10-CM

## 2025-03-21 NOTE — PROGRESS NOTES
Piedad Kinsey  2/22/1934  Date of Service: 3/21/2025    Patient Active Problem List    Diagnosis Date Noted    History of acute myocardial infarction 06/03/2016     Priority: High    GIST (gastrointestinal stromal tumor), malignant ([facility]) 05/25/2016     Priority: High    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     Priority: Medium    Mild cognitive impairment with memory loss 08/16/2022     Priority: Medium    Chronic right-sided low back pain with right-sided sciatica 08/16/2022     Priority: Medium     Overview Note:     has been worse lately  encouraged more chair yoga  will refer to Dr Quick for possible injections  tylenol does help significantly w/pain      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/16/2022     Priority: Medium     Overview Note:     more sifnificant on the right  refer for possible injections      Type 2 diabetes mellitus 05/23/2022     Priority: Medium    Chronic renal disease, stage III ([facility]) [505320] 05/23/2022     Priority: Medium    Chest pain 06/03/2016     Priority: Medium    Hypertensive heart disease with heart failure ([facility]) 02/17/2025    Generalized weakness 11/17/2024    AMS (altered mental status) 06/03/2024    Dementia ([facility]) 06/03/2024    Orthostatic hypotension 06/03/2024    TIA (transient ischemic attack) 04/24/2024    Vascular dementia without behavioral disturbance ([facility]) 01/31/2024    Nonrheumatic mitral valve stenosis 05/09/2023     Overview Note:     Borderline      Pulmonary nodules 09/20/2021    Statin intolerance 09/20/2021    Acute cerebrovascular accident (CVA) ([facility]) 09/16/2021    Mild cognitive impairment     Acquired hypothyroidism 11/20/2019    Hyperlipidemia 11/15/2019    Disorder of thyroid gland 11/15/2019    Carotid stenosis, right 11/15/2019    Onychomycosis 06/14/2019    Difficulty walking 06/14/2019    Hypertension     PUD (peptic ulcer disease)        Social History     Socioeconomic History    Marital status:      Spouse name: None

## 2025-03-21 NOTE — TELEPHONE ENCOUNTER
This pt very confused, says she dropped all of her pills a week ago and wants you to order new pills but has no idea what pills she is taking.  I called her daughter, Vonda, and she said  the altered mental status continues, the pt's  sets her pills up for her and then she doesn't want to take them. She says she doesn't need more medication, she has plenty.  She does sound exteremly confused on the phone as well.

## 2025-03-24 NOTE — TELEPHONE ENCOUNTER
FYI   Pt daughter states pt was just agitated that day and is okay, nothing out of the ordinary for pt. Will call in and let us know if anything changes.

## 2025-04-18 ENCOUNTER — TELEPHONE (OUTPATIENT)
Dept: PRIMARY CARE CLINIC | Age: 89
End: 2025-04-18

## 2025-04-18 DIAGNOSIS — F01.50 VASCULAR DEMENTIA WITHOUT BEHAVIORAL DISTURBANCE (HCC): ICD-10-CM

## 2025-04-18 DIAGNOSIS — R26.9 GAIT DIFFICULTY: Primary | ICD-10-CM

## 2025-04-18 NOTE — TELEPHONE ENCOUNTER
Patient called asking if you can order PT through [facility].  They told her she needs a new order.

## 2025-04-28 RX ORDER — ISOSORBIDE MONONITRATE 30 MG/1
15 TABLET, EXTENDED RELEASE ORAL DAILY
Qty: 45 TABLET | Refills: 1 | Status: SHIPPED | OUTPATIENT
Start: 2025-04-28

## 2025-05-05 RX ORDER — METOPROLOL SUCCINATE 25 MG/1
12.5 TABLET, EXTENDED RELEASE ORAL DAILY
Qty: 45 TABLET | Refills: 1 | Status: SHIPPED | OUTPATIENT
Start: 2025-05-05

## 2025-05-12 RX ORDER — METOPROLOL SUCCINATE 25 MG/1
12.5 TABLET, EXTENDED RELEASE ORAL DAILY
Qty: 45 TABLET | Refills: 1 | Status: SHIPPED | OUTPATIENT
Start: 2025-05-12

## 2025-05-12 RX ORDER — HYDRALAZINE HYDROCHLORIDE 10 MG/1
TABLET, FILM COATED ORAL
Qty: 180 TABLET | Refills: 1 | Status: SHIPPED | OUTPATIENT
Start: 2025-05-12

## 2025-05-12 RX ORDER — ISOSORBIDE MONONITRATE 30 MG/1
15 TABLET, EXTENDED RELEASE ORAL DAILY
Qty: 45 TABLET | Refills: 1 | Status: SHIPPED | OUTPATIENT
Start: 2025-05-12

## 2025-05-12 RX ORDER — HYDRALAZINE HYDROCHLORIDE 10 MG/1
10 TABLET, FILM COATED ORAL 2 TIMES DAILY
Qty: 180 TABLET | Refills: 1 | Status: SHIPPED | OUTPATIENT
Start: 2025-05-12

## 2025-05-19 RX ORDER — ISOSORBIDE MONONITRATE 30 MG/1
15 TABLET, EXTENDED RELEASE ORAL DAILY
Qty: 45 TABLET | Refills: 1 | Status: SHIPPED | OUTPATIENT
Start: 2025-05-19

## 2025-05-19 RX ORDER — HYDRALAZINE HYDROCHLORIDE 10 MG/1
10 TABLET, FILM COATED ORAL 2 TIMES DAILY
Qty: 180 TABLET | Refills: 1 | Status: SHIPPED | OUTPATIENT
Start: 2025-05-19

## 2025-05-19 RX ORDER — METOPROLOL SUCCINATE 25 MG/1
12.5 TABLET, EXTENDED RELEASE ORAL DAILY
Qty: 45 TABLET | Refills: 1 | Status: SHIPPED | OUTPATIENT
Start: 2025-05-19

## 2025-05-19 NOTE — TELEPHONE ENCOUNTER
Name of Medication(s) Requested:  Requested Prescriptions      No prescriptions requested or ordered in this encounter       Medication is on current medication list Yes    Dosage and directions were verified? Yes    Quantity verified: 90 day supply     Pharmacy Verified?  Yes    Last Appointment:  3/4/2025    Future appts:  Future Appointments   Date Time Provider Department Center   6/4/2025 10:30 AM Dave Li DO [facility] [place] [facility]   9/29/2025  1:00 PM Dimas Hayes DO [place] [facility]        (If no appt send self scheduling link. .REFILLAPPT)  Scheduling request sent?     [] Yes  [] No    Does patient need updated?  [] Yes  [] No

## 2025-06-04 ENCOUNTER — RESULTS FOLLOW-UP (OUTPATIENT)
Dept: PRIMARY CARE CLINIC | Age: 89
End: 2025-06-04

## 2025-06-04 ENCOUNTER — OFFICE VISIT (OUTPATIENT)
Dept: PRIMARY CARE CLINIC | Age: 89
End: 2025-06-04

## 2025-06-04 VITALS
RESPIRATION RATE: 18 BRPM | WEIGHT: 116 LBS | HEART RATE: 70 BPM | SYSTOLIC BLOOD PRESSURE: 124 MMHG | TEMPERATURE: 97.3 F | BODY MASS INDEX: 19.33 KG/M2 | HEIGHT: 65 IN | OXYGEN SATURATION: 99 % | DIASTOLIC BLOOD PRESSURE: 78 MMHG

## 2025-06-04 DIAGNOSIS — I10 PRIMARY HYPERTENSION: Primary | ICD-10-CM

## 2025-06-04 DIAGNOSIS — E55.9 VITAMIN D DEFICIENCY: ICD-10-CM

## 2025-06-04 DIAGNOSIS — E03.9 HYPOTHYROIDISM, UNSPECIFIED TYPE: ICD-10-CM

## 2025-06-04 DIAGNOSIS — K59.04 CHRONIC IDIOPATHIC CONSTIPATION: ICD-10-CM

## 2025-06-04 DIAGNOSIS — E11.22 TYPE 2 DIABETES MELLITUS WITH STAGE 3 CHRONIC KIDNEY DISEASE, WITHOUT LONG-TERM CURRENT USE OF INSULIN, UNSPECIFIED WHETHER STAGE 3A OR 3B CKD (HCC): ICD-10-CM

## 2025-06-04 DIAGNOSIS — F01.50 VASCULAR DEMENTIA WITHOUT BEHAVIORAL DISTURBANCE (HCC): ICD-10-CM

## 2025-06-04 DIAGNOSIS — I10 PRIMARY HYPERTENSION: ICD-10-CM

## 2025-06-04 DIAGNOSIS — Z86.73 HISTORY OF STROKE: ICD-10-CM

## 2025-06-04 DIAGNOSIS — F41.9 ANXIETY: ICD-10-CM

## 2025-06-04 DIAGNOSIS — N18.30 TYPE 2 DIABETES MELLITUS WITH STAGE 3 CHRONIC KIDNEY DISEASE, WITHOUT LONG-TERM CURRENT USE OF INSULIN, UNSPECIFIED WHETHER STAGE 3A OR 3B CKD (HCC): ICD-10-CM

## 2025-06-04 LAB
ALBUMIN: 3.8 G/DL (ref 3.5–5.2)
ALP BLD-CCNC: 80 U/L (ref 35–104)
ALT SERPL-CCNC: 7 U/L (ref 0–35)
ANION GAP SERPL CALCULATED.3IONS-SCNC: 10 MMOL/L (ref 7–16)
AST SERPL-CCNC: 22 U/L (ref 0–35)
BASOPHILS ABSOLUTE: 0.04 K/UL (ref 0–0.2)
BASOPHILS RELATIVE PERCENT: 1 % (ref 0–2)
BILIRUB SERPL-MCNC: 0.3 MG/DL (ref 0–1.2)
BUN BLDV-MCNC: 18 MG/DL (ref 8–23)
CALCIUM SERPL-MCNC: 9.4 MG/DL (ref 8.8–10.2)
CHLORIDE BLD-SCNC: 108 MMOL/L (ref 98–107)
CHOLESTEROL, TOTAL: 252 MG/DL
CO2: 22 MMOL/L (ref 22–29)
CREAT SERPL-MCNC: 0.9 MG/DL (ref 0.5–1)
EOSINOPHILS ABSOLUTE: 0.07 K/UL (ref 0.05–0.5)
EOSINOPHILS RELATIVE PERCENT: 2 % (ref 0–6)
GFR, ESTIMATED: 63 ML/MIN/1.73M2
GLUCOSE BLD-MCNC: 102 MG/DL (ref 74–99)
HBA1C MFR BLD: 5.9 % (ref 4–5.6)
HCT VFR BLD CALC: 42.4 % (ref 34–48)
HDLC SERPL-MCNC: 89 MG/DL
HEMOGLOBIN: 13.3 G/DL (ref 11.5–15.5)
IMMATURE GRANULOCYTES %: 0 % (ref 0–5)
IMMATURE GRANULOCYTES ABSOLUTE: <0.03 K/UL (ref 0–0.58)
LDL CHOLESTEROL: 138 MG/DL
LYMPHOCYTES ABSOLUTE: 1.24 K/UL (ref 1.5–4)
LYMPHOCYTES RELATIVE PERCENT: 26 % (ref 20–42)
MCH RBC QN AUTO: 30 PG (ref 26–35)
MCHC RBC AUTO-ENTMCNC: 31.4 G/DL (ref 32–34.5)
MCV RBC AUTO: 95.7 FL (ref 80–99.9)
MONOCYTES ABSOLUTE: 0.41 K/UL (ref 0.1–0.95)
MONOCYTES RELATIVE PERCENT: 9 % (ref 2–12)
NEUTROPHILS ABSOLUTE: 3 K/UL (ref 1.8–7.3)
NEUTROPHILS RELATIVE PERCENT: 63 % (ref 43–80)
PDW BLD-RTO: 14 % (ref 11.5–15)
PLATELET, FLUORESCENCE: 147 K/UL (ref 130–450)
PMV BLD AUTO: 12.2 FL (ref 7–12)
POTASSIUM SERPL-SCNC: 4.4 MMOL/L (ref 3.5–5.1)
RBC # BLD: 4.43 M/UL (ref 3.5–5.5)
SODIUM BLD-SCNC: 141 MMOL/L (ref 136–145)
TOTAL PROTEIN: 6.1 G/DL (ref 6.4–8.3)
TRIGL SERPL-MCNC: 122 MG/DL
TSH SERPL DL<=0.05 MIU/L-ACNC: 3.71 UIU/ML (ref 0.27–4.2)
VITAMIN D 25-HYDROXY: 24.7 NG/ML (ref 30–100)
VLDLC SERPL CALC-MCNC: 24 MG/DL
WBC # BLD: 4.8 K/UL (ref 4.5–11.5)

## 2025-06-04 ASSESSMENT — ENCOUNTER SYMPTOMS
ALLERGIC/IMMUNOLOGIC NEGATIVE: 1
CHEST TIGHTNESS: 0
ABDOMINAL PAIN: 0
SORE THROAT: 0
DIARRHEA: 0
SHORTNESS OF BREATH: 0
FACIAL SWELLING: 0
COLOR CHANGE: 0
COUGH: 0
NAUSEA: 0
BLOOD IN STOOL: 0
VOMITING: 0
SINUS PRESSURE: 0
WHEEZING: 0
CONSTIPATION: 1
PHOTOPHOBIA: 0
BACK PAIN: 0
APNEA: 0

## 2025-06-04 NOTE — PROGRESS NOTES
Chief Complaint:   Chief Complaint   Patient presents with    3 Month Follow-Up    Diabetes    GI Problem     Both constipation and diarrhea.        Diabetes  She presents for her follow-up diabetic visit. She has type 2 diabetes mellitus. Her disease course has been stable. Hypoglycemia symptoms include confusion and nervousness/anxiousness. Pertinent negatives for hypoglycemia include no headaches. Pertinent negatives for diabetes include no chest pain and no weakness. Symptoms are stable. Current diabetic treatment includes diet. She is compliant with treatment all of the time.   GI Problem  Primary symptoms do not include abdominal pain, nausea, vomiting, diarrhea, myalgias, arthralgias or rash.   The illness is also significant for constipation. The illness does not include back pain.   Hypertension  This is a chronic problem. The current episode started more than 1 year ago. The problem has been resolved since onset. The problem is controlled. Associated symptoms include anxiety. Pertinent negatives include no chest pain, headaches, palpitations or shortness of breath. Past treatments include direct vasodilators and beta blockers. The current treatment provides significant improvement. There are no compliance problems.    Constipation  This is a recurrent problem. The current episode started more than 1 month ago. The problem has been waxing and waning since onset. Her stool frequency is 2 to 3 times per week. The patient is on a high fiber diet. She Does not exercise regularly. There has Been adequate water intake. Pertinent negatives include no abdominal pain, back pain, diarrhea, difficulty urinating, nausea or vomiting. She has tried diet changes and fiber for the symptoms. The treatment provided mild relief.   Anxiety  Presents for follow-up visit. Symptoms include confusion, excessive worry and nervous/anxious behavior. Patient reports no chest pain, nausea, palpitations or shortness of breath.

## 2025-06-09 ENCOUNTER — TELEPHONE (OUTPATIENT)
Age: 89
End: 2025-06-09

## 2025-06-09 NOTE — TELEPHONE ENCOUNTER
[facility] received referral in [facility] for a diagnosis of anxiety.  Call placed to pt today but unable to have call go through due to pt's privacy settings. Will reattempt. LUISITO Baltazar, REBECCA-S

## 2025-06-20 ENCOUNTER — OFFICE VISIT (OUTPATIENT)
Dept: FAMILY MEDICINE CLINIC | Age: 89
End: 2025-06-20

## 2025-06-20 VITALS
TEMPERATURE: 98.1 F | RESPIRATION RATE: 16 BRPM | DIASTOLIC BLOOD PRESSURE: 72 MMHG | BODY MASS INDEX: 19.33 KG/M2 | HEART RATE: 82 BPM | OXYGEN SATURATION: 98 % | WEIGHT: 116 LBS | SYSTOLIC BLOOD PRESSURE: 120 MMHG | HEIGHT: 65 IN

## 2025-06-20 DIAGNOSIS — L60.0 INGROWN NAIL: Primary | ICD-10-CM

## 2025-06-20 RX ORDER — OXYBUTYNIN CHLORIDE 5 MG/1
TABLET, EXTENDED RELEASE ORAL
COMMUNITY
Start: 2025-05-20

## 2025-06-20 RX ORDER — DOXYCYCLINE 100 MG/1
100 TABLET ORAL 2 TIMES DAILY
Qty: 20 TABLET | Refills: 0 | Status: SHIPPED | OUTPATIENT
Start: 2025-06-20 | End: 2025-06-30

## 2025-06-20 ASSESSMENT — ENCOUNTER SYMPTOMS
BACK PAIN: 1
COLOR CHANGE: 1

## 2025-06-20 NOTE — PROGRESS NOTES
Piedad Kinsey (:  1934) is a 89 y.o. female,Established patient, here for evaluation of the following chief complaint(s):  Nail Problem (Cut yesterday may have an infection )         Assessment & Plan  Ingrown nail  Treat symptomatically at this time with short course of Keflex.  Referral to podiatry for definitive management.  Red flags discussed if these occur she is to return to clinic/emergency department.  Patient voiced understanding.    Orders:    doxycycline monohydrate (ADOXA) 100 MG tablet; Take 1 tablet by mouth 2 times daily for 10 days    Jake Ochoa, SAM, Podiatry, [place]      No follow-ups on file.       Subjective   HPI  Patient presents today with foot pain yesterday after attempting to cut her own nails.  Did damage to skin and she is concerned about infection.  Has not seen podiatry in the past.  No fever or chills.  No other injuries.  Review of Systems   Musculoskeletal:  Positive for arthralgias, back pain and gait problem.   Skin:  Positive for color change and wound.   All other systems reviewed and are negative.         Current Outpatient Medications:     oxyBUTYnin (DITROPAN-XL) 5 MG extended release tablet, , Disp: , Rfl:     doxycycline monohydrate (ADOXA) 100 MG tablet, Take 1 tablet by mouth 2 times daily for 10 days, Disp: 20 tablet, Rfl: 0    metoprolol succinate (TOPROL XL) 25 MG extended release tablet, Take 0.5 tablets by mouth daily Please cut in half for patient, Disp: 45 tablet, Rfl: 1    hydrALAZINE (APRESOLINE) 10 MG tablet, Take 1 tablet by mouth 2 times daily, Disp: 180 tablet, Rfl: 1    isosorbide mononitrate (IMDUR) 30 MG extended release tablet, Take 0.5 tablets by mouth daily Please cut in half for patient, Disp: 45 tablet, Rfl: 1    hydrALAZINE (APRESOLINE) 10 MG tablet, TAKE ONE (1) TABLET BY MOUTH TWO (2) TIMES DAILY, Disp: 180 tablet, Rfl: 1    levothyroxine (SYNTHROID) 25 MCG tablet, Take 1 tablet by mouth daily, Disp: 30 tablet, Rfl: 5

## 2025-07-03 RX ORDER — CLOPIDOGREL BISULFATE 75 MG/1
75 TABLET ORAL DAILY
Qty: 90 TABLET | Refills: 3 | Status: SHIPPED | OUTPATIENT
Start: 2025-07-03

## 2025-07-03 RX ORDER — EZETIMIBE 10 MG/1
10 TABLET ORAL NIGHTLY
Qty: 90 TABLET | Refills: 3 | Status: SHIPPED | OUTPATIENT
Start: 2025-07-03

## 2025-07-12 ENCOUNTER — OFFICE VISIT (OUTPATIENT)
Dept: FAMILY MEDICINE CLINIC | Age: 89
End: 2025-07-12

## 2025-07-12 VITALS
DIASTOLIC BLOOD PRESSURE: 74 MMHG | BODY MASS INDEX: 19.14 KG/M2 | HEART RATE: 56 BPM | OXYGEN SATURATION: 98 % | SYSTOLIC BLOOD PRESSURE: 118 MMHG | WEIGHT: 115 LBS | TEMPERATURE: 97.1 F

## 2025-07-12 DIAGNOSIS — K59.09 OTHER CONSTIPATION: ICD-10-CM

## 2025-07-12 DIAGNOSIS — J02.9 SORE THROAT: Primary | ICD-10-CM

## 2025-07-12 ASSESSMENT — ENCOUNTER SYMPTOMS
WHEEZING: 0
VOMITING: 0
CONSTIPATION: 1
COUGH: 0
SORE THROAT: 1
SINUS PRESSURE: 0
SHORTNESS OF BREATH: 0
ABDOMINAL PAIN: 0
DIARRHEA: 0
CHOKING: 0
ABDOMINAL DISTENTION: 0
NAUSEA: 0

## 2025-07-12 NOTE — PROGRESS NOTES
Piedad Kinsey (:  1934) is a 89 y.o. female,Established patient, here for evaluation of the following chief complaint(s):  Pharyngitis (Pt states she feels like \"something is hanging down in throat\" after she strained to have a BM )         Assessment & Plan  Sore throat   Salt water gargles and hot tea with honey recommended,          Other constipation   Continiue soluable fiber daily, senna as needed and continue to push fluids           Return if symptoms worsen or fail to improve.       Subjective   Here with   Thought she had an appt today  Had abdominal pain yesterday but moved her bowels yesterday and the pain went away  Now has sore throat, she now says she has had this for 3 weeks, it occurred after she moved her bowels when constipated and had to bear down  Trouble swallowing, ut doesnot feel like food gets stuck, no choking, no pain  No fevers, no new back pain, ambulating with a walker  She does seem somewhat forgetful still        Review of Systems   Constitutional:  Negative for appetite change, chills, fatigue and fever.   HENT:  Positive for sore throat. Negative for congestion, postnasal drip and sinus pressure.    Respiratory:  Negative for cough, choking, shortness of breath and wheezing.    Cardiovascular:  Negative for chest pain and leg swelling.   Gastrointestinal:  Positive for constipation. Negative for abdominal distention, abdominal pain, diarrhea, nausea and vomiting.   Genitourinary:  Negative for difficulty urinating, dysuria, frequency and urgency.   Skin:  Negative for rash.          Objective   Physical Exam  Vitals and nursing note reviewed.   Constitutional:       General: She is not in acute distress.     Appearance: She is well-developed. She is not toxic-appearing.   HENT:      Head: Normocephalic and atraumatic.      Ears:      Comments: Hearing aides in bialterally     Nose: No congestion or rhinorrhea.      Mouth/Throat:      Mouth: Mucous membranes are

## 2025-07-16 ENCOUNTER — OFFICE VISIT (OUTPATIENT)
Dept: FAMILY MEDICINE CLINIC | Age: 89
End: 2025-07-16

## 2025-07-16 VITALS
DIASTOLIC BLOOD PRESSURE: 64 MMHG | SYSTOLIC BLOOD PRESSURE: 122 MMHG | BODY MASS INDEX: 19.16 KG/M2 | WEIGHT: 115 LBS | HEIGHT: 65 IN | OXYGEN SATURATION: 99 % | HEART RATE: 69 BPM | RESPIRATION RATE: 16 BRPM | TEMPERATURE: 97.5 F

## 2025-07-16 DIAGNOSIS — R53.83 FATIGUE, UNSPECIFIED TYPE: ICD-10-CM

## 2025-07-16 DIAGNOSIS — R35.0 URINARY FREQUENCY: Primary | ICD-10-CM

## 2025-07-16 LAB
ALBUMIN: 4.3 G/DL (ref 3.5–5.2)
ALP BLD-CCNC: 84 U/L (ref 35–104)
ALT SERPL-CCNC: 9 U/L (ref 0–35)
ANION GAP SERPL CALCULATED.3IONS-SCNC: 9 MMOL/L (ref 7–16)
AST SERPL-CCNC: 21 U/L (ref 0–35)
BASOPHILS ABSOLUTE: 0.06 K/UL (ref 0–0.2)
BASOPHILS RELATIVE PERCENT: 1 % (ref 0–2)
BILIRUB SERPL-MCNC: 0.3 MG/DL (ref 0–1.2)
BILIRUBIN, POC: NEGATIVE
BLOOD URINE, POC: NEGATIVE
BUN BLDV-MCNC: 18 MG/DL (ref 8–23)
CALCIUM SERPL-MCNC: 9.7 MG/DL (ref 8.8–10.2)
CHLORIDE BLD-SCNC: 106 MMOL/L (ref 98–107)
CLARITY, POC: CLEAR
CO2: 25 MMOL/L (ref 22–29)
COLOR, POC: YELLOW
CREAT SERPL-MCNC: 1.1 MG/DL (ref 0.5–1)
EOSINOPHILS ABSOLUTE: 0.08 K/UL (ref 0.05–0.5)
EOSINOPHILS RELATIVE PERCENT: 2 % (ref 0–6)
GFR, ESTIMATED: 50 ML/MIN/1.73M2
GLUCOSE BLD-MCNC: 112 MG/DL (ref 74–99)
GLUCOSE URINE, POC: NEGATIVE MG/DL
HCT VFR BLD CALC: 42.4 % (ref 34–48)
HEMOGLOBIN: 13.5 G/DL (ref 11.5–15.5)
IMMATURE GRANULOCYTES %: 0 % (ref 0–5)
IMMATURE GRANULOCYTES ABSOLUTE: <0.03 K/UL (ref 0–0.58)
KETONES, POC: NORMAL MG/DL
LEUKOCYTE EST, POC: NEGATIVE
LYMPHOCYTES ABSOLUTE: 1.71 K/UL (ref 1.5–4)
LYMPHOCYTES RELATIVE PERCENT: 32 % (ref 20–42)
MAGNESIUM: 2.3 MG/DL (ref 1.6–2.4)
MCH RBC QN AUTO: 30.2 PG (ref 26–35)
MCHC RBC AUTO-ENTMCNC: 31.8 G/DL (ref 32–34.5)
MCV RBC AUTO: 94.9 FL (ref 80–99.9)
MONOCYTES ABSOLUTE: 0.53 K/UL (ref 0.1–0.95)
MONOCYTES RELATIVE PERCENT: 10 % (ref 2–12)
NEUTROPHILS ABSOLUTE: 3.02 K/UL (ref 1.8–7.3)
NEUTROPHILS RELATIVE PERCENT: 56 % (ref 43–80)
NITRITE, POC: NEGATIVE
PDW BLD-RTO: 13.7 % (ref 11.5–15)
PH, POC: 6
PLATELET # BLD: 159 K/UL (ref 130–450)
PMV BLD AUTO: 12.3 FL (ref 7–12)
POTASSIUM SERPL-SCNC: 4.3 MMOL/L (ref 3.5–5.1)
PROTEIN, POC: NEGATIVE MG/DL
RBC # BLD: 4.47 M/UL (ref 3.5–5.5)
SODIUM BLD-SCNC: 140 MMOL/L (ref 136–145)
SPECIFIC GRAVITY, POC: 1.01
TOTAL PROTEIN: 6.7 G/DL (ref 6.4–8.3)
TSH SERPL DL<=0.05 MIU/L-ACNC: 3.04 UIU/ML (ref 0.27–4.2)
UROBILINOGEN, POC: 0.2 MG/DL
WBC # BLD: 5.4 K/UL (ref 4.5–11.5)
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
## 2025-07-25 ENCOUNTER — HOSPITAL ENCOUNTER (EMERGENCY)
Age: 89
Discharge: HOME | End: 2025-07-25
Attending: EMERGENCY MEDICINE
Payer: MEDICARE

## 2025-07-25 ENCOUNTER — APPOINTMENT (OUTPATIENT)
Dept: CT IMAGING | Age: 89
End: 2025-07-25
Payer: MEDICARE

## 2025-07-25 VITALS
WEIGHT: 111 LBS | HEIGHT: 63 IN | RESPIRATION RATE: 14 BRPM | TEMPERATURE: 97.4 F | DIASTOLIC BLOOD PRESSURE: 73 MMHG | SYSTOLIC BLOOD PRESSURE: 163 MMHG | HEART RATE: 77 BPM | BODY MASS INDEX: 19.67 KG/M2 | OXYGEN SATURATION: 97 %

## 2025-07-25 DIAGNOSIS — R10.84 GENERALIZED ABDOMINAL PAIN: Primary | ICD-10-CM

## 2025-07-25 DIAGNOSIS — K59.00 CONSTIPATION, UNSPECIFIED CONSTIPATION TYPE: ICD-10-CM

## 2025-07-25 LAB
ALBUMIN SERPL-MCNC: 4.2 G/DL (ref 3.5–5.2)
ALP SERPL-CCNC: 83 U/L (ref 35–104)
ALT SERPL-CCNC: 10 U/L (ref 0–35)
ANION GAP SERPL CALCULATED.3IONS-SCNC: 11 MMOL/L (ref 7–16)
AST SERPL-CCNC: 20 U/L (ref 0–35)
BASOPHILS # BLD: 0.06 K/UL (ref 0–0.2)
BASOPHILS NFR BLD: 2 % (ref 0–2)
BILIRUB DIRECT SERPL-MCNC: 0.2 MG/DL (ref 0–0.2)
BILIRUB INDIRECT SERPL-MCNC: 0.3 MG/DL (ref 0–1)
BILIRUB SERPL-MCNC: 0.4 MG/DL (ref 0–1.2)
BILIRUB UR QL STRIP: NEGATIVE
BUN SERPL-MCNC: 17 MG/DL (ref 8–23)
CALCIUM SERPL-MCNC: 10 MG/DL (ref 8.8–10.2)
CHLORIDE SERPL-SCNC: 106 MMOL/L (ref 98–107)
CLARITY UR: CLEAR
CO2 SERPL-SCNC: 25 MMOL/L (ref 22–29)
COLOR UR: YELLOW
CREAT SERPL-MCNC: 1 MG/DL (ref 0.5–1)
EOSINOPHIL # BLD: 0.06 K/UL (ref 0.05–0.5)
EOSINOPHILS RELATIVE PERCENT: 2 % (ref 0–6)
ERYTHROCYTE [DISTWIDTH] IN BLOOD BY AUTOMATED COUNT: 13.4 % (ref 11.5–15)
GFR, ESTIMATED: 54 ML/MIN/1.73M2
GLUCOSE SERPL-MCNC: 100 MG/DL (ref 74–99)
GLUCOSE UR STRIP-MCNC: NEGATIVE MG/DL
HCT VFR BLD AUTO: 41.6 % (ref 34–48)
HGB BLD-MCNC: 13.6 G/DL (ref 11.5–15.5)
HGB UR QL STRIP.AUTO: NEGATIVE
IMM GRANULOCYTES # BLD AUTO: <0.03 K/UL (ref 0–0.58)
IMM GRANULOCYTES NFR BLD: 0 % (ref 0–5)
KETONES UR STRIP-MCNC: NEGATIVE MG/DL
LEUKOCYTE ESTERASE UR QL STRIP: ABNORMAL
LIPASE SERPL-CCNC: 39 U/L (ref 13–60)
LYMPHOCYTES NFR BLD: 1.14 K/UL (ref 1.5–4)
LYMPHOCYTES RELATIVE PERCENT: 29 % (ref 20–42)
MCH RBC QN AUTO: 29.9 PG (ref 26–35)
MCHC RBC AUTO-ENTMCNC: 32.7 G/DL (ref 32–34.5)
MCV RBC AUTO: 91.4 FL (ref 80–99.9)
MONOCYTES NFR BLD: 0.42 K/UL (ref 0.1–0.95)
MONOCYTES NFR BLD: 11 % (ref 2–12)
NEUTROPHILS NFR BLD: 57 % (ref 43–80)
NEUTS SEG NFR BLD: 2.23 K/UL (ref 1.8–7.3)
NITRITE UR QL STRIP: NEGATIVE
PH UR STRIP: 7 [PH] (ref 5–8)
PLATELET # BLD AUTO: 145 K/UL (ref 130–450)
PMV BLD AUTO: 11.5 FL (ref 7–12)
POTASSIUM SERPL-SCNC: 4.1 MMOL/L (ref 3.5–5.1)
PROT SERPL-MCNC: 6.5 G/DL (ref 6.4–8.3)
PROT UR STRIP-MCNC: NEGATIVE MG/DL
RBC # BLD AUTO: 4.55 M/UL (ref 3.5–5.5)
RBC #/AREA URNS HPF: NORMAL /HPF
SODIUM SERPL-SCNC: 143 MMOL/L (ref 136–145)
SP GR UR STRIP: <1.005 (ref 1–1.03)
UROBILINOGEN UR STRIP-ACNC: 0.2 EU/DL (ref 0–1)
WBC #/AREA URNS HPF: NORMAL /HPF
WBC OTHER # BLD: 3.9 K/UL (ref 4.5–11.5)

## 2025-07-25 PROCEDURE — 85025 COMPLETE CBC W/AUTO DIFF WBC: CPT

## 2025-07-25 PROCEDURE — 74176 CT ABD & PELVIS W/O CONTRAST: CPT

## 2025-07-25 PROCEDURE — 80053 COMPREHEN METABOLIC PANEL: CPT

## 2025-07-25 PROCEDURE — 51701 INSERT BLADDER CATHETER: CPT

## 2025-07-25 PROCEDURE — 81001 URINALYSIS AUTO W/SCOPE: CPT

## 2025-07-25 PROCEDURE — 83690 ASSAY OF LIPASE: CPT

## 2025-07-25 PROCEDURE — 82248 BILIRUBIN DIRECT: CPT

## 2025-07-25 PROCEDURE — 99284 EMERGENCY DEPT VISIT MOD MDM: CPT

## 2025-07-25 RX ORDER — OXYBUTYNIN CHLORIDE 5 MG/1
5 TABLET, EXTENDED RELEASE ORAL DAILY
Qty: 30 TABLET | Refills: 3 | Status: SHIPPED | OUTPATIENT
Start: 2025-07-25

## 2025-07-25 RX ORDER — SENNOSIDES 8.6 MG
1 TABLET ORAL DAILY
Qty: 120 TABLET | Refills: 0 | Status: SHIPPED | OUTPATIENT
Start: 2025-07-25

## 2025-07-25 RX ORDER — POLYETHYLENE GLYCOL 3350 17 G/17G
17 POWDER, FOR SOLUTION ORAL DAILY PRN
Qty: 170 G | Refills: 0 | Status: SHIPPED | OUTPATIENT
Start: 2025-07-25 | End: 2025-08-04

## 2025-07-25 ASSESSMENT — ENCOUNTER SYMPTOMS
WHEEZING: 0
EYE DISCHARGE: 0
CONSTIPATION: 1
SHORTNESS OF BREATH: 0
COUGH: 0
BACK PAIN: 0
EYE PAIN: 0
DIARRHEA: 0
EYE REDNESS: 0
ABDOMINAL DISTENTION: 0
SORE THROAT: 0
VOMITING: 0
NAUSEA: 0
ABDOMINAL PAIN: 1

## 2025-07-25 ASSESSMENT — LIFESTYLE VARIABLES
HOW OFTEN DO YOU HAVE A DRINK CONTAINING ALCOHOL: NEVER
HOW MANY STANDARD DRINKS CONTAINING ALCOHOL DO YOU HAVE ON A TYPICAL DAY: PATIENT DOES NOT DRINK

## 2025-07-25 ASSESSMENT — PAIN SCALES - GENERAL: PAINLEVEL_OUTOF10: 0

## 2025-07-25 NOTE — ED PROVIDER NOTES
89-year-old female presents to the emergency department with constipation.  Patient reports she has tried home remedies and an enema at 4 AM yesterday she had a small bowel movement but nothing is moved since she states no other complaints reportedly she has a history of dementia.  She has had a known history of a GI tumor.  She states no other symptoms and upon my evaluation she reports all symptoms have improved.  She states no other complaints at this time.    The history is provided by the patient.   Abdominal Pain  Pain location:  Generalized  Pain quality: bloating    Pain radiates to:  Does not radiate  Pain severity:  Mild  Onset quality:  Gradual  Duration:  2 days  Timing:  Intermittent  Progression:  Waxing and waning  Chronicity:  New  Associated symptoms: constipation    Associated symptoms: no chest pain, no chills, no cough, no diarrhea, no dysuria, no fever, no nausea, no shortness of breath, no sore throat and no vomiting         Review of Systems   Constitutional:  Negative for chills and fever.   HENT:  Negative for ear pain, sinus pressure and sore throat.    Eyes:  Negative for pain, discharge and redness.   Respiratory:  Negative for cough, shortness of breath and wheezing.    Cardiovascular:  Negative for chest pain.   Gastrointestinal:  Positive for abdominal pain and constipation. Negative for abdominal distention, diarrhea, nausea and vomiting.   Genitourinary:  Negative for dysuria and frequency.   Musculoskeletal:  Negative for arthralgias and back pain.   Skin:  Negative for rash and wound.   Neurological:  Negative for weakness and headaches.   Hematological:  Negative for adenopathy.   All other systems reviewed and are negative.       Physical Exam  Constitutional:       Appearance: Normal appearance.   HENT:      Head: Normocephalic and atraumatic.      Nose: Nose normal.      Mouth/Throat:      Mouth: Mucous membranes are moist.   Eyes:      Extraocular Movements: Extraocular

## 2025-07-28 ENCOUNTER — CARE COORDINATION (OUTPATIENT)
Dept: CARE COORDINATION | Age: 89
End: 2025-07-28

## 2025-07-28 NOTE — CARE COORDINATION
Ambulatory Care Coordination Note     7/28/2025 12:27 PM     Patient outreach attempt by this ACM today to offer care management services. ACM was unable to reach the patient by telephone today;   left voice message requesting a return phone call to this ACM.  letter mailed requesting patient and spouse/partner  to contact this ACM.      ACM: Gwendolyn Odom RN     Care Summary Note: will attempt to contact again    PCP/Specialist follow up:   Future Appointments         Provider Specialty Dept Phone    9/24/2025 1:00 PM Dimas Hayes, DO Cardiology [phone]            Follow Up:   Plan for next ACM outreach in approximately 1 week to complete:  - outreach attempt to offer care management services.

## 2025-08-06 ENCOUNTER — CARE COORDINATION (OUTPATIENT)
Dept: CARE COORDINATION | Age: 89
End: 2025-08-06

## 2025-08-07 ENCOUNTER — APPOINTMENT (OUTPATIENT)
Dept: CT IMAGING | Age: 89
End: 2025-08-07
Payer: MEDICARE

## 2025-08-07 ENCOUNTER — HOSPITAL ENCOUNTER (EMERGENCY)
Age: 89
Discharge: HOME | End: 2025-08-07
Attending: EMERGENCY MEDICINE
Payer: MEDICARE

## 2025-08-07 VITALS
HEART RATE: 75 BPM | OXYGEN SATURATION: 97 % | SYSTOLIC BLOOD PRESSURE: 136 MMHG | DIASTOLIC BLOOD PRESSURE: 76 MMHG | RESPIRATION RATE: 17 BRPM | TEMPERATURE: 98 F

## 2025-08-07 DIAGNOSIS — K59.00 CONSTIPATION, UNSPECIFIED CONSTIPATION TYPE: ICD-10-CM

## 2025-08-07 DIAGNOSIS — K52.9 COLITIS: Primary | ICD-10-CM

## 2025-08-07 LAB
ALBUMIN SERPL-MCNC: 3.8 G/DL (ref 3.5–5.2)
ALP SERPL-CCNC: 74 U/L (ref 35–104)
ALT SERPL-CCNC: 8 U/L (ref 0–35)
ANION GAP SERPL CALCULATED.3IONS-SCNC: 12 MMOL/L (ref 7–16)
AST SERPL-CCNC: 18 U/L (ref 0–35)
BASOPHILS # BLD: 0.05 K/UL (ref 0–0.2)
BASOPHILS NFR BLD: 1 % (ref 0–2)
BILIRUB SERPL-MCNC: 0.4 MG/DL (ref 0–1.2)
BILIRUB UR QL STRIP: NEGATIVE
BUN SERPL-MCNC: 12 MG/DL (ref 8–23)
CALCIUM SERPL-MCNC: 9.2 MG/DL (ref 8.8–10.2)
CHLORIDE SERPL-SCNC: 107 MMOL/L (ref 98–107)
CLARITY UR: CLEAR
CO2 SERPL-SCNC: 21 MMOL/L (ref 22–29)
COLOR UR: YELLOW
CREAT SERPL-MCNC: 0.9 MG/DL (ref 0.5–1)
EOSINOPHIL # BLD: 0.02 K/UL (ref 0.05–0.5)
EOSINOPHILS RELATIVE PERCENT: 0 % (ref 0–6)
ERYTHROCYTE [DISTWIDTH] IN BLOOD BY AUTOMATED COUNT: 13.2 % (ref 11.5–15)
GFR, ESTIMATED: 58 ML/MIN/1.73M2
GLUCOSE SERPL-MCNC: 93 MG/DL (ref 74–99)
GLUCOSE UR STRIP-MCNC: NEGATIVE MG/DL
HCT VFR BLD AUTO: 40.8 % (ref 34–48)
HGB BLD-MCNC: 13.3 G/DL (ref 11.5–15.5)
HGB UR QL STRIP.AUTO: NEGATIVE
IMM GRANULOCYTES # BLD AUTO: <0.03 K/UL (ref 0–0.58)
IMM GRANULOCYTES NFR BLD: 0 % (ref 0–5)
KETONES UR STRIP-MCNC: NEGATIVE MG/DL
LACTATE BLDV-SCNC: 0.7 MMOL/L (ref 0.5–2.2)
LEUKOCYTE ESTERASE UR QL STRIP: NEGATIVE
LIPASE SERPL-CCNC: 32 U/L (ref 13–60)
LYMPHOCYTES NFR BLD: 1.08 K/UL (ref 1.5–4)
LYMPHOCYTES RELATIVE PERCENT: 19 % (ref 20–42)
MCH RBC QN AUTO: 29.9 PG (ref 26–35)
MCHC RBC AUTO-ENTMCNC: 32.6 G/DL (ref 32–34.5)
MCV RBC AUTO: 91.7 FL (ref 80–99.9)
MONOCYTES NFR BLD: 0.43 K/UL (ref 0.1–0.95)
MONOCYTES NFR BLD: 8 % (ref 2–12)
NEUTROPHILS NFR BLD: 72 % (ref 43–80)
NEUTS SEG NFR BLD: 4.12 K/UL (ref 1.8–7.3)
NITRITE UR QL STRIP: NEGATIVE
PH UR STRIP: 7 [PH] (ref 5–8)
PLATELET, FLUORESCENCE: 135 K/UL (ref 130–450)
PMV BLD AUTO: 11 FL (ref 7–12)
POTASSIUM SERPL-SCNC: 4.2 MMOL/L (ref 3.5–5.1)
PROT SERPL-MCNC: 5.8 G/DL (ref 6.4–8.3)
PROT UR STRIP-MCNC: NEGATIVE MG/DL
RBC # BLD AUTO: 4.45 M/UL (ref 3.5–5.5)
RBC #/AREA URNS HPF: NORMAL /HPF
SODIUM SERPL-SCNC: 139 MMOL/L (ref 136–145)
SP GR UR STRIP: 1.01 (ref 1–1.03)
UROBILINOGEN UR STRIP-ACNC: 0.2 EU/DL (ref 0–1)
WBC #/AREA URNS HPF: NORMAL /HPF
WBC OTHER # BLD: 5.7 K/UL (ref 4.5–11.5)

## 2025-08-07 PROCEDURE — 80053 COMPREHEN METABOLIC PANEL: CPT

## 2025-08-07 PROCEDURE — 85025 COMPLETE CBC W/AUTO DIFF WBC: CPT

## 2025-08-07 PROCEDURE — 74177 CT ABD & PELVIS W/CONTRAST: CPT

## 2025-08-07 PROCEDURE — 83605 ASSAY OF LACTIC ACID: CPT

## 2025-08-07 PROCEDURE — 6370000000 HC RX 637 (ALT 250 FOR IP): Performed by: EMERGENCY MEDICINE

## 2025-08-07 PROCEDURE — 99285 EMERGENCY DEPT VISIT HI MDM: CPT

## 2025-08-07 PROCEDURE — 87086 URINE CULTURE/COLONY COUNT: CPT

## 2025-08-07 PROCEDURE — 83690 ASSAY OF LIPASE: CPT

## 2025-08-07 PROCEDURE — 6360000004 HC RX CONTRAST MEDICATION: Performed by: RADIOLOGY

## 2025-08-07 PROCEDURE — 81001 URINALYSIS AUTO W/SCOPE: CPT

## 2025-08-07 RX ORDER — IOPAMIDOL 755 MG/ML
75 INJECTION, SOLUTION INTRAVASCULAR
Status: COMPLETED | OUTPATIENT
Start: 2025-08-07 | End: 2025-08-07

## 2025-08-07 RX ORDER — POLYETHYLENE GLYCOL 3350 17 G/17G
17 POWDER, FOR SOLUTION ORAL DAILY PRN
Qty: 510 G | Refills: 0 | Status: SHIPPED | OUTPATIENT
Start: 2025-08-07 | End: 2025-09-06

## 2025-08-07 RX ADMIN — IOPAMIDOL 75 ML: 755 INJECTION, SOLUTION INTRAVENOUS at 14:54

## 2025-08-07 RX ADMIN — AMOXICILLIN AND CLAVULANATE POTASSIUM 1 TABLET: 875; 125 TABLET, FILM COATED ORAL at 18:06

## 2025-08-08 LAB
MICROORGANISM SPEC CULT: ABNORMAL
MICROORGANISM SPEC CULT: ABNORMAL
SERVICE CMNT-IMP: ABNORMAL
SPECIMEN DESCRIPTION: ABNORMAL

## 2025-08-15 ENCOUNTER — TELEPHONE (OUTPATIENT)
Dept: PRIMARY CARE CLINIC | Age: 89
End: 2025-08-15

## 2025-08-18 ENCOUNTER — TELEPHONE (OUTPATIENT)
Dept: PHYSICAL THERAPY | Age: 89
End: 2025-08-18

## 2025-08-18 ENCOUNTER — OFFICE VISIT (OUTPATIENT)
Dept: PRIMARY CARE CLINIC | Age: 89
End: 2025-08-18

## 2025-08-18 VITALS
DIASTOLIC BLOOD PRESSURE: 72 MMHG | SYSTOLIC BLOOD PRESSURE: 118 MMHG | OXYGEN SATURATION: 98 % | HEART RATE: 62 BPM | BODY MASS INDEX: 19.49 KG/M2 | TEMPERATURE: 97.7 F | RESPIRATION RATE: 18 BRPM | WEIGHT: 110 LBS | HEIGHT: 63 IN

## 2025-08-18 DIAGNOSIS — F01.50 VASCULAR DEMENTIA WITHOUT BEHAVIORAL DISTURBANCE (HCC): ICD-10-CM

## 2025-08-18 DIAGNOSIS — I25.83 CORONARY ARTERY DISEASE DUE TO LIPID RICH PLAQUE: ICD-10-CM

## 2025-08-18 DIAGNOSIS — E03.9 HYPOTHYROIDISM, UNSPECIFIED TYPE: ICD-10-CM

## 2025-08-18 DIAGNOSIS — G31.84 MILD COGNITIVE IMPAIRMENT WITH MEMORY LOSS: ICD-10-CM

## 2025-08-18 DIAGNOSIS — I10 PRIMARY HYPERTENSION: Primary | ICD-10-CM

## 2025-08-18 DIAGNOSIS — I25.10 CORONARY ARTERY DISEASE DUE TO LIPID RICH PLAQUE: ICD-10-CM

## 2025-08-18 ASSESSMENT — ENCOUNTER SYMPTOMS
BLOOD IN STOOL: 0
CONSTIPATION: 1
WHEEZING: 0
PHOTOPHOBIA: 0
SORE THROAT: 0
BACK PAIN: 0
SHORTNESS OF BREATH: 0
APNEA: 0
VOMITING: 0
FACIAL SWELLING: 0
NAUSEA: 0
COUGH: 0
COLOR CHANGE: 0
ALLERGIC/IMMUNOLOGIC NEGATIVE: 1
SINUS PRESSURE: 0
CHEST TIGHTNESS: 0
ABDOMINAL PAIN: 0
DIARRHEA: 0

## 2025-09-02 ENCOUNTER — TELEPHONE (OUTPATIENT)
Dept: PRIMARY CARE CLINIC | Age: 89
End: 2025-09-02

## 2025-09-02 ENCOUNTER — OFFICE VISIT (OUTPATIENT)
Dept: SURGERY | Age: 89
End: 2025-09-02
Payer: MEDICARE

## 2025-09-02 VITALS
BODY MASS INDEX: 19.67 KG/M2 | HEART RATE: 76 BPM | SYSTOLIC BLOOD PRESSURE: 108 MMHG | TEMPERATURE: 98.1 F | WEIGHT: 111 LBS | DIASTOLIC BLOOD PRESSURE: 66 MMHG | HEIGHT: 63 IN

## 2025-09-02 DIAGNOSIS — Z86.73 HISTORY OF STROKE: ICD-10-CM

## 2025-09-02 DIAGNOSIS — E11.22 TYPE 2 DIABETES MELLITUS WITH STAGE 3A CHRONIC KIDNEY DISEASE, WITHOUT LONG-TERM CURRENT USE OF INSULIN (HCC): ICD-10-CM

## 2025-09-02 DIAGNOSIS — K59.00 CONSTIPATION, UNSPECIFIED CONSTIPATION TYPE: Primary | ICD-10-CM

## 2025-09-02 DIAGNOSIS — F02.B0 MODERATE EARLY ONSET ALZHEIMER'S DEMENTIA WITHOUT BEHAVIORAL DISTURBANCE, PSYCHOTIC DISTURBANCE, MOOD DISTURBANCE, OR ANXIETY (HCC): ICD-10-CM

## 2025-09-02 DIAGNOSIS — N18.31 TYPE 2 DIABETES MELLITUS WITH STAGE 3A CHRONIC KIDNEY DISEASE, WITHOUT LONG-TERM CURRENT USE OF INSULIN (HCC): ICD-10-CM

## 2025-09-02 DIAGNOSIS — R10.9 ABDOMINAL PAIN OF MULTIPLE SITES: ICD-10-CM

## 2025-09-02 DIAGNOSIS — G30.0 MODERATE EARLY ONSET ALZHEIMER'S DEMENTIA WITHOUT BEHAVIORAL DISTURBANCE, PSYCHOTIC DISTURBANCE, MOOD DISTURBANCE, OR ANXIETY (HCC): ICD-10-CM

## 2025-09-02 PROCEDURE — 3044F HG A1C LEVEL LT 7.0%: CPT | Performed by: SURGERY

## 2025-09-02 PROCEDURE — 1124F ACP DISCUSS-NO DSCNMKR DOCD: CPT | Performed by: SURGERY

## 2025-09-02 PROCEDURE — 99204 OFFICE O/P NEW MOD 45 MIN: CPT | Performed by: SURGERY

## 2025-09-03 DIAGNOSIS — E03.9 HYPOTHYROIDISM, UNSPECIFIED TYPE: ICD-10-CM

## 2025-09-04 RX ORDER — LEVOTHYROXINE SODIUM 25 UG/1
25 TABLET ORAL DAILY
Qty: 30 TABLET | Refills: 5 | Status: SHIPPED | OUTPATIENT
Start: 2025-09-04